# Patient Record
Sex: MALE | Race: WHITE | NOT HISPANIC OR LATINO | Employment: OTHER | URBAN - METROPOLITAN AREA
[De-identification: names, ages, dates, MRNs, and addresses within clinical notes are randomized per-mention and may not be internally consistent; named-entity substitution may affect disease eponyms.]

---

## 2022-01-18 ENCOUNTER — TRANSCRIBE ORDERS (OUTPATIENT)
Dept: ADMINISTRATIVE | Facility: HOSPITAL | Age: 67
End: 2022-01-18

## 2022-01-18 ENCOUNTER — LAB (OUTPATIENT)
Dept: LAB | Facility: HOSPITAL | Age: 67
End: 2022-01-18

## 2022-01-18 DIAGNOSIS — R97.20 ELEVATED PROSTATE SPECIFIC ANTIGEN (PSA): ICD-10-CM

## 2022-01-18 DIAGNOSIS — R97.20 ELEVATED PROSTATE SPECIFIC ANTIGEN (PSA): Primary | ICD-10-CM

## 2022-01-18 PROCEDURE — 36415 COLL VENOUS BLD VENIPUNCTURE: CPT

## 2022-01-18 PROCEDURE — 84153 ASSAY OF PSA TOTAL: CPT

## 2022-01-18 PROCEDURE — 84154 ASSAY OF PSA FREE: CPT

## 2022-01-19 LAB
PSA FREE MFR SERPL: 30 %
PSA FREE SERPL-MCNC: 1.47 NG/ML
PSA SERPL-MCNC: 4.9 NG/ML (ref 0–4)

## 2022-07-18 ENCOUNTER — LAB (OUTPATIENT)
Dept: LAB | Facility: HOSPITAL | Age: 67
End: 2022-07-18

## 2022-07-18 ENCOUNTER — TRANSCRIBE ORDERS (OUTPATIENT)
Dept: ADMINISTRATIVE | Facility: HOSPITAL | Age: 67
End: 2022-07-18

## 2022-07-18 DIAGNOSIS — R97.20 ELEVATED PROSTATE SPECIFIC ANTIGEN (PSA): ICD-10-CM

## 2022-07-18 DIAGNOSIS — R97.20 ELEVATED PROSTATE SPECIFIC ANTIGEN (PSA): Primary | ICD-10-CM

## 2022-07-18 LAB — PSA SERPL-MCNC: 3.64 NG/ML (ref 0–4)

## 2022-07-18 PROCEDURE — G0103 PSA SCREENING: HCPCS

## 2022-07-18 PROCEDURE — 36415 COLL VENOUS BLD VENIPUNCTURE: CPT

## 2023-01-16 ENCOUNTER — LAB (OUTPATIENT)
Dept: LAB | Facility: HOSPITAL | Age: 68
End: 2023-01-16
Payer: MEDICARE

## 2023-01-16 ENCOUNTER — TRANSCRIBE ORDERS (OUTPATIENT)
Dept: ADMINISTRATIVE | Facility: HOSPITAL | Age: 68
End: 2023-01-16
Payer: MEDICARE

## 2023-01-16 DIAGNOSIS — R97.20 ELEVATED PROSTATE SPECIFIC ANTIGEN (PSA): Primary | ICD-10-CM

## 2023-01-16 DIAGNOSIS — R97.20 ELEVATED PROSTATE SPECIFIC ANTIGEN (PSA): ICD-10-CM

## 2023-01-16 PROCEDURE — 84153 ASSAY OF PSA TOTAL: CPT

## 2023-01-16 PROCEDURE — 84154 ASSAY OF PSA FREE: CPT

## 2023-01-16 PROCEDURE — 36415 COLL VENOUS BLD VENIPUNCTURE: CPT

## 2023-01-17 LAB
PSA FREE MFR SERPL: 30.2 %
PSA FREE SERPL-MCNC: 1.42 NG/ML
PSA SERPL-MCNC: 4.7 NG/ML (ref 0–4)

## 2023-04-03 ENCOUNTER — TRANSCRIBE ORDERS (OUTPATIENT)
Dept: ADMINISTRATIVE | Facility: HOSPITAL | Age: 68
End: 2023-04-03
Payer: MEDICARE

## 2023-04-03 ENCOUNTER — LAB (OUTPATIENT)
Dept: LAB | Facility: HOSPITAL | Age: 68
End: 2023-04-03
Payer: MEDICARE

## 2023-04-03 DIAGNOSIS — R97.20 ELEVATED PROSTATE SPECIFIC ANTIGEN (PSA): Primary | ICD-10-CM

## 2023-04-03 DIAGNOSIS — R97.20 ELEVATED PROSTATE SPECIFIC ANTIGEN (PSA): ICD-10-CM

## 2023-04-03 LAB — PSA SERPL-MCNC: 4.88 NG/ML (ref 0–4)

## 2023-04-03 PROCEDURE — 84153 ASSAY OF PSA TOTAL: CPT

## 2023-04-03 PROCEDURE — 36415 COLL VENOUS BLD VENIPUNCTURE: CPT

## 2023-07-31 ENCOUNTER — HOSPITAL ENCOUNTER (OUTPATIENT)
Dept: CT IMAGING | Facility: HOSPITAL | Age: 68
Discharge: HOME | End: 2023-07-31
Admitting: INTERNAL MEDICINE
Payer: MEDICARE

## 2023-07-31 DIAGNOSIS — Z87.891 PERSONAL HISTORY OF NICOTINE DEPENDENCE: ICD-10-CM

## 2023-07-31 PROCEDURE — 71271 CT THORAX LUNG CANCER SCR C-: CPT

## 2023-08-18 ENCOUNTER — TRANSCRIBE ORDERS (OUTPATIENT)
Dept: ADMINISTRATIVE | Facility: HOSPITAL | Age: 68
End: 2023-08-18
Payer: MEDICARE

## 2023-08-18 DIAGNOSIS — R91.8 LUNG MASS: Primary | ICD-10-CM

## 2023-08-25 ENCOUNTER — HOSPITAL ENCOUNTER (OUTPATIENT)
Dept: CT IMAGING | Facility: HOSPITAL | Age: 68
Discharge: HOME | End: 2023-08-25
Admitting: NURSE PRACTITIONER
Payer: MEDICARE

## 2023-08-25 DIAGNOSIS — R91.8 LUNG MASS: ICD-10-CM

## 2023-08-25 LAB — CREAT BLDA-MCNC: 0.9 MG/DL (ref 0.6–1.3)

## 2023-08-25 PROCEDURE — 71260 CT THORAX DX C+: CPT

## 2023-08-25 PROCEDURE — 82565 ASSAY OF CREATININE: CPT

## 2023-08-25 PROCEDURE — 25510000001 IOPAMIDOL 61 % SOLUTION: Performed by: NURSE PRACTITIONER

## 2023-08-25 RX ADMIN — IOPAMIDOL 100 ML: 612 INJECTION, SOLUTION INTRAVENOUS at 13:09

## 2023-08-31 ENCOUNTER — PREP FOR SURGERY (OUTPATIENT)
Dept: OTHER | Facility: HOSPITAL | Age: 68
End: 2023-08-31
Payer: MEDICARE

## 2023-08-31 ENCOUNTER — OFFICE VISIT (OUTPATIENT)
Dept: OTHER | Facility: HOSPITAL | Age: 68
End: 2023-08-31
Payer: MEDICARE

## 2023-08-31 VITALS
OXYGEN SATURATION: 93 % | HEIGHT: 72 IN | BODY MASS INDEX: 22.75 KG/M2 | WEIGHT: 168 LBS | DIASTOLIC BLOOD PRESSURE: 77 MMHG | SYSTOLIC BLOOD PRESSURE: 152 MMHG | HEART RATE: 70 BPM

## 2023-08-31 DIAGNOSIS — R91.8 MASS OF LEFT LUNG: Primary | ICD-10-CM

## 2023-08-31 RX ORDER — FLUTICASONE PROPIONATE 50 MCG
2 SPRAY, SUSPENSION (ML) NASAL DAILY PRN
COMMUNITY

## 2023-08-31 RX ORDER — PRAVASTATIN SODIUM 20 MG
1 TABLET ORAL NIGHTLY
COMMUNITY
Start: 2023-08-21

## 2023-08-31 NOTE — H&P (VIEW-ONLY)
"  THORACIC SURGERY CLINIC CONSULT NOTE    REASON FOR CONSULT: Left lower lobe mass    REFERRING PROVIDER: Hosea Abernathy MD    Subjective   HISTORY OF PRESENTING ILLNESS:   Sunny Hung is a 68 y.o. male who has significant medical problems as mentioned in the medical chart.     His primary care physician recommended that he have a CT scan performed because he used to smoke from age 19 or 20 and quit several years ago. He used to smoke 1 pack of cigarettes daily.  CT chest was performed on 7/31/2023 which reported large area of soft tissue density in the medial left lower lobe extending from the left hilum inferiorly. Repeat CT chest on 8/25/2023 reported large left infrahilar medial mass measuring 8 x 4 cm in size.  He was referred to multidisciplinary clinic for further evaluation.    He denied any chest pain.  He reported coughing when he lays down or first thing in the morning and it is occasionally a productive cough; the coughing is a new development that has lasted for the last 3 or 4 months. He was placed on Xyzal and Mucinex was recommended. He was not treated with any antibiotics and is unsure if he is losing weight. He denies dyspnea with talking or sitting and is sure that he can walk a block without getting short of breath. He is still regularly active and maintains his home and property himself. He had a \"knot\" excised from his left breast in the past, but no open chest surgery. He denies any cardiac, hepatic, or kidney issues and has not had any cancer. He is currently taking cholesterol medication.    ECOG 0    Past Medical History:   Diagnosis Date    BPH (benign prostatic hyperplasia)     Bruises easily     Bursitis of right shoulder     Cough     Hyperlipidemia     Mass of lower lobe of left lung        Past Surgical History:   Procedure Laterality Date    GANGLION CYST EXCISION Bilateral     HEMORRHOIDECTOMY      OTHER SURGICAL HISTORY      SOMETHING REMOVED FROM LEFT CHEST, BENIGN ? LIPOMA " "      Family History   Problem Relation Age of Onset    Malig Hyperthermia Neg Hx        Social History     Socioeconomic History    Marital status:    Tobacco Use    Smoking status: Former     Packs/day: 0.50     Years: 40.00     Pack years: 20.00     Types: Cigarettes     Quit date:      Years since quittin.6    Smokeless tobacco: Never   Vaping Use    Vaping Use: Never used   Substance and Sexual Activity    Alcohol use: Not Currently     Comment: VERY RARELY, MAYBE 1 BEER    Drug use: Never    Sexual activity: Defer       No current facility-administered medications for this visit.  No current outpatient medications on file.    Facility-Administered Medications Ordered in Other Visits:     dexAMETHasone sodium phosphate injection, , Intravenous, PRN, Axel Coon MD, 4 mg at 23 0825    glycopyrrolate (ROBINUL) injection, , Intravenous, PRN, Axel Coon MD, 0.2 mg at 23 0825    lactated ringers infusion, 30 mL/hr, Intravenous, Continuous PRN, Rosalio Scott MD, Last Rate: 30 mL/hr at 23 0800, Restarted at 23 0801    lidocaine (XYLOCAINE) 2% injection, , Injection, PRN, Axel Coon MD, 100 mg at 23 0814    ondansetron (ZOFRAN) injection, , Intravenous, PRN, Axel Coon MD, 4 mg at 23 0844    Propofol (DIPRIVAN) injection, , Intravenous, PRN, Axel Coon MD, 200 mg at 23 0814    rocuronium (ZEMURON) injection, , Intravenous, PRN, Axel Coon MD, 40 mg at 23 0814    sugammadex (BRIDION) injection, , Intravenous, PRN, Axel Coon MD, 400 mg at 23 0853     Allergies   Allergen Reactions    Amoxicillin-Pot Clavulanate Hives             Objective    OBJECTIVE:     VITAL SIGNS:  /77   Pulse 70   Ht 182.9 cm (72\")   Wt 76.2 kg (168 lb)   SpO2 93%   BMI 22.78 kg/m²     PHYSICAL EXAM:  Constitutional:       Appearance: Normal appearance.   HENT:      Head: Normocephalic.      " Right Ear: External ear normal.      Left Ear: External ear normal.      Nose: Nose normal.      Mouth/Throat: No obvious deformity     Pharynx: Oropharynx is clear.   Eyes:      Conjunctiva/sclera: Conjunctivae normal.   Cardiovascular:      Rate and Rhythm: Normal rate.      Pulses: Normal pulses.   Pulmonary:      Effort: Pulmonary effort is normal.   Abdominal:      Palpations: Abdomen is soft.   Musculoskeletal:         General: Normal range of motion.      Cervical back: Normal range of motion.   Skin:     General: Skin is warm.   Neurological:      General: No focal deficit present.      Mental Status: He is alert and oriented to person, place, and time.     LAB RESULTS:  I have reviewed all the available laboratory results in the chart.    RESULTS REVIEW:  I have reviewed the patient's all relevant laboratory and imaging findings.     CT Chest With Contrast Diagnostic (08/25/2023 13:15)        ASSESSMENT & PLAN:  Sunny Hung is a 68 y.o. male with significant medical conditions as mentioned above presented to my clinic.    Diagnosis: Left lower lobe 4 x 8 cm mass  Staging: Undetermined    An 8 cm mass is found in the left lower lung and a PET CT scan, an MRI of the brain, and a biopsy with a lung function test are recommended to further evaluate. The risks and benefits were explained, and all future planning will depend on the results of these tests.     I discussed the patients findings and my recommendations with the patient. The patient was given adequate time to ask questions and all questions were answered to patient satisfaction. Thank you for this consult and allowing us to participate in the care of your patient.      Rosalio Scott MD  Thoracic Surgeon  [facility]        Dictated utilizing Dragon dictation    I spent 60 minutes caring for Sunny on this date of service. This time includes time spent by me in the following activities:preparing for the visit, reviewing tests,  obtaining and/or reviewing a separately obtained history, performing a medically appropriate examination and/or evaluation , counseling and educating the patient/family/caregiver, ordering medications, tests, or procedures, referring and communicating with other health care professionals , documenting information in the medical record, independently interpreting results and communicating that information with the patient/family/caregiver, and care coordination and more than half the time was spent in direct face to face evaluation and decision making.     Transcribed from ambient dictation for Rosalio Scott MD by Maritza Castaneda.  08/31/23   14:36 EDT    Patient or patient representative verbalized consent to the visit recording.  I have personally performed the services described in this document as transcribed by the above individual, and it is both accurate and complete.

## 2023-08-31 NOTE — PROGRESS NOTES
"  THORACIC SURGERY CLINIC CONSULT NOTE    REASON FOR CONSULT: Left lower lobe mass    REFERRING PROVIDER: Hosea Abernathy MD    Subjective   HISTORY OF PRESENTING ILLNESS:   Sunny Hung is a 68 y.o. male who has significant medical problems as mentioned in the medical chart.     His primary care physician recommended that he have a CT scan performed because he used to smoke from age 19 or 20 and quit several years ago. He used to smoke 1 pack of cigarettes daily.  CT chest was performed on 7/31/2023 which reported large area of soft tissue density in the medial left lower lobe extending from the left hilum inferiorly. Repeat CT chest on 8/25/2023 reported large left infrahilar medial mass measuring 8 x 4 cm in size.  He was referred to multidisciplinary clinic for further evaluation.    He denied any chest pain.  He reported coughing when he lays down or first thing in the morning and it is occasionally a productive cough; the coughing is a new development that has lasted for the last 3 or 4 months. He was placed on Xyzal and Mucinex was recommended. He was not treated with any antibiotics and is unsure if he is losing weight. He denies dyspnea with talking or sitting and is sure that he can walk a block without getting short of breath. He is still regularly active and maintains his home and property himself. He had a \"knot\" excised from his left breast in the past, but no open chest surgery. He denies any cardiac, hepatic, or kidney issues and has not had any cancer. He is currently taking cholesterol medication.    ECOG 0    Past Medical History:   Diagnosis Date    BPH (benign prostatic hyperplasia)     Bruises easily     Bursitis of right shoulder     Cough     Hyperlipidemia     Mass of lower lobe of left lung        Past Surgical History:   Procedure Laterality Date    GANGLION CYST EXCISION Bilateral     HEMORRHOIDECTOMY      OTHER SURGICAL HISTORY      SOMETHING REMOVED FROM LEFT CHEST, BENIGN ? LIPOMA " "      Family History   Problem Relation Age of Onset    Malig Hyperthermia Neg Hx        Social History     Socioeconomic History    Marital status:    Tobacco Use    Smoking status: Former     Packs/day: 0.50     Years: 40.00     Pack years: 20.00     Types: Cigarettes     Quit date:      Years since quittin.6    Smokeless tobacco: Never   Vaping Use    Vaping Use: Never used   Substance and Sexual Activity    Alcohol use: Not Currently     Comment: VERY RARELY, MAYBE 1 BEER    Drug use: Never    Sexual activity: Defer       No current facility-administered medications for this visit.  No current outpatient medications on file.    Facility-Administered Medications Ordered in Other Visits:     dexAMETHasone sodium phosphate injection, , Intravenous, PRN, Axel Coon MD, 4 mg at 23 0825    glycopyrrolate (ROBINUL) injection, , Intravenous, PRN, Axel Coon MD, 0.2 mg at 23 0825    lactated ringers infusion, 30 mL/hr, Intravenous, Continuous PRN, Rosalio Scott MD, Last Rate: 30 mL/hr at 23 0800, Restarted at 23 0801    lidocaine (XYLOCAINE) 2% injection, , Injection, PRN, Axel Coon MD, 100 mg at 23 0814    ondansetron (ZOFRAN) injection, , Intravenous, PRN, Axel Coon MD, 4 mg at 23 0844    Propofol (DIPRIVAN) injection, , Intravenous, PRN, Axel Coon MD, 200 mg at 23 0814    rocuronium (ZEMURON) injection, , Intravenous, PRN, Axel Coon MD, 40 mg at 23 0814    sugammadex (BRIDION) injection, , Intravenous, PRN, Axel Coon MD, 400 mg at 23 0853     Allergies   Allergen Reactions    Amoxicillin-Pot Clavulanate Hives             Objective    OBJECTIVE:     VITAL SIGNS:  /77   Pulse 70   Ht 182.9 cm (72\")   Wt 76.2 kg (168 lb)   SpO2 93%   BMI 22.78 kg/m²     PHYSICAL EXAM:  Constitutional:       Appearance: Normal appearance.   HENT:      Head: Normocephalic.      " Right Ear: External ear normal.      Left Ear: External ear normal.      Nose: Nose normal.      Mouth/Throat: No obvious deformity     Pharynx: Oropharynx is clear.   Eyes:      Conjunctiva/sclera: Conjunctivae normal.   Cardiovascular:      Rate and Rhythm: Normal rate.      Pulses: Normal pulses.   Pulmonary:      Effort: Pulmonary effort is normal.   Abdominal:      Palpations: Abdomen is soft.   Musculoskeletal:         General: Normal range of motion.      Cervical back: Normal range of motion.   Skin:     General: Skin is warm.   Neurological:      General: No focal deficit present.      Mental Status: He is alert and oriented to person, place, and time.     LAB RESULTS:  I have reviewed all the available laboratory results in the chart.    RESULTS REVIEW:  I have reviewed the patient's all relevant laboratory and imaging findings.     CT Chest With Contrast Diagnostic (08/25/2023 13:15)        ASSESSMENT & PLAN:  Sunny Hung is a 68 y.o. male with significant medical conditions as mentioned above presented to my clinic.    Diagnosis: Left lower lobe 4 x 8 cm mass  Staging: Undetermined    An 8 cm mass is found in the left lower lung and a PET CT scan, an MRI of the brain, and a biopsy with a lung function test are recommended to further evaluate. The risks and benefits were explained, and all future planning will depend on the results of these tests.     I discussed the patients findings and my recommendations with the patient. The patient was given adequate time to ask questions and all questions were answered to patient satisfaction. Thank you for this consult and allowing us to participate in the care of your patient.      Rosalio Scott MD  Thoracic Surgeon  [facility] [place]        Dictated utilizing Dragon dictation    I spent 60 minutes caring for Sunny on this date of service. This time includes time spent by me in the following activities:preparing for the visit, reviewing tests,  obtaining and/or reviewing a separately obtained history, performing a medically appropriate examination and/or evaluation , counseling and educating the patient/family/caregiver, ordering medications, tests, or procedures, referring and communicating with other health care professionals , documenting information in the medical record, independently interpreting results and communicating that information with the patient/family/caregiver, and care coordination and more than half the time was spent in direct face to face evaluation and decision making.     Transcribed from ambient dictation for Rosalio Scott MD by Maritza Castaneda.  08/31/23   14:36 EDT    Patient or patient representative verbalized consent to the visit recording.  I have personally performed the services described in this document as transcribed by the above individual, and it is both accurate and complete.

## 2023-09-01 PROBLEM — R91.8 MASS OF LEFT LUNG: Status: ACTIVE | Noted: 2023-09-01

## 2023-09-05 ENCOUNTER — HOSPITAL ENCOUNTER (OUTPATIENT)
Dept: PET IMAGING | Facility: HOSPITAL | Age: 68
Discharge: HOME | End: 2023-09-05
Payer: MEDICARE

## 2023-09-05 ENCOUNTER — PRE-ADMISSION TESTING (OUTPATIENT)
Dept: PREADMISSION TESTING | Facility: HOSPITAL | Age: 68
End: 2023-09-05
Payer: MEDICARE

## 2023-09-05 VITALS
DIASTOLIC BLOOD PRESSURE: 79 MMHG | HEIGHT: 70 IN | WEIGHT: 168.8 LBS | SYSTOLIC BLOOD PRESSURE: 177 MMHG | RESPIRATION RATE: 20 BRPM | TEMPERATURE: 98.3 F | OXYGEN SATURATION: 96 % | BODY MASS INDEX: 24.17 KG/M2 | HEART RATE: 61 BPM

## 2023-09-05 DIAGNOSIS — R91.8 MASS OF LEFT LUNG: ICD-10-CM

## 2023-09-05 LAB
ANION GAP SERPL CALCULATED.3IONS-SCNC: 10.4 MMOL/L (ref 5–15)
BUN SERPL-MCNC: 13 MG/DL (ref 8–23)
BUN/CREAT SERPL: 16.5 (ref 7–25)
CALCIUM SPEC-SCNC: 9.2 MG/DL (ref 8.6–10.5)
CHLORIDE SERPL-SCNC: 102 MMOL/L (ref 98–107)
CO2 SERPL-SCNC: 25.6 MMOL/L (ref 22–29)
CREAT SERPL-MCNC: 0.79 MG/DL (ref 0.76–1.27)
DEPRECATED RDW RBC AUTO: 46.7 FL (ref 37–54)
EGFRCR SERPLBLD CKD-EPI 2021: 97.4 ML/MIN/1.73
ERYTHROCYTE [DISTWIDTH] IN BLOOD BY AUTOMATED COUNT: 15.8 % (ref 12.3–15.4)
GLUCOSE SERPL-MCNC: 80 MG/DL (ref 65–99)
HCT VFR BLD AUTO: 40.2 % (ref 37.5–51)
HGB BLD-MCNC: 12.7 G/DL (ref 13–17.7)
MCH RBC QN AUTO: 25.9 PG (ref 26.6–33)
MCHC RBC AUTO-ENTMCNC: 31.6 G/DL (ref 31.5–35.7)
MCV RBC AUTO: 82 FL (ref 79–97)
PLATELET # BLD AUTO: 294 10*3/MM3 (ref 140–450)
PMV BLD AUTO: 10 FL (ref 6–12)
POTASSIUM SERPL-SCNC: 4 MMOL/L (ref 3.5–5.2)
RBC # BLD AUTO: 4.9 10*6/MM3 (ref 4.14–5.8)
SODIUM SERPL-SCNC: 138 MMOL/L (ref 136–145)
WBC NRBC COR # BLD: 7.68 10*3/MM3 (ref 3.4–10.8)

## 2023-09-05 PROCEDURE — 36415 COLL VENOUS BLD VENIPUNCTURE: CPT

## 2023-09-05 PROCEDURE — 93005 ELECTROCARDIOGRAM TRACING: CPT

## 2023-09-05 PROCEDURE — 80048 BASIC METABOLIC PNL TOTAL CA: CPT

## 2023-09-05 PROCEDURE — 85027 COMPLETE CBC AUTOMATED: CPT

## 2023-09-05 PROCEDURE — 71250 CT THORAX DX C-: CPT

## 2023-09-05 NOTE — DISCHARGE INSTRUCTIONS
Take the following medications the morning of surgery with a small sip of water:  NONE      Arrive to hospital on your day of surgery at 7:00 AM.      If you are on prescription narcotic pain medication to control your pain you may also take that medication the morning of surgery.    General Instructions:  Do not eat or drink anything after midnight the night before surgery.  Infants may have breast milk up to four hours before surgery.  Infants drinking formula may drink formula up to six hours before surgery.   Patients who avoid smoking, chewing tobacco and alcohol for 4 weeks prior to surgery have a reduced risk of post-operative complications.  Quit smoking as many days before surgery as you can.  Do not smoke, use chewing tobacco or drink alcohol the day of surgery.   If applicable bring your C-PAP/ BI-PAP machine in with you to preop day of surgery.  Bring any papers given to you in the doctor’s office.  Wear clean comfortable clothes.  Do not wear contact lenses, false eyelashes or make-up.  Bring a case for your glasses.   Bring crutches or walker if applicable.  Remove all piercings.  Leave jewelry and any other valuables at home.  Hair extensions with metal clips must be removed prior to surgery.  The Pre-Admission Testing nurse will instruct you to bring medications if unable to obtain an accurate list in Pre-Admission Testing.        If you were given a blood bank ID arm band remember to bring it with you the day of surgery.    Preventing a Surgical Site Infection:  For 2 to 3 days before surgery, avoid shaving with a razor because the razor can irritate skin and make it easier to develop an infection.    Any areas of open skin can increase the risk of a post-operative wound infection by allowing bacteria to enter and travel throughout the body.  Notify your surgeon if you have any skin wounds / rashes even if it is not near the expected surgical site.  The area will need assessed to determine if  surgery should be delayed until it is healed.  The night prior to surgery shower using a fresh bar of anti-bacterial soap (such as Dial) and clean washcloth.  Sleep in a clean bed with clean clothing.  Do not allow pets to sleep with you.  Shower on the morning of surgery using a fresh bar of anti-bacterial soap (such as Dial) and clean washcloth.  Dry with a clean towel and dress in clean clothing.  Ask your surgeon if you will be receiving antibiotics prior to surgery.  Make sure you, your family, and all healthcare providers clean their hands with soap and water or an alcohol based hand  before caring for you or your wound.    Day of surgery:  Your arrival time is approximately two hours before your scheduled surgery time.  Upon arrival, a Pre-op nurse and Anesthesiologist will review your health history, obtain vital signs, and answer questions you may have.  The only belongings needed at this time will be your home medications and if applicable your C-PAP/BI-PAP machine.  A Pre-op nurse will start an IV and you may receive medication in preparation for surgery, including something to help you relax.      Please be aware that surgery does come with discomfort.  We want to make every effort to control your discomfort so please discuss any uncontrolled symptoms with your nurse.   Your doctor will most likely have prescribed pain medications.      If you are going home after surgery you will receive individualized written care instructions before being discharged.  A responsible adult must drive you to and from the hospital on the day of your surgery and stay with you for 24 hours.  Discharge prescriptions can be filled by the hospital pharmacy during regular pharmacy hours.  If you are having surgery late in the day/evening your prescription may be e-prescribed to your pharmacy.  Please verify your pharmacy hours or chose a 24 hour pharmacy to avoid not having access to your prescription because your  pharmacy has closed for the day.    If you are staying overnight following surgery, you will be transported to your hospital room following the recovery period.  [facility] has all private rooms.    If you have any questions please call Pre-Admission Testing at [phone].  Deductibles and co-payments are collected on the day of service. Please be prepared to pay the required co-pay, deductible or deposit on the day of service as defined by your plan.    Call your surgeon immediately if you experience any of the following symptoms:  Sore Throat  Shortness of Breath or difficulty breathing  Cough  Chills  Body soreness or muscle pain  Headache  Fever  New loss of taste or smell  Do not arrive for your surgery ill.  Your procedure will need to be rescheduled to another time.  You will need to call your physician before the day of surgery to avoid any unnecessary exposure to hospital staff as well as other patients.

## 2023-09-06 ENCOUNTER — APPOINTMENT (OUTPATIENT)
Dept: GENERAL RADIOLOGY | Facility: HOSPITAL | Age: 68
End: 2023-09-06
Payer: MEDICARE

## 2023-09-06 ENCOUNTER — ANESTHESIA (OUTPATIENT)
Dept: GASTROENTEROLOGY | Facility: HOSPITAL | Age: 68
End: 2023-09-06
Payer: MEDICARE

## 2023-09-06 ENCOUNTER — ANESTHESIA EVENT (OUTPATIENT)
Dept: GASTROENTEROLOGY | Facility: HOSPITAL | Age: 68
End: 2023-09-06
Payer: MEDICARE

## 2023-09-06 ENCOUNTER — HOSPITAL ENCOUNTER (OUTPATIENT)
Facility: HOSPITAL | Age: 68
Setting detail: HOSPITAL OUTPATIENT SURGERY
Discharge: HOME | End: 2023-09-06
Attending: SURGERY | Admitting: SURGERY
Payer: MEDICARE

## 2023-09-06 VITALS
TEMPERATURE: 98 F | HEART RATE: 80 BPM | OXYGEN SATURATION: 95 % | BODY MASS INDEX: 22.44 KG/M2 | DIASTOLIC BLOOD PRESSURE: 85 MMHG | RESPIRATION RATE: 18 BRPM | HEIGHT: 72 IN | SYSTOLIC BLOOD PRESSURE: 160 MMHG | WEIGHT: 165.7 LBS

## 2023-09-06 DIAGNOSIS — R91.8 MASS OF LEFT LUNG: ICD-10-CM

## 2023-09-06 DIAGNOSIS — R91.8 MASS OF LEFT LUNG: Primary | ICD-10-CM

## 2023-09-06 LAB
QT INTERVAL: 392 MS
QTC INTERVAL: 389 MS

## 2023-09-06 PROCEDURE — 88333 PATH CONSLTJ SURG CYTO XM 1: CPT | Performed by: SURGERY

## 2023-09-06 PROCEDURE — 25010000002 DEXAMETHASONE SODIUM PHOSPHATE 20 MG/5ML SOLUTION: Performed by: ANESTHESIOLOGY

## 2023-09-06 PROCEDURE — 71045 X-RAY EXAM CHEST 1 VIEW: CPT

## 2023-09-06 PROCEDURE — 25010000002 SUGAMMADEX 200 MG/2ML SOLUTION: Performed by: ANESTHESIOLOGY

## 2023-09-06 PROCEDURE — C1726 CATH, BAL DIL, NON-VASCULAR: HCPCS | Performed by: SURGERY

## 2023-09-06 PROCEDURE — 25010000002 PROPOFOL 10 MG/ML EMULSION: Performed by: ANESTHESIOLOGY

## 2023-09-06 PROCEDURE — 94640 AIRWAY INHALATION TREATMENT: CPT

## 2023-09-06 PROCEDURE — 94799 UNLISTED PULMONARY SVC/PX: CPT

## 2023-09-06 PROCEDURE — 88342 IMHCHEM/IMCYTCHM 1ST ANTB: CPT | Performed by: SURGERY

## 2023-09-06 PROCEDURE — 88112 CYTOPATH CELL ENHANCE TECH: CPT | Performed by: SURGERY

## 2023-09-06 PROCEDURE — 94761 N-INVAS EAR/PLS OXIMETRY MLT: CPT

## 2023-09-06 PROCEDURE — 76000 FLUOROSCOPY <1 HR PHYS/QHP: CPT

## 2023-09-06 PROCEDURE — 88172 CYTP DX EVAL FNA 1ST EA SITE: CPT | Performed by: SURGERY

## 2023-09-06 PROCEDURE — 88341 IMHCHEM/IMCYTCHM EA ADD ANTB: CPT | Performed by: SURGERY

## 2023-09-06 PROCEDURE — 25010000002 ONDANSETRON PER 1 MG: Performed by: ANESTHESIOLOGY

## 2023-09-06 PROCEDURE — 88173 CYTOPATH EVAL FNA REPORT: CPT | Performed by: SURGERY

## 2023-09-06 PROCEDURE — 88305 TISSUE EXAM BY PATHOLOGIST: CPT | Performed by: SURGERY

## 2023-09-06 RX ORDER — ROCURONIUM BROMIDE 10 MG/ML
INJECTION, SOLUTION INTRAVENOUS AS NEEDED
Status: DISCONTINUED | OUTPATIENT
Start: 2023-09-06 | End: 2023-09-06 | Stop reason: SURG

## 2023-09-06 RX ORDER — ONDANSETRON 2 MG/ML
INJECTION INTRAMUSCULAR; INTRAVENOUS AS NEEDED
Status: DISCONTINUED | OUTPATIENT
Start: 2023-09-06 | End: 2023-09-06 | Stop reason: SURG

## 2023-09-06 RX ORDER — PROPOFOL 10 MG/ML
VIAL (ML) INTRAVENOUS AS NEEDED
Status: DISCONTINUED | OUTPATIENT
Start: 2023-09-06 | End: 2023-09-06 | Stop reason: SURG

## 2023-09-06 RX ORDER — SODIUM CHLORIDE, SODIUM LACTATE, POTASSIUM CHLORIDE, CALCIUM CHLORIDE 600; 310; 30; 20 MG/100ML; MG/100ML; MG/100ML; MG/100ML
30 INJECTION, SOLUTION INTRAVENOUS CONTINUOUS PRN
Status: DISCONTINUED | OUTPATIENT
Start: 2023-09-06 | End: 2023-09-06 | Stop reason: HOSPADM

## 2023-09-06 RX ORDER — GLYCOPYRROLATE 0.2 MG/ML
INJECTION INTRAMUSCULAR; INTRAVENOUS AS NEEDED
Status: DISCONTINUED | OUTPATIENT
Start: 2023-09-06 | End: 2023-09-06 | Stop reason: SURG

## 2023-09-06 RX ORDER — DEXAMETHASONE SODIUM PHOSPHATE 4 MG/ML
INJECTION, SOLUTION INTRA-ARTICULAR; INTRALESIONAL; INTRAMUSCULAR; INTRAVENOUS; SOFT TISSUE AS NEEDED
Status: DISCONTINUED | OUTPATIENT
Start: 2023-09-06 | End: 2023-09-06 | Stop reason: SURG

## 2023-09-06 RX ORDER — IPRATROPIUM BROMIDE AND ALBUTEROL SULFATE 2.5; .5 MG/3ML; MG/3ML
3 SOLUTION RESPIRATORY (INHALATION) ONCE
Status: COMPLETED | OUTPATIENT
Start: 2023-09-06 | End: 2023-09-06

## 2023-09-06 RX ORDER — LIDOCAINE HYDROCHLORIDE 20 MG/ML
INJECTION, SOLUTION INFILTRATION; PERINEURAL AS NEEDED
Status: DISCONTINUED | OUTPATIENT
Start: 2023-09-06 | End: 2023-09-06 | Stop reason: SURG

## 2023-09-06 RX ADMIN — PROPOFOL 200 MCG/KG/MIN: 10 INJECTION, EMULSION INTRAVENOUS at 08:14

## 2023-09-06 RX ADMIN — GLYCOPYRROLATE 0.2 MG: 0.2 INJECTION INTRAMUSCULAR; INTRAVENOUS at 08:25

## 2023-09-06 RX ADMIN — DEXAMETHASONE SODIUM PHOSPHATE 4 MG: 4 INJECTION, SOLUTION INTRAMUSCULAR; INTRAVENOUS at 08:25

## 2023-09-06 RX ADMIN — LIDOCAINE HYDROCHLORIDE 100 MG: 20 INJECTION, SOLUTION INFILTRATION; PERINEURAL at 08:14

## 2023-09-06 RX ADMIN — ONDANSETRON 4 MG: 2 INJECTION INTRAMUSCULAR; INTRAVENOUS at 08:44

## 2023-09-06 RX ADMIN — SUGAMMADEX 400 MG: 100 INJECTION, SOLUTION INTRAVENOUS at 08:53

## 2023-09-06 RX ADMIN — ROCURONIUM BROMIDE 40 MG: 10 INJECTION, SOLUTION INTRAVENOUS at 08:14

## 2023-09-06 RX ADMIN — IPRATROPIUM BROMIDE AND ALBUTEROL SULFATE 3 ML: .5; 3 SOLUTION RESPIRATORY (INHALATION) at 09:32

## 2023-09-06 RX ADMIN — SODIUM CHLORIDE, POTASSIUM CHLORIDE, SODIUM LACTATE AND CALCIUM CHLORIDE 30 ML/HR: 600; 310; 30; 20 INJECTION, SOLUTION INTRAVENOUS at 07:51

## 2023-09-06 RX ADMIN — PROPOFOL 200 MG: 10 INJECTION, EMULSION INTRAVENOUS at 08:14

## 2023-09-06 NOTE — DISCHARGE INSTRUCTIONS
Endoscopic ultrasound and Robotic bronchoscopy with fine needle aspiration  Samples (biopsies) of the lymph nodes are taken from inside the lungs and sent for diagnostic testing.  Final results of your biopsy take up to 5 business days to process; your endoscopic physician will contact you with your results.  EBUS and robotic bronchoscopy is recommended in the following instances:  To diagnose different types of lung disorders (such as sarcoidosis or tuberculosis)  To diagnose or 'stage' cancer   To investigate enlarged lymph  nodes in the chest  Due to effects of sedation, do not drive or operate heavy machinery for 24 hours.  Avoid heavy lifting (>10 lbs.) or strenuous activity for 48 hours.  Continue to avoid non-steroidal anti-inflammatory medications (NSAIDS) for 5-7 days after the procedure unless told otherwise by your endoscopic and primary care physicians.  Some patients have a temporary sore throat after the procedure; this is a normal finding and over-the-counter lozenges help soothe symptoms.  You may resume normal diet once you are discharged.   Avoid red-colored foods for 24 hours.   You may resume your blood thinner medications (if applicable) as directed by your endoscopic and primary care physicians.  It is normal to have a very small amount of blood-tinged sputum immediately after the procedure. This should resolve within a few hours.  Although complications are rare, there is a small risk of pain, collapsed lung, bleeding and infection. Contact your endoscopic physician if you have these signs or symptoms (Dr. Scott):  Temperature greater than 102°F  Worsening pain not relieved by medications  Go to your nearest emergency room is you experience:  Shaking, chills or a temperature over 102°F.    New, sudden difficulty breathing.    New pain when taking a deep breath.    New, sudden chest pain.  Worsening cough that produces large amounts of blood.      For the next 24 hours patient needs to be with a  responsible adult.    For 24 hours DO NOT drive, operate machinery, appliances, drink alcohol, make important decisions or sign legal documents.    Start with a light or bland diet if you are feeling sick to your stomach otherwise advance to regular diet as tolerated.    Follow recommendations on procedure report if provided by your doctor.    Call Dr Scott [phone]    Problems may include but not limited to: large amounts of bleeding, trouble breathing, repeated vomiting, severe unrelieved pain, fever or chills.

## 2023-09-06 NOTE — ANESTHESIA PREPROCEDURE EVALUATION
Anesthesia Evaluation     Patient summary reviewed and Nursing notes reviewed   NPO Solid Status: > 8 hours             Airway   Mallampati: I  TM distance: >3 FB  Neck ROM: full  No difficulty expected  Dental - normal exam     Pulmonary - normal exam   (+) a smoker Former,  Cardiovascular - normal exam  Exercise tolerance: good (4-7 METS)    (+) hyperlipidemia      Neuro/Psych- negative ROS  GI/Hepatic/Renal/Endo - negative ROS     Musculoskeletal (-) negative ROS    Abdominal    Substance History - negative use     OB/GYN negative ob/gyn ROS         Other                      Anesthesia Plan    ASA 2     general     intravenous induction     Anesthetic plan, risks, benefits, and alternatives have been provided, discussed and informed consent has been obtained with: patient.  Pre-procedure education provided    CODE STATUS:

## 2023-09-06 NOTE — OP NOTE
Operative Note     Date of procedure: 9/6/2023     Patient name: Sunny Hung  MRN: [id number]    Pre OP diagnosis:  Left lower lobe mass.  History of tobacco abuse.  Hyperlipidemia.  Chronic cough.  Benign prostatic hyperplasia.    Post OP diagnosis:  Left lower lobe non-small cell lung cancer.  History of tobacco abuse.  Hyperlipidemia.  Chronic cough.  Benign prostatic hyperplasia.    Procedure performed:   Flexible bronchoscopy.  Navigational bronchoscopy with ION robot with C-arm.  Interpretation of fluoroscopy images.  Radial endobronchial ultrasound.  Fine-needle aspiration of the left lower lobe mass.  Cold forcep biopsy of left lower lobe mass.  Left lower lobe bronchoalveolar lavage.  Endobronchial ultrasound for mediastinal staging.  Fine-needle aspiration of level 7 lymph node.  Fine-needle aspiration of level 4R lymph node.    ION Synoptic report:  Date of radiological diagnosis: 7/31/2023  Lesions biopsied: Single  Tumor size: 80 mm  Location: Left Lower Lobe  Peripheral 1/3: No  Appearance: Solid  PET avidity: Yes  Bronchial sign: Yes  Prior biopsy: No  Radial EBUS: Concentric  Tools utilized: 21-Gauge Needle, cold forcep biopsy  MANUEL result: Non-small cell lung cancer  EBUS performed: Yes    Indications:   Sunny Hung is a 68-year-old male who has significant medical problems as mentioned in the medical chart.      His primary care physician recommended that he have a CT scan performed because he used to smoke from age 19 or 20 and quit several years ago. He used to smoke 1 pack of cigarettes daily.  CT chest was performed on 7/31/2023 which reported large area of soft tissue density in the medial left lower lobe extending from the left hilum inferiorly. Repeat CT chest on 8/25/2023 reported large left infrahilar medial mass measuring 8 x 4 cm in size.  He was referred to multidisciplinary clinic for further evaluation.     He denied any chest pain.  He reported coughing when he lays down or first  "thing in the morning and it is occasionally a productive cough; the coughing is a new development that has lasted for the last 3 or 4 months. He was placed on Xyzal and Mucinex was recommended. He was not treated with any antibiotics and is unsure if he is losing weight. He denies dyspnea with talking or sitting and is sure that he can walk a block without getting short of breath. He is still regularly active and maintains his home and property himself. He had a \"knot\" excised from his left breast in the past, but no open chest surgery. He denies any cardiac, hepatic, or kidney issues and has not had any cancer.     Based on size, appearance and history of tobacco abuse, primary lung malignancy was high on my differential.  Therefore I recommended ION robotic navigational bronchoscopy to establish diagnosis and mediastinal staging.       Surgeon: Rosalio Scott MD     Assistants: No qualified assistant was available for this procedure.      Anesthesia: General endotracheal anesthesia    ASA Class: 3    Procedure Details   On 9/6/2023, the patient was brought to the operating room and placed in the supine position on the operating room table. Following an uneventful induction of general anesthesia, patient was intubated with a single endotracheal tube without incident.  Antibiotic for surgical prophylaxis was not indicated due to the nature of the procedure.  Prior to beginning the operation, a time-out was conducted with all members of surgical team present. The patient was identified as .name, the procedure and the correct site were verified.     I began by performing flexible bronchoscopy.  A flexible adult bronchoscope was advanced through the endotracheal tube.  A complete examination of the distal trachea and bilateral mainstem and lobar bronchi and all segmental bronchial orifices was performed.  The patient has normal endobronchial anatomy.  All the tracheobronchial tree had moderate mucus secretion.  There " was no blood, endoluminal lesions or other abnormal findings.  The bronchoscope was removed.    Prior to the procedure, the patient CT scan was integrated into the PlanPoint interface that generated the patient's 3D airway tree.  The target nodule was identified and its anatomical borders were mapped.  A path to the target nodule was generated through the PlanPoint interface.  After the plan was finalized, the patient image and plan guide was transferred to the Direct Access Software robotic platform.  Using the Ion's controller, the 3.5 mm robotic catheter was advanced with the Ion's vision probe and navigated to the target along the preplanned path.  The catheter was parked next to the target lesion.  The Ion vision probe was removed and radial EBUS was used to confirm the presence of the target lesion.  The radial EBUS was removed and under fluoroscopic guidance, a 21 gauge Ion’s Flexision needle was passed into the robotic catheter.  The needle was advanced into the target lesion and the location of the needle was confirmed with the C-arm. Multiple passes of the needle were made into the target lesion and the aspirate was sent for Rapid on Site Evaluation (MANUEL). The needle was removed. I then performed cold forcep biopsy through the working channel in the same location. The bronchioloalveolar lavage was performed in the segmental bronchi.  The aspirate was sent for cytology.  The robotic catheter was removed and an adult flexible bronchoscope was inserted.  There was no pooling of blood into the bronchial tree.  All tracheobronchial tree were cleared from secretions.    The flexible bronchoscope with the Olympus endobronchial ultrasound was inserted.  The level 7 lymph node was identified that measured 15 mm.  Using 21-gauge needle endobronchial Olympus needle, transbronchial FNA was performed.  Multiple passes with the needle were performed.  The specimen was passed for Rapid onsite evaluation (MANUEL) by the pathologist. The  cytology technician confirmed adequate sample.  The level 4R lymph node was identified and measured 7 mm.  Multiple fine-needle aspiration was performed.  The Olympus endobronchial ultrasound was removed and adult bronchoscope was inserted. There was no pooling of blood into the bronchial tree.  All tracheobronchial tree were cleared from secretions.    The patient was awakened from anesthesia, was extubated without incident, and was transported to the Post Anesthesia Care Unit in stable condition.    Findings:  The left lower lobe bronchus was completely obstructed by tumor.  Using 21-gauge needle, fine-needle aspiration of the left lower lobe mass was performed.  Cold forcep biopsy of left lower lobe mass was performed.  Bronchoalveolar lavage of the left lower lobe was performed.  The pathologist non-small cell lung cancer in the left lower lobe mass.  15 mm level 7 lymph node was identified and FNA was performed.  7 mm level 4R lymph node was identified and sampled.  No evidence of significant active bleeding at the end of the procedure.    Estimated Blood Loss:  Minimal           Drains: None                 Specimens:   ID Type Source Tests Collected by Time   A (Not marked as sent) : STATION 7 FNA Lymph Node Mediastinum FINE NEEDLE ASPIRATION Rosalio Scott MD 9/6/2023 0841   B (Not marked as sent) : LEFT LOWER LOBE FNA Fine Needle Aspirate Lung, Left Lower Lobe FINE NEEDLE ASPIRATION Rosalio Scott MD 9/6/2023 0842   C (Not marked as sent) : LEFT LOWER LOBE BIOPSIES Tissue Lung, Left Lower Lobe TISSUE PATHOLOGY EXAM Rosalio Scott MD 9/6/2023 0842   D (Not marked as sent) : STATION 4R FNA'S Lymph Node Mediastinum FINE NEEDLE ASPIRATION Rosalio Scott MD 9/6/2023 0844   E (Not marked as sent) : LEFT LOWER LOBE BAL Lavage Lung, Left Lower Lobe NON-GYNECOLOGIC CYTOLOGY Rosalio Scott MD 9/6/2023 0845              Implants: None           Complications: None           Disposition: PACU - hemodynamically stable.            Condition: Stable     Rosalio Scott MD   Thoracic Surgeon  [facility]

## 2023-09-06 NOTE — ANESTHESIA POSTPROCEDURE EVALUATION
Patient: Sunny Hung    Procedure Summary       Date: 09/06/23 Room / Location:  [facility]    Anesthesia Start: 0800 Anesthesia Stop: 0915    Procedure: BRONCHOSCOPY WITH BAL;   ION ROBOT AND ENDOBRONCHIAL ULTRASOUND WITH FNA'S (Chest) Diagnosis:       Mass of left lung      (Mass of left lung [R91.8])    Surgeons: Rosalio Scott MD Provider: Axel Coon MD    Anesthesia Type: general ASA Status: 2            Anesthesia Type: general    Vitals  Vitals Value Taken Time   /85 09/06/23 0954   Temp 36.7 °C (98 °F) 09/06/23 0954   Pulse 80 09/06/23 0954   Resp 18 09/06/23 0954   SpO2 95 % 09/06/23 0954           Post Anesthesia Care and Evaluation    Patient location during evaluation: bedside  Patient participation: complete - patient participated  Level of consciousness: awake  Pain management: adequate    Airway patency: patent  Anesthetic complications: No anesthetic complications  PONV Status: none  Cardiovascular status: acceptable  Respiratory status: acceptable  Hydration status: acceptable  Post Neuraxial Block status: Motor and sensory function returned to baseline

## 2023-09-06 NOTE — ANESTHESIA PROCEDURE NOTES
Airway  Urgency: elective    Date/Time: 9/6/2023 8:16 AM  Airway not difficult    General Information and Staff    Patient location during procedure: OR  Anesthesiologist: Axel Coon MD    Indications and Patient Condition  Indications for airway management: airway protection    Preoxygenated: yes  MILS maintained throughout  Mask difficulty assessment: 1 - vent by mask    Final Airway Details  Final airway type: endotracheal airway      Successful airway: ETT  Cuffed: yes   Successful intubation technique: direct laryngoscopy  Endotracheal tube insertion site: oral  Blade: Ny  Blade size: 3  ETT size (mm): 8.5  Cormack-Lehane Classification: grade I - full view of glottis  Placement verified by: chest auscultation and capnometry   Measured from: lips  ETT/EBT  to lips (cm): 25  Number of attempts at approach: 1  Assessment: lips, teeth, and gum same as pre-op and atraumatic intubation

## 2023-09-08 ENCOUNTER — HOSPITAL ENCOUNTER (OUTPATIENT)
Dept: PET IMAGING | Facility: HOSPITAL | Age: 68
Discharge: HOME | End: 2023-09-08
Payer: MEDICARE

## 2023-09-08 DIAGNOSIS — R91.8 MASS OF LEFT LUNG: ICD-10-CM

## 2023-09-08 LAB
GLUCOSE BLDC GLUCOMTR-MCNC: 90 MG/DL (ref 70–130)
LAB AP CASE REPORT: NORMAL
LAB AP DIAGNOSIS COMMENT: NORMAL
LAB AP SPECIAL STAINS: NORMAL
PATH REPORT.FINAL DX SPEC: NORMAL
PATH REPORT.GROSS SPEC: NORMAL

## 2023-09-08 PROCEDURE — 78815 PET IMAGE W/CT SKULL-THIGH: CPT

## 2023-09-08 PROCEDURE — A9552 F18 FDG: HCPCS | Performed by: SURGERY

## 2023-09-08 PROCEDURE — 0 FLUDEOXYGLUCOSE F18 SOLUTION: Performed by: SURGERY

## 2023-09-08 PROCEDURE — 82948 REAGENT STRIP/BLOOD GLUCOSE: CPT

## 2023-09-08 RX ADMIN — FLUDEOXYGLUCOSE F18 1 DOSE: 300 INJECTION INTRAVENOUS at 11:28

## 2023-09-11 ENCOUNTER — HOSPITAL ENCOUNTER (OUTPATIENT)
Dept: MRI IMAGING | Facility: HOSPITAL | Age: 68
Discharge: HOME | End: 2023-09-11
Payer: MEDICARE

## 2023-09-11 ENCOUNTER — HOSPITAL ENCOUNTER (OUTPATIENT)
Dept: PULMONOLOGY | Facility: HOSPITAL | Age: 68
Discharge: HOME | End: 2023-09-11
Payer: MEDICARE

## 2023-09-11 VITALS — RESPIRATION RATE: 20 BRPM | OXYGEN SATURATION: 98 % | HEART RATE: 68 BPM

## 2023-09-11 DIAGNOSIS — R91.8 MASS OF LEFT LUNG: ICD-10-CM

## 2023-09-11 LAB
CYTO UR: NORMAL
DX PRELIMINARY: NORMAL
LAB AP CASE REPORT: NORMAL
LAB AP DIAGNOSIS COMMENT: NORMAL
LAB AP NON-GYN SPECIMEN ADEQUACY: NORMAL
PATH REPORT.FINAL DX SPEC: NORMAL
PATH REPORT.GROSS SPEC: NORMAL

## 2023-09-11 PROCEDURE — 94726 PLETHYSMOGRAPHY LUNG VOLUMES: CPT

## 2023-09-11 PROCEDURE — A9577 INJ MULTIHANCE: HCPCS | Performed by: SURGERY

## 2023-09-11 PROCEDURE — 94060 EVALUATION OF WHEEZING: CPT

## 2023-09-11 PROCEDURE — 0 GADOBENATE DIMEGLUMINE 529 MG/ML SOLUTION: Performed by: SURGERY

## 2023-09-11 PROCEDURE — 94729 DIFFUSING CAPACITY: CPT

## 2023-09-11 PROCEDURE — 70553 MRI BRAIN STEM W/O & W/DYE: CPT

## 2023-09-11 RX ORDER — ALBUTEROL SULFATE 2.5 MG/3ML
2.5 SOLUTION RESPIRATORY (INHALATION) ONCE
Status: COMPLETED | OUTPATIENT
Start: 2023-09-11 | End: 2023-09-11

## 2023-09-11 RX ADMIN — ALBUTEROL SULFATE 2.5 MG: 2.5 SOLUTION RESPIRATORY (INHALATION) at 08:10

## 2023-09-11 RX ADMIN — GADOBENATE DIMEGLUMINE 15 ML: 529 INJECTION, SOLUTION INTRAVENOUS at 09:59

## 2023-09-13 ENCOUNTER — PATIENT OUTREACH (OUTPATIENT)
Dept: OTHER | Facility: HOSPITAL | Age: 68
End: 2023-09-13
Payer: MEDICARE

## 2023-09-13 DIAGNOSIS — R91.8 MASS OF LEFT LUNG: Primary | ICD-10-CM

## 2023-09-13 LAB
LAB AP CASE REPORT: NORMAL
LAB AP DIAGNOSIS COMMENT: NORMAL
LAB AP SPECIAL STAINS: NORMAL
PATH REPORT.ADDENDUM SPEC: NORMAL
PATH REPORT.FINAL DX SPEC: NORMAL
PATH REPORT.GROSS SPEC: NORMAL

## 2023-09-13 NOTE — PROGRESS NOTES
Called patient, s/w his wife Carmen. Introduced myself.  She stated she had not received the complete biopsy results from Dr. Scott. She is aware that Dr. Scott suspected cancer.  I will reach out to Dr. Scott and ask that he contact them to go over the pathology results.    We discussed recent and upcoming appt with Dr. Dubon. I will call back early next week. Provided her with my name and number; encouraged her to call as needed.

## 2023-09-15 ENCOUNTER — LAB (OUTPATIENT)
Dept: LAB | Facility: HOSPITAL | Age: 68
End: 2023-09-15
Payer: MEDICARE

## 2023-09-15 ENCOUNTER — CONSULT (OUTPATIENT)
Dept: ONCOLOGY | Facility: CLINIC | Age: 68
End: 2023-09-15
Payer: MEDICARE

## 2023-09-15 ENCOUNTER — PREP FOR SURGERY (OUTPATIENT)
Dept: OTHER | Facility: HOSPITAL | Age: 68
End: 2023-09-15
Payer: MEDICARE

## 2023-09-15 ENCOUNTER — TELEPHONE (OUTPATIENT)
Dept: SURGERY | Facility: CLINIC | Age: 68
End: 2023-09-15
Payer: MEDICARE

## 2023-09-15 VITALS
DIASTOLIC BLOOD PRESSURE: 82 MMHG | BODY MASS INDEX: 22.52 KG/M2 | WEIGHT: 166.3 LBS | SYSTOLIC BLOOD PRESSURE: 160 MMHG | HEART RATE: 65 BPM | HEIGHT: 72 IN | TEMPERATURE: 97.7 F | OXYGEN SATURATION: 98 %

## 2023-09-15 DIAGNOSIS — R91.8 MASS OF LEFT LUNG: Primary | ICD-10-CM

## 2023-09-15 PROBLEM — Z79.899 ENCOUNTER FOR LONG-TERM (CURRENT) USE OF OTHER MEDICATIONS: Status: ACTIVE | Noted: 2023-09-15

## 2023-09-15 LAB
BASOPHILS # BLD AUTO: 0.07 10*3/MM3 (ref 0–0.2)
BASOPHILS NFR BLD AUTO: 0.8 % (ref 0–1.5)
DEPRECATED RDW RBC AUTO: 47.8 FL (ref 37–54)
EOSINOPHIL # BLD AUTO: 0.13 10*3/MM3 (ref 0–0.4)
EOSINOPHIL NFR BLD AUTO: 1.5 % (ref 0.3–6.2)
ERYTHROCYTE [DISTWIDTH] IN BLOOD BY AUTOMATED COUNT: 15.9 % (ref 12.3–15.4)
HCT VFR BLD AUTO: 42.2 % (ref 37.5–51)
HGB BLD-MCNC: 13.2 G/DL (ref 13–17.7)
IMM GRANULOCYTES # BLD AUTO: 0.04 10*3/MM3 (ref 0–0.05)
IMM GRANULOCYTES NFR BLD AUTO: 0.5 % (ref 0–0.5)
LYMPHOCYTES # BLD AUTO: 2.04 10*3/MM3 (ref 0.7–3.1)
LYMPHOCYTES NFR BLD AUTO: 23.1 % (ref 19.6–45.3)
MCH RBC QN AUTO: 25.9 PG (ref 26.6–33)
MCHC RBC AUTO-ENTMCNC: 31.3 G/DL (ref 31.5–35.7)
MCV RBC AUTO: 82.9 FL (ref 79–97)
MONOCYTES # BLD AUTO: 0.93 10*3/MM3 (ref 0.1–0.9)
MONOCYTES NFR BLD AUTO: 10.5 % (ref 5–12)
NEUTROPHILS NFR BLD AUTO: 5.62 10*3/MM3 (ref 1.7–7)
NEUTROPHILS NFR BLD AUTO: 63.6 % (ref 42.7–76)
NRBC BLD AUTO-RTO: 0 /100 WBC (ref 0–0.2)
PLATELET # BLD AUTO: 230 10*3/MM3 (ref 140–450)
PMV BLD AUTO: 10.1 FL (ref 6–12)
RBC # BLD AUTO: 5.09 10*6/MM3 (ref 4.14–5.8)
WBC NRBC COR # BLD: 8.83 10*3/MM3 (ref 3.4–10.8)

## 2023-09-15 PROCEDURE — 36415 COLL VENOUS BLD VENIPUNCTURE: CPT

## 2023-09-15 PROCEDURE — 85025 COMPLETE CBC W/AUTO DIFF WBC: CPT

## 2023-09-15 RX ORDER — FAMOTIDINE 10 MG/ML
20 INJECTION, SOLUTION INTRAVENOUS AS NEEDED
OUTPATIENT
Start: 2023-09-20

## 2023-09-15 RX ORDER — SODIUM CHLORIDE 9 MG/ML
250 INJECTION, SOLUTION INTRAVENOUS ONCE
OUTPATIENT
Start: 2023-09-20

## 2023-09-15 RX ORDER — FAMOTIDINE 10 MG/ML
20 INJECTION, SOLUTION INTRAVENOUS ONCE
OUTPATIENT
Start: 2023-09-20

## 2023-09-15 RX ORDER — DIPHENHYDRAMINE HYDROCHLORIDE 50 MG/ML
50 INJECTION INTRAMUSCULAR; INTRAVENOUS AS NEEDED
OUTPATIENT
Start: 2023-09-20

## 2023-09-15 RX ORDER — PALONOSETRON 0.05 MG/ML
0.25 INJECTION, SOLUTION INTRAVENOUS ONCE
OUTPATIENT
Start: 2023-09-20

## 2023-09-15 NOTE — LETTER
September 15, 2023       No Recipients    Patient: Sunny Hung   YOB: 1955   Date of Visit: 9/15/2023     Dear Hosea Abernathy MD:       Thank you for referring Sunny Hung to me for evaluation. Below are the relevant portions of my assessment and plan of care.    If you have questions, please do not hesitate to call me. I look forward to following Sunny along with you.         Sincerely,        Greg Dubon MD        CC:   No Recipients    Greg Dubon MD  09/15/23 1401  Sign when Signing Visit    Subjective    REASON FOR CONSULTATION:  NSCLC  Provide an opinion on any further workup or treatment                             REQUESTING PHYSICIAN:  Gul    RECORDS OBTAINED:  Records of the patients history including those obtained from the referring provider were reviewed and summarized in detail.      History of Present Illness   This is a pleasant 68-year-old man with hyperlipidemia and previous tobacco abuse who underwent a screening CT of the chest on 7/31/2023 which showed a large mass in the left lower lobe of the lung extending to the hilum.  A diagnostic CT was performed 8/25/2023 showing a large left infrahilar medial mass in the lower lobe.  A PET scan on 9/8/2023 showed a left lower lobe spiculated pleural-based mass 1.3 x 5 cm involving the left hilum and major fissure SUV 23.2.  There were smaller nodular airspace opacities also hypermetabolic.  The mass encases left lower lobe bronchovascular bundle.  No hypermetabolic mediastinal or hilar lymph nodes noted.  There was a 7 mm nodule in the right upper lobe photopenic SUV 2.1.  There was no supraclavicular uptake or disease below the abdomen other than 2 abnormal foci in the sigmoid colon.  MRI of the brain on 9/11/2023 showed a subtle 2 mm focus inferior left cerebellum too small to characterize but likely a blood vessel.  The patient underwent bronchoscopy on 9/6/2023 with biopsies taken from the left lower lobe mass, lymph  node station 7 and lymph node station 4R.  Pathology showed malignant cells favoring poorly differentiated carcinoma/squamous differentiation from the left lower lobe and biopsies from station 7 and 4R were negative for malignant cells.    Past Medical History:   Diagnosis Date   • BPH (benign prostatic hyperplasia)    • Bruises easily    • Bursitis of right shoulder    • Cough    • Hyperlipidemia    • Mass of lower lobe of left lung         Past Surgical History:   Procedure Laterality Date   • BRONCHOSCOPY WITH ION ROBOTIC ASSIST N/A 2023    Procedure: BRONCHOSCOPY WITH BAL;   ION ROBOT AND ENDOBRONCHIAL ULTRASOUND WITH FNA'S;  Surgeon: Rosalio Scott MD;  Location: [facility];  Service: Robotics - Pulmonary;  Laterality: N/A;  PRE- LEFT LOWER LOBE MASS  POST- SAME   • GANGLION CYST EXCISION Bilateral    • HEMORRHOIDECTOMY     • OTHER SURGICAL HISTORY      SOMETHING REMOVED FROM LEFT CHEST, BENIGN ? LIPOMA        Current Outpatient Medications on File Prior to Visit   Medication Sig Dispense Refill   • fluticasone (FLONASE) 50 MCG/ACT nasal spray 2 sprays into the nostril(s) as directed by provider Daily As Needed.     • pravastatin (PRAVACHOL) 20 MG tablet Take 1 tablet by mouth Every Night.       No current facility-administered medications on file prior to visit.        ALLERGIES:    Allergies   Allergen Reactions   • Amoxicillin-Pot Clavulanate Hives        Social History     Socioeconomic History   • Marital status:      Spouse name: Carmen   Tobacco Use   • Smoking status: Former     Packs/day: 0.50     Years: 40.00     Pack years: 20.00     Types: Cigarettes     Quit date:      Years since quittin.7   • Smokeless tobacco: Never   Vaping Use   • Vaping Use: Never used   Substance and Sexual Activity   • Alcohol use: Not Currently     Comment: VERY RARELY, MAYBE 1 BEER   • Drug use: Never   • Sexual activity: Defer        Family History   Problem Relation Age of Onset   • Bone cancer  "Mother    • COPD Father    • Cancer Brother         Malignant tumor of pharynx   • Alcohol abuse Brother    • Cirrhosis Brother    • Recurrent abdominal pain Brother    • Malig Hyperthermia Neg Hx         Review of Systems   Constitutional: Negative.    HENT: Negative.     Respiratory:  Positive for cough.    Cardiovascular: Negative.    Gastrointestinal: Negative.    Genitourinary: Negative.    Musculoskeletal: Negative.    Neurological: Negative.    Hematological: Negative.    Psychiatric/Behavioral:  Negative for dysphoric mood. The patient is nervous/anxious.         Objective    Vitals:    09/15/23 1256   BP: 160/82   Pulse: 65   Temp: 97.7 °F (36.5 °C)   TempSrc: Temporal   SpO2: 98%   Weight: 75.4 kg (166 lb 4.8 oz)   Height: 182.9 cm (72.01\")   PainSc: 0-No pain         9/15/2023    12:55 PM   Current Status   ECOG score 0       Physical Exam    CONSTITUTIONAL: pleasant well-developed adult man  LYMPH: no cervical or supraclavicular lad  CV: RRR, S1S2, no murmur  RESP: cta bilat, no wheezing, no rales  GI: soft, non-tender, no splenomegaly, +bs  MUSC: no edema, normal gait  NEURO: alert and oriented x3, normal strength  PSYCH: normal mood anxious affect    RECENT LABS:  Hematology WBC   Date Value Ref Range Status   09/15/2023 8.83 3.40 - 10.80 10*3/mm3 Final     RBC   Date Value Ref Range Status   09/15/2023 5.09 4.14 - 5.80 10*6/mm3 Final     Hemoglobin   Date Value Ref Range Status   09/15/2023 13.2 13.0 - 17.7 g/dL Final     Hematocrit   Date Value Ref Range Status   09/15/2023 42.2 37.5 - 51.0 % Final     Platelets   Date Value Ref Range Status   09/15/2023 230 140 - 450 10*3/mm3 Final        Lab Results   Component Value Date    GLUCOSE 80 09/05/2023    BUN 13 09/05/2023    CREATININE 0.79 09/05/2023    EGFR 97.4 09/05/2023    BCR 16.5 09/05/2023    K 4.0 09/05/2023    CO2 25.6 09/05/2023    CALCIUM 9.2 09/05/2023     PET scan 9823  IMPRESSION:  1. Intensely hypermetabolic approximately 13 x 5 cm " pleural-based left  lower lobe lung mass involving the left hilum. Smaller nodular opacities  adjacently are hypermetabolic and likely represent metastases. A 7 mm  right upper lobe nodule is photopenic. Conservative surveillance is  recommended. There is no convincing evidence for metastatic disease at  the neck, abdomen, or pelvis.  2. There are 2 hypermetabolic foci at the sigmoid colon need further  evaluation, particularly the 1.4 cm hypermetabolic polyp at the distal  sigmoid colon. Colonoscopy is recommended.  3. Nonspecific heterogeneous prostate activity. PSA follow-up is  recommended.    Assessment & Plan  *T4N0M0 poorly differentiated carcinoma/squamous differentiation left lower lobe of the lung  Mass discovered on low-dose CT chest screening 7/31/2023  PET scan 9/8/2023 showed a 1.3 x 5 cm left lower lobe mass involving the hilum with smaller adjacent nodular opacities, max SUV 23.2, photopenic right upper lobe nodule  Bronchoscopy with biopsy from the left lower lobe 9/6/2023 positive for poorly differentiated carcinoma with squamous differentiation; P-L1 TPS 0%    *MRI brain 9/11/23-2 mm enhancement left cerebellar hemisphere likely vessel artifact but will need radiographic follow-up    *PET uptake 2 foci sigmoid colon Will eventually need colonoscopy    *Comorbidities-hyperlipidemia      Oncology plan/recommendations:  I discussed the case with Dr. Scott thoracic surgery-the patient has clinical T4 N0 M0 disease and is presently unresectable secondary to tumor location and pulmonary function test results.  I discussed with the patient perioperative chemo-immunotherapy for treatment of his malignancy using carboplatin/Taxol/nivolumab every 21 days for 3 cycles followed by repeat assessment with CT imaging and another MRI of the brain along with PFTs.  If disease is then resectable he would undergo surgical resection.  Recent study also showed benefit of adjuvant nivolumab 448 mg every 4 weeks for 6  months after surgical resection of stage III non-small cell lung cancer after neoadjuvant chemoimmunotherapy.  If disease remained unresectable after 3 cycles of neoadjuvant chemoimmunotherapy the patient would proceed with chemoradiation and adjuvant immunotherapy at that point.  I discussed with the patient risk and side effects of treatment outlined and he was agreeable to proceed.  We are hopeful to begin next week after education.  I think we can do the first 3 cycles without a port but if he ends up needing chemoradiation and maintenance immunotherapy we can have a port placed at that time.    I spent 70 minutes of time on this case today reviewing the patient's past medical records, imaging studies, lab results, pathology reports, face-to-face time with the patient discussing his diagnosis, treatment recommendations, side effects of treatment, documenting care writing orders etc.

## 2023-09-15 NOTE — TELEPHONE ENCOUNTER
9/15/23 I SCHEDULED A PORT PLACEMENT FOR WEDNESDAY 9/27 FOR THIS PATIENT PER DR. CORTEZ THIS MORNING.  THIS AFTERNOON, [facility], CHRISTINE, CALLED FOR DR. GONZALEZ AND SAID TO CANCEL THE PORT.  I CALLED SCHEDULING AND CANCELED THE PORT AND PUT IT IN THE DEPOT.  GB

## 2023-09-15 NOTE — PROGRESS NOTES
Subjective     REASON FOR CONSULTATION:  NSCLC  Provide an opinion on any further workup or treatment                             REQUESTING PHYSICIAN:  Gul    RECORDS OBTAINED:  Records of the patients history including those obtained from the referring provider were reviewed and summarized in detail.      History of Present Illness   This is a pleasant 68-year-old man with hyperlipidemia and previous tobacco abuse who underwent a screening CT of the chest on 7/31/2023 which showed a large mass in the left lower lobe of the lung extending to the hilum.  A diagnostic CT was performed 8/25/2023 showing a large left infrahilar medial mass in the lower lobe.  A PET scan on 9/8/2023 showed a left lower lobe spiculated pleural-based mass 1.3 x 5 cm involving the left hilum and major fissure SUV 23.2.  There were smaller nodular airspace opacities also hypermetabolic.  The mass encases left lower lobe bronchovascular bundle.  No hypermetabolic mediastinal or hilar lymph nodes noted.  There was a 7 mm nodule in the right upper lobe photopenic SUV 2.1.  There was no supraclavicular uptake or disease below the abdomen other than 2 abnormal foci in the sigmoid colon.  MRI of the brain on 9/11/2023 showed a subtle 2 mm focus inferior left cerebellum too small to characterize but likely a blood vessel.  The patient underwent bronchoscopy on 9/6/2023 with biopsies taken from the left lower lobe mass, lymph node station 7 and lymph node station 4R.  Pathology showed malignant cells favoring poorly differentiated carcinoma/squamous differentiation from the left lower lobe and biopsies from station 7 and 4R were negative for malignant cells.    Past Medical History:   Diagnosis Date    BPH (benign prostatic hyperplasia)     Bruises easily     Bursitis of right shoulder     Cough     Hyperlipidemia     Mass of lower lobe of left lung         Past Surgical History:   Procedure Laterality Date    BRONCHOSCOPY WITH ION ROBOTIC ASSIST  N/A 2023    Procedure: BRONCHOSCOPY WITH BAL;   ION ROBOT AND ENDOBRONCHIAL ULTRASOUND WITH FNA'S;  Surgeon: Rosalio Scott MD;  Location: [facility] ENDOSCOPY;  Service: Robotics - Pulmonary;  Laterality: N/A;  PRE- LEFT LOWER LOBE MASS  POST- SAME    GANGLION CYST EXCISION Bilateral     HEMORRHOIDECTOMY      OTHER SURGICAL HISTORY      SOMETHING REMOVED FROM LEFT CHEST, BENIGN ? LIPOMA        Current Outpatient Medications on File Prior to Visit   Medication Sig Dispense Refill    fluticasone (FLONASE) 50 MCG/ACT nasal spray 2 sprays into the nostril(s) as directed by provider Daily As Needed.      pravastatin (PRAVACHOL) 20 MG tablet Take 1 tablet by mouth Every Night.       No current facility-administered medications on file prior to visit.        ALLERGIES:    Allergies   Allergen Reactions    Amoxicillin-Pot Clavulanate Hives        Social History     Socioeconomic History    Marital status:      Spouse name: Carmen   Tobacco Use    Smoking status: Former     Packs/day: 0.50     Years: 40.00     Pack years: 20.00     Types: Cigarettes     Quit date:      Years since quittin.7    Smokeless tobacco: Never   Vaping Use    Vaping Use: Never used   Substance and Sexual Activity    Alcohol use: Not Currently     Comment: VERY RARELY, MAYBE 1 BEER    Drug use: Never    Sexual activity: Defer        Family History   Problem Relation Age of Onset    Bone cancer Mother     COPD Father     Cancer Brother         Malignant tumor of pharynx    Alcohol abuse Brother     Cirrhosis Brother     Recurrent abdominal pain Brother     Malig Hyperthermia Neg Hx         Review of Systems   Constitutional: Negative.    HENT: Negative.     Respiratory:  Positive for cough.    Cardiovascular: Negative.    Gastrointestinal: Negative.    Genitourinary: Negative.    Musculoskeletal: Negative.    Neurological: Negative.    Hematological: Negative.    Psychiatric/Behavioral:  Negative for dysphoric mood. The patient is  "nervous/anxious.         Objective     Vitals:    09/15/23 1256   BP: 160/82   Pulse: 65   Temp: 97.7 °F (36.5 °C)   TempSrc: Temporal   SpO2: 98%   Weight: 75.4 kg (166 lb 4.8 oz)   Height: 182.9 cm (72.01\")   PainSc: 0-No pain         9/15/2023    12:55 PM   Current Status   ECOG score 0       Physical Exam    CONSTITUTIONAL: pleasant well-developed adult man  LYMPH: no cervical or supraclavicular lad  CV: RRR, S1S2, no murmur  RESP: cta bilat, no wheezing, no rales  GI: soft, non-tender, no splenomegaly, +bs  MUSC: no edema, normal gait  NEURO: alert and oriented x3, normal strength  PSYCH: normal mood anxious affect    RECENT LABS:  Hematology WBC   Date Value Ref Range Status   09/15/2023 8.83 3.40 - 10.80 10*3/mm3 Final     RBC   Date Value Ref Range Status   09/15/2023 5.09 4.14 - 5.80 10*6/mm3 Final     Hemoglobin   Date Value Ref Range Status   09/15/2023 13.2 13.0 - 17.7 g/dL Final     Hematocrit   Date Value Ref Range Status   09/15/2023 42.2 37.5 - 51.0 % Final     Platelets   Date Value Ref Range Status   09/15/2023 230 140 - 450 10*3/mm3 Final        Lab Results   Component Value Date    GLUCOSE 80 09/05/2023    BUN 13 09/05/2023    CREATININE 0.79 09/05/2023    EGFR 97.4 09/05/2023    BCR 16.5 09/05/2023    K 4.0 09/05/2023    CO2 25.6 09/05/2023    CALCIUM 9.2 09/05/2023     PET scan 9823  IMPRESSION:  1. Intensely hypermetabolic approximately 13 x 5 cm pleural-based left  lower lobe lung mass involving the left hilum. Smaller nodular opacities  adjacently are hypermetabolic and likely represent metastases. A 7 mm  right upper lobe nodule is photopenic. Conservative surveillance is  recommended. There is no convincing evidence for metastatic disease at  the neck, abdomen, or pelvis.  2. There are 2 hypermetabolic foci at the sigmoid colon need further  evaluation, particularly the 1.4 cm hypermetabolic polyp at the distal  sigmoid colon. Colonoscopy is recommended.  3. Nonspecific heterogeneous " prostate activity. PSA follow-up is  recommended.    Assessment & Plan   *T4N0M0 poorly differentiated carcinoma/squamous differentiation left lower lobe of the lung  Mass discovered on low-dose CT chest screening 7/31/2023  PET scan 9/8/2023 showed a 1.3 x 5 cm left lower lobe mass involving the hilum with smaller adjacent nodular opacities, max SUV 23.2, photopenic right upper lobe nodule  Bronchoscopy with biopsy from the left lower lobe 9/6/2023 positive for poorly differentiated carcinoma with squamous differentiation; P-L1 TPS 0%    *MRI brain 9/11/23-2 mm enhancement left cerebellar hemisphere likely vessel artifact but will need radiographic follow-up    *PET uptake 2 foci sigmoid colon Will eventually need colonoscopy    *Comorbidities-hyperlipidemia      Oncology plan/recommendations:  I discussed the case with Dr. Scott thoracic surgery-the patient has clinical T4 N0 M0 disease and is presently unresectable secondary to tumor location and pulmonary function test results.  I discussed with the patient perioperative chemo-immunotherapy for treatment of his malignancy using carboplatin/Taxol/nivolumab every 21 days for 3 cycles followed by repeat assessment with CT imaging and another MRI of the brain along with PFTs.  If disease is then resectable he would undergo surgical resection.  Recent study also showed benefit of adjuvant nivolumab 448 mg every 4 weeks for 6 months after surgical resection of stage III non-small cell lung cancer after neoadjuvant chemoimmunotherapy.  If disease remained unresectable after 3 cycles of neoadjuvant chemoimmunotherapy the patient would proceed with chemoradiation and adjuvant immunotherapy at that point.  I discussed with the patient risk and side effects of treatment outlined and he was agreeable to proceed.  We are hopeful to begin next week after education.  I think we can do the first 3 cycles without a port but if he ends up needing chemoradiation and maintenance  immunotherapy we can have a port placed at that time.    I spent 70 minutes of time on this case today reviewing the patient's past medical records, imaging studies, lab results, pathology reports, face-to-face time with the patient discussing his diagnosis, treatment recommendations, side effects of treatment, documenting care writing orders etc.

## 2023-09-18 ENCOUNTER — PATIENT OUTREACH (OUTPATIENT)
Dept: OTHER | Facility: HOSPITAL | Age: 68
End: 2023-09-18
Payer: MEDICARE

## 2023-09-18 LAB
CYTO UR: NORMAL
LAB AP CASE REPORT: NORMAL
LAB AP DIAGNOSIS COMMENT: NORMAL
Lab: NORMAL
PATH REPORT.ADDENDUM SPEC: NORMAL
PATH REPORT.FINAL DX SPEC: NORMAL
PATH REPORT.GROSS SPEC: NORMAL

## 2023-09-18 NOTE — PROGRESS NOTES
Called patient. Spoke with patient and his wife. Introduced myself and explained navigational services.    The patient saw his PCP for a regular check up and was sent for LDCT scan due to smoking history and cough.  The scan revealed a soft tissue density in his LLL. He was referred to Dr. Scott and underwent LLL bronchoscopy with biopsy 9/6/23 which revealed clinical T4 N0 M0 disease.     The patient was referred to Dr. Dubon, who saw the patient in consultation 9/15/23. Dr. Dubon noted that the patient's disease is presently unresectable secondary to tumor location and pulmonary function test results. Dr. Dubon recommended perioperative chemo-immunotherapy for treatment of his malignancy using carboplatin/Taxol/nivolumab every 21 days for 3 cycles followed by repeat assessment with CT imaging and another MRI of the brain along with PFTs. If disease is then resectable he would undergo surgical resection.     The patient is not pursuing port placement at the current time. He is scheduled for chemotherapy teaching tomorrow with a tentative start date for therapy tomorrow. The patient and his wife have a good understanding of his pathology and treatment options. He and his wife were able to teach back his plan of care.     The patient is alert and oriented. He ambulates without assistive devices, denies falls and is independent with ADLs. The patient lives with his wife in [place], [place]. He will be receiving his treatment at [facility]. Their two daughters live a few blocks from them.    The patient is a former 1/2 PPD smoker from age 18 to 2016.    The patient has [employer] Medicare Replacement.    The patient does not have an ACP on file. Provided additional ACP information.    The patient is able to drive. He denies transportation, financial, resource needs or ongoing concerns at this time.    The patient has been eating well and denies weight loss.    The patient and his wife are coping with his diagnosis. His wife  "stated \"it seems like we are in a whirlwind\". The patient/wife report an adequate support system. Provided active listening and emotional support, validating and normalizing patient/wife's feelings. We discussed OSW and Behavioral oncology services.  Patient/wife expressed gratitude for my support and denied any additional needs at this time. We also discussed [employer] [employer] and [facility]. The patient declined referrals to either organization at this time.    We discussed integrative therapies and other [facility].  Mailed navigation folder with the following information today: [facility], Cancer and Restorative Exercise - CARE, [facility] exercise program, Living with a Diagnosis of Lung Cancer, Lung Cancer [place] Support Services, Cancer Resource [place], [facility], Reiki Therapy, [facility], Cancer Nutrition, Smoking Cessation and Survivorship Clinic.      I will call in a few weeks; provided my name and number and encouraged patient/wife to call if any needs arise.   "

## 2023-09-19 ENCOUNTER — TELEMEDICINE (OUTPATIENT)
Dept: ONCOLOGY | Facility: CLINIC | Age: 68
End: 2023-09-19
Payer: MEDICARE

## 2023-09-19 VITALS — BODY MASS INDEX: 22.48 KG/M2 | WEIGHT: 166 LBS | HEIGHT: 72 IN

## 2023-09-19 DIAGNOSIS — Z79.899 HIGH RISK MEDICATION USE: ICD-10-CM

## 2023-09-19 DIAGNOSIS — C34.92 NSCLC OF LEFT LUNG: Primary | ICD-10-CM

## 2023-09-19 DIAGNOSIS — R91.8 MASS OF LEFT LUNG: ICD-10-CM

## 2023-09-19 RX ORDER — MONTELUKAST SODIUM 10 MG/1
10 TABLET ORAL ONCE
Status: CANCELLED
Start: 2023-09-20 | End: 2023-09-20

## 2023-09-19 RX ORDER — DEXAMETHASONE 4 MG/1
TABLET ORAL
Qty: 4 TABLET | Refills: 0 | Status: SHIPPED | OUTPATIENT
Start: 2023-09-19

## 2023-09-19 RX ORDER — ONDANSETRON HYDROCHLORIDE 8 MG/1
8 TABLET, FILM COATED ORAL 3 TIMES DAILY PRN
Qty: 30 TABLET | Refills: 2 | Status: SHIPPED | OUTPATIENT
Start: 2023-09-19

## 2023-09-19 RX ORDER — OLANZAPINE 5 MG/1
5 TABLET ORAL NIGHTLY
Qty: 4 TABLET | Refills: 2 | Status: SHIPPED | OUTPATIENT
Start: 2023-09-19

## 2023-09-19 NOTE — PROGRESS NOTES
[facility] Hematology/Oncology Treatment Plan Summary    Name: Sunny Hung  Acct# [id number]  MD: Dr. Dubon    Diagnosis:     ICD-10-CM ICD-9-CM   1. NSCLC of left lung  C34.92 162.9   2. Mass of left lung  R91.8 786.6   3. High risk medication use  Z79.899 V58.69     Goal of treatment: disease control    Treatment Medication(s):   Carboplatin  Taxol  Nivolumab     Frequency: every 3 weeks    Number of cycles: 3     Starting on: 9/20/2023    Repeat after 3 cycles: CT Scan    Items for home use: Thermometer    Rx written for: [x] Nausea    [] Pre-Treatment   dexamethasone 4mg tabs. 2 tabs twice daily on the day prior to starting paclitaxel  and ondansetron 8 mg by mouth every 8 hours as needed for nausea  Olanzapine 5 mg nightly x 4 nights, starting the night of chemotherapy.    Notes:     Next Steps:     Completing Provider: HILDA Abarca           Date/time: 09/19/2023      Please note: You will be seen by a provider frequently with your treatment plan. This plan may change depending on many factors, if so, this will be discussed with you by your physician.  Last update 03/2022.

## 2023-09-19 NOTE — PROGRESS NOTES
TREATMENT  PREPARATION  This was an audio and video enabled telemedicine encounter.    Sunny Hung  [id number]  1955    Chief Complaint: Treatment preparation and needs assessment    History of present illness:  Sunny Hugn is a 68 y.o. year old male who is here today for treatment preparation and needs assessment.  The patient has been diagnosed with   Encounter Diagnoses   Name Primary?    NSCLC of left lung Yes    Mass of left lung     High risk medication use     and is scheduled to begin treatment with:     Oncology History:    Oncology/Hematology History    No history exists.       The current medication list and allergy list were reviewed and reconciled.     Past Medical History, Past Surgical History, Social History, Family History have been reviewed and are without significant changes except as mentioned.    Physical Exam:    There were no vitals filed for this visit.  Vitals:    09/19/23 1236   PainSc: 0-No pain        ECOG score: 0             Physical Exam  Constitutional:       General: He is not in acute distress.     Appearance: He is well-developed.   Pulmonary:      Effort: Pulmonary effort is normal. No respiratory distress.   Skin:     General: Skin is warm and dry.   Neurological:      Mental Status: He is alert and oriented to person, place, and time.     Exam limited due to telehealth visit    NEEDS ASSESSMENTS    Genetics  The patient's new diagnosis and family history have been reviewed for genetic counseling needs. The patient will not be referred..     Psychosocial and Barriers to care  The patient has completed a PHQ-9 Depression Screening and the Distress Thermometer (DT) today.  PHQ-9 results show PHQ-2 Total Score: 0 PHQ-9 Total Score: PHQ-9 Total Score: 0     The patient scored their distress today as Distress Level: 0-No distress on a scale of 0-10 with 0 being no distress and 10 being extreme distress. Problems marked by the patient as being an issue for them within the last  week include   .      Results were reviewed along with psychosocial resources offered by our cancer center.  Our Supportive Oncology team will be flagged for a score of 4 or above, and a same day call will be made for a score of 9 or 10.  A mental health referral is offered at that time. Patients who score less than 4 have been educated on our support services and can be referred to our  upon request.  The patient will not be referred to our .       Nutrition  The patient has completed the malnutrition screening today. They scored Malnutrition Screening Tool  Have you recently lost weight without trying?  If yes, how much weight have you lost?: 0--> No  Have you been eating poorly because of a decreased appetite?: 0--> No  MST score: 0   with a score of 0-1 meaning not at risk in a score of 2 or greater meaning at risk.  Patients with a score of 3 or higher will be referred to our oncology dietitian for support. Patients beginning at risk treatment regimens or who have dietary concerns will also be referred to our oncology dietitian. The patient will not be referred.    Functional Assessment  Persons who are age 70 or greater will be screened for qualification of a comprehensive geriatric assessment by our survivorship nurse practitioner.  Older adults with cancer face unique challenges. These may include an increased risk of drug reactions, financial burdens, and caregiver stress. The patient scored G8 Questionnaire  Has food intake declined over the past 3 months due to loss of appetite, digestive problems, chewing or swallowing difficulties?: No decrease in food intake  Weight loss during the last 3 months: No weight loss  Mobility: Goes out  Neuropsychological Problems: No psychological problems  Body Mass Index (BMI (weight in kg) / (height in m2)): BMI 21 to BMI < 23  Takes more than 3 medications a day: Yes, takes 1 or 2 prescription drugs per day  In comparison with other people of  "the same age, how does the patient consider his/her health status?: As good  Age: < 80  Total Score: 15 . Patients scoring 14 or lower will referred for an older adult functional assessment with the survivorship advanced practice registered nurse to ensure all needed support is provided as patients plan for their treatments. The patient will not be referred.    Intravenous Access Assessment  The patient and I discussed planned intravenous chemo/biotherapy as well as other IV treatments that are often needed throughout the course of treatment. These may include, but are not limited to blood transfusions, antibiotics, and IV hydration. Discussed that depending on selected treatment and vein assessment, patient may require venous access device (VAD) which could include but not limited to a Mediport or PICC line. Risks and benefits of VADs reviewed. The patient will be treated via PIV.    Reproductive/Sexual Activity   People should avoid becoming pregnant and should not get a partner pregnant while undergoing chemo/biotherapy.  People of childbearing age should use effective contraception during active therapy. The best recommendation for all people is to use a barrier method for a minimum of 1 week after the last infusion of chemo/biotherapy to prevent your partner being exposed to byproducts from treatment medications in bodily fluids. Effective contraception should be discussed with your oncology team to make sure it is safe to take based on your diagnosis. Possible options include oral contraceptives, barrier methods. Chemo/biotherapy can change your ability to reproduce children in the future.  There are options for fertility preservation. NOT APPLICABLE    Advanced Care Planning  Advance Care Planning   The patient and I discussed advanced care planning, \"Conversations that Matter\".   This service is offered for development of advance directives with a certified ACP facilitator.  The patient does not have an " up-to-date advanced directive. This document is not on file with our office. The patient is not interested in an appointment with one of our facilitators to create or update their advanced directives.    Have you reviewed your Advance Directive and is it valid for this stay?: Not applicable          Smoking cessation  Tobacco Use: Medium Risk    Smoking Tobacco Use: Former    Smokeless Tobacco Use: Never    Passive Exposure: Not on file       Patient and I discussed their tobacco use history. Referral will not be made for smoking cessation.      Palliative Care  When appropriate, the patient and I discussed the availability palliative care services and when appropriate Hospice care. Palliative care is not the same as Hospice care which was explained to the patient.NOT APPLICABLE.    Survivorship   When appropriate, we discussed that we will refer the patient to survivorship clinic to discuss next steps following completion of planned treatment.  Reviewed this visit will include assessment of your physical, psychological, functional, and spiritual needs as a survivor and the need at attend this visit when scheduled.    TREATMENT EDUCATION    Today I met with the patient to discuss the chemo/biotherapy regimen recommended for treatment of NSCLC of left lung    Mass of left lung  - OLANZapine (zyPREXA) 5 MG tablet  - dexAMETHasone (DECADRON) 4 MG tablet  - ondansetron (ZOFRAN) 8 MG tablet    High risk medication use  - OLANZapine (zyPREXA) 5 MG tablet  - dexAMETHasone (DECADRON) 4 MG tablet  - ondansetron (ZOFRAN) 8 MG tablet  .  The patient was given explanation of treatment premed side effects including office policy that prohibits patients to drive if sedating medications are administered, MD explanation given regarding benefits, side effects, toxicities and goals of treatment.  The patient received a Chemotherapy/Biotherapy Plan Summary including diagnosis and explanation of specific treatment plan.    SIDE  EFFECTS:  Common side effects were discussed with the patient and/or significant other.  Discussion included where applicable hair loss/discoloration, anemia/fatigue, infection/chills/fever, appetite, bleeding risk/precautions, constipation, diarrhea, mouth sores, taste alteration, loss of appetite, nausea/vomiting, peripheral neuropathy, skin/nail changes, rash, muscle aches/weakness, photosensitivity, weight gain/loss, hearing loss, dizziness, menopausal symptoms, menstrual irregularity, sterility, high blood pressure, heart damage, liver damage, lung damage, kidney damage, DVT/PE risk, fluid retention, pleural/pericardial effusion, somnolence, electrolyte/LFT imbalance, vein exercises and/or the possible need for vascular access/port placement.  The patient was advised that although uncommon, leakage of an infused medication from the vein or venous access device may lead to skin breakdown and/or other tissue damage.  The patient was advised that he/she may have pain, bleeding, and/or bruising from the insertion of a needle in their vein or venous access device (port).  The patient was further advised that, in spite of proper technique, infection with redness and irritation may rarely occur at the site where the needle was inserted.  The patient was advised that if complications occur, additional medical treatment is available.  Finally, where applicable we have reviewed rare but potential immune mediated side effects including shortness of breath, cough, chest pain (pneumonitis), abdominal pain, diarrhea (colitis), thyroiditis (hypothyroid or hyperthyroid), hepatitis and liver dysfunction, nephritis and renal dysfunction.    Discussion also included side effects specific to drugs in the treatment plan, specifically:    Treatment Plans       Name Type Plan Dates Plan Provider         Active    OP LUNG  Nivolumab 360mg /  PACLitaxel / CARBOplatin AUC=5  ONCOLOGY TREATMENT  9/19/2023 - Present Greg Dubon MD                       Questions answered and additional information discussed on topics including:  Anemia, Thrombocytopenia, Neutropenia, Nutrition and appetite changes, Constipation, Diarrhea, Nausea & vomiting, Mouth sores, Alopecia, Nervous system changes, Pain, Skin & nail changes, Organ toxicities, and Hand/Foot Cooling       Assessment and Plan:    Diagnoses and all orders for this visit:    1. NSCLC of left lung (Primary)    2. Mass of left lung  -     OLANZapine (zyPREXA) 5 MG tablet; Take 1 tablet by mouth Every Night. Take nightly x 4 starting night of chemotherapy.  Dispense: 4 tablet; Refill: 2  -     dexAMETHasone (DECADRON) 4 MG tablet; Take 2 tablets twice a day. Take day before first chemotherapy. Take with food.  Dispense: 4 tablet; Refill: 0  -     ondansetron (ZOFRAN) 8 MG tablet; Take 1 tablet by mouth 3 (Three) Times a Day As Needed for Nausea or Vomiting.  Dispense: 30 tablet; Refill: 2    3. High risk medication use  -     OLANZapine (zyPREXA) 5 MG tablet; Take 1 tablet by mouth Every Night. Take nightly x 4 starting night of chemotherapy.  Dispense: 4 tablet; Refill: 2  -     dexAMETHasone (DECADRON) 4 MG tablet; Take 2 tablets twice a day. Take day before first chemotherapy. Take with food.  Dispense: 4 tablet; Refill: 0  -     ondansetron (ZOFRAN) 8 MG tablet; Take 1 tablet by mouth 3 (Three) Times a Day As Needed for Nausea or Vomiting.  Dispense: 30 tablet; Refill: 2      No orders of the defined types were placed in this encounter.        The patient and I have reviewed their diagnosis and scheduled treatment plan. Needs assessment was completed where applicable including genetics, psychosocial needs, barriers to care, VAD evaluation, advanced care planning, survivorship, and palliative care services where indicated. Referrals have been ordered as appropriate based upon evaluation today and patient desires.   Chemo/biotherapy teaching was completed today and consent obtained. See separate  documentation for further details.  Adequate time was given to answer questions.  Patient made aware of their care team members and contact information if they have questions or problems throughout the treatment course.  Discussion held and written information provided describing frequency of office visits and ongoing monitoring throughout the treatment plan.     Reviewed with patient any prescribed medication sent to pharmacy.  Education provided regarding proper storage, safe handling, and proper disposal of unused medication.  Proper handling of body fluids and waste discussed and written information provided.  If appropriate, patient had pretreatment labs drawn today.    Learning assessment completed at initial patient encounter. See separate flowsheet. Chemo/biotherapy education comprehension assessed at today's visit.    I spent 50 minutes caring for Sunny on this date of service. This time includes time spent by me in the following activities: preparing for the visit, reviewing tests, obtaining and/or reviewing a separately obtained history, performing a medically appropriate examination and/or evaluation, counseling and educating the patient/family/caregiver, ordering medications, tests, or procedures, referring and communicating with other health care professionals, and documenting information in the medical record.     Dennise Marion, HILDA   09/19/23    Mode of Visit: Video  Location of patient: home  You have chosen to receive care through a telehealth visit.  Does the patient consent to use a video/audio connection for your medical care today? Yes  The visit included audio and video interaction. No technical issues occurred during this visit.

## 2023-09-20 ENCOUNTER — INFUSION (OUTPATIENT)
Dept: ONCOLOGY | Facility: HOSPITAL | Age: 68
End: 2023-09-20
Payer: MEDICARE

## 2023-09-20 VITALS
TEMPERATURE: 98 F | HEART RATE: 66 BPM | BODY MASS INDEX: 22.81 KG/M2 | WEIGHT: 166.6 LBS | DIASTOLIC BLOOD PRESSURE: 76 MMHG | OXYGEN SATURATION: 96 % | SYSTOLIC BLOOD PRESSURE: 146 MMHG

## 2023-09-20 DIAGNOSIS — R91.8 MASS OF LEFT LUNG: Primary | ICD-10-CM

## 2023-09-20 DIAGNOSIS — Z79.899 HIGH RISK MEDICATION USE: ICD-10-CM

## 2023-09-20 LAB
ALBUMIN SERPL-MCNC: 4.4 G/DL (ref 3.5–5.2)
ALBUMIN/GLOB SERPL: 1.4 G/DL
ALP SERPL-CCNC: 53 U/L (ref 39–117)
ALT SERPL W P-5'-P-CCNC: 13 U/L (ref 1–41)
ANION GAP SERPL CALCULATED.3IONS-SCNC: 14.4 MMOL/L (ref 5–15)
AST SERPL-CCNC: 21 U/L (ref 1–40)
BASOPHILS # BLD AUTO: 0.01 10*3/MM3 (ref 0–0.2)
BASOPHILS NFR BLD AUTO: 0.1 % (ref 0–1.5)
BILIRUB SERPL-MCNC: 0.4 MG/DL (ref 0–1.2)
BUN SERPL-MCNC: 17 MG/DL (ref 8–23)
BUN/CREAT SERPL: 22.1 (ref 7–25)
CALCIUM SPEC-SCNC: 9.2 MG/DL (ref 8.6–10.5)
CHLORIDE SERPL-SCNC: 98 MMOL/L (ref 98–107)
CO2 SERPL-SCNC: 21.6 MMOL/L (ref 22–29)
CREAT SERPL-MCNC: 0.77 MG/DL (ref 0.76–1.27)
DEPRECATED RDW RBC AUTO: 47.6 FL (ref 37–54)
EGFRCR SERPLBLD CKD-EPI 2021: 98.1 ML/MIN/1.73
EOSINOPHIL # BLD AUTO: 0 10*3/MM3 (ref 0–0.4)
EOSINOPHIL NFR BLD AUTO: 0 % (ref 0.3–6.2)
ERYTHROCYTE [DISTWIDTH] IN BLOOD BY AUTOMATED COUNT: 15.5 % (ref 12.3–15.4)
GLOBULIN UR ELPH-MCNC: 3.1 GM/DL
GLUCOSE SERPL-MCNC: 201 MG/DL (ref 65–99)
HCT VFR BLD AUTO: 40.5 % (ref 37.5–51)
HGB BLD-MCNC: 12.7 G/DL (ref 13–17.7)
IMM GRANULOCYTES # BLD AUTO: 0.01 10*3/MM3 (ref 0–0.05)
IMM GRANULOCYTES NFR BLD AUTO: 0.1 % (ref 0–0.5)
LYMPHOCYTES # BLD AUTO: 0.91 10*3/MM3 (ref 0.7–3.1)
LYMPHOCYTES NFR BLD AUTO: 8.9 % (ref 19.6–45.3)
MCH RBC QN AUTO: 26 PG (ref 26.6–33)
MCHC RBC AUTO-ENTMCNC: 31.4 G/DL (ref 31.5–35.7)
MCV RBC AUTO: 82.8 FL (ref 79–97)
MONOCYTES # BLD AUTO: 0.32 10*3/MM3 (ref 0.1–0.9)
MONOCYTES NFR BLD AUTO: 3.1 % (ref 5–12)
NEUTROPHILS NFR BLD AUTO: 87.8 % (ref 42.7–76)
NEUTROPHILS NFR BLD AUTO: 9 10*3/MM3 (ref 1.7–7)
PLATELET # BLD AUTO: 292 10*3/MM3 (ref 140–450)
PMV BLD AUTO: 9.4 FL (ref 6–12)
POTASSIUM SERPL-SCNC: 4.1 MMOL/L (ref 3.5–5.2)
PROT SERPL-MCNC: 7.5 G/DL (ref 6–8.5)
RBC # BLD AUTO: 4.89 10*6/MM3 (ref 4.14–5.8)
SODIUM SERPL-SCNC: 134 MMOL/L (ref 136–145)
T4 FREE SERPL-MCNC: 1.18 NG/DL (ref 0.93–1.7)
TSH SERPL DL<=0.05 MIU/L-ACNC: 0.7 UIU/ML (ref 0.27–4.2)
WBC NRBC COR # BLD: 10.25 10*3/MM3 (ref 3.4–10.8)

## 2023-09-20 PROCEDURE — 96417 CHEMO IV INFUS EACH ADDL SEQ: CPT

## 2023-09-20 PROCEDURE — 80053 COMPREHEN METABOLIC PANEL: CPT | Performed by: INTERNAL MEDICINE

## 2023-09-20 PROCEDURE — A9270 NON-COVERED ITEM OR SERVICE: HCPCS | Performed by: INTERNAL MEDICINE

## 2023-09-20 PROCEDURE — 96375 TX/PRO/DX INJ NEW DRUG ADDON: CPT

## 2023-09-20 PROCEDURE — 25010000002 PALONOSETRON 0.25 MG/5ML SOLUTION PREFILLED SYRINGE: Performed by: INTERNAL MEDICINE

## 2023-09-20 PROCEDURE — 25010000002 CARBOPLATIN PER 50 MG: Performed by: INTERNAL MEDICINE

## 2023-09-20 PROCEDURE — 25010000002 PACLITAXEL PER 1 MG: Performed by: INTERNAL MEDICINE

## 2023-09-20 PROCEDURE — 84443 ASSAY THYROID STIM HORMONE: CPT | Performed by: INTERNAL MEDICINE

## 2023-09-20 PROCEDURE — 63710000001 MONTELUKAST 10 MG TABLET: Performed by: INTERNAL MEDICINE

## 2023-09-20 PROCEDURE — 96413 CHEMO IV INFUSION 1 HR: CPT

## 2023-09-20 PROCEDURE — 25010000002 NIVOLUMAB 100 MG/10ML SOLUTION 10 ML VIAL: Performed by: INTERNAL MEDICINE

## 2023-09-20 PROCEDURE — 96367 TX/PROPH/DG ADDL SEQ IV INF: CPT

## 2023-09-20 PROCEDURE — 25010000002 FOSAPREPITANT PER 1 MG: Performed by: INTERNAL MEDICINE

## 2023-09-20 PROCEDURE — 84439 ASSAY OF FREE THYROXINE: CPT | Performed by: INTERNAL MEDICINE

## 2023-09-20 PROCEDURE — 96415 CHEMO IV INFUSION ADDL HR: CPT

## 2023-09-20 PROCEDURE — 25010000002 NIVOLUMAB 40 MG/4ML SOLUTION 4 ML VIAL: Performed by: INTERNAL MEDICINE

## 2023-09-20 PROCEDURE — 25010000002 DIPHENHYDRAMINE PER 50 MG: Performed by: INTERNAL MEDICINE

## 2023-09-20 PROCEDURE — 25010000002 DEXAMETHASONE SODIUM PHOSPHATE 100 MG/10ML SOLUTION: Performed by: INTERNAL MEDICINE

## 2023-09-20 PROCEDURE — 85025 COMPLETE CBC W/AUTO DIFF WBC: CPT | Performed by: INTERNAL MEDICINE

## 2023-09-20 PROCEDURE — 25010000002 NIVOLUMAB 240 MG/24ML SOLUTION 24 ML VIAL: Performed by: INTERNAL MEDICINE

## 2023-09-20 RX ORDER — FAMOTIDINE 10 MG/ML
20 INJECTION, SOLUTION INTRAVENOUS ONCE
Status: COMPLETED | OUTPATIENT
Start: 2023-09-20 | End: 2023-09-20

## 2023-09-20 RX ORDER — MONTELUKAST SODIUM 10 MG/1
10 TABLET ORAL ONCE
Status: COMPLETED | OUTPATIENT
Start: 2023-09-20 | End: 2023-09-20

## 2023-09-20 RX ORDER — SODIUM CHLORIDE 9 MG/ML
250 INJECTION, SOLUTION INTRAVENOUS ONCE
Status: COMPLETED | OUTPATIENT
Start: 2023-09-20 | End: 2023-09-20

## 2023-09-20 RX ORDER — PALONOSETRON 0.05 MG/ML
0.25 INJECTION, SOLUTION INTRAVENOUS ONCE
Status: COMPLETED | OUTPATIENT
Start: 2023-09-20 | End: 2023-09-20

## 2023-09-20 RX ADMIN — SODIUM CHLORIDE 250 ML: 9 INJECTION, SOLUTION INTRAVENOUS at 08:59

## 2023-09-20 RX ADMIN — MONTELUKAST SODIUM 10 MG: 10 TABLET, COATED ORAL at 09:42

## 2023-09-20 RX ADMIN — FAMOTIDINE 20 MG: 10 INJECTION INTRAVENOUS at 10:11

## 2023-09-20 RX ADMIN — PACLITAXEL 395 MG: 300 INJECTION, SOLUTION INTRAVENOUS at 11:18

## 2023-09-20 RX ADMIN — SODIUM CHLORIDE 360 MG: 9 INJECTION, SOLUTION INTRAVENOUS at 09:38

## 2023-09-20 RX ADMIN — DIPHENHYDRAMINE HYDROCHLORIDE 50 MG: 50 INJECTION, SOLUTION INTRAMUSCULAR; INTRAVENOUS at 10:32

## 2023-09-20 RX ADMIN — CARBOPLATIN 620 MG: 10 INJECTION, SOLUTION INTRAVENOUS at 13:59

## 2023-09-20 RX ADMIN — PALONOSETRON 0.25 MG: 0.25 INJECTION, SOLUTION INTRAVENOUS at 09:01

## 2023-09-20 RX ADMIN — FOSAPREPITANT 100 ML: 150 INJECTION, POWDER, LYOPHILIZED, FOR SOLUTION INTRAVENOUS at 09:03

## 2023-09-20 RX ADMIN — DEXAMETHASONE SODIUM PHOSPHATE 20 MG: 10 INJECTION, SOLUTION INTRAMUSCULAR; INTRAVENOUS at 10:11

## 2023-09-28 ENCOUNTER — OFFICE VISIT (OUTPATIENT)
Dept: ONCOLOGY | Facility: CLINIC | Age: 68
End: 2023-09-28
Payer: MEDICARE

## 2023-09-28 ENCOUNTER — LAB (OUTPATIENT)
Dept: LAB | Facility: HOSPITAL | Age: 68
End: 2023-09-28
Payer: MEDICARE

## 2023-09-28 VITALS
WEIGHT: 167.8 LBS | DIASTOLIC BLOOD PRESSURE: 81 MMHG | BODY MASS INDEX: 22.73 KG/M2 | TEMPERATURE: 98.9 F | RESPIRATION RATE: 16 BRPM | HEART RATE: 83 BPM | OXYGEN SATURATION: 97 % | SYSTOLIC BLOOD PRESSURE: 158 MMHG | HEIGHT: 72 IN

## 2023-09-28 DIAGNOSIS — R91.8 MASS OF LEFT LUNG: ICD-10-CM

## 2023-09-28 DIAGNOSIS — Z79.899 HIGH RISK MEDICATION USE: ICD-10-CM

## 2023-09-28 DIAGNOSIS — C34.92 NSCLC OF LEFT LUNG: ICD-10-CM

## 2023-09-28 DIAGNOSIS — Z79.899 HIGH RISK MEDICATION USE: Primary | ICD-10-CM

## 2023-09-28 LAB
ALBUMIN SERPL-MCNC: 4.1 G/DL (ref 3.5–5.2)
ALBUMIN/GLOB SERPL: 1.4 G/DL
ALP SERPL-CCNC: 46 U/L (ref 39–117)
ALT SERPL W P-5'-P-CCNC: 25 U/L (ref 1–41)
ANION GAP SERPL CALCULATED.3IONS-SCNC: 11.7 MMOL/L (ref 5–15)
AST SERPL-CCNC: 21 U/L (ref 1–40)
BASOPHILS # BLD AUTO: 0.02 10*3/MM3 (ref 0–0.2)
BASOPHILS NFR BLD AUTO: 1.2 % (ref 0–1.5)
BILIRUB SERPL-MCNC: 0.3 MG/DL (ref 0–1.2)
BUN SERPL-MCNC: 15 MG/DL (ref 8–23)
BUN/CREAT SERPL: 21.4 (ref 7–25)
CALCIUM SPEC-SCNC: 8.7 MG/DL (ref 8.6–10.5)
CHLORIDE SERPL-SCNC: 98 MMOL/L (ref 98–107)
CO2 SERPL-SCNC: 23.3 MMOL/L (ref 22–29)
CREAT SERPL-MCNC: 0.7 MG/DL (ref 0.76–1.27)
DEPRECATED RDW RBC AUTO: 47.7 FL (ref 37–54)
EGFRCR SERPLBLD CKD-EPI 2021: 101 ML/MIN/1.73
EOSINOPHIL # BLD AUTO: 0.09 10*3/MM3 (ref 0–0.4)
EOSINOPHIL NFR BLD AUTO: 5.3 % (ref 0.3–6.2)
ERYTHROCYTE [DISTWIDTH] IN BLOOD BY AUTOMATED COUNT: 15.6 % (ref 12.3–15.4)
GLOBULIN UR ELPH-MCNC: 3 GM/DL
GLUCOSE SERPL-MCNC: 106 MG/DL (ref 65–99)
HCT VFR BLD AUTO: 36.9 % (ref 37.5–51)
HGB BLD-MCNC: 11.6 G/DL (ref 13–17.7)
IMM GRANULOCYTES # BLD AUTO: 0 10*3/MM3 (ref 0–0.05)
IMM GRANULOCYTES NFR BLD AUTO: 0 % (ref 0–0.5)
LYMPHOCYTES # BLD AUTO: 0.92 10*3/MM3 (ref 0.7–3.1)
LYMPHOCYTES NFR BLD AUTO: 53.8 % (ref 19.6–45.3)
MCH RBC QN AUTO: 26.3 PG (ref 26.6–33)
MCHC RBC AUTO-ENTMCNC: 31.4 G/DL (ref 31.5–35.7)
MCV RBC AUTO: 83.7 FL (ref 79–97)
MONOCYTES # BLD AUTO: 0.22 10*3/MM3 (ref 0.1–0.9)
MONOCYTES NFR BLD AUTO: 12.9 % (ref 5–12)
NEUTROPHILS NFR BLD AUTO: 0.46 10*3/MM3 (ref 1.7–7)
NEUTROPHILS NFR BLD AUTO: 26.8 % (ref 42.7–76)
NRBC BLD AUTO-RTO: 0 /100 WBC (ref 0–0.2)
PLATELET # BLD AUTO: 165 10*3/MM3 (ref 140–450)
PMV BLD AUTO: 9.6 FL (ref 6–12)
POTASSIUM SERPL-SCNC: 4.1 MMOL/L (ref 3.5–5.2)
PROT SERPL-MCNC: 7.1 G/DL (ref 6–8.5)
RBC # BLD AUTO: 4.41 10*6/MM3 (ref 4.14–5.8)
SODIUM SERPL-SCNC: 133 MMOL/L (ref 136–145)
T4 FREE SERPL-MCNC: 1.05 NG/DL (ref 0.93–1.7)
TSH SERPL DL<=0.05 MIU/L-ACNC: 2.51 UIU/ML (ref 0.27–4.2)
WBC NRBC COR # BLD: 1.71 10*3/MM3 (ref 3.4–10.8)

## 2023-09-28 PROCEDURE — 36415 COLL VENOUS BLD VENIPUNCTURE: CPT

## 2023-09-28 PROCEDURE — 85025 COMPLETE CBC W/AUTO DIFF WBC: CPT | Performed by: INTERNAL MEDICINE

## 2023-09-28 PROCEDURE — 84443 ASSAY THYROID STIM HORMONE: CPT | Performed by: INTERNAL MEDICINE

## 2023-09-28 PROCEDURE — 80053 COMPREHEN METABOLIC PANEL: CPT | Performed by: INTERNAL MEDICINE

## 2023-09-28 PROCEDURE — 84439 ASSAY OF FREE THYROXINE: CPT | Performed by: INTERNAL MEDICINE

## 2023-09-28 RX ORDER — LEVOFLOXACIN 500 MG/1
500 TABLET, FILM COATED ORAL DAILY
Qty: 5 TABLET | Refills: 0 | Status: SHIPPED | OUTPATIENT
Start: 2023-09-28

## 2023-09-28 NOTE — PROGRESS NOTES
Subjective     REASON FOR CONSULTATION:  NSCLC  Provide an opinion on any further workup or treatment                             REQUESTING PHYSICIAN:  Sonia    RECORDS OBTAINED:  Records of the patients history including those obtained from the referring provider were reviewed and summarized in detail.      History of Present Illness   This is a pleasant 68-year-old man with hyperlipidemia and previous tobacco abuse who underwent a screening CT of the chest on 7/31/2023 which showed a large mass in the left lower lobe of the lung extending to the hilum.  A diagnostic CT was performed 8/25/2023 showing a large left infrahilar medial mass in the lower lobe.  A PET scan on 9/8/2023 showed a left lower lobe spiculated pleural-based mass 1.3 x 5 cm involving the left hilum and major fissure SUV 23.2.  There were smaller nodular airspace opacities also hypermetabolic.  The mass encases left lower lobe bronchovascular bundle.  No hypermetabolic mediastinal or hilar lymph nodes noted.  There was a 7 mm nodule in the right upper lobe photopenic SUV 2.1.  There was no supraclavicular uptake or disease below the abdomen other than 2 abnormal foci in the sigmoid colon.  MRI of the brain on 9/11/2023 showed a subtle 2 mm focus inferior left cerebellum too small to characterize but likely a blood vessel.  The patient underwent bronchoscopy on 9/6/2023 with biopsies taken from the left lower lobe mass, lymph node station 7 and lymph node station 4R.  Pathology showed malignant cells favoring poorly differentiated carcinoma/squamous differentiation from the left lower lobe and biopsies from station 7 and 4R were negative for malignant cells.    He went on to initiate carboplatin, Taxol, and nivolumab on 9/20/2023.  He returns today for toxicity check.  He has tolerated treatment well thus far.  He did have aches and pains and felt chilled on Saturday and part of Sunday.  They checked his temperature multiple times he was  afebrile.  He did take some Tylenol for the aches with some improvement.  He denies nausea though does feel somewhat bloated at times.  He is eating smaller portions more frequently.  He denies any bowel difficulties.  He does have minimal neuropathy in his fingertips bilaterally.    Past Medical History:   Diagnosis Date    BPH (benign prostatic hyperplasia)     Bruises easily     Bursitis of right shoulder     Cough     Hyperlipidemia     Mass of lower lobe of left lung         Past Surgical History:   Procedure Laterality Date    BRONCHOSCOPY WITH ION ROBOTIC ASSIST N/A 9/6/2023    Procedure: BRONCHOSCOPY WITH BAL;   ION ROBOT AND ENDOBRONCHIAL ULTRASOUND WITH FNA'S;  Surgeon: Rosalio Scott MD;  Location: [facility] ENDOSCOPY;  Service: Robotics - Pulmonary;  Laterality: N/A;  PRE- LEFT LOWER LOBE MASS  POST- SAME    GANGLION CYST EXCISION Bilateral     HEMORRHOIDECTOMY      OTHER SURGICAL HISTORY      SOMETHING REMOVED FROM LEFT CHEST, BENIGN ? LIPOMA        Current Outpatient Medications on File Prior to Visit   Medication Sig Dispense Refill    fluticasone (FLONASE) 50 MCG/ACT nasal spray 2 sprays into the nostril(s) as directed by provider Daily As Needed.      OLANZapine (zyPREXA) 5 MG tablet Take 1 tablet by mouth Every Night. Take nightly x 4 starting night of chemotherapy. 4 tablet 2    ondansetron (ZOFRAN) 8 MG tablet Take 1 tablet by mouth 3 (Three) Times a Day As Needed for Nausea or Vomiting. 30 tablet 2    pravastatin (PRAVACHOL) 20 MG tablet Take 1 tablet by mouth Every Night.      [DISCONTINUED] dexAMETHasone (DECADRON) 4 MG tablet Take 2 tablets twice a day. Take day before first chemotherapy. Take with food. 4 tablet 0     No current facility-administered medications on file prior to visit.        ALLERGIES:    Allergies   Allergen Reactions    Amoxicillin-Pot Clavulanate Hives        Social History     Socioeconomic History    Marital status:      Spouse name: Carmen   Tobacco Use    Smoking  "status: Former     Packs/day: 0.50     Years: 40.00     Pack years: 20.00     Types: Cigarettes     Quit date: 2016     Years since quittin.7    Smokeless tobacco: Never   Vaping Use    Vaping Use: Never used   Substance and Sexual Activity    Alcohol use: Not Currently     Comment: VERY RARELY, MAYBE 1 BEER    Drug use: Never    Sexual activity: Defer        Family History   Problem Relation Age of Onset    Bone cancer Mother     COPD Father     Brain cancer Sister     Cancer Brother         Malignant tumor of pharynx    Alcohol abuse Brother     Cirrhosis Brother     Recurrent abdominal pain Brother     Lung cancer Brother     Malig Hyperthermia Neg Hx         Review of Systems   Constitutional: Negative.    HENT: Negative.     Respiratory:  Positive for cough.    Cardiovascular: Negative.    Gastrointestinal: Negative.    Genitourinary: Negative.    Musculoskeletal: Negative.    Neurological: Negative.    Hematological: Negative.    Psychiatric/Behavioral:  Negative for dysphoric mood. The patient is nervous/anxious.         Objective     Vitals:    23 0920   BP: 158/81   Pulse: 83   Resp: 16   Temp: 98.9 °F (37.2 °C)   TempSrc: Infrared   SpO2: 97%   Weight: 76.1 kg (167 lb 12.8 oz)   Height: 182.9 cm (72.01\")   PainSc: 0-No pain         2023     9:21 AM   Current Status   ECOG score 0       Physical Exam    CONSTITUTIONAL: pleasant well-developed adult man  LYMPH: no cervical or supraclavicular lad  CV: RRR, S1S2, no murmur  RESP: cta bilat, no wheezing, no rales  GI: soft, non-tender, no splenomegaly, +bs  MUSC: no edema, normal gait  NEURO: alert and oriented x3, normal strength  PSYCH: normal mood anxious affect  I have reexamined the patient and the results are consistent with the previously documented exam. Coni Gonzales, APRN 2023        RECENT LABS:  Hematology WBC   Date Value Ref Range Status   2023 1.71 (C) 3.40 - 10.80 10*3/mm3 Final     RBC   Date Value Ref Range Status "   09/28/2023 4.41 4.14 - 5.80 10*6/mm3 Final     Hemoglobin   Date Value Ref Range Status   09/28/2023 11.6 (L) 13.0 - 17.7 g/dL Final     Hematocrit   Date Value Ref Range Status   09/28/2023 36.9 (L) 37.5 - 51.0 % Final     Platelets   Date Value Ref Range Status   09/28/2023 165 140 - 450 10*3/mm3 Final        Lab Results   Component Value Date    GLUCOSE 106 (H) 09/28/2023    BUN 15 09/28/2023    CREATININE 0.70 (L) 09/28/2023    EGFR 101.0 09/28/2023    BCR 21.4 09/28/2023    K 4.1 09/28/2023    CO2 23.3 09/28/2023    CALCIUM 8.7 09/28/2023    ALBUMIN 4.1 09/28/2023    BILITOT 0.3 09/28/2023    AST 21 09/28/2023    ALT 25 09/28/2023     PET scan 9823  IMPRESSION:  1. Intensely hypermetabolic approximately 13 x 5 cm pleural-based left  lower lobe lung mass involving the left hilum. Smaller nodular opacities  adjacently are hypermetabolic and likely represent metastases. A 7 mm  right upper lobe nodule is photopenic. Conservative surveillance is  recommended. There is no convincing evidence for metastatic disease at  the neck, abdomen, or pelvis.  2. There are 2 hypermetabolic foci at the sigmoid colon need further  evaluation, particularly the 1.4 cm hypermetabolic polyp at the distal  sigmoid colon. Colonoscopy is recommended.  3. Nonspecific heterogeneous prostate activity. PSA follow-up is  recommended.    Assessment & Plan   *T4N0M0 poorly differentiated carcinoma/squamous differentiation left lower lobe of the lung  Mass discovered on low-dose CT chest screening 7/31/2023  PET scan 9/8/2023 showed a 1.3 x 5 cm left lower lobe mass involving the hilum with smaller adjacent nodular opacities, max SUV 23.2, photopenic right upper lobe nodule  Bronchoscopy with biopsy from the left lower lobe 9/6/2023 positive for poorly differentiated carcinoma with squamous differentiation; P-L1 TPS 0%  Disease is presently unresectable secondary to tumor location and pulmonary function test results  Plans made to initiate  carboplatin, Taxol, nivolumab every 21 days x 3 cycles followed by repeat assessment with CT imaging and another MRI of the brain along with PFTs.  Patient did initiate this on 9/20/2023.  If the disease is then resectable following this plan, he would undergo surgical resection.  Recent study showed benefit of adjuvant nivolumab every 4 weeks for 6 months following surgical resection of stage III non-small cell lung cancer after neoadjuvant chemoimmunotherapy.  If disease remained unresectable after 3 cycles of neoadjuvant chemotherapy, the patient would proceed with chemoradiation and adjuvant immunotherapy at that point.  We have proceeded with initiation without a port.  If patient ends up needing chemoradiation and maintenance immunotherapy, we will initiate a port at that time.  Patient returns to the office on 9/28/2023 for 1 week toxicity check status post initiation of carboplatin, Taxol, nivolumab.  He tolerated treatment fairly well.  He does have minimal neuropathy in his fingertips bilaterally.  He reports Saturday and Sunday following treatment he felt achy and chilled though denies fevers at the time.  They did give him Tylenol and he had some improvement in his symptoms.  Thereafter this resolved.  He had no nausea or bowel difficulties.  He does feel full more quickly and slightly bloated and is eating smaller portions.  His weight is stable.  he is neutropenic today with an ANC of 0.46 with some monocytosis at 12.9%.  We will proceed with Levaquin prophylaxis.  He will return in 1 week for CBC with nurse review and in 2 weeks for review by Dr. Dubon and cycle 2 carboplatin, Taxol, nivolumab.  We reviewed today to have his olanzapine filled for cycle 2.  We reviewed neutropenic precautions today.    *MRI brain 9/11/23-2 mm enhancement left cerebellar hemisphere likely vessel artifact but will need radiographic follow-up    *PET uptake 2 foci sigmoid colon Will eventually need  colonoscopy    *Comorbidities-hyperlipidemia      Oncology plan/recommendations:  Levaquin 500 mg daily for neutropenic prophylaxis   Call the office for any signs or symptoms of fevers or infection.    Return to the office in 1 week for CBC with RN review   May continue ondansetron every 8 hours as needed for nausea  We will utilize olanzapine 5 mg on nights 1, 2, 3, 4 of each cycle of chemotherapy to help prevent nausea, review to get this refilled for cycle 2.  Return to the office in 2 weeks for review by Dr. Dubon and cycle 2 neoadjuvant carboplatin, Taxol, nivolumab.  Plan a total of 3 cycles of neoadjuvant chemoimmunotherapy followed by restaging with CT scans, MRI of the brain, and PFTs.  Patient continues on high risk medication requiring frequent monitoring.

## 2023-10-02 ENCOUNTER — PATIENT OUTREACH (OUTPATIENT)
Dept: OTHER | Facility: HOSPITAL | Age: 68
End: 2023-10-02
Payer: MEDICARE

## 2023-10-02 NOTE — PROGRESS NOTES
Reviewed chart    Called patient, s/w wife. He has had 1 treatment so far and felt bad a few days later (felt achy and chilled, no fever). The patient has labs 10/5 and is scheduled for cycle 2 on 10/11.    We discussed how they are coping with the patient's diagnosis. His wife states they are doing better.  We again discussed integrative therapies and other [facility]. Patient expressed gratitude for my support and denied any additional needs at this time.     We discussed their [facility] and the [facility] cancer policy. She will ask Dr. Dubon's office for assistance in filling out paper work needed for the policy.  Offered to transfer her to medical records if she needs to request any reports today; she declined.    I will call in several weeks; encouraged patient/wife to call as needed.

## 2023-10-05 ENCOUNTER — CLINICAL SUPPORT (OUTPATIENT)
Dept: ONCOLOGY | Facility: HOSPITAL | Age: 68
End: 2023-10-05
Payer: MEDICARE

## 2023-10-05 ENCOUNTER — LAB (OUTPATIENT)
Dept: LAB | Facility: HOSPITAL | Age: 68
End: 2023-10-05
Payer: MEDICARE

## 2023-10-05 DIAGNOSIS — C34.92 NSCLC OF LEFT LUNG: ICD-10-CM

## 2023-10-05 DIAGNOSIS — Z79.899 HIGH RISK MEDICATION USE: ICD-10-CM

## 2023-10-05 LAB
BASOPHILS # BLD AUTO: 0.06 10*3/MM3 (ref 0–0.2)
BASOPHILS NFR BLD AUTO: 1.2 % (ref 0–1.5)
DEPRECATED RDW RBC AUTO: 47.1 FL (ref 37–54)
EOSINOPHIL # BLD AUTO: 0.21 10*3/MM3 (ref 0–0.4)
EOSINOPHIL NFR BLD AUTO: 4.2 % (ref 0.3–6.2)
ERYTHROCYTE [DISTWIDTH] IN BLOOD BY AUTOMATED COUNT: 15.7 % (ref 12.3–15.4)
HCT VFR BLD AUTO: 36.8 % (ref 37.5–51)
HGB BLD-MCNC: 11.6 G/DL (ref 13–17.7)
IMM GRANULOCYTES # BLD AUTO: 0.07 10*3/MM3 (ref 0–0.05)
IMM GRANULOCYTES NFR BLD AUTO: 1.4 % (ref 0–0.5)
LYMPHOCYTES # BLD AUTO: 1.85 10*3/MM3 (ref 0.7–3.1)
LYMPHOCYTES NFR BLD AUTO: 37.2 % (ref 19.6–45.3)
MCH RBC QN AUTO: 26.2 PG (ref 26.6–33)
MCHC RBC AUTO-ENTMCNC: 31.5 G/DL (ref 31.5–35.7)
MCV RBC AUTO: 83.3 FL (ref 79–97)
MONOCYTES # BLD AUTO: 1.1 10*3/MM3 (ref 0.1–0.9)
MONOCYTES NFR BLD AUTO: 22.1 % (ref 5–12)
NEUTROPHILS NFR BLD AUTO: 1.68 10*3/MM3 (ref 1.7–7)
NEUTROPHILS NFR BLD AUTO: 33.9 % (ref 42.7–76)
NRBC BLD AUTO-RTO: 0 /100 WBC (ref 0–0.2)
PLATELET # BLD AUTO: 280 10*3/MM3 (ref 140–450)
PMV BLD AUTO: 9.1 FL (ref 6–12)
RBC # BLD AUTO: 4.42 10*6/MM3 (ref 4.14–5.8)
WBC NRBC COR # BLD: 4.97 10*3/MM3 (ref 3.4–10.8)

## 2023-10-05 PROCEDURE — G0463 HOSPITAL OUTPT CLINIC VISIT: HCPCS

## 2023-10-05 PROCEDURE — 85025 COMPLETE CBC W/AUTO DIFF WBC: CPT | Performed by: NURSE PRACTITIONER

## 2023-10-05 NOTE — PROGRESS NOTES
Subjective     REASON FOR CONSULTATION:  NSCLC  Provide an opinion on any further workup or treatment                             REQUESTING PHYSICIAN:  Gul    RECORDS OBTAINED:  Records of the patients history including those obtained from the referring provider were reviewed and summarized in detail.      History of Present Illness   The patient initiated neoadjuvant treatments on 9/20/2023 with carboplatin/Taxol and nivolumab.  He tolerated the first treatment well other than achiness and fatigue.  He had no uncontrolled nausea vomiting diarrhea skin rash worsening shortness of breath cough fevers chills etc.    Oncology history:  This is a pleasant 68-year-old man with hyperlipidemia and previous tobacco abuse who underwent a screening CT of the chest on 7/31/2023 which showed a large mass in the left lower lobe of the lung extending to the hilum.  A diagnostic CT was performed 8/25/2023 showing a large left infrahilar medial mass in the lower lobe.  A PET scan on 9/8/2023 showed a left lower lobe spiculated pleural-based mass 1.3 x 5 cm involving the left hilum and major fissure SUV 23.2.  There were smaller nodular airspace opacities also hypermetabolic.  The mass encases left lower lobe bronchovascular bundle.  No hypermetabolic mediastinal or hilar lymph nodes noted.  There was a 7 mm nodule in the right upper lobe photopenic SUV 2.1.  There was no supraclavicular uptake or disease below the abdomen other than 2 abnormal foci in the sigmoid colon.  MRI of the brain on 9/11/2023 showed a subtle 2 mm focus inferior left cerebellum too small to characterize but likely a blood vessel.  The patient underwent bronchoscopy on 9/6/2023 with biopsies taken from the left lower lobe mass, lymph node station 7 and lymph node station 4R.  Pathology showed malignant cells favoring poorly differentiated carcinoma/squamous differentiation from the left lower lobe and biopsies from station 7 and 4R were negative for  malignant cells.        Past Medical History:   Diagnosis Date    BPH (benign prostatic hyperplasia)     Bruises easily     Bursitis of right shoulder     Cough     Hyperlipidemia     Mass of lower lobe of left lung         Past Surgical History:   Procedure Laterality Date    BRONCHOSCOPY WITH ION ROBOTIC ASSIST N/A 2023    Procedure: BRONCHOSCOPY WITH BAL;   ION ROBOT AND ENDOBRONCHIAL ULTRASOUND WITH FNA'S;  Surgeon: Rosalio Scott MD;  Location: [facility] ENDOSCOPY;  Service: Robotics - Pulmonary;  Laterality: N/A;  PRE- LEFT LOWER LOBE MASS  POST- SAME    GANGLION CYST EXCISION Bilateral     HEMORRHOIDECTOMY      OTHER SURGICAL HISTORY      SOMETHING REMOVED FROM LEFT CHEST, BENIGN ? LIPOMA        Current Outpatient Medications on File Prior to Visit   Medication Sig Dispense Refill    fluticasone (FLONASE) 50 MCG/ACT nasal spray 2 sprays into the nostril(s) as directed by provider Daily As Needed.      OLANZapine (zyPREXA) 5 MG tablet Take 1 tablet by mouth Every Night. Take nightly x 4 starting night of chemotherapy. 4 tablet 2    ondansetron (ZOFRAN) 8 MG tablet Take 1 tablet by mouth 3 (Three) Times a Day As Needed for Nausea or Vomiting. 30 tablet 2    pravastatin (PRAVACHOL) 20 MG tablet Take 1 tablet by mouth Every Night.      levoFLOXacin (Levaquin) 500 MG tablet Take 1 tablet by mouth Daily. 5 tablet 0     No current facility-administered medications on file prior to visit.        ALLERGIES:    Allergies   Allergen Reactions    Amoxicillin-Pot Clavulanate Hives        Social History     Socioeconomic History    Marital status:      Spouse name: Carmen   Tobacco Use    Smoking status: Former     Packs/day: 0.50     Years: 40.00     Additional pack years: 0.00     Total pack years: 20.00     Types: Cigarettes     Quit date:      Years since quittin.7    Smokeless tobacco: Never   Vaping Use    Vaping Use: Never used   Substance and Sexual Activity    Alcohol use: Not Currently     Comment:  "VERY RARELY, MAYBE 1 BEER    Drug use: Never    Sexual activity: Defer        Family History   Problem Relation Age of Onset    Bone cancer Mother     COPD Father     Brain cancer Sister     Cancer Brother         Malignant tumor of pharynx    Alcohol abuse Brother     Cirrhosis Brother     Recurrent abdominal pain Brother     Lung cancer Brother     Malig Hyperthermia Neg Hx         Review of Systems   Constitutional:  Positive for fatigue.   HENT: Negative.     Respiratory:  Positive for cough.    Cardiovascular: Negative.    Gastrointestinal: Negative.    Genitourinary: Negative.    Musculoskeletal: Negative.    Neurological: Negative.    Hematological: Negative.    Psychiatric/Behavioral:  Negative for dysphoric mood. The patient is nervous/anxious.           Objective     Vitals:    10/11/23 0752   BP: 155/70   Pulse: 83   Resp: 16   Temp: 98.2 øF (36.8 øC)   TempSrc: Infrared   SpO2: 95%   Weight: 76.8 kg (169 lb 6.4 oz)   Height: 182.9 cm (72.01\")   PainSc: 0-No pain         10/11/2023     7:53 AM   Current Status   ECOG score 0       Physical Exam    CONSTITUTIONAL: pleasant well-developed adult man  LYMPH: no cervical or supraclavicular lad  CV: RRR, S1S2, no murmur  RESP: cta bilat, no wheezing, no rales  GI: soft, non-tender, no splenomegaly, +bs  MUSC: no edema, normal gait  NEURO: alert and oriented x3, normal strength  PSYCH: normal mood anxious affect  Exam is unchanged-10/11/2023  RECENT LABS:  Hematology WBC   Date Value Ref Range Status   10/11/2023 12.90 (H) 3.40 - 10.80 10*3/mm3 Final     RBC   Date Value Ref Range Status   10/11/2023 4.75 4.14 - 5.80 10*6/mm3 Final     Hemoglobin   Date Value Ref Range Status   10/11/2023 12.6 (L) 13.0 - 17.7 g/dL Final     Hematocrit   Date Value Ref Range Status   10/11/2023 39.8 37.5 - 51.0 % Final     Platelets   Date Value Ref Range Status   10/11/2023 470 (H) 140 - 450 10*3/mm3 Final        Lab Results   Component Value Date    GLUCOSE 280 (H) 10/11/2023 "    BUN 16 10/11/2023    CREATININE 0.63 (L) 10/11/2023    EGFR 104.3 10/11/2023    BCR 25.4 (H) 10/11/2023    K 3.9 10/11/2023    CO2 21.1 (L) 10/11/2023    CALCIUM 9.2 10/11/2023    ALBUMIN 4.1 10/11/2023    BILITOT 0.2 10/11/2023    AST 14 10/11/2023    ALT 18 10/11/2023     PET scan 9823  IMPRESSION:  1. Intensely hypermetabolic approximately 13 x 5 cm pleural-based left  lower lobe lung mass involving the left hilum. Smaller nodular opacities  adjacently are hypermetabolic and likely represent metastases. A 7 mm  right upper lobe nodule is photopenic. Conservative surveillance is  recommended. There is no convincing evidence for metastatic disease at  the neck, abdomen, or pelvis.  2. There are 2 hypermetabolic foci at the sigmoid colon need further  evaluation, particularly the 1.4 cm hypermetabolic polyp at the distal  sigmoid colon. Colonoscopy is recommended.  3. Nonspecific heterogeneous prostate activity. PSA follow-up is  recommended.    Assessment & Plan   *T4N0M0 poorly differentiated carcinoma/squamous differentiation left lower lobe of the lung  Mass discovered on low-dose CT chest screening 7/31/2023  PET scan 9/8/2023 showed a 1.3 x 5 cm left lower lobe mass involving the hilum with smaller adjacent nodular opacities, max SUV 23.2, photopenic right upper lobe nodule  Bronchoscopy with biopsy from the left lower lobe 9/6/2023 positive for poorly differentiated carcinoma with squamous differentiation; P-L1 TPS 0%  Initiated neoadjuvant treatment with carboplatin/Taxol/nivolumab 9/20/2023    *MRI brain 9/11/23-2 mm enhancement left cerebellar hemisphere likely vessel artifact but will need radiographic follow-up    *PET uptake 2 foci sigmoid colon; he will eventually need colonoscopy    *Comorbidities-hyperlipidemia      Oncology plan/recommendations:  Proceed with cycle 2 of carboplatin/Taxol/nivolumab-creatinine clearance will be capped at 120 mil/ minute  Return to clinic 3 weeks for cycle 3 of  neoadjuvant therapy  After cycle 3 plan to repeat CT chest, MRI brain PFTs and assess for resectability   If disease remained unresectable after 3 cycles of neoadjuvant chemoimmunotherapy the patient would proceed with chemoradiation and adjuvant immunotherapy at that point.

## 2023-10-05 NOTE — NURSING NOTE
WBC 4.97  ANC 1.68 today.  Pt denies any signs of infection or fevers.  His only complaint is fatigue. Pt to return next week for md visit and next treatment.

## 2023-10-09 ENCOUNTER — LAB (OUTPATIENT)
Dept: LAB | Facility: HOSPITAL | Age: 68
End: 2023-10-09
Payer: MEDICARE

## 2023-10-09 ENCOUNTER — TRANSCRIBE ORDERS (OUTPATIENT)
Dept: ADMINISTRATIVE | Facility: HOSPITAL | Age: 68
End: 2023-10-09
Payer: MEDICARE

## 2023-10-09 DIAGNOSIS — R97.20 ELEVATED PROSTATE SPECIFIC ANTIGEN (PSA): Primary | ICD-10-CM

## 2023-10-09 DIAGNOSIS — R97.20 ELEVATED PROSTATE SPECIFIC ANTIGEN (PSA): ICD-10-CM

## 2023-10-09 PROCEDURE — 36415 COLL VENOUS BLD VENIPUNCTURE: CPT

## 2023-10-09 PROCEDURE — 84154 ASSAY OF PSA FREE: CPT

## 2023-10-09 PROCEDURE — 84153 ASSAY OF PSA TOTAL: CPT

## 2023-10-10 LAB
PSA FREE MFR SERPL: 23.6 %
PSA FREE SERPL-MCNC: 1.51 NG/ML
PSA SERPL-MCNC: 6.4 NG/ML (ref 0–4)

## 2023-10-11 ENCOUNTER — INFUSION (OUTPATIENT)
Dept: ONCOLOGY | Facility: HOSPITAL | Age: 68
End: 2023-10-11
Payer: MEDICARE

## 2023-10-11 ENCOUNTER — OFFICE VISIT (OUTPATIENT)
Dept: ONCOLOGY | Facility: CLINIC | Age: 68
End: 2023-10-11
Payer: MEDICARE

## 2023-10-11 ENCOUNTER — APPOINTMENT (OUTPATIENT)
Dept: ONCOLOGY | Facility: HOSPITAL | Age: 68
End: 2023-10-11
Payer: MEDICARE

## 2023-10-11 VITALS
SYSTOLIC BLOOD PRESSURE: 155 MMHG | TEMPERATURE: 98.2 F | RESPIRATION RATE: 16 BRPM | OXYGEN SATURATION: 95 % | DIASTOLIC BLOOD PRESSURE: 70 MMHG | HEIGHT: 72 IN | HEART RATE: 83 BPM | BODY MASS INDEX: 22.94 KG/M2 | WEIGHT: 169.4 LBS

## 2023-10-11 VITALS — HEART RATE: 65 BPM | SYSTOLIC BLOOD PRESSURE: 133 MMHG | DIASTOLIC BLOOD PRESSURE: 73 MMHG

## 2023-10-11 DIAGNOSIS — R91.8 MASS OF LEFT LUNG: ICD-10-CM

## 2023-10-11 DIAGNOSIS — Z79.899 HIGH RISK MEDICATION USE: ICD-10-CM

## 2023-10-11 DIAGNOSIS — Z79.899 HIGH RISK MEDICATION USE: Primary | ICD-10-CM

## 2023-10-11 DIAGNOSIS — R91.8 MASS OF LEFT LUNG: Primary | ICD-10-CM

## 2023-10-11 LAB
ALBUMIN SERPL-MCNC: 4.1 G/DL (ref 3.5–5.2)
ALBUMIN/GLOB SERPL: 1.1 G/DL
ALP SERPL-CCNC: 56 U/L (ref 39–117)
ALT SERPL W P-5'-P-CCNC: 18 U/L (ref 1–41)
ANION GAP SERPL CALCULATED.3IONS-SCNC: 12.9 MMOL/L (ref 5–15)
AST SERPL-CCNC: 14 U/L (ref 1–40)
BASOPHILS # BLD AUTO: 0.02 10*3/MM3 (ref 0–0.2)
BASOPHILS NFR BLD AUTO: 0.2 % (ref 0–1.5)
BILIRUB SERPL-MCNC: 0.2 MG/DL (ref 0–1.2)
BUN SERPL-MCNC: 16 MG/DL (ref 8–23)
BUN/CREAT SERPL: 25.4 (ref 7–25)
CALCIUM SPEC-SCNC: 9.2 MG/DL (ref 8.6–10.5)
CHLORIDE SERPL-SCNC: 102 MMOL/L (ref 98–107)
CO2 SERPL-SCNC: 21.1 MMOL/L (ref 22–29)
CREAT SERPL-MCNC: 0.63 MG/DL (ref 0.76–1.27)
DEPRECATED RDW RBC AUTO: 49 FL (ref 37–54)
EGFRCR SERPLBLD CKD-EPI 2021: 104.3 ML/MIN/1.73
EOSINOPHIL # BLD AUTO: 0 10*3/MM3 (ref 0–0.4)
EOSINOPHIL NFR BLD AUTO: 0 % (ref 0.3–6.2)
ERYTHROCYTE [DISTWIDTH] IN BLOOD BY AUTOMATED COUNT: 16.2 % (ref 12.3–15.4)
GLOBULIN UR ELPH-MCNC: 3.6 GM/DL
GLUCOSE SERPL-MCNC: 280 MG/DL (ref 65–99)
HCT VFR BLD AUTO: 39.8 % (ref 37.5–51)
HGB BLD-MCNC: 12.6 G/DL (ref 13–17.7)
IMM GRANULOCYTES # BLD AUTO: 0.08 10*3/MM3 (ref 0–0.05)
IMM GRANULOCYTES NFR BLD AUTO: 0.6 % (ref 0–0.5)
LYMPHOCYTES # BLD AUTO: 1.09 10*3/MM3 (ref 0.7–3.1)
LYMPHOCYTES NFR BLD AUTO: 8.4 % (ref 19.6–45.3)
MCH RBC QN AUTO: 26.5 PG (ref 26.6–33)
MCHC RBC AUTO-ENTMCNC: 31.7 G/DL (ref 31.5–35.7)
MCV RBC AUTO: 83.8 FL (ref 79–97)
MONOCYTES # BLD AUTO: 0.26 10*3/MM3 (ref 0.1–0.9)
MONOCYTES NFR BLD AUTO: 2 % (ref 5–12)
NEUTROPHILS NFR BLD AUTO: 11.45 10*3/MM3 (ref 1.7–7)
NEUTROPHILS NFR BLD AUTO: 88.8 % (ref 42.7–76)
NRBC BLD AUTO-RTO: 0.2 /100 WBC (ref 0–0.2)
PLATELET # BLD AUTO: 470 10*3/MM3 (ref 140–450)
PMV BLD AUTO: 9.1 FL (ref 6–12)
POTASSIUM SERPL-SCNC: 3.9 MMOL/L (ref 3.5–5.2)
PROT SERPL-MCNC: 7.7 G/DL (ref 6–8.5)
RBC # BLD AUTO: 4.75 10*6/MM3 (ref 4.14–5.8)
SODIUM SERPL-SCNC: 136 MMOL/L (ref 136–145)
T4 FREE SERPL-MCNC: 1.01 NG/DL (ref 0.93–1.7)
TSH SERPL DL<=0.05 MIU/L-ACNC: 0.77 UIU/ML (ref 0.27–4.2)
WBC NRBC COR # BLD: 12.9 10*3/MM3 (ref 3.4–10.8)

## 2023-10-11 PROCEDURE — 96375 TX/PRO/DX INJ NEW DRUG ADDON: CPT

## 2023-10-11 PROCEDURE — 84443 ASSAY THYROID STIM HORMONE: CPT | Performed by: INTERNAL MEDICINE

## 2023-10-11 PROCEDURE — 96367 TX/PROPH/DG ADDL SEQ IV INF: CPT

## 2023-10-11 PROCEDURE — 25010000002 NIVOLUMAB 240 MG/24ML SOLUTION 24 ML VIAL: Performed by: INTERNAL MEDICINE

## 2023-10-11 PROCEDURE — 25010000002 FOSAPREPITANT PER 1 MG: Performed by: INTERNAL MEDICINE

## 2023-10-11 PROCEDURE — 25010000002 DEXAMETHASONE PER 1 MG: Performed by: INTERNAL MEDICINE

## 2023-10-11 PROCEDURE — 80053 COMPREHEN METABOLIC PANEL: CPT | Performed by: INTERNAL MEDICINE

## 2023-10-11 PROCEDURE — 96417 CHEMO IV INFUS EACH ADDL SEQ: CPT

## 2023-10-11 PROCEDURE — 25810000003 SODIUM CHLORIDE 0.9 % SOLUTION: Performed by: INTERNAL MEDICINE

## 2023-10-11 PROCEDURE — 25810000003 SODIUM CHLORIDE 0.9 % SOLUTION 500 ML FLEX CONT: Performed by: INTERNAL MEDICINE

## 2023-10-11 PROCEDURE — 25010000002 PACLITAXEL PER 1 MG: Performed by: INTERNAL MEDICINE

## 2023-10-11 PROCEDURE — A9270 NON-COVERED ITEM OR SERVICE: HCPCS | Performed by: INTERNAL MEDICINE

## 2023-10-11 PROCEDURE — 25010000002 CARBOPLATIN PER 50 MG: Performed by: INTERNAL MEDICINE

## 2023-10-11 PROCEDURE — 63710000001 MONTELUKAST 10 MG TABLET: Performed by: INTERNAL MEDICINE

## 2023-10-11 PROCEDURE — 25010000002 PALONOSETRON 0.25 MG/5ML SOLUTION PREFILLED SYRINGE: Performed by: INTERNAL MEDICINE

## 2023-10-11 PROCEDURE — 25010000002 DIPHENHYDRAMINE PER 50 MG: Performed by: INTERNAL MEDICINE

## 2023-10-11 PROCEDURE — 84439 ASSAY OF FREE THYROXINE: CPT | Performed by: INTERNAL MEDICINE

## 2023-10-11 PROCEDURE — 96415 CHEMO IV INFUSION ADDL HR: CPT

## 2023-10-11 PROCEDURE — 96413 CHEMO IV INFUSION 1 HR: CPT

## 2023-10-11 PROCEDURE — 25010000002 NIVOLUMAB 100 MG/10ML SOLUTION 10 ML VIAL: Performed by: INTERNAL MEDICINE

## 2023-10-11 PROCEDURE — 25810000003 SODIUM CHLORIDE 0.9 % SOLUTION 250 ML FLEX CONT: Performed by: INTERNAL MEDICINE

## 2023-10-11 PROCEDURE — 85025 COMPLETE CBC W/AUTO DIFF WBC: CPT | Performed by: INTERNAL MEDICINE

## 2023-10-11 RX ORDER — PALONOSETRON 0.05 MG/ML
0.25 INJECTION, SOLUTION INTRAVENOUS ONCE
Status: CANCELLED | OUTPATIENT
Start: 2023-10-11

## 2023-10-11 RX ORDER — FAMOTIDINE 10 MG/ML
20 INJECTION, SOLUTION INTRAVENOUS AS NEEDED
Status: CANCELLED | OUTPATIENT
Start: 2023-10-11

## 2023-10-11 RX ORDER — MONTELUKAST SODIUM 10 MG/1
10 TABLET ORAL ONCE
Status: CANCELLED
Start: 2023-10-11 | End: 2023-10-11

## 2023-10-11 RX ORDER — PALONOSETRON 0.05 MG/ML
0.25 INJECTION, SOLUTION INTRAVENOUS ONCE
Status: COMPLETED | OUTPATIENT
Start: 2023-10-11 | End: 2023-10-11

## 2023-10-11 RX ORDER — MONTELUKAST SODIUM 10 MG/1
10 TABLET ORAL ONCE
Status: COMPLETED | OUTPATIENT
Start: 2023-10-11 | End: 2023-10-11

## 2023-10-11 RX ORDER — SODIUM CHLORIDE 9 MG/ML
250 INJECTION, SOLUTION INTRAVENOUS ONCE
Status: COMPLETED | OUTPATIENT
Start: 2023-10-11 | End: 2023-10-11

## 2023-10-11 RX ORDER — FAMOTIDINE 10 MG/ML
20 INJECTION, SOLUTION INTRAVENOUS ONCE
Status: CANCELLED | OUTPATIENT
Start: 2023-10-11

## 2023-10-11 RX ORDER — DIPHENHYDRAMINE HYDROCHLORIDE 50 MG/ML
50 INJECTION INTRAMUSCULAR; INTRAVENOUS AS NEEDED
Status: CANCELLED | OUTPATIENT
Start: 2023-10-11

## 2023-10-11 RX ORDER — SODIUM CHLORIDE 9 MG/ML
250 INJECTION, SOLUTION INTRAVENOUS ONCE
Status: CANCELLED | OUTPATIENT
Start: 2023-10-11

## 2023-10-11 RX ORDER — FAMOTIDINE 10 MG/ML
20 INJECTION, SOLUTION INTRAVENOUS ONCE
Status: COMPLETED | OUTPATIENT
Start: 2023-10-11 | End: 2023-10-11

## 2023-10-11 RX ADMIN — FOSAPREPITANT 100 ML: 150 INJECTION, POWDER, LYOPHILIZED, FOR SOLUTION INTRAVENOUS at 08:56

## 2023-10-11 RX ADMIN — FAMOTIDINE 20 MG: 10 INJECTION INTRAVENOUS at 10:05

## 2023-10-11 RX ADMIN — CARBOPLATIN 730 MG: 10 INJECTION, SOLUTION INTRAVENOUS at 14:16

## 2023-10-11 RX ADMIN — DIPHENHYDRAMINE HYDROCHLORIDE 50 MG: 50 INJECTION, SOLUTION INTRAMUSCULAR; INTRAVENOUS at 10:05

## 2023-10-11 RX ADMIN — MONTELUKAST SODIUM 10 MG: 10 TABLET, COATED ORAL at 08:53

## 2023-10-11 RX ADMIN — PALONOSETRON 0.25 MG: 0.25 INJECTION, SOLUTION INTRAVENOUS at 08:54

## 2023-10-11 RX ADMIN — DEXAMETHASONE SODIUM PHOSPHATE 20 MG: 4 INJECTION, SOLUTION INTRAMUSCULAR; INTRAVENOUS at 10:28

## 2023-10-11 RX ADMIN — SODIUM CHLORIDE 360 MG: 9 INJECTION, SOLUTION INTRAVENOUS at 09:31

## 2023-10-11 RX ADMIN — SODIUM CHLORIDE 250 ML: 9 INJECTION, SOLUTION INTRAVENOUS at 08:51

## 2023-10-11 RX ADMIN — PACLITAXEL 395 MG: 6 INJECTION, SOLUTION INTRAVENOUS at 11:02

## 2023-10-16 ENCOUNTER — TELEPHONE (OUTPATIENT)
Dept: ONCOLOGY | Facility: CLINIC | Age: 68
End: 2023-10-16
Payer: MEDICARE

## 2023-10-16 NOTE — TELEPHONE ENCOUNTER
Called the patients wife and she states that he is having some hair loss that was expected but that he also had red area around where his ballcap was. They had not tried lotion yet and stated this was not bothering him and there were no blisters. Patients wife stated that they would try lotion and call us if this did not help. Wife v/u.

## 2023-10-16 NOTE — TELEPHONE ENCOUNTER
Provider: Jagdish   Caller: Carmen  Relationship to Patient: wife  Call Back Phone Number: [phone]  Reason for Call: Pt is losing some hair and has some redness on his head.Is there a medicated cream that can be used to help.

## 2023-10-26 NOTE — PROGRESS NOTES
Subjective     REASON FOR CONSULTATION:  NSCLC  Provide an opinion on any further workup or treatment                             REQUESTING PHYSICIAN:  Gul    RECORDS OBTAINED:  Records of the patients history including those obtained from the referring provider were reviewed and summarized in detail.      History of Present Illness   The patient initiated neoadjuvant treatments on 9/20/2023 with carboplatin/Taxol and nivolumab and has completed 2 cycles.  The patient has had excellent tolerance thus far to neoadjuvant therapy.  Only main side effect has been grade 1 peripheral neuropathy affecting the fingertips.  He denies nausea vomiting diarrhea shortness of breath above baseline worsening cough skin rash etc.    Oncology history:  This is a pleasant 68-year-old man with hyperlipidemia and previous tobacco abuse who underwent a screening CT of the chest on 7/31/2023 which showed a large mass in the left lower lobe of the lung extending to the hilum.  A diagnostic CT was performed 8/25/2023 showing a large left infrahilar medial mass in the lower lobe.  A PET scan on 9/8/2023 showed a left lower lobe spiculated pleural-based mass 1.3 x 5 cm involving the left hilum and major fissure SUV 23.2.  There were smaller nodular airspace opacities also hypermetabolic.  The mass encases left lower lobe bronchovascular bundle.  No hypermetabolic mediastinal or hilar lymph nodes noted.  There was a 7 mm nodule in the right upper lobe photopenic SUV 2.1.  There was no supraclavicular uptake or disease below the abdomen other than 2 abnormal foci in the sigmoid colon.  MRI of the brain on 9/11/2023 showed a subtle 2 mm focus inferior left cerebellum too small to characterize but likely a blood vessel.  The patient underwent bronchoscopy on 9/6/2023 with biopsies taken from the left lower lobe mass, lymph node station 7 and lymph node station 4R.  Pathology showed malignant cells favoring poorly differentiated  carcinoma/squamous differentiation from the left lower lobe and biopsies from station 7 and 4R were negative for malignant cells.    The patient completed 3 cycles of neoadjuvant therapy with carboplatin/Taxol/nivolumab 2023.    Past Medical History:   Diagnosis Date    BPH (benign prostatic hyperplasia)     Bruises easily     Bursitis of right shoulder     Cough     Hyperlipidemia     Mass of lower lobe of left lung         Past Surgical History:   Procedure Laterality Date    BRONCHOSCOPY WITH ION ROBOTIC ASSIST N/A 2023    Procedure: BRONCHOSCOPY WITH BAL;   ION ROBOT AND ENDOBRONCHIAL ULTRASOUND WITH FNA'S;  Surgeon: Rosalio Scott MD;  Location: [facility] ENDOSCOPY;  Service: Robotics - Pulmonary;  Laterality: N/A;  PRE- LEFT LOWER LOBE MASS  POST- SAME    GANGLION CYST EXCISION Bilateral     HEMORRHOIDECTOMY      OTHER SURGICAL HISTORY      SOMETHING REMOVED FROM LEFT CHEST, BENIGN ? LIPOMA        Current Outpatient Medications on File Prior to Visit   Medication Sig Dispense Refill    fluticasone (FLONASE) 50 MCG/ACT nasal spray 2 sprays into the nostril(s) as directed by provider Daily As Needed.      OLANZapine (zyPREXA) 5 MG tablet Take 1 tablet by mouth Every Night. Take nightly x 4 starting night of chemotherapy. 4 tablet 2    ondansetron (ZOFRAN) 8 MG tablet Take 1 tablet by mouth 3 (Three) Times a Day As Needed for Nausea or Vomiting. 30 tablet 2    pravastatin (PRAVACHOL) 20 MG tablet Take 1 tablet by mouth Every Night.       No current facility-administered medications on file prior to visit.        ALLERGIES:    Allergies   Allergen Reactions    Amoxicillin-Pot Clavulanate Hives        Social History     Socioeconomic History    Marital status:      Spouse name: Carmen   Tobacco Use    Smoking status: Former     Packs/day: 0.50     Years: 40.00     Additional pack years: 0.00     Total pack years: 20.00     Types: Cigarettes     Quit date:      Years since quittin.8    Smokeless  "tobacco: Never   Vaping Use    Vaping Use: Never used   Substance and Sexual Activity    Alcohol use: Not Currently     Comment: VERY RARELY, MAYBE 1 BEER    Drug use: Never    Sexual activity: Defer        Family History   Problem Relation Age of Onset    Bone cancer Mother     COPD Father     Brain cancer Sister     Cancer Brother         Malignant tumor of pharynx    Alcohol abuse Brother     Cirrhosis Brother     Recurrent abdominal pain Brother     Lung cancer Brother     Malig Hyperthermia Neg Hx         Review of Systems   Constitutional:  Positive for fatigue.   HENT: Negative.     Respiratory:  Positive for cough.    Cardiovascular: Negative.    Gastrointestinal: Negative.    Genitourinary: Negative.    Musculoskeletal: Negative.    Neurological:  Positive for numbness.   Hematological: Negative.    Psychiatric/Behavioral:  Negative for dysphoric mood. The patient is nervous/anxious.           Objective     Vitals:    11/01/23 0751   BP: 130/83   Pulse: 83   Resp: 16   Temp: 98.6 °F (37 °C)   TempSrc: Infrared   SpO2: 97%   Weight: 77.7 kg (171 lb 6.4 oz)   Height: 182.9 cm (72.01\")   PainSc: 0-No pain           11/1/2023     7:54 AM   Current Status   ECOG score 0       Physical Exam    CONSTITUTIONAL: pleasant well-developed adult man  LYMPH: no cervical or supraclavicular lad  CV: RRR, S1S2, no murmur  RESP: cta bilat, no wheezing, no rales  GI: soft, non-tender, no splenomegaly, +bs  MUSC: no edema, normal gait  NEURO: alert and oriented x3, normal strength  PSYCH: normal mood anxious affect  Exam is unchanged-11/1/2023  RECENT LABS:  Hematology WBC   Date Value Ref Range Status   11/01/2023 10.67 3.40 - 10.80 10*3/mm3 Final     RBC   Date Value Ref Range Status   11/01/2023 4.33 4.14 - 5.80 10*6/mm3 Final     Hemoglobin   Date Value Ref Range Status   11/01/2023 11.4 (L) 13.0 - 17.7 g/dL Final     Hematocrit   Date Value Ref Range Status   11/01/2023 36.6 (L) 37.5 - 51.0 % Final     Platelets   Date " Value Ref Range Status   11/01/2023 409 140 - 450 10*3/mm3 Final        Lab Results   Component Value Date    GLUCOSE 93 11/01/2023    BUN 16 11/01/2023    CREATININE 0.64 (L) 11/01/2023    EGFR 103.8 11/01/2023    BCR 25.0 11/01/2023    K 4.2 11/01/2023    CO2 23.3 11/01/2023    CALCIUM 9.3 11/01/2023    ALBUMIN 4.0 11/01/2023    BILITOT 0.2 11/01/2023    AST 18 11/01/2023    ALT 18 11/01/2023     PET scan 9823  IMPRESSION:  1. Intensely hypermetabolic approximately 13 x 5 cm pleural-based left  lower lobe lung mass involving the left hilum. Smaller nodular opacities  adjacently are hypermetabolic and likely represent metastases. A 7 mm  right upper lobe nodule is photopenic. Conservative surveillance is  recommended. There is no convincing evidence for metastatic disease at  the neck, abdomen, or pelvis.  2. There are 2 hypermetabolic foci at the sigmoid colon need further  evaluation, particularly the 1.4 cm hypermetabolic polyp at the distal  sigmoid colon. Colonoscopy is recommended.  3. Nonspecific heterogeneous prostate activity. PSA follow-up is  recommended.    Assessment & Plan   *T4N0M0 poorly differentiated carcinoma/squamous differentiation left lower lobe of the lung  Mass discovered on low-dose CT chest screening 7/31/2023  PET scan 9/8/2023 showed a 1.3 x 5 cm left lower lobe mass involving the hilum with smaller adjacent nodular opacities, max SUV 23.2, photopenic right upper lobe nodule  Bronchoscopy with biopsy from the left lower lobe 9/6/2023 positive for poorly differentiated carcinoma with squamous differentiation; P-L1 TPS 0%  Initiated neoadjuvant treatment with carboplatin/Taxol/nivolumab 9/20/2023    *MRI brain 9/11/23-2 mm enhancement left cerebellar hemisphere likely vessel artifact but will need radiographic follow-up    *PET uptake 2 foci sigmoid colon; he will eventually need colonoscopy    *Comorbidities-hyperlipidemia      Oncology plan/recommendations:  Proceed with cycle 3 of  carboplatin/Taxol/nivolumab-creatinine clearance will be capped at 125 mil/ minute  After cycle 3 plan to repeat CT chest, MRI brain PFTs and assess for resectability-ordered today   If disease remained unresectable after 3 cycles of neoadjuvant chemoimmunotherapy the patient would proceed with chemoradiation and adjuvant immunotherapy at that point.   3.  We will present case at our multidisciplinary conference after repeat imaging/PFT assessment

## 2023-10-30 ENCOUNTER — PATIENT OUTREACH (OUTPATIENT)
Dept: OTHER | Facility: HOSPITAL | Age: 68
End: 2023-10-30
Payer: MEDICARE

## 2023-10-30 NOTE — PROGRESS NOTES
"Reviewed chart. Initiated neoadjuvant treatment with carboplatin/Taxol/nivolumab 9/20/2023. After cycle 3 plan to repeat CT chest, MRI brain PFTs and assess for resectability     Called patient.  S/w his wife.  He continued to have similar side effects to cycle 2 of chemotherapy (fever, fatigue, generally not feeling well). The patient has his last treatment on 11/1.  His daughter will be attending this appt.     The patient's appetite is \"pretty good\". He is drinking Ensure. He lost 1 lb this past week.     We discussed his [employer] insurance (PPO).  They requested continuity of care agreement for Jagdish Krishnan; his wife states [employer] approved that.    We again discussed integrative therapies and other services [facility]. Patient expressed gratitude for my support and denied any additional needs at this time. I will call in 1-2 months; encouraged patient to call as needed.   "

## 2023-11-01 ENCOUNTER — INFUSION (OUTPATIENT)
Dept: ONCOLOGY | Facility: HOSPITAL | Age: 68
End: 2023-11-01
Payer: MEDICARE

## 2023-11-01 ENCOUNTER — OFFICE VISIT (OUTPATIENT)
Dept: ONCOLOGY | Facility: CLINIC | Age: 68
End: 2023-11-01
Payer: MEDICARE

## 2023-11-01 VITALS
TEMPERATURE: 98.6 F | HEART RATE: 83 BPM | RESPIRATION RATE: 16 BRPM | DIASTOLIC BLOOD PRESSURE: 83 MMHG | WEIGHT: 171.4 LBS | SYSTOLIC BLOOD PRESSURE: 130 MMHG | BODY MASS INDEX: 23.22 KG/M2 | HEIGHT: 72 IN | OXYGEN SATURATION: 97 %

## 2023-11-01 VITALS — DIASTOLIC BLOOD PRESSURE: 69 MMHG | SYSTOLIC BLOOD PRESSURE: 130 MMHG | HEART RATE: 61 BPM

## 2023-11-01 DIAGNOSIS — Z79.899 HIGH RISK MEDICATION USE: ICD-10-CM

## 2023-11-01 DIAGNOSIS — C34.92 PRIMARY SQUAMOUS CELL CARCINOMA OF LUNG, LEFT: Primary | ICD-10-CM

## 2023-11-01 DIAGNOSIS — R91.8 MASS OF LEFT LUNG: ICD-10-CM

## 2023-11-01 LAB
ALBUMIN SERPL-MCNC: 4 G/DL (ref 3.5–5.2)
ALBUMIN/GLOB SERPL: 1.1 G/DL
ALP SERPL-CCNC: 63 U/L (ref 39–117)
ALT SERPL W P-5'-P-CCNC: 18 U/L (ref 1–41)
ANION GAP SERPL CALCULATED.3IONS-SCNC: 11.7 MMOL/L (ref 5–15)
AST SERPL-CCNC: 18 U/L (ref 1–40)
BASOPHILS # BLD AUTO: 0.05 10*3/MM3 (ref 0–0.2)
BASOPHILS NFR BLD AUTO: 0.5 % (ref 0–1.5)
BILIRUB SERPL-MCNC: 0.2 MG/DL (ref 0–1.2)
BUN SERPL-MCNC: 16 MG/DL (ref 8–23)
BUN/CREAT SERPL: 25 (ref 7–25)
CALCIUM SPEC-SCNC: 9.3 MG/DL (ref 8.6–10.5)
CHLORIDE SERPL-SCNC: 102 MMOL/L (ref 98–107)
CO2 SERPL-SCNC: 23.3 MMOL/L (ref 22–29)
CREAT SERPL-MCNC: 0.64 MG/DL (ref 0.76–1.27)
DEPRECATED RDW RBC AUTO: 51 FL (ref 37–54)
EGFRCR SERPLBLD CKD-EPI 2021: 103.8 ML/MIN/1.73
EOSINOPHIL # BLD AUTO: 0.15 10*3/MM3 (ref 0–0.4)
EOSINOPHIL NFR BLD AUTO: 1.4 % (ref 0.3–6.2)
ERYTHROCYTE [DISTWIDTH] IN BLOOD BY AUTOMATED COUNT: 16.8 % (ref 12.3–15.4)
GLOBULIN UR ELPH-MCNC: 3.8 GM/DL
GLUCOSE SERPL-MCNC: 93 MG/DL (ref 65–99)
HCT VFR BLD AUTO: 36.6 % (ref 37.5–51)
HGB BLD-MCNC: 11.4 G/DL (ref 13–17.7)
IMM GRANULOCYTES # BLD AUTO: 0.04 10*3/MM3 (ref 0–0.05)
IMM GRANULOCYTES NFR BLD AUTO: 0.4 % (ref 0–0.5)
LYMPHOCYTES # BLD AUTO: 2.27 10*3/MM3 (ref 0.7–3.1)
LYMPHOCYTES NFR BLD AUTO: 21.3 % (ref 19.6–45.3)
MCH RBC QN AUTO: 26.3 PG (ref 26.6–33)
MCHC RBC AUTO-ENTMCNC: 31.1 G/DL (ref 31.5–35.7)
MCV RBC AUTO: 84.5 FL (ref 79–97)
MONOCYTES # BLD AUTO: 1.08 10*3/MM3 (ref 0.1–0.9)
MONOCYTES NFR BLD AUTO: 10.1 % (ref 5–12)
NEUTROPHILS NFR BLD AUTO: 66.3 % (ref 42.7–76)
NEUTROPHILS NFR BLD AUTO: 7.08 10*3/MM3 (ref 1.7–7)
NRBC BLD AUTO-RTO: 0 /100 WBC (ref 0–0.2)
PLATELET # BLD AUTO: 409 10*3/MM3 (ref 140–450)
PMV BLD AUTO: 9.4 FL (ref 6–12)
POTASSIUM SERPL-SCNC: 4.2 MMOL/L (ref 3.5–5.2)
PROT SERPL-MCNC: 7.8 G/DL (ref 6–8.5)
RBC # BLD AUTO: 4.33 10*6/MM3 (ref 4.14–5.8)
SODIUM SERPL-SCNC: 137 MMOL/L (ref 136–145)
WBC NRBC COR # BLD: 10.67 10*3/MM3 (ref 3.4–10.8)

## 2023-11-01 PROCEDURE — 25810000003 SODIUM CHLORIDE 0.9 % SOLUTION 500 ML FLEX CONT: Performed by: INTERNAL MEDICINE

## 2023-11-01 PROCEDURE — 80053 COMPREHEN METABOLIC PANEL: CPT | Performed by: INTERNAL MEDICINE

## 2023-11-01 PROCEDURE — 96375 TX/PRO/DX INJ NEW DRUG ADDON: CPT

## 2023-11-01 PROCEDURE — 25010000002 PALONOSETRON 0.25 MG/5ML SOLUTION PREFILLED SYRINGE: Performed by: INTERNAL MEDICINE

## 2023-11-01 PROCEDURE — 25010000002 DIPHENHYDRAMINE PER 50 MG: Performed by: INTERNAL MEDICINE

## 2023-11-01 PROCEDURE — 96367 TX/PROPH/DG ADDL SEQ IV INF: CPT

## 2023-11-01 PROCEDURE — 25010000002 CARBOPLATIN PER 50 MG: Performed by: INTERNAL MEDICINE

## 2023-11-01 PROCEDURE — A9270 NON-COVERED ITEM OR SERVICE: HCPCS | Performed by: INTERNAL MEDICINE

## 2023-11-01 PROCEDURE — 25010000002 NIVOLUMAB 120 MG/12ML SOLUTION 12 ML VIAL: Performed by: INTERNAL MEDICINE

## 2023-11-01 PROCEDURE — 1126F AMNT PAIN NOTED NONE PRSNT: CPT | Performed by: INTERNAL MEDICINE

## 2023-11-01 PROCEDURE — 96415 CHEMO IV INFUSION ADDL HR: CPT

## 2023-11-01 PROCEDURE — 25010000002 PACLITAXEL PER 1 MG: Performed by: INTERNAL MEDICINE

## 2023-11-01 PROCEDURE — 25810000003 SODIUM CHLORIDE 0.9 % SOLUTION: Performed by: INTERNAL MEDICINE

## 2023-11-01 PROCEDURE — 99214 OFFICE O/P EST MOD 30 MIN: CPT | Performed by: INTERNAL MEDICINE

## 2023-11-01 PROCEDURE — 85025 COMPLETE CBC W/AUTO DIFF WBC: CPT | Performed by: INTERNAL MEDICINE

## 2023-11-01 PROCEDURE — 25010000002 NIVOLUMAB 240 MG/24ML SOLUTION 24 ML VIAL: Performed by: INTERNAL MEDICINE

## 2023-11-01 PROCEDURE — 63710000001 MONTELUKAST 10 MG TABLET: Performed by: INTERNAL MEDICINE

## 2023-11-01 PROCEDURE — 25810000003 SODIUM CHLORIDE 0.9 % SOLUTION 250 ML FLEX CONT: Performed by: INTERNAL MEDICINE

## 2023-11-01 PROCEDURE — 25010000002 DEXAMETHASONE SODIUM PHOSPHATE 100 MG/10ML SOLUTION: Performed by: INTERNAL MEDICINE

## 2023-11-01 PROCEDURE — 25010000002 FOSAPREPITANT PER 1 MG: Performed by: INTERNAL MEDICINE

## 2023-11-01 PROCEDURE — 96413 CHEMO IV INFUSION 1 HR: CPT

## 2023-11-01 PROCEDURE — 96417 CHEMO IV INFUS EACH ADDL SEQ: CPT

## 2023-11-01 RX ORDER — PALONOSETRON 0.05 MG/ML
0.25 INJECTION, SOLUTION INTRAVENOUS ONCE
Status: COMPLETED | OUTPATIENT
Start: 2023-11-01 | End: 2023-11-01

## 2023-11-01 RX ORDER — FAMOTIDINE 10 MG/ML
20 INJECTION, SOLUTION INTRAVENOUS ONCE
Status: COMPLETED | OUTPATIENT
Start: 2023-11-01 | End: 2023-11-01

## 2023-11-01 RX ORDER — SODIUM CHLORIDE 9 MG/ML
250 INJECTION, SOLUTION INTRAVENOUS ONCE
Status: CANCELLED | OUTPATIENT
Start: 2023-11-01

## 2023-11-01 RX ORDER — PALONOSETRON 0.05 MG/ML
0.25 INJECTION, SOLUTION INTRAVENOUS ONCE
Status: CANCELLED | OUTPATIENT
Start: 2023-11-01

## 2023-11-01 RX ORDER — MONTELUKAST SODIUM 10 MG/1
10 TABLET ORAL ONCE
Status: COMPLETED | OUTPATIENT
Start: 2023-11-01 | End: 2023-11-01

## 2023-11-01 RX ORDER — MONTELUKAST SODIUM 10 MG/1
10 TABLET ORAL ONCE
Status: CANCELLED
Start: 2023-11-01 | End: 2023-11-01

## 2023-11-01 RX ORDER — DIPHENHYDRAMINE HYDROCHLORIDE 50 MG/ML
50 INJECTION INTRAMUSCULAR; INTRAVENOUS AS NEEDED
Status: CANCELLED | OUTPATIENT
Start: 2023-11-01

## 2023-11-01 RX ORDER — FAMOTIDINE 10 MG/ML
20 INJECTION, SOLUTION INTRAVENOUS ONCE
Status: CANCELLED | OUTPATIENT
Start: 2023-11-01

## 2023-11-01 RX ORDER — SODIUM CHLORIDE 9 MG/ML
250 INJECTION, SOLUTION INTRAVENOUS ONCE
Status: COMPLETED | OUTPATIENT
Start: 2023-11-01 | End: 2023-11-01

## 2023-11-01 RX ORDER — FAMOTIDINE 10 MG/ML
20 INJECTION, SOLUTION INTRAVENOUS AS NEEDED
Status: CANCELLED | OUTPATIENT
Start: 2023-11-01

## 2023-11-01 RX ADMIN — DEXAMETHASONE SODIUM PHOSPHATE 20 MG: 10 INJECTION, SOLUTION INTRAMUSCULAR; INTRAVENOUS at 10:25

## 2023-11-01 RX ADMIN — SODIUM CHLORIDE 100 ML: 9 INJECTION, SOLUTION INTRAVENOUS at 08:35

## 2023-11-01 RX ADMIN — PACLITAXEL 395 MG: 6 INJECTION, SOLUTION INTRAVENOUS at 11:04

## 2023-11-01 RX ADMIN — SODIUM CHLORIDE 250 ML: 9 INJECTION, SOLUTION INTRAVENOUS at 08:25

## 2023-11-01 RX ADMIN — PALONOSETRON 0.25 MG: 0.25 INJECTION, SOLUTION INTRAVENOUS at 08:33

## 2023-11-01 RX ADMIN — FAMOTIDINE 20 MG: 10 INJECTION, SOLUTION INTRAVENOUS at 09:59

## 2023-11-01 RX ADMIN — DIPHENHYDRAMINE HYDROCHLORIDE 50 MG: 50 INJECTION, SOLUTION INTRAMUSCULAR; INTRAVENOUS at 10:00

## 2023-11-01 RX ADMIN — MONTELUKAST 10 MG: 10 TABLET, FILM COATED ORAL at 08:33

## 2023-11-01 RX ADMIN — CARBOPLATIN 740 MG: 10 INJECTION, SOLUTION INTRAVENOUS at 14:10

## 2023-11-01 RX ADMIN — SODIUM CHLORIDE 360 MG: 9 INJECTION, SOLUTION INTRAVENOUS at 09:24

## 2023-11-27 ENCOUNTER — HOSPITAL ENCOUNTER (OUTPATIENT)
Dept: MRI IMAGING | Facility: HOSPITAL | Age: 68
Discharge: HOME | End: 2023-11-27
Admitting: INTERNAL MEDICINE
Payer: MEDICARE

## 2023-11-27 DIAGNOSIS — C34.92 PRIMARY SQUAMOUS CELL CARCINOMA OF LUNG, LEFT: ICD-10-CM

## 2023-11-27 PROCEDURE — A9577 INJ MULTIHANCE: HCPCS | Performed by: INTERNAL MEDICINE

## 2023-11-27 PROCEDURE — 70553 MRI BRAIN STEM W/O & W/DYE: CPT

## 2023-11-27 PROCEDURE — 0 GADOBENATE DIMEGLUMINE 529 MG/ML SOLUTION: Performed by: INTERNAL MEDICINE

## 2023-11-27 RX ADMIN — GADOBENATE DIMEGLUMINE 15 ML: 529 INJECTION, SOLUTION INTRAVENOUS at 10:15

## 2023-11-28 ENCOUNTER — HOSPITAL ENCOUNTER (OUTPATIENT)
Dept: CT IMAGING | Facility: HOSPITAL | Age: 68
Discharge: HOME | End: 2023-11-28
Payer: MEDICARE

## 2023-11-28 ENCOUNTER — HOSPITAL ENCOUNTER (OUTPATIENT)
Dept: RESPIRATORY THERAPY | Facility: HOSPITAL | Age: 68
Discharge: HOME | End: 2023-11-28
Payer: MEDICARE

## 2023-11-28 DIAGNOSIS — C34.92 PRIMARY SQUAMOUS CELL CARCINOMA OF LUNG, LEFT: ICD-10-CM

## 2023-11-28 LAB
BDY SITE: ABNORMAL
CALCULATED HEMOGLOBIN, EPOC: ABNORMAL
CREAT BLDA-MCNC: 0.6 MG/DL (ref 0.6–1.3)
HGB BLDA-MCNC: 11.1 G/DL (ref 12–18)
Lab: ABNORMAL

## 2023-11-28 PROCEDURE — 71260 CT THORAX DX C+: CPT

## 2023-11-28 PROCEDURE — 82820 HEMOGLOBIN-OXYGEN AFFINITY: CPT | Performed by: INTERNAL MEDICINE

## 2023-11-28 PROCEDURE — 25510000001 IOPAMIDOL 61 % SOLUTION: Performed by: INTERNAL MEDICINE

## 2023-11-28 PROCEDURE — 94010 BREATHING CAPACITY TEST: CPT

## 2023-11-28 PROCEDURE — 94726 PLETHYSMOGRAPHY LUNG VOLUMES: CPT

## 2023-11-28 PROCEDURE — 94729 DIFFUSING CAPACITY: CPT

## 2023-11-28 PROCEDURE — 82565 ASSAY OF CREATININE: CPT

## 2023-11-28 RX ADMIN — IOPAMIDOL 75 ML: 612 INJECTION, SOLUTION INTRAVENOUS at 09:23

## 2023-11-30 ENCOUNTER — HOSPITAL ENCOUNTER (OUTPATIENT)
Dept: CT IMAGING | Facility: HOSPITAL | Age: 68
Discharge: HOME | End: 2023-11-30
Admitting: NURSE PRACTITIONER
Payer: MEDICARE

## 2023-11-30 ENCOUNTER — OFFICE VISIT (OUTPATIENT)
Dept: ONCOLOGY | Facility: CLINIC | Age: 68
End: 2023-11-30
Payer: MEDICARE

## 2023-11-30 ENCOUNTER — TELEPHONE (OUTPATIENT)
Dept: ONCOLOGY | Facility: CLINIC | Age: 68
End: 2023-11-30
Payer: MEDICARE

## 2023-11-30 ENCOUNTER — INFUSION (OUTPATIENT)
Dept: ONCOLOGY | Facility: HOSPITAL | Age: 68
End: 2023-11-30
Payer: MEDICARE

## 2023-11-30 ENCOUNTER — APPOINTMENT (OUTPATIENT)
Dept: ONCOLOGY | Facility: HOSPITAL | Age: 68
End: 2023-11-30
Payer: MEDICARE

## 2023-11-30 VITALS
TEMPERATURE: 98.1 F | OXYGEN SATURATION: 95 % | HEART RATE: 82 BPM | DIASTOLIC BLOOD PRESSURE: 83 MMHG | WEIGHT: 164.6 LBS | SYSTOLIC BLOOD PRESSURE: 135 MMHG | BODY MASS INDEX: 22.32 KG/M2

## 2023-11-30 DIAGNOSIS — Z79.899 HIGH RISK MEDICATION USE: ICD-10-CM

## 2023-11-30 DIAGNOSIS — C34.92 NSCLC OF LEFT LUNG: ICD-10-CM

## 2023-11-30 DIAGNOSIS — Z51.11 ENCOUNTER FOR ANTINEOPLASTIC CHEMOTHERAPY: ICD-10-CM

## 2023-11-30 DIAGNOSIS — C34.92 NSCLC OF LEFT LUNG: Primary | ICD-10-CM

## 2023-11-30 DIAGNOSIS — C34.92 PRIMARY SQUAMOUS CELL CARCINOMA OF LUNG, LEFT: Primary | ICD-10-CM

## 2023-11-30 DIAGNOSIS — R91.8 MASS OF LEFT LUNG: Primary | ICD-10-CM

## 2023-11-30 DIAGNOSIS — R19.7 DIARRHEA, UNSPECIFIED TYPE: ICD-10-CM

## 2023-11-30 DIAGNOSIS — C34.92 PRIMARY SQUAMOUS CELL CARCINOMA OF LUNG, LEFT: ICD-10-CM

## 2023-11-30 DIAGNOSIS — R94.31 ABNORMAL ELECTROCARDIOGRAM (ECG) (EKG): ICD-10-CM

## 2023-11-30 LAB
ALBUMIN SERPL-MCNC: 4.1 G/DL (ref 3.5–5.2)
ALBUMIN/GLOB SERPL: 1.2 G/DL
ALP SERPL-CCNC: 57 U/L (ref 39–117)
ALT SERPL W P-5'-P-CCNC: 12 U/L (ref 1–41)
ANION GAP SERPL CALCULATED.3IONS-SCNC: 12.7 MMOL/L (ref 5–15)
AST SERPL-CCNC: 16 U/L (ref 1–40)
BASOPHILS # BLD AUTO: 0.03 10*3/MM3 (ref 0–0.2)
BASOPHILS NFR BLD AUTO: 0.3 % (ref 0–1.5)
BILIRUB SERPL-MCNC: 0.4 MG/DL (ref 0–1.2)
BUN SERPL-MCNC: 12 MG/DL (ref 8–23)
BUN/CREAT SERPL: 17.9 (ref 7–25)
CALCIUM SPEC-SCNC: 9.6 MG/DL (ref 8.6–10.5)
CHLORIDE SERPL-SCNC: 98 MMOL/L (ref 98–107)
CO2 SERPL-SCNC: 23.3 MMOL/L (ref 22–29)
CREAT SERPL-MCNC: 0.67 MG/DL (ref 0.76–1.27)
DEPRECATED RDW RBC AUTO: 56.5 FL (ref 37–54)
EGFRCR SERPLBLD CKD-EPI 2021: 102.3 ML/MIN/1.73
EOSINOPHIL # BLD AUTO: 0.13 10*3/MM3 (ref 0–0.4)
EOSINOPHIL NFR BLD AUTO: 1.3 % (ref 0.3–6.2)
ERYTHROCYTE [DISTWIDTH] IN BLOOD BY AUTOMATED COUNT: 17.6 % (ref 12.3–15.4)
GLOBULIN UR ELPH-MCNC: 3.4 GM/DL
GLUCOSE SERPL-MCNC: 147 MG/DL (ref 65–99)
HCT VFR BLD AUTO: 36.5 % (ref 37.5–51)
HGB BLD-MCNC: 11.1 G/DL (ref 13–17.7)
IMM GRANULOCYTES # BLD AUTO: 0 10*3/MM3 (ref 0–0.05)
IMM GRANULOCYTES NFR BLD AUTO: 0 % (ref 0–0.5)
LYMPHOCYTES # BLD AUTO: 1.2 10*3/MM3 (ref 0.7–3.1)
LYMPHOCYTES NFR BLD AUTO: 11.6 % (ref 19.6–45.3)
MAGNESIUM SERPL-MCNC: 1.9 MG/DL (ref 1.6–2.4)
MCH RBC QN AUTO: 26.4 PG (ref 26.6–33)
MCHC RBC AUTO-ENTMCNC: 30.4 G/DL (ref 31.5–35.7)
MCV RBC AUTO: 86.7 FL (ref 79–97)
MONOCYTES # BLD AUTO: 0.91 10*3/MM3 (ref 0.1–0.9)
MONOCYTES NFR BLD AUTO: 8.8 % (ref 5–12)
NEUTROPHILS NFR BLD AUTO: 78 % (ref 42.7–76)
NEUTROPHILS NFR BLD AUTO: 8.04 10*3/MM3 (ref 1.7–7)
PLATELET # BLD AUTO: 388 10*3/MM3 (ref 140–450)
PMV BLD AUTO: 9.1 FL (ref 6–12)
POTASSIUM SERPL-SCNC: 3.5 MMOL/L (ref 3.5–5.2)
PROT SERPL-MCNC: 7.5 G/DL (ref 6–8.5)
RBC # BLD AUTO: 4.21 10*6/MM3 (ref 4.14–5.8)
SODIUM SERPL-SCNC: 134 MMOL/L (ref 136–145)
TSH SERPL DL<=0.05 MIU/L-ACNC: 2.92 UIU/ML (ref 0.27–4.2)
WBC NRBC COR # BLD AUTO: 10.31 10*3/MM3 (ref 3.4–10.8)

## 2023-11-30 PROCEDURE — 85025 COMPLETE CBC W/AUTO DIFF WBC: CPT | Performed by: INTERNAL MEDICINE

## 2023-11-30 PROCEDURE — 25810000003 SODIUM CHLORIDE 0.9 % SOLUTION: Performed by: NURSE PRACTITIONER

## 2023-11-30 PROCEDURE — 25510000001 IOPAMIDOL PER 1 ML: Performed by: NURSE PRACTITIONER

## 2023-11-30 PROCEDURE — 96360 HYDRATION IV INFUSION INIT: CPT

## 2023-11-30 PROCEDURE — 74177 CT ABD & PELVIS W/CONTRAST: CPT

## 2023-11-30 PROCEDURE — 84443 ASSAY THYROID STIM HORMONE: CPT | Performed by: INTERNAL MEDICINE

## 2023-11-30 PROCEDURE — 0 DIATRIZOATE MEGLUMINE & SODIUM PER 1 ML: Performed by: NURSE PRACTITIONER

## 2023-11-30 PROCEDURE — 96361 HYDRATE IV INFUSION ADD-ON: CPT

## 2023-11-30 PROCEDURE — 83735 ASSAY OF MAGNESIUM: CPT | Performed by: INTERNAL MEDICINE

## 2023-11-30 PROCEDURE — 80053 COMPREHEN METABOLIC PANEL: CPT | Performed by: INTERNAL MEDICINE

## 2023-11-30 RX ORDER — SODIUM CHLORIDE 9 MG/ML
1000 INJECTION, SOLUTION INTRAVENOUS ONCE
OUTPATIENT
Start: 2023-11-30

## 2023-11-30 RX ORDER — SODIUM CHLORIDE 9 MG/ML
500 INJECTION, SOLUTION INTRAVENOUS ONCE
Status: COMPLETED | OUTPATIENT
Start: 2023-11-30 | End: 2023-11-30

## 2023-11-30 RX ADMIN — IOPAMIDOL 100 ML: 755 INJECTION, SOLUTION INTRAVENOUS at 14:52

## 2023-11-30 RX ADMIN — SODIUM CHLORIDE 500 ML: 9 INJECTION, SOLUTION INTRAVENOUS at 11:44

## 2023-11-30 RX ADMIN — SODIUM CHLORIDE 500 ML: 9 INJECTION, SOLUTION INTRAVENOUS at 10:29

## 2023-11-30 RX ADMIN — DIATRIZOATE MEGLUMINE AND DIATRIZOATE SODIUM 30 ML: 660; 100 LIQUID ORAL; RECTAL at 13:26

## 2023-11-30 NOTE — PROGRESS NOTES
Subjective     REASON FOR CONSULTATION:  NSCLC  Provide an opinion on any further workup or treatment                             REQUESTING PHYSICIAN:  Gul    RECORDS OBTAINED:  Records of the patients history including those obtained from the referring provider were reviewed and summarized in detail.      History of Present Illness   The patient initiated neoadjuvant treatments on 9/20/2023 with carboplatin/Taxol and nivolumab and has completed 3 cycles.  Patient returns today for triage visit due to new onset diarrhea as of this past Sunday.  He reports he has been having at least 6-8 episodes daily.  He has been trying to eat light such as toast so that he does not aggravate it further.  He has had no fevers, chills, nausea, vomiting.  He has occasional lower abdominal cramping that is overall mild.  He denies blood or mucus in his stool.  He reports his stools are mainly watery and more odorous than he would expect.    Oncology history:  This is a pleasant 68-year-old man with hyperlipidemia and previous tobacco abuse who underwent a screening CT of the chest on 7/31/2023 which showed a large mass in the left lower lobe of the lung extending to the hilum.  A diagnostic CT was performed 8/25/2023 showing a large left infrahilar medial mass in the lower lobe.  A PET scan on 9/8/2023 showed a left lower lobe spiculated pleural-based mass 1.3 x 5 cm involving the left hilum and major fissure SUV 23.2.  There were smaller nodular airspace opacities also hypermetabolic.  The mass encases left lower lobe bronchovascular bundle.  No hypermetabolic mediastinal or hilar lymph nodes noted.  There was a 7 mm nodule in the right upper lobe photopenic SUV 2.1.  There was no supraclavicular uptake or disease below the abdomen other than 2 abnormal foci in the sigmoid colon.  MRI of the brain on 9/11/2023 showed a subtle 2 mm focus inferior left cerebellum too small to characterize but likely a blood vessel.  The patient  underwent bronchoscopy on 9/6/2023 with biopsies taken from the left lower lobe mass, lymph node station 7 and lymph node station 4R.  Pathology showed malignant cells favoring poorly differentiated carcinoma/squamous differentiation from the left lower lobe and biopsies from station 7 and 4R were negative for malignant cells.    The patient completed 3 cycles of neoadjuvant therapy with carboplatin/Taxol/nivolumab 11/1/2023.    Past Medical History:   Diagnosis Date    BPH (benign prostatic hyperplasia)     Bruises easily     Bursitis of right shoulder     Cough     Hyperlipidemia     Mass of lower lobe of left lung         Past Surgical History:   Procedure Laterality Date    BRONCHOSCOPY WITH ION ROBOTIC ASSIST N/A 9/6/2023    Procedure: BRONCHOSCOPY WITH BAL;   ION ROBOT AND ENDOBRONCHIAL ULTRASOUND WITH FNA'S;  Surgeon: Rosalio Scott MD;  Location: [facility] ENDOSCOPY;  Service: Robotics - Pulmonary;  Laterality: N/A;  PRE- LEFT LOWER LOBE MASS  POST- SAME    GANGLION CYST EXCISION Bilateral     HEMORRHOIDECTOMY      OTHER SURGICAL HISTORY      SOMETHING REMOVED FROM LEFT CHEST, BENIGN ? LIPOMA        Current Outpatient Medications on File Prior to Visit   Medication Sig Dispense Refill    fluticasone (FLONASE) 50 MCG/ACT nasal spray 2 sprays into the nostril(s) as directed by provider Daily As Needed.      ondansetron (ZOFRAN) 8 MG tablet Take 1 tablet by mouth 3 (Three) Times a Day As Needed for Nausea or Vomiting. 30 tablet 2    pravastatin (PRAVACHOL) 20 MG tablet Take 1 tablet by mouth Every Night.      [DISCONTINUED] OLANZapine (zyPREXA) 5 MG tablet Take 1 tablet by mouth Every Night. Take nightly x 4 starting night of chemotherapy. 4 tablet 2     No current facility-administered medications on file prior to visit.        ALLERGIES:    Allergies   Allergen Reactions    Amoxicillin-Pot Clavulanate Hives        Social History     Socioeconomic History    Marital status:      Spouse name: Carmen    Tobacco Use    Smoking status: Former     Packs/day: 0.50     Years: 40.00     Additional pack years: 0.00     Total pack years: 20.00     Types: Cigarettes     Quit date:      Years since quittin.9    Smokeless tobacco: Never   Vaping Use    Vaping Use: Never used   Substance and Sexual Activity    Alcohol use: Not Currently     Comment: VERY RARELY, MAYBE 1 BEER    Drug use: Never    Sexual activity: Defer        Family History   Problem Relation Age of Onset    Bone cancer Mother     COPD Father     Brain cancer Sister     Cancer Brother         Malignant tumor of pharynx    Alcohol abuse Brother     Cirrhosis Brother     Recurrent abdominal pain Brother     Lung cancer Brother     Malig Hyperthermia Neg Hx         Review of Systems   Constitutional:  Positive for fatigue.   HENT: Negative.     Respiratory:  Positive for cough.    Cardiovascular: Negative.    Gastrointestinal: Negative.    Genitourinary: Negative.    Musculoskeletal: Negative.    Neurological:  Positive for numbness.   Hematological: Negative.    Psychiatric/Behavioral:  Negative for dysphoric mood. The patient is nervous/anxious.           Objective     Vitals:    23 0949   BP: 135/83   Pulse: 82   Temp: 98.1 °F (36.7 °C)   TempSrc: Infrared   SpO2: 95%   Weight: 74.7 kg (164 lb 9.6 oz)   PainSc: 0-No pain           2023    10:21 AM   Current Status   ECOG score 0       Physical Exam    CONSTITUTIONAL: pleasant well-developed adult man  LYMPH: no cervical or supraclavicular lad  CV: RRR, S1S2, no murmur  RESP: cta bilat, no wheezing, no rales  GI: soft, non-tender, no splenomegaly, +bs  MUSC: no edema, normal gait  NEURO: alert and oriented x3, normal strength  PSYCH: normal mood anxious affect  Exam is unchanged-2023  RECENT LABS:  Hematology WBC   Date Value Ref Range Status   2023 10.31 3.40 - 10.80 10*3/mm3 Final     RBC   Date Value Ref Range Status   2023 4.21 4.14 - 5.80 10*6/mm3 Final      Hemoglobin   Date Value Ref Range Status   11/30/2023 11.1 (L) 13.0 - 17.7 g/dL Final     Hematocrit   Date Value Ref Range Status   11/30/2023 36.5 (L) 37.5 - 51.0 % Final     Platelets   Date Value Ref Range Status   11/30/2023 388 140 - 450 10*3/mm3 Final        Lab Results   Component Value Date    GLUCOSE 147 (H) 11/30/2023    BUN 12 11/30/2023    CREATININE 0.67 (L) 11/30/2023    EGFR 102.3 11/30/2023    BCR 17.9 11/30/2023    K 3.5 11/30/2023    CO2 23.3 11/30/2023    CALCIUM 9.6 11/30/2023    ALBUMIN 4.1 11/30/2023    BILITOT 0.4 11/30/2023    AST 16 11/30/2023    ALT 12 11/30/2023     PET scan 9823  IMPRESSION:  1. Intensely hypermetabolic approximately 13 x 5 cm pleural-based left  lower lobe lung mass involving the left hilum. Smaller nodular opacities  adjacently are hypermetabolic and likely represent metastases. A 7 mm  right upper lobe nodule is photopenic. Conservative surveillance is  recommended. There is no convincing evidence for metastatic disease at  the neck, abdomen, or pelvis.  2. There are 2 hypermetabolic foci at the sigmoid colon need further  evaluation, particularly the 1.4 cm hypermetabolic polyp at the distal  sigmoid colon. Colonoscopy is recommended.  3. Nonspecific heterogeneous prostate activity. PSA follow-up is  recommended.    Assessment & Plan   *T4N0M0 poorly differentiated carcinoma/squamous differentiation left lower lobe of the lung  Mass discovered on low-dose CT chest screening 7/31/2023  PET scan 9/8/2023 showed a 1.3 x 5 cm left lower lobe mass involving the hilum with smaller adjacent nodular opacities, max SUV 23.2, photopenic right upper lobe nodule  Bronchoscopy with biopsy from the left lower lobe 9/6/2023 positive for poorly differentiated carcinoma with squamous differentiation; P-L1 TPS 0%  Initiated neoadjuvant treatment with carboplatin/Taxol/nivolumab 9/20/2023  Scans reviewed by Dr. Dubon which we discussed today.  Patient has had significant  improvement overall and we will refer him back to Dr. Scott for evaluation surgical options.  CT scan showed the previously 7 x 4.3 cm left lower lobe pleural-based lung mass now measures 4.1 x 3.3.  There is been significant decrease in the adjacent interstitial thickening and tiny interstitial lung nodules.  Patient denies any increased shortness of breath recently.  There is some patchy nodular airspace consolidations.  We will continue to monitor.  There is resolution of the 7 mm right upper lobe pulmonary nodule and decrease of the narrowing of the left lower lobe bronchus.  Mediastinal nodes are stable.  Patient was happy to hear this information.    *Diarrhea  Seen today with diarrhea for the past 4 to 5 days with 6-8 episodes daily and not responsive to Imodium.  He is taken up to 6 Imodium tablets in 24 hours.  He has been drinking Gatorade and water.  We discussed trying to avoid greasy or spicy foods and maintaining good hydration like he has been doing.  I did give him 1 L of normal saline in the office while we awaited some lab work today.  Patient was sent for a CT abdomen pelvis that was overall unremarkable with regards to diarrhea without any concerns reported in the colon.  It does show some gallbladder sludge though no blockage noted.  Patient not having any nausea.  Will monitor.  Otherwise scan was stable from previous imaging  Patient did not have another stool while he was in the office so if he does have further diarrhea, we sent him with supplies for a stool sample which she will bring back to the hospital for evaluation of C. difficile and GI panel.  If this is negative and he continues with diarrhea then we will plan to proceed with steroid treatment.  We discussed not utilizing Imodium currently until we have a diagnosis.  Patient does understand this could be related to his previous immunotherapy.  We will continue to await his symptoms.  They are to call for any new changes.  I will have  him follow-up next week with MD/NP with repeat CBC and CMP.        *MRI brain 9/11/23-2 mm enhancement left cerebellar hemisphere likely vessel artifact but will need radiographic follow-up repeat MRI of the brain 11/27/2023 stable with no concerns of metastatic disease.    *PET uptake 2 foci sigmoid colon; he will eventually need colonoscopy    *Comorbidities-hyperlipidemia      Oncology plan/recommendations:  CT abdomen pelvis performed today  1 Liter normal saline in the office today  Referral back to Dr. Scott for evaluation of surgical options  Follow-up with MD/NP in 1 week with CBC and CMP  If diarrhea returns, patient will bring stool sample back to the hospital lab for evaluation of C. difficile and GI PCR which is ordered already.  Once these results we will treat with steroids if these are negative for immune-mediated colitis.    Case discussed with Dr. Dubon today. I spent 60 minutes caring for Sunny on this date of service. This time includes time spent by me in the following activities: preparing for the visit, reviewing tests, obtaining and/or reviewing a separately obtained history, performing a medically appropriate examination and/or evaluation, counseling and educating the patient/family/caregiver, ordering medications, tests, or procedures, referring and communicating with other health care professionals, documenting information in the medical record, independently interpreting results and communicating that information with the patient/family/caregiver, and care coordination.

## 2023-11-30 NOTE — TELEPHONE ENCOUNTER
Provider: Jagdish  Caller: Rochelle  Relationship to Patient: daughter  Call Back Phone Number: [phone]  Reason for Call: patient is having sever diarrhea since Sunday

## 2023-11-30 NOTE — TELEPHONE ENCOUNTER
Patients daughter is calling in and she states the patient has been having diarrhea since Sunday night and he has been having 3-4 episodes a day, but this morning he has already gone 3x. I know you see him tomorrow, but I wanted your thoughts. The patient has taken Imodium and this is still how much he is going. I let her know I would discuss this with Dr. Dubon to see about coming into [facility] to see an NP and have labs and get fluids. Dr. Dubon was in agreeance to this and also asked if the NP could call him. I talked with Meenakshi at [facility] and she requested that the patient come now and the daughter was fine with this and stated she could be there in 15ish minutes. Daughter v/u.

## 2023-12-01 ENCOUNTER — DOCUMENTATION (OUTPATIENT)
Dept: ONCOLOGY | Facility: CLINIC | Age: 68
End: 2023-12-01
Payer: MEDICARE

## 2023-12-01 ENCOUNTER — LAB (OUTPATIENT)
Dept: LAB | Facility: HOSPITAL | Age: 68
End: 2023-12-01
Payer: MEDICARE

## 2023-12-01 ENCOUNTER — TELEPHONE (OUTPATIENT)
Dept: ONCOLOGY | Facility: CLINIC | Age: 68
End: 2023-12-01
Payer: MEDICARE

## 2023-12-01 ENCOUNTER — TELEPHONE (OUTPATIENT)
Dept: SURGERY | Facility: CLINIC | Age: 68
End: 2023-12-01
Payer: MEDICARE

## 2023-12-01 ENCOUNTER — TELEPHONE (OUTPATIENT)
Dept: ONCOLOGY | Facility: CLINIC | Age: 68
End: 2023-12-01

## 2023-12-01 DIAGNOSIS — Z79.899 HIGH RISK MEDICATION USE: ICD-10-CM

## 2023-12-01 DIAGNOSIS — R19.7 DIARRHEA, UNSPECIFIED TYPE: ICD-10-CM

## 2023-12-01 DIAGNOSIS — Z51.11 ENCOUNTER FOR ANTINEOPLASTIC CHEMOTHERAPY: ICD-10-CM

## 2023-12-01 DIAGNOSIS — C34.92 NSCLC OF LEFT LUNG: ICD-10-CM

## 2023-12-01 LAB
ADV 40+41 DNA STL QL NAA+NON-PROBE: NOT DETECTED
ASTRO TYP 1-8 RNA STL QL NAA+NON-PROBE: NOT DETECTED
C CAYETANENSIS DNA STL QL NAA+NON-PROBE: NOT DETECTED
C COLI+JEJ+UPSA DNA STL QL NAA+NON-PROBE: NOT DETECTED
C DIFF TOX GENS STL QL NAA+PROBE: NEGATIVE
CRYPTOSP DNA STL QL NAA+NON-PROBE: NOT DETECTED
E HISTOLYT DNA STL QL NAA+NON-PROBE: NOT DETECTED
EAEC PAA PLAS AGGR+AATA ST NAA+NON-PRB: NOT DETECTED
EC STX1+STX2 GENES STL QL NAA+NON-PROBE: NOT DETECTED
EPEC EAE GENE STL QL NAA+NON-PROBE: NOT DETECTED
ETEC LTA+ST1A+ST1B TOX ST NAA+NON-PROBE: NOT DETECTED
G LAMBLIA DNA STL QL NAA+NON-PROBE: NOT DETECTED
NOROVIRUS GI+II RNA STL QL NAA+NON-PROBE: NOT DETECTED
P SHIGELLOIDES DNA STL QL NAA+NON-PROBE: NOT DETECTED
RVA RNA STL QL NAA+NON-PROBE: NOT DETECTED
S ENT+BONG DNA STL QL NAA+NON-PROBE: NOT DETECTED
SAPO I+II+IV+V RNA STL QL NAA+NON-PROBE: NOT DETECTED
SHIGELLA SP+EIEC IPAH ST NAA+NON-PROBE: NOT DETECTED
V CHOL+PARA+VUL DNA STL QL NAA+NON-PROBE: NOT DETECTED
V CHOLERAE DNA STL QL NAA+NON-PROBE: NOT DETECTED
Y ENTEROCOL DNA STL QL NAA+NON-PROBE: NOT DETECTED

## 2023-12-01 PROCEDURE — 87507 IADNA-DNA/RNA PROBE TQ 12-25: CPT

## 2023-12-01 PROCEDURE — 87493 C DIFF AMPLIFIED PROBE: CPT

## 2023-12-01 RX ORDER — OMEPRAZOLE 20 MG/1
20 CAPSULE, DELAYED RELEASE ORAL DAILY
Qty: 30 CAPSULE | Refills: 5 | Status: CANCELLED | OUTPATIENT
Start: 2023-12-01

## 2023-12-01 RX ORDER — PREDNISONE 10 MG/1
70 TABLET ORAL
Qty: 100 TABLET | Refills: 0 | Status: SHIPPED | OUTPATIENT
Start: 2023-12-01

## 2023-12-01 RX ORDER — OMEPRAZOLE 20 MG/1
20 CAPSULE, DELAYED RELEASE ORAL DAILY
Qty: 30 CAPSULE | Refills: 0 | Status: SHIPPED | OUTPATIENT
Start: 2023-12-01

## 2023-12-01 RX ORDER — PREDNISONE 20 MG/1
20 TABLET ORAL DAILY
Status: CANCELLED
Start: 2023-12-01

## 2023-12-01 NOTE — TELEPHONE ENCOUNTER
Pt confirmed appt for 12/7/2023 @ 9 am and pet and echo tesf for 12/5/2023    DB 1:58 pm  12/1/2023

## 2023-12-01 NOTE — TELEPHONE ENCOUNTER
Called the wife to let her know the stool specimen was not back. I talked with Priscilla in lab and she stated it should be same day. Patients wife v/u.

## 2023-12-01 NOTE — TELEPHONE ENCOUNTER
Provider: Dr. Dubon  Caller: Carmen Hung  Relationship to Patient: Spouse  Call Back Phone Number: [phone]  Reason for Call: Pt and spouse wondering is stool sample alb is back

## 2023-12-01 NOTE — TELEPHONE ENCOUNTER
Called the patients wife back and explained that the stool specimen had to be sent to [facility] and they could not give me an ETA. I called Coni and updated her and she stated she would watch out for it and she would call them with results. I called the wife to see how many times he has gone and she stated he went three times from midnight to 6am and he has not had any during the day. I explained that we would still want him to avoid imodium and to push oral hydration and we would call him with results. Patients wife v/u.

## 2023-12-02 NOTE — TELEPHONE ENCOUNTER
Reviewed stool studies, resulted as negative.  Discussed with Dr Dubon and will proceed with prednisone for immune mediated colitis.  Patient with up to 8 diarrhea stools mainly at night.  No stool output during the day.  Will send in prednisone 1mg/kg at 70mg daily.  Also sending in omeprazole for GI prophylaxis. Discussed with patient wife and she v/u.  They will call for any worsening symptoms. He will return in 1 week for follow up.     HILDA Lamb

## 2023-12-04 ENCOUNTER — PATIENT OUTREACH (OUTPATIENT)
Dept: OTHER | Facility: HOSPITAL | Age: 68
End: 2023-12-04
Payer: MEDICARE

## 2023-12-04 DIAGNOSIS — C34.92 PRIMARY SQUAMOUS CELL CARCINOMA OF LUNG, LEFT: Primary | ICD-10-CM

## 2023-12-04 NOTE — PROGRESS NOTES
Reviewed chart    Called patient, s/w his wife Carmen. He developed diarrhea related to his treatment. The patient was given steroids and reports mild improvement in his diarrhea. He is drinking Gatorade and water as well as Ensure. The patient has npt been seen by nutrition; will place referral    The patient is scheduled for PET scan tomorrow.  We discussed the prep and dietary restrictions for the PET scan. His wife verbalized understanding.  The patient is scheduled for a f/u with Dr. Scott on 12/7 to discuss possible surgery.    The patient/wife denies any questions/concerns or ongoing resource needs. I will call in several weeks; encouraged patient to call as needed.

## 2023-12-05 ENCOUNTER — HOSPITAL ENCOUNTER (OUTPATIENT)
Dept: PET IMAGING | Facility: HOSPITAL | Age: 68
Discharge: HOME | End: 2023-12-05
Payer: MEDICARE

## 2023-12-05 ENCOUNTER — HOSPITAL ENCOUNTER (OUTPATIENT)
Dept: CARDIOLOGY | Facility: HOSPITAL | Age: 68
Discharge: HOME | End: 2023-12-05
Payer: MEDICARE

## 2023-12-05 VITALS
WEIGHT: 164 LBS | HEIGHT: 72 IN | HEART RATE: 65 BPM | BODY MASS INDEX: 22.21 KG/M2 | DIASTOLIC BLOOD PRESSURE: 84 MMHG | SYSTOLIC BLOOD PRESSURE: 162 MMHG

## 2023-12-05 DIAGNOSIS — R94.31 ABNORMAL ELECTROCARDIOGRAM (ECG) (EKG): ICD-10-CM

## 2023-12-05 DIAGNOSIS — R91.8 MASS OF LEFT LUNG: ICD-10-CM

## 2023-12-05 LAB
BH CV ECHO MEAS - ACS: 2.37 CM
BH CV ECHO MEAS - AO MAX PG: 8.3 MMHG
BH CV ECHO MEAS - AO MEAN PG: 4 MMHG
BH CV ECHO MEAS - AO ROOT DIAM: 3.9 CM
BH CV ECHO MEAS - AO V2 MAX: 144.5 CM/SEC
BH CV ECHO MEAS - AO V2 VTI: 30 CM
BH CV ECHO MEAS - AVA(I,D): 2.8 CM2
BH CV ECHO MEAS - EDV(CUBED): 95.3 ML
BH CV ECHO MEAS - EDV(MOD-SP2): 96 ML
BH CV ECHO MEAS - EDV(MOD-SP4): 101 ML
BH CV ECHO MEAS - EF(MOD-BP): 56.2 %
BH CV ECHO MEAS - EF(MOD-SP2): 52.1 %
BH CV ECHO MEAS - EF(MOD-SP4): 56.4 %
BH CV ECHO MEAS - ESV(CUBED): 33.2 ML
BH CV ECHO MEAS - ESV(MOD-SP2): 46 ML
BH CV ECHO MEAS - ESV(MOD-SP4): 44 ML
BH CV ECHO MEAS - FS: 29.6 %
BH CV ECHO MEAS - IVS/LVPW: 1.08 CM
BH CV ECHO MEAS - IVSD: 0.73 CM
BH CV ECHO MEAS - LAT PEAK E' VEL: 12.8 CM/SEC
BH CV ECHO MEAS - LV DIASTOLIC VOL/BSA (35-75): 51.6 CM2
BH CV ECHO MEAS - LV MASS(C)D: 98.7 GRAMS
BH CV ECHO MEAS - LV MAX PG: 5.2 MMHG
BH CV ECHO MEAS - LV MEAN PG: 2.37 MMHG
BH CV ECHO MEAS - LV SYSTOLIC VOL/BSA (12-30): 22.5 CM2
BH CV ECHO MEAS - LV V1 MAX: 114.5 CM/SEC
BH CV ECHO MEAS - LV V1 VTI: 26.2 CM
BH CV ECHO MEAS - LVIDD: 4.6 CM
BH CV ECHO MEAS - LVIDS: 3.2 CM
BH CV ECHO MEAS - LVOT AREA: 3.2 CM2
BH CV ECHO MEAS - LVOT DIAM: 2.03 CM
BH CV ECHO MEAS - LVPWD: 0.68 CM
BH CV ECHO MEAS - MED PEAK E' VEL: 9.9 CM/SEC
BH CV ECHO MEAS - MV A DUR: 0.13 SEC
BH CV ECHO MEAS - MV A MAX VEL: 73.7 CM/SEC
BH CV ECHO MEAS - MV DEC SLOPE: 368.7 CM/SEC2
BH CV ECHO MEAS - MV DEC TIME: 0.33 SEC
BH CV ECHO MEAS - MV E MAX VEL: 58.3 CM/SEC
BH CV ECHO MEAS - MV E/A: 0.79
BH CV ECHO MEAS - MV MAX PG: 3.7 MMHG
BH CV ECHO MEAS - MV MEAN PG: 1.28 MMHG
BH CV ECHO MEAS - MV P1/2T: 73.3 MSEC
BH CV ECHO MEAS - MV V2 VTI: 35.7 CM
BH CV ECHO MEAS - MVA(P1/2T): 3 CM2
BH CV ECHO MEAS - MVA(VTI): 2.37 CM2
BH CV ECHO MEAS - PA ACC TIME: 0.11 SEC
BH CV ECHO MEAS - PA V2 MAX: 97.7 CM/SEC
BH CV ECHO MEAS - PULM A REVS DUR: 0.12 SEC
BH CV ECHO MEAS - PULM A REVS VEL: 26.6 CM/SEC
BH CV ECHO MEAS - PULM DIAS VEL: 37.4 CM/SEC
BH CV ECHO MEAS - PULM S/D: 1.49
BH CV ECHO MEAS - PULM SYS VEL: 55.6 CM/SEC
BH CV ECHO MEAS - RAP SYSTOLE: 3 MMHG
BH CV ECHO MEAS - RV MAX PG: 2.3 MMHG
BH CV ECHO MEAS - RV V1 MAX: 75.8 CM/SEC
BH CV ECHO MEAS - RV V1 VTI: 16.9 CM
BH CV ECHO MEAS - RVSP: 34 MMHG
BH CV ECHO MEAS - SI(MOD-SP2): 25.5 ML/M2
BH CV ECHO MEAS - SI(MOD-SP4): 29.1 ML/M2
BH CV ECHO MEAS - SV(LVOT): 84.8 ML
BH CV ECHO MEAS - SV(MOD-SP2): 50 ML
BH CV ECHO MEAS - SV(MOD-SP4): 57 ML
BH CV ECHO MEAS - TAPSE (>1.6): 1.58 CM
BH CV ECHO MEAS - TR MAX PG: 31.5 MMHG
BH CV ECHO MEAS - TR MAX VEL: 280.5 CM/SEC
BH CV ECHO MEASUREMENTS AVERAGE E/E' RATIO: 5.14
BH CV XLRA - RV BASE: 3.3 CM
BH CV XLRA - RV LENGTH: 6.6 CM
BH CV XLRA - RV MID: 2.38 CM
BH CV XLRA - TDI S': 10.1 CM/SEC
GLUCOSE BLDC GLUCOMTR-MCNC: 85 MG/DL (ref 70–130)
LEFT ATRIUM VOLUME INDEX: 12.3 ML/M2
SINUS: 3.7 CM
STJ: 3.5 CM

## 2023-12-05 PROCEDURE — 0 FLUDEOXYGLUCOSE F18 SOLUTION: Performed by: SURGERY

## 2023-12-05 PROCEDURE — 25510000001 PERFLUTREN (DEFINITY) 8.476 MG IN SODIUM CHLORIDE (PF) 0.9 % 10 ML INJECTION: Performed by: SURGERY

## 2023-12-05 PROCEDURE — 78815 PET IMAGE W/CT SKULL-THIGH: CPT

## 2023-12-05 PROCEDURE — 93306 TTE W/DOPPLER COMPLETE: CPT | Performed by: INTERNAL MEDICINE

## 2023-12-05 PROCEDURE — 93306 TTE W/DOPPLER COMPLETE: CPT

## 2023-12-05 PROCEDURE — 82948 REAGENT STRIP/BLOOD GLUCOSE: CPT

## 2023-12-05 PROCEDURE — A9552 F18 FDG: HCPCS | Performed by: SURGERY

## 2023-12-05 RX ADMIN — SODIUM CHLORIDE 2 ML: 9 INJECTION INTRAMUSCULAR; INTRAVENOUS; SUBCUTANEOUS at 09:30

## 2023-12-05 RX ADMIN — FLUDEOXYGLUCOSE F 18 1 DOSE: 200 INJECTION, SOLUTION INTRAVENOUS at 06:44

## 2023-12-06 ENCOUNTER — TELEMEDICINE - AUDIO (OUTPATIENT)
Dept: OTHER | Facility: HOSPITAL | Age: 68
End: 2023-12-06
Payer: MEDICARE

## 2023-12-06 NOTE — H&P (VIEW-ONLY)
THORACIC SURGERY CLINIC CONSULT NOTE    REASON FOR CONSULT: Left lower lobe poorly differentiated carcinoma/squamous differentiation     REFERRING PROVIDER: Hosea Abernathy MD     Subjective   HISTORY OF PRESENTING ILLNESS:   Sunny Hung is a 68 y.o. male who has significant medical problems as mentioned in the medical chart.     He underwent a screening CT of the chest on 7/31/2023 which showed a large mass in the left lower lobe of the lung extending to the hilum.  A diagnostic CT was performed 8/25/2023 showing a large left infrahilar medial mass in the lower lobe.  A PET scan on 9/8/2023 showed a left lower lobe spiculated pleural-based mass 1.3 x 5 cm involving the left hilum and major fissure SUV 23.2.  There were smaller nodular airspace opacities also hypermetabolic.  The mass encases left lower lobe bronchovascular bundle.  No hypermetabolic mediastinal or hilar lymph nodes noted.  There was a 7 mm nodule in the right upper lobe photopenic SUV 2.1.  There was no supraclavicular uptake or disease below the abdomen other than 2 abnormal foci in the sigmoid colon.  MRI of the brain on 9/11/2023 showed a subtle 2 mm focus inferior left cerebellum too small to characterize but likely a blood vessel.  The patient underwent bronchoscopy on 9/6/2023 with biopsies taken from the left lower lobe mass, lymph node station 7 and lymph node station 4R.  Pathology showed malignant cells favoring poorly differentiated carcinoma/squamous differentiation from the left lower lobe and biopsies from station 7 and 4R were negative for malignant cells.     The patient completed 3 cycles of neoadjuvant therapy with carboplatin/Taxol/nivolumab 11/1/2023.  He tolerated treatment well without much complications.  His case was discussed in multidisciplinary tumor board conference and the consensus recommendation was to proceed with surgical resection.  Restaging PET/CT was done on 12/5/2023 which reported significant decrease in  size of the left lower lobe mass with decreased SUV of 3.6, previously noted 23.2.  There was irregular large airspace consolidation at the posterior aspect of the left lower lobe with a maximal SUV of 8.7 suspected to represent radiation pneumonitis with possible superimposed infection.  There was no evidence of hypermetabolic mediastinal or hilar lymphadenopathy or distant metastases.  He also had transthoracic echo on 12/5/2023 which reported LVEF of 60%.     ECOG 0    He came to clinic to discuss surgical resection.  He denies any difficulty breathing or pain. He coughs intermittently. He denies any angina. He had diarrhea last week and lost about 7 pounds. The diarrhea has resolved. He denies any history of myocardial infarction or stroke. He can walk a block without dyspnea. He does not smoke. He is not on oxygen at home.    Past Medical History:   Diagnosis Date    BPH (benign prostatic hyperplasia)     Bruises easily     Bursitis of right shoulder     Cough     Hyperlipidemia     Mass of lower lobe of left lung        Past Surgical History:   Procedure Laterality Date    BRONCHOSCOPY WITH ION ROBOTIC ASSIST N/A 9/6/2023    Procedure: BRONCHOSCOPY WITH BAL;   ION ROBOT AND ENDOBRONCHIAL ULTRASOUND WITH FNA'S;  Surgeon: Rosalio Scott MD;  Location: [facility] ENDOSCOPY;  Service: Robotics - Pulmonary;  Laterality: N/A;  PRE- LEFT LOWER LOBE MASS  POST- SAME    GANGLION CYST EXCISION Bilateral     HEMORRHOIDECTOMY      OTHER SURGICAL HISTORY      SOMETHING REMOVED FROM LEFT CHEST, BENIGN ? LIPOMA       Family History   Problem Relation Age of Onset    Bone cancer Mother     COPD Father     Brain cancer Sister     Cancer Brother         Malignant tumor of pharynx    Alcohol abuse Brother     Cirrhosis Brother     Recurrent abdominal pain Brother     Lung cancer Brother     Malig Hyperthermia Neg Hx        Social History     Socioeconomic History    Marital status:      Spouse name: Carmen   Tobacco Use     "Smoking status: Former     Packs/day: 0.50     Years: 40.00     Additional pack years: 0.00     Total pack years: 20.00     Types: Cigarettes     Quit date: 2016     Years since quittin.9    Smokeless tobacco: Never   Vaping Use    Vaping Use: Never used   Substance and Sexual Activity    Alcohol use: Not Currently     Comment: VERY RARELY, MAYBE 1 BEER    Drug use: Never    Sexual activity: Defer         Current Outpatient Medications:     fluticasone (FLONASE) 50 MCG/ACT nasal spray, 2 sprays into the nostril(s) as directed by provider Daily As Needed., Disp: , Rfl:     omeprazole (priLOSEC) 20 MG capsule, Take 1 capsule by mouth Daily., Disp: 30 capsule, Rfl: 0    ondansetron (ZOFRAN) 8 MG tablet, Take 1 tablet by mouth 3 (Three) Times a Day As Needed for Nausea or Vomiting., Disp: 30 tablet, Rfl: 2    pravastatin (PRAVACHOL) 20 MG tablet, Take 1 tablet by mouth Every Night., Disp: , Rfl:     predniSONE (DELTASONE) 10 MG tablet, Take 7 tablets by mouth Daily With Breakfast., Disp: 100 tablet, Rfl: 0     Allergies   Allergen Reactions    Amoxicillin-Pot Clavulanate Hives             Objective    OBJECTIVE:     VITAL SIGNS:  /80 (BP Location: Left arm, Patient Position: Standing, Cuff Size: Adult)   Pulse 62   Ht 182.9 cm (72.01\")   Wt 74.4 kg (164 lb)   SpO2 95%   BMI 22.24 kg/m²     PHYSICAL EXAM:  Constitutional:       Appearance: Normal appearance.   HENT:      Head: Normocephalic.      Right Ear: External ear normal.      Left Ear: External ear normal.      Nose: Nose normal.      Mouth/Throat: No obvious deformity     Pharynx: Oropharynx is clear.   Eyes:      Conjunctiva/sclera: Conjunctivae normal.   Cardiovascular:      Rate and Rhythm: Normal rate.      Pulses: Normal pulses.   Pulmonary:      Effort: Pulmonary effort is normal.   Abdominal:      Palpations: Abdomen is soft.   Musculoskeletal:         General: Normal range of motion.      Cervical back: Normal range of motion.   Skin:     " General: Skin is warm.   Neurological:      General: No focal deficit present.      Mental Status: He is alert and oriented to person, place, and time.     LAB RESULTS:  I have reviewed all the available laboratory results in the chart.    RESULTS REVIEW:  I have reviewed the patient's all relevant laboratory and imaging findings.     CT Chest With Contrast Diagnostic (08/25/2023 13:15)        ASSESSMENT & PLAN:  Sunny Hung is a 68 y.o. male with significant medical conditions as mentioned above presented to my clinic.    Diagnosis: Left lower lobe poorly differentiated non-small cell lung cancer  Staging: Stage II-B ([id number])    He has stage II-B non-small cell lung cancer who underwent neoadjuvant chemoimmunotherapy based on Checkmate 816 trial.  There is significant treatment response based on the PET CT scan.  In Checkmate 816 trial, there were 25% of the patient who had complete pathological response after neoadjuvant chemoimmunotherapy.  The standard of care for stage II-B resectable lung cancer is neoadjuvant chemoimmunotherapy followed by lobectomy.  Therefore, I recommended robot-assisted thoracoscopic left lower lobectomy, mediastinal lymph node dissection.  His lung function has been borderline which does not correlate to his excellent functional status.  He is independent in his activities of daily living and is not dependent on supplemental oxygen.  We performed a 6-minute walk test in the clinic which he completed without any respiratory distress.  His oxygen saturation remained above 95% throughout the test.  He is motivated and determined to pursue surgical cure and I support his wishes.    His last dose of chemotherapy was on 11/1/2023 and I recommend surgical resection within 6 to 8 weeks after the last systemic treatment dose.    I discussed the patients findings and my recommendations with the patient. The patient was given adequate time to ask questions and all questions were answered to  patient satisfaction.     Rosalio Scott MD  Thoracic Surgeon  [facility] [place]        Dictated utilizing Dragon dictation    I spent 50 minutes caring for Sunny on this date of service. This time includes time spent by me in the following activities:preparing for the visit, reviewing tests, obtaining and/or reviewing a separately obtained history, performing a medically appropriate examination and/or evaluation , counseling and educating the patient/family/caregiver, ordering medications, tests, or procedures, referring and communicating with other health care professionals , documenting information in the medical record, independently interpreting results and communicating that information with the patient/family/caregiver, and care coordination and more than half the time was spent in direct face to face evaluation and decision making.       Transcribed from ambient dictation for Rosalio Scott MD by Adrienne Lance.   12/07/23   12:23 EST    Patient or patient representative verbalized consent to the visit recording.  I have personally performed the services described in this document as transcribed by the above individual, and it is both accurate and complete.

## 2023-12-06 NOTE — PROGRESS NOTES
THORACIC SURGERY CLINIC CONSULT NOTE    REASON FOR CONSULT: Left lower lobe poorly differentiated carcinoma/squamous differentiation     REFERRING PROVIDER: Hosea Abernathy MD     Subjective   HISTORY OF PRESENTING ILLNESS:   Sunny Hung is a 68 y.o. male who has significant medical problems as mentioned in the medical chart.     He underwent a screening CT of the chest on 7/31/2023 which showed a large mass in the left lower lobe of the lung extending to the hilum.  A diagnostic CT was performed 8/25/2023 showing a large left infrahilar medial mass in the lower lobe.  A PET scan on 9/8/2023 showed a left lower lobe spiculated pleural-based mass 1.3 x 5 cm involving the left hilum and major fissure SUV 23.2.  There were smaller nodular airspace opacities also hypermetabolic.  The mass encases left lower lobe bronchovascular bundle.  No hypermetabolic mediastinal or hilar lymph nodes noted.  There was a 7 mm nodule in the right upper lobe photopenic SUV 2.1.  There was no supraclavicular uptake or disease below the abdomen other than 2 abnormal foci in the sigmoid colon.  MRI of the brain on 9/11/2023 showed a subtle 2 mm focus inferior left cerebellum too small to characterize but likely a blood vessel.  The patient underwent bronchoscopy on 9/6/2023 with biopsies taken from the left lower lobe mass, lymph node station 7 and lymph node station 4R.  Pathology showed malignant cells favoring poorly differentiated carcinoma/squamous differentiation from the left lower lobe and biopsies from station 7 and 4R were negative for malignant cells.     The patient completed 3 cycles of neoadjuvant therapy with carboplatin/Taxol/nivolumab 11/1/2023.  He tolerated treatment well without much complications.  His case was discussed in multidisciplinary tumor board conference and the consensus recommendation was to proceed with surgical resection.  Restaging PET/CT was done on 12/5/2023 which reported significant decrease in  size of the left lower lobe mass with decreased SUV of 3.6, previously noted 23.2.  There was irregular large airspace consolidation at the posterior aspect of the left lower lobe with a maximal SUV of 8.7 suspected to represent radiation pneumonitis with possible superimposed infection.  There was no evidence of hypermetabolic mediastinal or hilar lymphadenopathy or distant metastases.  He also had transthoracic echo on 12/5/2023 which reported LVEF of 60%.     ECOG 0    He came to clinic to discuss surgical resection.  He denies any difficulty breathing or pain. He coughs intermittently. He denies any angina. He had diarrhea last week and lost about 7 pounds. The diarrhea has resolved. He denies any history of myocardial infarction or stroke. He can walk a block without dyspnea. He does not smoke. He is not on oxygen at home.    Past Medical History:   Diagnosis Date    BPH (benign prostatic hyperplasia)     Bruises easily     Bursitis of right shoulder     Cough     Hyperlipidemia     Mass of lower lobe of left lung        Past Surgical History:   Procedure Laterality Date    BRONCHOSCOPY WITH ION ROBOTIC ASSIST N/A 9/6/2023    Procedure: BRONCHOSCOPY WITH BAL;   ION ROBOT AND ENDOBRONCHIAL ULTRASOUND WITH FNA'S;  Surgeon: Rosalio Scott MD;  Location: [facility] ENDOSCOPY;  Service: Robotics - Pulmonary;  Laterality: N/A;  PRE- LEFT LOWER LOBE MASS  POST- SAME    GANGLION CYST EXCISION Bilateral     HEMORRHOIDECTOMY      OTHER SURGICAL HISTORY      SOMETHING REMOVED FROM LEFT CHEST, BENIGN ? LIPOMA       Family History   Problem Relation Age of Onset    Bone cancer Mother     COPD Father     Brain cancer Sister     Cancer Brother         Malignant tumor of pharynx    Alcohol abuse Brother     Cirrhosis Brother     Recurrent abdominal pain Brother     Lung cancer Brother     Malig Hyperthermia Neg Hx        Social History     Socioeconomic History    Marital status:      Spouse name: Carmen   Tobacco Use     "Smoking status: Former     Packs/day: 0.50     Years: 40.00     Additional pack years: 0.00     Total pack years: 20.00     Types: Cigarettes     Quit date: 2016     Years since quittin.9    Smokeless tobacco: Never   Vaping Use    Vaping Use: Never used   Substance and Sexual Activity    Alcohol use: Not Currently     Comment: VERY RARELY, MAYBE 1 BEER    Drug use: Never    Sexual activity: Defer         Current Outpatient Medications:     fluticasone (FLONASE) 50 MCG/ACT nasal spray, 2 sprays into the nostril(s) as directed by provider Daily As Needed., Disp: , Rfl:     omeprazole (priLOSEC) 20 MG capsule, Take 1 capsule by mouth Daily., Disp: 30 capsule, Rfl: 0    ondansetron (ZOFRAN) 8 MG tablet, Take 1 tablet by mouth 3 (Three) Times a Day As Needed for Nausea or Vomiting., Disp: 30 tablet, Rfl: 2    pravastatin (PRAVACHOL) 20 MG tablet, Take 1 tablet by mouth Every Night., Disp: , Rfl:     predniSONE (DELTASONE) 10 MG tablet, Take 7 tablets by mouth Daily With Breakfast., Disp: 100 tablet, Rfl: 0     Allergies   Allergen Reactions    Amoxicillin-Pot Clavulanate Hives             Objective    OBJECTIVE:     VITAL SIGNS:  /80 (BP Location: Left arm, Patient Position: Standing, Cuff Size: Adult)   Pulse 62   Ht 182.9 cm (72.01\")   Wt 74.4 kg (164 lb)   SpO2 95%   BMI 22.24 kg/m²     PHYSICAL EXAM:  Constitutional:       Appearance: Normal appearance.   HENT:      Head: Normocephalic.      Right Ear: External ear normal.      Left Ear: External ear normal.      Nose: Nose normal.      Mouth/Throat: No obvious deformity     Pharynx: Oropharynx is clear.   Eyes:      Conjunctiva/sclera: Conjunctivae normal.   Cardiovascular:      Rate and Rhythm: Normal rate.      Pulses: Normal pulses.   Pulmonary:      Effort: Pulmonary effort is normal.   Abdominal:      Palpations: Abdomen is soft.   Musculoskeletal:         General: Normal range of motion.      Cervical back: Normal range of motion.   Skin:     " General: Skin is warm.   Neurological:      General: No focal deficit present.      Mental Status: He is alert and oriented to person, place, and time.     LAB RESULTS:  I have reviewed all the available laboratory results in the chart.    RESULTS REVIEW:  I have reviewed the patient's all relevant laboratory and imaging findings.     CT Chest With Contrast Diagnostic (08/25/2023 13:15)        ASSESSMENT & PLAN:  Sunny Hung is a 68 y.o. male with significant medical conditions as mentioned above presented to my clinic.    Diagnosis: Left lower lobe poorly differentiated non-small cell lung cancer  Staging: Stage II-B ([id number])    He has stage II-B non-small cell lung cancer who underwent neoadjuvant chemoimmunotherapy based on Checkmate 816 trial.  There is significant treatment response based on the PET CT scan.  In Checkmate 816 trial, there were 25% of the patient who had complete pathological response after neoadjuvant chemoimmunotherapy.  The standard of care for stage II-B resectable lung cancer is neoadjuvant chemoimmunotherapy followed by lobectomy.  Therefore, I recommended robot-assisted thoracoscopic left lower lobectomy, mediastinal lymph node dissection.  His lung function has been borderline which does not correlate to his excellent functional status.  He is independent in his activities of daily living and is not dependent on supplemental oxygen.  We performed a 6-minute walk test in the clinic which he completed without any respiratory distress.  His oxygen saturation remained above 95% throughout the test.  He is motivated and determined to pursue surgical cure and I support his wishes.    His last dose of chemotherapy was on 11/1/2023 and I recommend surgical resection within 6 to 8 weeks after the last systemic treatment dose.    I discussed the patients findings and my recommendations with the patient. The patient was given adequate time to ask questions and all questions were answered to  patient satisfaction.     Rosalio Scott MD  Thoracic Surgeon  [facility]        Dictated utilizing Dragon dictation    I spent 50 minutes caring for Sunny on this date of service. This time includes time spent by me in the following activities:preparing for the visit, reviewing tests, obtaining and/or reviewing a separately obtained history, performing a medically appropriate examination and/or evaluation , counseling and educating the patient/family/caregiver, ordering medications, tests, or procedures, referring and communicating with other health care professionals , documenting information in the medical record, independently interpreting results and communicating that information with the patient/family/caregiver, and care coordination and more than half the time was spent in direct face to face evaluation and decision making.       Transcribed from ambient dictation for Rosalio Scott MD by Adrienne Lance.   12/07/23   12:23 EST    Patient or patient representative verbalized consent to the visit recording.  I have personally performed the services described in this document as transcribed by the above individual, and it is both accurate and complete.

## 2023-12-06 NOTE — PROGRESS NOTES
OUTPATIENT ONCOLOGY NUTRITION ASSESSMENT    Patient Name: Sunny Hung  YOB: 1955  MRN: [id number]  Assessment Date: 12/6/2023    COMMENTS: Spoke with pt wife via phone, pt wife reports pt diarrhea has improved since starting the steroids, has not had a loose BM since Sunday. Pt spouse also reports he has done well in regards to nutrition and eating throughout treatment, has completed chemotherapy. She does report he will occasionally get bloated, but this does not significantly impact his PO intake. Pt continues to drink 1 ensure/day, is following up with surgeon tomorrow about surgical options. Encouraged pt spouse to reach out if they have any questions or concerns regarding nutrition.         Reason for Assessment New assessment, Nursing referral     Diagnosis/Problem   NSCLC L lung   Treatment Plan Chemotherapy; carboplatin, taxol, nivolumab    Frequency Completed    Goal of cancer treatment Disease control   Comments:      Encounter Information        Nutrition/Diet History:  Pt has a good appetite, eating well through treatment, diarrhea improving    Oral Nutrition Supplements: Ensure daily    Factors/Symptoms Affecting Intake: Diarrhea   Comments:      Medical/Surgical History Past Medical History:   Diagnosis Date    BPH (benign prostatic hyperplasia)     Bruises easily     Bursitis of right shoulder     Cough     Hyperlipidemia     Mass of lower lobe of left lung      Past Surgical History:   Procedure Laterality Date    BRONCHOSCOPY WITH ION ROBOTIC ASSIST N/A 9/6/2023    Procedure: BRONCHOSCOPY WITH BAL;   ION ROBOT AND ENDOBRONCHIAL ULTRASOUND WITH FNA'S;  Surgeon: Rosalio Scott MD;  Location: [facility] ENDOSCOPY;  Service: Robotics - Pulmonary;  Laterality: N/A;  PRE- LEFT LOWER LOBE MASS  POST- SAME    GANGLION CYST EXCISION Bilateral     HEMORRHOIDECTOMY      OTHER SURGICAL HISTORY      SOMETHING REMOVED FROM LEFT CHEST, BENIGN ? LIPOMA        Anthropometrics        Current Height Ht  "Readings from Last 1 Encounters:   12/05/23 182.9 cm (72\")      Current Weight Wt Readings from Last 1 Encounters:   12/05/23 74.4 kg (164 lb)      BMI  22.2   Ideal Body Weight (IBW) 178#   Usual Body Weight (UBW) ~170#   Weight Change/Trend Stable, Loss   Weight History Wt Readings from Last 30 Encounters:   12/05/23 0930 74.4 kg (164 lb)   11/30/23 0949 74.7 kg (164 lb 9.6 oz)   11/24/23 1119 77.6 kg (171 lb)   11/01/23 0751 77.7 kg (171 lb 6.4 oz)   10/11/23 0752 76.8 kg (169 lb 6.4 oz)   09/28/23 0920 76.1 kg (167 lb 12.8 oz)   09/20/23 0816 75.6 kg (166 lb 9.6 oz)   09/19/23 1236 75.3 kg (166 lb)   09/15/23 1256 75.4 kg (166 lb 4.8 oz)   09/06/23 0738 75.2 kg (165 lb 11.2 oz)   09/05/23 0747 76.6 kg (168 lb 12.8 oz)   08/31/23 1256 76.2 kg (168 lb)          Medications           Current medications: fluticasone, omeprazole, ondansetron, pravastatin, and predniSONE     Tests/Procedures        Tests/Procedures Chemotherapy     Labs       Pertinent Labs    Results from last 7 days   Lab Units 11/30/23  0944   SODIUM mmol/L 134*   POTASSIUM mmol/L 3.5   CHLORIDE mmol/L 98   CO2 mmol/L 23.3   BUN mg/dL 12   CREATININE mg/dL 0.67*   CALCIUM mg/dL 9.6   BILIRUBIN mg/dL 0.4   ALK PHOS U/L 57   ALT (SGPT) U/L 12   AST (SGOT) U/L 16   GLUCOSE mg/dL 147*     Results from last 7 days   Lab Units 11/30/23  0944   MAGNESIUM mg/dL 1.9   HEMOGLOBIN g/dL 11.1*   HEMATOCRIT % 36.5*   WBC 10*3/mm3 10.31   ALBUMIN g/dL 4.1     Results from last 7 days   Lab Units 11/30/23  0944   PLATELETS 10*3/mm3 388     No results found for: \"COVID19\"  No results found for: \"HGBA1C\"       Estimated/Assessed Needs        Energy Requirements    Height for Calculation   72\"    Weight for Calculation 74.4 kg   Method for Estimation  25 kcal/kg   EST Needs (kcal/day) 1860 kcal/day       Protein Requirements    Weight for Calculation 74.4 kg   EST Protein Needs (g/kg) 1.2 gm/kg   EST Daily Needs (g/day) 90 g/day       Fluid Requirements     Method " for Estimation 1 mL/kcal    Estimated Needs (mL/day) 1900 mL/day           PES STATEMENT / NUTRITION DIAGNOSIS      Nutrition Dx Problem Problem:    NutritionDiagnosisProblem: Nutrition Appropriate for Condition at this Time and Altered GI Function    Etiology:  Medical diagnosis: Lung cancer  Factors affecting nutrition: Reported GI Symptoms    Signs/Symptoms:  Unintended Weight Change and Report/Observation    Comment:      NUTRITION INTERVENTION / PLAN OF CARE      Intervention Goal(s) Maintain nutrition status, Meet estimated needs, Tolerate PO , Maintain intake, Maintain weight, and No significant weight loss         RD Intervention/Action Encouraged intake, Follow Tx progress         Recommendations:       PO Diet Continue current       Supplements Continue current       Snacks       Other          Monitor/Evaluation PO intake, Supplement intake, Weight, Symptoms   Education Will provide eduction as needed   --    RD to follow     Electronically signed by:  Claudia Kelly RD  12/06/23 13:33 EST

## 2023-12-07 ENCOUNTER — OFFICE VISIT (OUTPATIENT)
Dept: SURGERY | Facility: CLINIC | Age: 68
End: 2023-12-07
Payer: MEDICARE

## 2023-12-07 ENCOUNTER — LAB (OUTPATIENT)
Dept: LAB | Facility: HOSPITAL | Age: 68
End: 2023-12-07
Payer: MEDICARE

## 2023-12-07 ENCOUNTER — PREP FOR SURGERY (OUTPATIENT)
Dept: OTHER | Facility: HOSPITAL | Age: 68
End: 2023-12-07
Payer: MEDICARE

## 2023-12-07 ENCOUNTER — OFFICE VISIT (OUTPATIENT)
Dept: ONCOLOGY | Facility: CLINIC | Age: 68
End: 2023-12-07
Payer: MEDICARE

## 2023-12-07 VITALS
WEIGHT: 164 LBS | HEIGHT: 72 IN | OXYGEN SATURATION: 95 % | HEART RATE: 62 BPM | BODY MASS INDEX: 22.21 KG/M2 | DIASTOLIC BLOOD PRESSURE: 80 MMHG | SYSTOLIC BLOOD PRESSURE: 132 MMHG

## 2023-12-07 VITALS
RESPIRATION RATE: 16 BRPM | SYSTOLIC BLOOD PRESSURE: 157 MMHG | HEIGHT: 72 IN | HEART RATE: 72 BPM | BODY MASS INDEX: 23.08 KG/M2 | OXYGEN SATURATION: 97 % | DIASTOLIC BLOOD PRESSURE: 62 MMHG | WEIGHT: 170.4 LBS | TEMPERATURE: 98.2 F

## 2023-12-07 DIAGNOSIS — C34.92 PRIMARY SQUAMOUS CELL CARCINOMA OF LUNG, LEFT: ICD-10-CM

## 2023-12-07 DIAGNOSIS — C34.92 PRIMARY SQUAMOUS CELL CARCINOMA OF LUNG, LEFT: Primary | ICD-10-CM

## 2023-12-07 DIAGNOSIS — R91.8 MASS OF LEFT LUNG: Primary | ICD-10-CM

## 2023-12-07 DIAGNOSIS — R94.5 ABNORMAL RESULTS OF LIVER FUNCTION STUDIES: ICD-10-CM

## 2023-12-07 DIAGNOSIS — Z79.899 HIGH RISK MEDICATION USE: ICD-10-CM

## 2023-12-07 PROBLEM — C34.32: Status: ACTIVE | Noted: 2023-12-07

## 2023-12-07 LAB
ALBUMIN SERPL-MCNC: 3.9 G/DL (ref 3.5–5.2)
ALBUMIN/GLOB SERPL: 1.3 G/DL
ALP SERPL-CCNC: 49 U/L (ref 39–117)
ALT SERPL W P-5'-P-CCNC: 20 U/L (ref 1–41)
ANION GAP SERPL CALCULATED.3IONS-SCNC: 15.1 MMOL/L (ref 5–15)
AST SERPL-CCNC: 14 U/L (ref 1–40)
BASOPHILS # BLD AUTO: 0.01 10*3/MM3 (ref 0–0.2)
BASOPHILS NFR BLD AUTO: 0.1 % (ref 0–1.5)
BILIRUB SERPL-MCNC: 0.2 MG/DL (ref 0–1.2)
BUN SERPL-MCNC: 15 MG/DL (ref 8–23)
BUN/CREAT SERPL: 20.3 (ref 7–25)
CALCIUM SPEC-SCNC: 9 MG/DL (ref 8.6–10.5)
CHLORIDE SERPL-SCNC: 99 MMOL/L (ref 98–107)
CO2 SERPL-SCNC: 21.9 MMOL/L (ref 22–29)
CREAT SERPL-MCNC: 0.74 MG/DL (ref 0.76–1.27)
DEPRECATED RDW RBC AUTO: 55.2 FL (ref 37–54)
EGFRCR SERPLBLD CKD-EPI 2021: 99.3 ML/MIN/1.73
EOSINOPHIL # BLD AUTO: 0 10*3/MM3 (ref 0–0.4)
EOSINOPHIL NFR BLD AUTO: 0 % (ref 0.3–6.2)
ERYTHROCYTE [DISTWIDTH] IN BLOOD BY AUTOMATED COUNT: 17.8 % (ref 12.3–15.4)
GLOBULIN UR ELPH-MCNC: 3.1 GM/DL
GLUCOSE SERPL-MCNC: 249 MG/DL (ref 65–99)
HCT VFR BLD AUTO: 35.4 % (ref 37.5–51)
HGB BLD-MCNC: 10.9 G/DL (ref 13–17.7)
IMM GRANULOCYTES # BLD AUTO: 0.09 10*3/MM3 (ref 0–0.05)
IMM GRANULOCYTES NFR BLD AUTO: 0.9 % (ref 0–0.5)
LYMPHOCYTES # BLD AUTO: 0.81 10*3/MM3 (ref 0.7–3.1)
LYMPHOCYTES NFR BLD AUTO: 8 % (ref 19.6–45.3)
MCH RBC QN AUTO: 26.6 PG (ref 26.6–33)
MCHC RBC AUTO-ENTMCNC: 30.8 G/DL (ref 31.5–35.7)
MCV RBC AUTO: 86.3 FL (ref 79–97)
MONOCYTES # BLD AUTO: 0.11 10*3/MM3 (ref 0.1–0.9)
MONOCYTES NFR BLD AUTO: 1.1 % (ref 5–12)
NEUTROPHILS NFR BLD AUTO: 89.9 % (ref 42.7–76)
NEUTROPHILS NFR BLD AUTO: 9.15 10*3/MM3 (ref 1.7–7)
NRBC BLD AUTO-RTO: 0.4 /100 WBC (ref 0–0.2)
PLATELET # BLD AUTO: 384 10*3/MM3 (ref 140–450)
PMV BLD AUTO: 9.2 FL (ref 6–12)
POTASSIUM SERPL-SCNC: 3.7 MMOL/L (ref 3.5–5.2)
PROT SERPL-MCNC: 7 G/DL (ref 6–8.5)
RBC # BLD AUTO: 4.1 10*6/MM3 (ref 4.14–5.8)
SODIUM SERPL-SCNC: 136 MMOL/L (ref 136–145)
WBC NRBC COR # BLD AUTO: 10.17 10*3/MM3 (ref 3.4–10.8)

## 2023-12-07 PROCEDURE — 85025 COMPLETE CBC W/AUTO DIFF WBC: CPT | Performed by: INTERNAL MEDICINE

## 2023-12-07 PROCEDURE — 36415 COLL VENOUS BLD VENIPUNCTURE: CPT

## 2023-12-07 PROCEDURE — 80053 COMPREHEN METABOLIC PANEL: CPT | Performed by: INTERNAL MEDICINE

## 2023-12-07 RX ORDER — SODIUM CHLORIDE 0.9 % (FLUSH) 0.9 %
10 SYRINGE (ML) INJECTION EVERY 12 HOURS SCHEDULED
OUTPATIENT
Start: 2023-12-07

## 2023-12-07 RX ORDER — CEFAZOLIN SODIUM 2 G/100ML
2000 INJECTION, SOLUTION INTRAVENOUS ONCE
OUTPATIENT
Start: 2023-12-07 | End: 2023-12-07

## 2023-12-07 RX ORDER — SODIUM CHLORIDE 9 MG/ML
40 INJECTION, SOLUTION INTRAVENOUS AS NEEDED
OUTPATIENT
Start: 2023-12-07

## 2023-12-07 RX ORDER — SODIUM CHLORIDE 0.9 % (FLUSH) 0.9 %
10 SYRINGE (ML) INJECTION AS NEEDED
OUTPATIENT
Start: 2023-12-07

## 2023-12-07 NOTE — LETTER
December 7, 2023     Patient: Sunny Hung   YOB: 1955   Date of Visit: 12/7/2023       To Whom It May Concern:    Sunny Hung is seen in our office for   Encounter Diagnoses   Name Primary?   • Primary squamous cell carcinoma of lung, left Yes   • High risk medication use        He received immunotherapy with nivolumab to activate cells in his body to attack the cancer.           Sincerely,        HILDA Lamb    CC: No Recipients

## 2023-12-07 NOTE — PROGRESS NOTES
Subjective     REASON FOR CONSULTATION:  NSCLC  Provide an opinion on any further workup or treatment                             REQUESTING PHYSICIAN:  Sonia    RECORDS OBTAINED:  Records of the patients history including those obtained from the referring provider were reviewed and summarized in detail.      History of Present Illness   The patient returns to the office today for follow-up on his frequent diarrhea that was felt to be associated to immunotherapy.  Patient has been taking 70 mg of prednisone daily.  He has had an increased appetite with this.  He is may be slightly more irritable.  He denies insomnia or jitteriness.  He reports the diarrhea improved starting on Sunday.  He has had 1 normal bowel movement since that time but no further diarrhea.  He is taking omeprazole daily.  Patient did see Dr. Diop earlier this week and has plans for left lobectomy on 12/13/2023.    Oncology history:  This is a pleasant 68-year-old man with hyperlipidemia and previous tobacco abuse who underwent a screening CT of the chest on 7/31/2023 which showed a large mass in the left lower lobe of the lung extending to the hilum.  A diagnostic CT was performed 8/25/2023 showing a large left infrahilar medial mass in the lower lobe.  A PET scan on 9/8/2023 showed a left lower lobe spiculated pleural-based mass 1.3 x 5 cm involving the left hilum and major fissure SUV 23.2.  There were smaller nodular airspace opacities also hypermetabolic.  The mass encases left lower lobe bronchovascular bundle.  No hypermetabolic mediastinal or hilar lymph nodes noted.  There was a 7 mm nodule in the right upper lobe photopenic SUV 2.1.  There was no supraclavicular uptake or disease below the abdomen other than 2 abnormal foci in the sigmoid colon.  MRI of the brain on 9/11/2023 showed a subtle 2 mm focus inferior left cerebellum too small to characterize but likely a blood vessel.  The patient underwent bronchoscopy on 9/6/2023 with  biopsies taken from the left lower lobe mass, lymph node station 7 and lymph node station 4R.  Pathology showed malignant cells favoring poorly differentiated carcinoma/squamous differentiation from the left lower lobe and biopsies from station 7 and 4R were negative for malignant cells.    The patient completed 3 cycles of neoadjuvant therapy with carboplatin/Taxol/nivolumab 11/1/2023.    Past Medical History:   Diagnosis Date    BPH (benign prostatic hyperplasia)     Bruises easily     Bursitis of right shoulder     Cough     Hyperlipidemia     Mass of lower lobe of left lung         Past Surgical History:   Procedure Laterality Date    BRONCHOSCOPY WITH ION ROBOTIC ASSIST N/A 9/6/2023    Procedure: BRONCHOSCOPY WITH BAL;   ION ROBOT AND ENDOBRONCHIAL ULTRASOUND WITH FNA'S;  Surgeon: Rosalio Scott MD;  Location: [facility] ENDOSCOPY;  Service: Robotics - Pulmonary;  Laterality: N/A;  PRE- LEFT LOWER LOBE MASS  POST- SAME    GANGLION CYST EXCISION Bilateral     HEMORRHOIDECTOMY      OTHER SURGICAL HISTORY      SOMETHING REMOVED FROM LEFT CHEST, BENIGN ? LIPOMA        Current Outpatient Medications on File Prior to Visit   Medication Sig Dispense Refill    fluticasone (FLONASE) 50 MCG/ACT nasal spray 2 sprays into the nostril(s) as directed by provider Daily As Needed.      omeprazole (priLOSEC) 20 MG capsule Take 1 capsule by mouth Daily. 30 capsule 0    ondansetron (ZOFRAN) 8 MG tablet Take 1 tablet by mouth 3 (Three) Times a Day As Needed for Nausea or Vomiting. 30 tablet 2    pravastatin (PRAVACHOL) 20 MG tablet Take 1 tablet by mouth Every Night.      predniSONE (DELTASONE) 10 MG tablet Take 7 tablets by mouth Daily With Breakfast. 100 tablet 0     No current facility-administered medications on file prior to visit.        ALLERGIES:    Allergies   Allergen Reactions    Amoxicillin-Pot Clavulanate Hives        Social History     Socioeconomic History    Marital status:      Spouse name: Carmen   Tobacco Use  "   Smoking status: Former     Packs/day: 0.50     Years: 40.00     Additional pack years: 0.00     Total pack years: 20.00     Types: Cigarettes     Quit date: 2016     Years since quittin.9    Smokeless tobacco: Never   Vaping Use    Vaping Use: Never used   Substance and Sexual Activity    Alcohol use: Not Currently     Comment: VERY RARELY, MAYBE 1 BEER    Drug use: Never    Sexual activity: Defer        Family History   Problem Relation Age of Onset    Bone cancer Mother     COPD Father     Brain cancer Sister     Cancer Brother         Malignant tumor of pharynx    Alcohol abuse Brother     Cirrhosis Brother     Recurrent abdominal pain Brother     Lung cancer Brother     Malig Hyperthermia Neg Hx         Review of Systems   Constitutional:  Positive for fatigue.   HENT: Negative.     Respiratory:  Positive for cough.    Cardiovascular: Negative.    Gastrointestinal: Negative.    Genitourinary: Negative.    Musculoskeletal: Negative.    Neurological:  Positive for numbness.   Hematological: Negative.    Psychiatric/Behavioral:  Negative for dysphoric mood. The patient is nervous/anxious.           Objective     Vitals:    23 1305   BP: 157/62   Pulse: 72   Resp: 16   Temp: 98.2 °F (36.8 °C)   TempSrc: Infrared   SpO2: 97%   Weight: 77.3 kg (170 lb 6.4 oz)   Height: 182.9 cm (72.01\")   PainSc: 0-No pain           2023     1:10 PM   Current Status   ECOG score 0       Physical Exam    CONSTITUTIONAL: pleasant well-developed adult man  LYMPH: no cervical or supraclavicular lad  CV: RRR, S1S2, no murmur  RESP: cta bilat, no wheezing, no rales  GI: soft, non-tender, no splenomegaly, +bs  MUSC: no edema, normal gait  NEURO: alert and oriented x3, normal strength  PSYCH: normal mood anxious affect  Exam is unchanged- 2023      RECENT LABS:  Hematology WBC   Date Value Ref Range Status   2023 10.17 3.40 - 10.80 10*3/mm3 Final     RBC   Date Value Ref Range Status   2023 4.10 (L) 4.14 - " 5.80 10*6/mm3 Final     Hemoglobin   Date Value Ref Range Status   12/07/2023 10.9 (L) 13.0 - 17.7 g/dL Final     Hematocrit   Date Value Ref Range Status   12/07/2023 35.4 (L) 37.5 - 51.0 % Final     Platelets   Date Value Ref Range Status   12/07/2023 384 140 - 450 10*3/mm3 Final        Lab Results   Component Value Date    GLUCOSE 147 (H) 11/30/2023    BUN 12 11/30/2023    CREATININE 0.67 (L) 11/30/2023    EGFR 102.3 11/30/2023    BCR 17.9 11/30/2023    K 3.5 11/30/2023    CO2 23.3 11/30/2023    CALCIUM 9.6 11/30/2023    ALBUMIN 4.1 11/30/2023    BILITOT 0.4 11/30/2023    AST 16 11/30/2023    ALT 12 11/30/2023     PET scan 9823  IMPRESSION:  1. Intensely hypermetabolic approximately 13 x 5 cm pleural-based left  lower lobe lung mass involving the left hilum. Smaller nodular opacities  adjacently are hypermetabolic and likely represent metastases. A 7 mm  right upper lobe nodule is photopenic. Conservative surveillance is  recommended. There is no convincing evidence for metastatic disease at  the neck, abdomen, or pelvis.  2. There are 2 hypermetabolic foci at the sigmoid colon need further  evaluation, particularly the 1.4 cm hypermetabolic polyp at the distal  sigmoid colon. Colonoscopy is recommended.  3. Nonspecific heterogeneous prostate activity. PSA follow-up is  recommended.    Assessment & Plan   *T4N0M0 poorly differentiated carcinoma/squamous differentiation left lower lobe of the lung  Mass discovered on low-dose CT chest screening 7/31/2023  PET scan 9/8/2023 showed a 1.3 x 5 cm left lower lobe mass involving the hilum with smaller adjacent nodular opacities, max SUV 23.2, photopenic right upper lobe nodule  Bronchoscopy with biopsy from the left lower lobe 9/6/2023 positive for poorly differentiated carcinoma with squamous differentiation; P-L1 TPS 0%  Initiated neoadjuvant treatment with carboplatin/Taxol/nivolumab 9/20/2023  Scans reviewed by Dr. Dubon which we discussed today.  Patient has had  significant improvement overall and we will refer him back to Dr. Scott for evaluation surgical options.  CT scan showed the previously 7 x 4.3 cm left lower lobe pleural-based lung mass now measures 4.1 x 3.3.  There is been significant decrease in the adjacent interstitial thickening and tiny interstitial lung nodules.  Patient denies any increased shortness of breath recently.  There is some patchy nodular airspace consolidations.  We will continue to monitor.  There is resolution of the 7 mm right upper lobe pulmonary nodule and decrease of the narrowing of the left lower lobe bronchus.  Mediastinal nodes are stable.  Patient was happy to hear this information.  The lower lobe lung mass has a maximal SUV of 3.6, previously 23.2.  12/7/2023 patient was seen by Dr. Scott earlier this week with plans for left lower lobectomy 12/13/23    *Diarrhea  Seen today with diarrhea for the past 4 to 5 days with 6-8 episodes daily and not responsive to Imodium.  He is taken up to 6 Imodium tablets in 24 hours.  He has been drinking Gatorade and water.  We discussed trying to avoid greasy or spicy foods and maintaining good hydration like he has been doing.  I did give him 1 L of normal saline in the office while we awaited some lab work today.  Patient was sent for a CT abdomen pelvis that was overall unremarkable with regards to diarrhea without any concerns reported in the colon.  It does show some gallbladder sludge though no blockage noted.  Patient not having any nausea.  Will monitor.  Return stool studies that evening to the hospital with C. difficile and GI PCR panel negative.  Prednisone 70 mg daily initiated.  12/7/23 patient returns today reporting diarrhea has subsided on Sunday.  Since that time has had 1 normal stool.  He is continue the prednisone.  He is tolerating this reasonably well.  Will reduce to 60 mg daily.  He was instructed to continue the omeprazole until he discontinues steroids.  He will wean the  steroids by 10 mg/week.        *MRI brain 9/11/23-2 mm enhancement left cerebellar hemisphere likely vessel artifact but will need radiographic follow-up repeat MRI of the brain 11/27/2023 stable with no concerns of metastatic disease.    *PET uptake 2 foci sigmoid colon; he will eventually need colonoscopy    *Comorbidities-hyperlipidemia      Oncology plan/recommendations:  Reduce prednisone to 60 mg daily  Continue omeprazole 20 mg daily  Patient will reduce prednisone by 10 mg/week until discontinued.  He is currently on 60 mg daily on 12/15/2023 he will reduce prednisone to 50 mg daily, on 12/22/2023 he will reduce prednisone to 40 mg daily, on 12/29/2023 he will reduce to 30 mg daily and so 1.  Patient instructed to notify the office for any recurrent diarrhea.  Follow-up in 2 and half weeks with Dr. Dubon with [facility] CMP.

## 2023-12-11 ENCOUNTER — TELEPHONE (OUTPATIENT)
Dept: ONCOLOGY | Facility: CLINIC | Age: 68
End: 2023-12-11
Payer: MEDICARE

## 2023-12-11 ENCOUNTER — PRE-ADMISSION TESTING (OUTPATIENT)
Dept: PREADMISSION TESTING | Facility: HOSPITAL | Age: 68
DRG: 163 | End: 2023-12-11
Payer: MEDICARE

## 2023-12-11 VITALS
RESPIRATION RATE: 18 BRPM | DIASTOLIC BLOOD PRESSURE: 79 MMHG | HEART RATE: 63 BPM | BODY MASS INDEX: 24.05 KG/M2 | HEIGHT: 70 IN | SYSTOLIC BLOOD PRESSURE: 161 MMHG | TEMPERATURE: 97.7 F | WEIGHT: 168 LBS | OXYGEN SATURATION: 98 %

## 2023-12-11 DIAGNOSIS — R91.8 MASS OF LEFT LUNG: ICD-10-CM

## 2023-12-11 DIAGNOSIS — R94.5 ABNORMAL RESULTS OF LIVER FUNCTION STUDIES: ICD-10-CM

## 2023-12-11 LAB
ABO GROUP BLD: NORMAL
ALBUMIN SERPL-MCNC: 4.1 G/DL (ref 3.5–5.2)
ALBUMIN/GLOB SERPL: 1.6 G/DL
ALP SERPL-CCNC: 46 U/L (ref 39–117)
ALT SERPL W P-5'-P-CCNC: 21 U/L (ref 1–41)
ANION GAP SERPL CALCULATED.3IONS-SCNC: 12 MMOL/L (ref 5–15)
AST SERPL-CCNC: 16 U/L (ref 1–40)
BILIRUB SERPL-MCNC: <0.2 MG/DL (ref 0–1.2)
BLD GP AB SCN SERPL QL: NEGATIVE
BUN SERPL-MCNC: 17 MG/DL (ref 8–23)
BUN/CREAT SERPL: 25.8 (ref 7–25)
CALCIUM SPEC-SCNC: 7.1 MG/DL (ref 8.6–10.5)
CHLORIDE SERPL-SCNC: 103 MMOL/L (ref 98–107)
CO2 SERPL-SCNC: 26 MMOL/L (ref 22–29)
CREAT SERPL-MCNC: 0.66 MG/DL (ref 0.76–1.27)
DEPRECATED RDW RBC AUTO: 54.1 FL (ref 37–54)
EGFRCR SERPLBLD CKD-EPI 2021: 102.8 ML/MIN/1.73
ERYTHROCYTE [DISTWIDTH] IN BLOOD BY AUTOMATED COUNT: 17.6 % (ref 12.3–15.4)
GLOBULIN UR ELPH-MCNC: 2.6 GM/DL
GLUCOSE SERPL-MCNC: 125 MG/DL (ref 65–99)
HCT VFR BLD AUTO: 36.9 % (ref 37.5–51)
HGB BLD-MCNC: 11.6 G/DL (ref 13–17.7)
INR PPP: 1.11 (ref 0.9–1.1)
MCH RBC QN AUTO: 26.7 PG (ref 26.6–33)
MCHC RBC AUTO-ENTMCNC: 31.4 G/DL (ref 31.5–35.7)
MCV RBC AUTO: 85 FL (ref 79–97)
PLATELET # BLD AUTO: 416 10*3/MM3 (ref 140–450)
PMV BLD AUTO: 8.9 FL (ref 6–12)
POTASSIUM SERPL-SCNC: 4.8 MMOL/L (ref 3.5–5.2)
PROT SERPL-MCNC: 6.7 G/DL (ref 6–8.5)
PROTHROMBIN TIME: 14.5 SECONDS (ref 11.7–14.2)
RBC # BLD AUTO: 4.34 10*6/MM3 (ref 4.14–5.8)
RH BLD: POSITIVE
SODIUM SERPL-SCNC: 141 MMOL/L (ref 136–145)
T&S EXPIRATION DATE: NORMAL
WBC NRBC COR # BLD AUTO: 10.57 10*3/MM3 (ref 3.4–10.8)

## 2023-12-11 PROCEDURE — 85027 COMPLETE CBC AUTOMATED: CPT

## 2023-12-11 PROCEDURE — 86901 BLOOD TYPING SEROLOGIC RH(D): CPT

## 2023-12-11 PROCEDURE — 36415 COLL VENOUS BLD VENIPUNCTURE: CPT

## 2023-12-11 PROCEDURE — 86900 BLOOD TYPING SEROLOGIC ABO: CPT

## 2023-12-11 PROCEDURE — 86850 RBC ANTIBODY SCREEN: CPT

## 2023-12-11 PROCEDURE — 85610 PROTHROMBIN TIME: CPT

## 2023-12-11 PROCEDURE — 80053 COMPREHEN METABOLIC PANEL: CPT

## 2023-12-11 NOTE — TELEPHONE ENCOUNTER
----- Message from HILDA Lamb sent at 12/8/2023  5:20 PM EST -----  Regarding: marc dubon someone in the last week dec  Patient will follow-up with Dr. Dubon sometime in the last week of December with CBC and CMP.  Thank you.

## 2023-12-11 NOTE — DISCHARGE INSTRUCTIONS
Take the following medications the morning of surgery: OMEPRAZOLE AND PREDNISONE      If you are on prescription narcotic pain medication to control your pain you may also take that medication the morning of surgery.    General Instructions:  Do not eat solid food after midnight the night before surgery.  You may drink clear liquids day of surgery but must stop at least one hour before your hospital arrival time.  It is beneficial for you to have a clear drink that contains carbohydrates the day of surgery.  We suggest a 12 to 20 ounce bottle of Gatorade or Powerade for non-diabetic patients or a 12 to 20 ounce bottle of G2 or Powerade Zero for diabetic patients. (Pediatric patients, are not advised to drink a 12 to 20 ounce carbohydrate drink)    Clear liquids are liquids you can see through.  Nothing red in color.     Plain water                               Sports drinks  Sodas                                   Gelatin (Jell-O)  Fruit juices without pulp such as white grape juice and apple juice  Popsicles that contain no fruit or yogurt  Tea or coffee (no cream or milk added)  Gatorade / Powerade  G2 / Powerade Zero    Infants may have breast milk up to four hours before surgery.  Infants drinking formula may drink formula up to six hours before surgery.   Patients who avoid smoking, chewing tobacco and alcohol for 4 weeks prior to surgery have a reduced risk of post-operative complications.  Quit smoking as many days before surgery as you can.  Do not smoke, use chewing tobacco or drink alcohol the day of surgery.   If applicable bring your C-PAP/ BI-PAP machine in with you to preop day of surgery.  Bring any papers given to you in the doctor’s office.  Wear clean comfortable clothes.  Do not wear contact lenses, false eyelashes or make-up.  Bring a case for your glasses.   Bring crutches or walker if applicable.  Remove all piercings.  Leave jewelry and any other valuables at home.  Hair extensions with metal  clips must be removed prior to surgery.  The Pre-Admission Testing nurse will instruct you to bring medications if unable to obtain an accurate list in Pre-Admission Testing.        If you were given a blood bank ID arm band remember to bring it with you the day of surgery.    Preventing a Surgical Site Infection:  For 2 to 3 days before surgery, avoid shaving with a razor because the razor can irritate skin and make it easier to develop an infection.    Any areas of open skin can increase the risk of a post-operative wound infection by allowing bacteria to enter and travel throughout the body.  Notify your surgeon if you have any skin wounds / rashes even if it is not near the expected surgical site.  The area will need assessed to determine if surgery should be delayed until it is healed.  The night prior to surgery shower using a fresh bar of anti-bacterial soap (such as Dial) and clean washcloth.  Sleep in a clean bed with clean clothing.  Do not allow pets to sleep with you.  Shower on the morning of surgery using a fresh bar of anti-bacterial soap (such as Dial) and clean washcloth.  Dry with a clean towel and dress in clean clothing.  Ask your surgeon if you will be receiving antibiotics prior to surgery.  Make sure you, your family, and all healthcare providers clean their hands with soap and water or an alcohol based hand  before caring for you or your wound.  CHLORHEXIDINE CLOTH INSTRUCTIONS  The morning of surgery follow these instructions using the Chlorhexidine cloths you've been given.  These steps reduce bacteria on the body.  Do not use the cloths near your eyes, ears mouth, genitalia or on open wounds.  Throw the cloths away after use but do not try to flush them down a toilet.      Open and remove one cloth at a time from the package.    Leave the cloth unfolded and begin the bathing.  Massage the skin with the cloths using gentle pressure to remove bacteria.  Do not scrub harshly.    Follow the steps below with one 2% CHG cloth per area (6 total cloths).  One cloth for neck, shoulders and chest.  One cloth for both arms, hands, fingers and underarms (do underarms last).  One cloth for the abdomen followed by groin.  One cloth for right leg and foot including between the toes.  One cloth for left leg and foot including between the toes.  The last cloth is to be used for the back of the neck, back and buttocks.    Allow the CHG to air dry 3 minutes on the skin which will give it time to work and decrease the chance of irritation.  The skin may feel sticky until it is dry.  Do not rinse with water or any other liquid or you will lose the beneficial effects of the CHG.  If mild skin irritation occurs, do rinse the skin to remove the CHG.  Report this to the nurse at time of admission.  Do not apply lotions, creams, ointments, deodorants or perfumes after using the clothes. Dress in clean clothes before coming to the hospital.  Day of surgery:  Your arrival time is approximately two hours before your scheduled surgery time.  Upon arrival, a Pre-op nurse and Anesthesiologist will review your health history, obtain vital signs, and answer questions you may have.  The only belongings needed at this time will be a list of your home medications and if applicable your C-PAP/BI-PAP machine.  A Pre-op nurse will start an IV and you may receive medication in preparation for surgery, including something to help you relax.     Please be aware that surgery does come with discomfort.  We want to make every effort to control your discomfort so please discuss any uncontrolled symptoms with your nurse.   Your doctor will most likely have prescribed pain medications.      If you are going home after surgery you will receive individualized written care instructions before being discharged.  A responsible adult must drive you to and from the hospital on the day of your surgery and stay with you for 24 hours.   Discharge prescriptions can be filled by the hospital pharmacy during regular pharmacy hours.  If you are having surgery late in the day/evening your prescription may be e-prescribed to your pharmacy.  Please verify your pharmacy hours or chose a 24 hour pharmacy to avoid not having access to your prescription because your pharmacy has closed for the day.    If you are staying overnight following surgery, you will be transported to your hospital room following the recovery period.  [facility] has all private rooms.    If you have any questions please call Pre-Admission Testing at [phone].  Deductibles and co-payments are collected on the day of service. Please be prepared to pay the required co-pay, deductible or deposit on the day of service as defined by your plan.    Call your surgeon immediately if you experience any of the following symptoms:  Sore Throat  Shortness of Breath or difficulty breathing  Cough  Chills  Body soreness or muscle pain  Headache  Fever  New loss of taste or smell  Do not arrive for your surgery ill.  Your procedure will need to be rescheduled to another time.  You will need to call your physician before the day of surgery to avoid any unnecessary exposure to hospital staff as well as other patients.

## 2023-12-12 ENCOUNTER — ANESTHESIA EVENT (OUTPATIENT)
Dept: PERIOP | Facility: HOSPITAL | Age: 68
End: 2023-12-12
Payer: MEDICARE

## 2023-12-13 ENCOUNTER — ANESTHESIA (OUTPATIENT)
Dept: PERIOP | Facility: HOSPITAL | Age: 68
End: 2023-12-13
Payer: MEDICARE

## 2023-12-13 ENCOUNTER — HOSPITAL ENCOUNTER (INPATIENT)
Facility: HOSPITAL | Age: 68
LOS: 11 days | Discharge: HOME | DRG: 163 | End: 2023-12-24
Attending: SURGERY | Admitting: SURGERY
Payer: MEDICARE

## 2023-12-13 ENCOUNTER — APPOINTMENT (OUTPATIENT)
Dept: GENERAL RADIOLOGY | Facility: HOSPITAL | Age: 68
DRG: 163 | End: 2023-12-13
Payer: MEDICARE

## 2023-12-13 DIAGNOSIS — C34.32 CANCER OF BRONCHUS OF LEFT LOWER LOBE: Primary | ICD-10-CM

## 2023-12-13 DIAGNOSIS — R91.8 MASS OF LEFT LUNG: ICD-10-CM

## 2023-12-13 LAB
ANION GAP SERPL CALCULATED.3IONS-SCNC: 8.9 MMOL/L (ref 5–15)
BUN SERPL-MCNC: 15 MG/DL (ref 8–23)
BUN/CREAT SERPL: 22.1 (ref 7–25)
CALCIUM SPEC-SCNC: 8.2 MG/DL (ref 8.6–10.5)
CHLORIDE SERPL-SCNC: 103 MMOL/L (ref 98–107)
CO2 SERPL-SCNC: 24.1 MMOL/L (ref 22–29)
CREAT SERPL-MCNC: 0.68 MG/DL (ref 0.76–1.27)
DEPRECATED RDW RBC AUTO: 54.2 FL (ref 37–54)
EGFRCR SERPLBLD CKD-EPI 2021: 101.9 ML/MIN/1.73
ERYTHROCYTE [DISTWIDTH] IN BLOOD BY AUTOMATED COUNT: 17.6 % (ref 12.3–15.4)
GLUCOSE SERPL-MCNC: 191 MG/DL (ref 65–99)
HCT VFR BLD AUTO: 26.7 % (ref 37.5–51)
HGB BLD-MCNC: 8.5 G/DL (ref 13–17.7)
MAGNESIUM SERPL-MCNC: 2.3 MG/DL (ref 1.6–2.4)
MCH RBC QN AUTO: 27.2 PG (ref 26.6–33)
MCHC RBC AUTO-ENTMCNC: 31.8 G/DL (ref 31.5–35.7)
MCV RBC AUTO: 85.6 FL (ref 79–97)
PLATELET # BLD AUTO: 243 10*3/MM3 (ref 140–450)
PMV BLD AUTO: 8.5 FL (ref 6–12)
POTASSIUM SERPL-SCNC: 4.5 MMOL/L (ref 3.5–5.2)
RBC # BLD AUTO: 3.12 10*6/MM3 (ref 4.14–5.8)
SODIUM SERPL-SCNC: 136 MMOL/L (ref 136–145)
WBC NRBC COR # BLD AUTO: 23.99 10*3/MM3 (ref 3.4–10.8)

## 2023-12-13 PROCEDURE — 25010000002 BUPIVACAINE (PF) 0.25 % SOLUTION: Performed by: STUDENT IN AN ORGANIZED HEALTH CARE EDUCATION/TRAINING PROGRAM

## 2023-12-13 PROCEDURE — 85027 COMPLETE CBC AUTOMATED: CPT | Performed by: SURGERY

## 2023-12-13 PROCEDURE — 07B74ZX EXCISION OF THORAX LYMPHATIC, PERCUTANEOUS ENDOSCOPIC APPROACH, DIAGNOSTIC: ICD-10-PCS | Performed by: SURGERY

## 2023-12-13 PROCEDURE — 25010000002 INDOCYANINE GREEN 25 MG RECONSTITUTED SOLUTION: Performed by: NURSE ANESTHETIST, CERTIFIED REGISTERED

## 2023-12-13 PROCEDURE — 25810000003 LACTATED RINGERS PER 1000 ML: Performed by: NURSE ANESTHETIST, CERTIFIED REGISTERED

## 2023-12-13 PROCEDURE — C9290 INJ, BUPIVACAINE LIPOSOME: HCPCS | Performed by: STUDENT IN AN ORGANIZED HEALTH CARE EDUCATION/TRAINING PROGRAM

## 2023-12-13 PROCEDURE — 25010000002 ONDANSETRON PER 1 MG: Performed by: NURSE ANESTHETIST, CERTIFIED REGISTERED

## 2023-12-13 PROCEDURE — P9041 ALBUMIN (HUMAN),5%, 50ML: HCPCS | Performed by: NURSE ANESTHETIST, CERTIFIED REGISTERED

## 2023-12-13 PROCEDURE — 88342 IMHCHEM/IMCYTCHM 1ST ANTB: CPT | Performed by: SURGERY

## 2023-12-13 PROCEDURE — 25010000002 PHENYLEPHRINE 10 MG/ML SOLUTION 5 ML VIAL: Performed by: NURSE ANESTHETIST, CERTIFIED REGISTERED

## 2023-12-13 PROCEDURE — 88305 TISSUE EXAM BY PATHOLOGIST: CPT | Performed by: SURGERY

## 2023-12-13 PROCEDURE — 25010000002 MAGNESIUM SULFATE PER 500 MG OF MAGNESIUM: Performed by: NURSE ANESTHETIST, CERTIFIED REGISTERED

## 2023-12-13 PROCEDURE — C1729 CATH, DRAINAGE: HCPCS | Performed by: SURGERY

## 2023-12-13 PROCEDURE — 25010000002 BUPIVACAINE (PF) 0.25 % SOLUTION: Performed by: SURGERY

## 2023-12-13 PROCEDURE — 25010000002 ONDANSETRON PER 1 MG: Performed by: SURGERY

## 2023-12-13 PROCEDURE — 0 BUPIVACAINE LIPOSOME 1.3 % SUSPENSION: Performed by: STUDENT IN AN ORGANIZED HEALTH CARE EDUCATION/TRAINING PROGRAM

## 2023-12-13 PROCEDURE — 71045 X-RAY EXAM CHEST 1 VIEW: CPT

## 2023-12-13 PROCEDURE — 25810000003 LACTATED RINGERS PER 1000 ML: Performed by: SURGERY

## 2023-12-13 PROCEDURE — 0BJ08ZZ INSPECTION OF TRACHEOBRONCHIAL TREE, VIA NATURAL OR ARTIFICIAL OPENING ENDOSCOPIC: ICD-10-PCS | Performed by: SURGERY

## 2023-12-13 PROCEDURE — 88341 IMHCHEM/IMCYTCHM EA ADD ANTB: CPT

## 2023-12-13 PROCEDURE — 25010000002 HYDROMORPHONE PER 4 MG: Performed by: SURGERY

## 2023-12-13 PROCEDURE — 85014 HEMATOCRIT: CPT

## 2023-12-13 PROCEDURE — 25010000002 DEXAMETHASONE SODIUM PHOSPHATE 20 MG/5ML SOLUTION: Performed by: NURSE ANESTHETIST, CERTIFIED REGISTERED

## 2023-12-13 PROCEDURE — 0BTJ4ZZ RESECTION OF LEFT LOWER LUNG LOBE, PERCUTANEOUS ENDOSCOPIC APPROACH: ICD-10-PCS | Performed by: SURGERY

## 2023-12-13 PROCEDURE — 88341 IMHCHEM/IMCYTCHM EA ADD ANTB: CPT | Performed by: SURGERY

## 2023-12-13 PROCEDURE — 25810000003 SODIUM CHLORIDE 0.9 % SOLUTION 250 ML FLEX CONT: Performed by: NURSE ANESTHETIST, CERTIFIED REGISTERED

## 2023-12-13 PROCEDURE — 82803 BLOOD GASES ANY COMBINATION: CPT

## 2023-12-13 PROCEDURE — 85018 HEMOGLOBIN: CPT

## 2023-12-13 PROCEDURE — 25810000003 LACTATED RINGERS PER 1000 ML: Performed by: STUDENT IN AN ORGANIZED HEALTH CARE EDUCATION/TRAINING PROGRAM

## 2023-12-13 PROCEDURE — 25010000002 NEOSTIGMINE 5 MG/10ML SOLUTION: Performed by: NURSE ANESTHETIST, CERTIFIED REGISTERED

## 2023-12-13 PROCEDURE — 25010000002 LIDOCAINE PER 10 MG: Performed by: NURSE ANESTHETIST, CERTIFIED REGISTERED

## 2023-12-13 PROCEDURE — 25010000002 HYDROMORPHONE 1 MG/ML SOLUTION: Performed by: NURSE ANESTHETIST, CERTIFIED REGISTERED

## 2023-12-13 PROCEDURE — 25010000002 KETOROLAC TROMETHAMINE PER 15 MG: Performed by: SURGERY

## 2023-12-13 PROCEDURE — 82947 ASSAY GLUCOSE BLOOD QUANT: CPT

## 2023-12-13 PROCEDURE — 83735 ASSAY OF MAGNESIUM: CPT | Performed by: SURGERY

## 2023-12-13 PROCEDURE — 3E0T3BZ INTRODUCTION OF ANESTHETIC AGENT INTO PERIPHERAL NERVES AND PLEXI, PERCUTANEOUS APPROACH: ICD-10-PCS | Performed by: SURGERY

## 2023-12-13 PROCEDURE — 25810000003 SODIUM CHLORIDE PER 500 ML: Performed by: SURGERY

## 2023-12-13 PROCEDURE — 8E0W4CZ ROBOTIC ASSISTED PROCEDURE OF TRUNK REGION, PERCUTANEOUS ENDOSCOPIC APPROACH: ICD-10-PCS | Performed by: SURGERY

## 2023-12-13 PROCEDURE — 25010000002 FENTANYL CITRATE (PF) 50 MCG/ML SOLUTION: Performed by: NURSE ANESTHETIST, CERTIFIED REGISTERED

## 2023-12-13 PROCEDURE — 25010000002 PROPOFOL 10 MG/ML EMULSION: Performed by: NURSE ANESTHETIST, CERTIFIED REGISTERED

## 2023-12-13 PROCEDURE — 88309 TISSUE EXAM BY PATHOLOGIST: CPT | Performed by: SURGERY

## 2023-12-13 PROCEDURE — 25010000002 CEFAZOLIN IN DEXTROSE 2-4 GM/100ML-% SOLUTION: Performed by: NURSE ANESTHETIST, CERTIFIED REGISTERED

## 2023-12-13 PROCEDURE — 25010000002 FENTANYL CITRATE (PF) 50 MCG/ML SOLUTION: Performed by: STUDENT IN AN ORGANIZED HEALTH CARE EDUCATION/TRAINING PROGRAM

## 2023-12-13 PROCEDURE — 25010000002 MIDAZOLAM PER 1 MG: Performed by: STUDENT IN AN ORGANIZED HEALTH CARE EDUCATION/TRAINING PROGRAM

## 2023-12-13 PROCEDURE — 0BBP4ZZ EXCISION OF LEFT PLEURA, PERCUTANEOUS ENDOSCOPIC APPROACH: ICD-10-PCS | Performed by: SURGERY

## 2023-12-13 PROCEDURE — 25010000002 CEFAZOLIN IN DEXTROSE 2-4 GM/100ML-% SOLUTION: Performed by: SURGERY

## 2023-12-13 PROCEDURE — 25010000002 ALBUMIN HUMAN 5% PER 50 ML: Performed by: NURSE ANESTHETIST, CERTIFIED REGISTERED

## 2023-12-13 PROCEDURE — 80048 BASIC METABOLIC PNL TOTAL CA: CPT | Performed by: SURGERY

## 2023-12-13 PROCEDURE — C2615 SEALANT, PULMONARY, LIQUID: HCPCS | Performed by: SURGERY

## 2023-12-13 DEVICE — ABSORBABLE HEMOSTAT (OXIDIZED REGENERATED CELLULOSE)
Type: IMPLANTABLE DEVICE | Site: CHEST | Status: FUNCTIONAL
Brand: SURGICEL

## 2023-12-13 DEVICE — SUREFORM 45 RELOAD BLUE
Type: IMPLANTABLE DEVICE | Site: CHEST | Status: FUNCTIONAL
Brand: SUREFORM

## 2023-12-13 DEVICE — SUREFORM 45 RELOAD GREEN
Type: IMPLANTABLE DEVICE | Site: CHEST | Status: FUNCTIONAL
Brand: SUREFORM

## 2023-12-13 DEVICE — PROGEL, PLEURAL AIR LEAK SEALANT, 4 ML KIT
Type: IMPLANTABLE DEVICE | Site: CHEST | Status: FUNCTIONAL
Brand: PROGEL

## 2023-12-13 DEVICE — SUREFORM 45 RELOAD BLACK
Type: IMPLANTABLE DEVICE | Site: CHEST | Status: FUNCTIONAL
Brand: SUREFORM

## 2023-12-13 DEVICE — SUREFORM 45 RELOAD WHITE
Type: IMPLANTABLE DEVICE | Site: CHEST | Status: FUNCTIONAL
Brand: SUREFORM

## 2023-12-13 DEVICE — ARISTA AH ABSORBABLE HEMOSTATIC PARTICLES
Type: IMPLANTABLE DEVICE | Site: CHEST | Status: FUNCTIONAL
Brand: ARISTA™ AH

## 2023-12-13 RX ORDER — KETAMINE HCL IN NACL, ISO-OSM 100MG/10ML
10 SYRINGE (ML) INJECTION
Status: DISCONTINUED | OUTPATIENT
Start: 2023-12-13 | End: 2023-12-13 | Stop reason: HOSPADM

## 2023-12-13 RX ORDER — DEXAMETHASONE SODIUM PHOSPHATE 4 MG/ML
INJECTION, SOLUTION INTRA-ARTICULAR; INTRALESIONAL; INTRAMUSCULAR; INTRAVENOUS; SOFT TISSUE AS NEEDED
Status: DISCONTINUED | OUTPATIENT
Start: 2023-12-13 | End: 2023-12-13 | Stop reason: SURG

## 2023-12-13 RX ORDER — ALBUMIN, HUMAN INJ 5% 5 %
SOLUTION INTRAVENOUS CONTINUOUS PRN
Status: DISCONTINUED | OUTPATIENT
Start: 2023-12-13 | End: 2023-12-13 | Stop reason: SURG

## 2023-12-13 RX ORDER — PREDNISONE 50 MG/1
50 TABLET ORAL
Status: DISCONTINUED | OUTPATIENT
Start: 2023-12-14 | End: 2023-12-17

## 2023-12-13 RX ORDER — NALOXONE HCL 0.4 MG/ML
0.4 VIAL (ML) INJECTION
Status: DISCONTINUED | OUTPATIENT
Start: 2023-12-13 | End: 2023-12-13 | Stop reason: HOSPADM

## 2023-12-13 RX ORDER — ONDANSETRON 4 MG/1
4 TABLET, FILM COATED ORAL EVERY 6 HOURS PRN
Status: DISCONTINUED | OUTPATIENT
Start: 2023-12-13 | End: 2023-12-24 | Stop reason: HOSPADM

## 2023-12-13 RX ORDER — ACETAMINOPHEN 500 MG
1000 TABLET ORAL 3 TIMES DAILY
Status: DISCONTINUED | OUTPATIENT
Start: 2023-12-13 | End: 2023-12-24 | Stop reason: HOSPADM

## 2023-12-13 RX ORDER — HYDROMORPHONE HYDROCHLORIDE 1 MG/ML
0.5 INJECTION, SOLUTION INTRAMUSCULAR; INTRAVENOUS; SUBCUTANEOUS
Status: DISCONTINUED | OUTPATIENT
Start: 2023-12-13 | End: 2023-12-24 | Stop reason: HOSPADM

## 2023-12-13 RX ORDER — DIPHENHYDRAMINE HCL 25 MG
25 CAPSULE ORAL EVERY 4 HOURS PRN
Status: DISCONTINUED | OUTPATIENT
Start: 2023-12-13 | End: 2023-12-13 | Stop reason: HOSPADM

## 2023-12-13 RX ORDER — DOCUSATE SODIUM 100 MG/1
100 CAPSULE, LIQUID FILLED ORAL 2 TIMES DAILY
Status: DISCONTINUED | OUTPATIENT
Start: 2023-12-13 | End: 2023-12-24 | Stop reason: HOSPADM

## 2023-12-13 RX ORDER — ONDANSETRON 2 MG/ML
4 INJECTION INTRAMUSCULAR; INTRAVENOUS ONCE AS NEEDED
Status: DISCONTINUED | OUTPATIENT
Start: 2023-12-13 | End: 2023-12-13 | Stop reason: HOSPADM

## 2023-12-13 RX ORDER — HYDROMORPHONE HYDROCHLORIDE 1 MG/ML
0.25 INJECTION, SOLUTION INTRAMUSCULAR; INTRAVENOUS; SUBCUTANEOUS
Status: DISCONTINUED | OUTPATIENT
Start: 2023-12-13 | End: 2023-12-13 | Stop reason: HOSPADM

## 2023-12-13 RX ORDER — INDOCYANINE GREEN AND WATER 25 MG
KIT INJECTION AS NEEDED
Status: DISCONTINUED | OUTPATIENT
Start: 2023-12-13 | End: 2023-12-13 | Stop reason: SURG

## 2023-12-13 RX ORDER — ACETYLCYSTEINE 200 MG/ML
3 SOLUTION ORAL; RESPIRATORY (INHALATION)
Status: DISCONTINUED | OUTPATIENT
Start: 2023-12-13 | End: 2023-12-15

## 2023-12-13 RX ORDER — NITROGLYCERIN 0.4 MG/1
0.4 TABLET SUBLINGUAL
Status: DISCONTINUED | OUTPATIENT
Start: 2023-12-13 | End: 2023-12-24 | Stop reason: HOSPADM

## 2023-12-13 RX ORDER — ONDANSETRON 2 MG/ML
INJECTION INTRAMUSCULAR; INTRAVENOUS AS NEEDED
Status: DISCONTINUED | OUTPATIENT
Start: 2023-12-13 | End: 2023-12-13 | Stop reason: SURG

## 2023-12-13 RX ORDER — PROPOFOL 10 MG/ML
VIAL (ML) INTRAVENOUS AS NEEDED
Status: DISCONTINUED | OUTPATIENT
Start: 2023-12-13 | End: 2023-12-13 | Stop reason: SURG

## 2023-12-13 RX ORDER — ONDANSETRON 2 MG/ML
4 INJECTION INTRAMUSCULAR; INTRAVENOUS EVERY 6 HOURS PRN
Status: DISCONTINUED | OUTPATIENT
Start: 2023-12-13 | End: 2023-12-24 | Stop reason: HOSPADM

## 2023-12-13 RX ORDER — SODIUM CHLORIDE 0.9 % (FLUSH) 0.9 %
3-10 SYRINGE (ML) INJECTION AS NEEDED
Status: DISCONTINUED | OUTPATIENT
Start: 2023-12-13 | End: 2023-12-13 | Stop reason: HOSPADM

## 2023-12-13 RX ORDER — LABETALOL HYDROCHLORIDE 5 MG/ML
5 INJECTION, SOLUTION INTRAVENOUS
Status: DISCONTINUED | OUTPATIENT
Start: 2023-12-13 | End: 2023-12-13 | Stop reason: HOSPADM

## 2023-12-13 RX ORDER — KETOROLAC TROMETHAMINE 15 MG/ML
15 INJECTION, SOLUTION INTRAMUSCULAR; INTRAVENOUS EVERY 8 HOURS
Status: DISPENSED | OUTPATIENT
Start: 2023-12-13 | End: 2023-12-16

## 2023-12-13 RX ORDER — DIPHENHYDRAMINE HYDROCHLORIDE 50 MG/ML
25 INJECTION INTRAMUSCULAR; INTRAVENOUS EVERY 4 HOURS PRN
Status: DISCONTINUED | OUTPATIENT
Start: 2023-12-13 | End: 2023-12-13 | Stop reason: HOSPADM

## 2023-12-13 RX ORDER — FENTANYL CITRATE 50 UG/ML
INJECTION, SOLUTION INTRAMUSCULAR; INTRAVENOUS
Status: COMPLETED | OUTPATIENT
Start: 2023-12-13 | End: 2023-12-13

## 2023-12-13 RX ORDER — BUPIVACAINE HYDROCHLORIDE 2.5 MG/ML
INJECTION, SOLUTION EPIDURAL; INFILTRATION; INTRACAUDAL
Status: COMPLETED | OUTPATIENT
Start: 2023-12-13 | End: 2023-12-13

## 2023-12-13 RX ORDER — CEFAZOLIN SODIUM 2 G/100ML
2000 INJECTION, SOLUTION INTRAVENOUS ONCE
Status: COMPLETED | OUTPATIENT
Start: 2023-12-13 | End: 2023-12-13

## 2023-12-13 RX ORDER — DROPERIDOL 2.5 MG/ML
0.62 INJECTION, SOLUTION INTRAMUSCULAR; INTRAVENOUS
Status: DISCONTINUED | OUTPATIENT
Start: 2023-12-13 | End: 2023-12-13 | Stop reason: HOSPADM

## 2023-12-13 RX ORDER — FENTANYL CITRATE 50 UG/ML
50 INJECTION, SOLUTION INTRAMUSCULAR; INTRAVENOUS ONCE AS NEEDED
Status: DISCONTINUED | OUTPATIENT
Start: 2023-12-13 | End: 2023-12-13 | Stop reason: HOSPADM

## 2023-12-13 RX ORDER — LIDOCAINE HYDROCHLORIDE 10 MG/ML
0.5 INJECTION, SOLUTION INFILTRATION; PERINEURAL ONCE AS NEEDED
Status: DISCONTINUED | OUTPATIENT
Start: 2023-12-13 | End: 2023-12-13 | Stop reason: HOSPADM

## 2023-12-13 RX ORDER — KETAMINE HCL IN NACL, ISO-OSM 100MG/10ML
SYRINGE (ML) INJECTION AS NEEDED
Status: DISCONTINUED | OUTPATIENT
Start: 2023-12-13 | End: 2023-12-13 | Stop reason: SURG

## 2023-12-13 RX ORDER — SODIUM CHLORIDE 9 MG/ML
40 INJECTION, SOLUTION INTRAVENOUS AS NEEDED
Status: DISCONTINUED | OUTPATIENT
Start: 2023-12-13 | End: 2023-12-13 | Stop reason: HOSPADM

## 2023-12-13 RX ORDER — SODIUM CHLORIDE, SODIUM LACTATE, POTASSIUM CHLORIDE, CALCIUM CHLORIDE 600; 310; 30; 20 MG/100ML; MG/100ML; MG/100ML; MG/100ML
40 INJECTION, SOLUTION INTRAVENOUS CONTINUOUS
Status: DISCONTINUED | OUTPATIENT
Start: 2023-12-13 | End: 2023-12-14

## 2023-12-13 RX ORDER — FAMOTIDINE 10 MG/ML
20 INJECTION, SOLUTION INTRAVENOUS ONCE
Status: COMPLETED | OUTPATIENT
Start: 2023-12-13 | End: 2023-12-13

## 2023-12-13 RX ORDER — NALOXONE HCL 0.4 MG/ML
0.4 VIAL (ML) INJECTION AS NEEDED
Status: DISCONTINUED | OUTPATIENT
Start: 2023-12-13 | End: 2023-12-13 | Stop reason: HOSPADM

## 2023-12-13 RX ORDER — METOCLOPRAMIDE HYDROCHLORIDE 5 MG/ML
10 INJECTION INTRAMUSCULAR; INTRAVENOUS ONCE AS NEEDED
Status: DISCONTINUED | OUTPATIENT
Start: 2023-12-13 | End: 2023-12-13 | Stop reason: HOSPADM

## 2023-12-13 RX ORDER — FENTANYL CITRATE 50 UG/ML
INJECTION, SOLUTION INTRAMUSCULAR; INTRAVENOUS AS NEEDED
Status: DISCONTINUED | OUTPATIENT
Start: 2023-12-13 | End: 2023-12-13 | Stop reason: SURG

## 2023-12-13 RX ORDER — BUPIVACAINE HYDROCHLORIDE 2.5 MG/ML
INJECTION, SOLUTION EPIDURAL; INFILTRATION; INTRACAUDAL AS NEEDED
Status: DISCONTINUED | OUTPATIENT
Start: 2023-12-13 | End: 2023-12-13 | Stop reason: HOSPADM

## 2023-12-13 RX ORDER — SODIUM CHLORIDE 0.9 % (FLUSH) 0.9 %
3 SYRINGE (ML) INJECTION EVERY 12 HOURS SCHEDULED
Status: DISCONTINUED | OUTPATIENT
Start: 2023-12-13 | End: 2023-12-13 | Stop reason: HOSPADM

## 2023-12-13 RX ORDER — OXYCODONE HYDROCHLORIDE 5 MG/1
5 TABLET ORAL ONCE AS NEEDED
Status: DISCONTINUED | OUTPATIENT
Start: 2023-12-13 | End: 2023-12-13 | Stop reason: HOSPADM

## 2023-12-13 RX ORDER — CEFAZOLIN SODIUM 2 G/100ML
INJECTION, SOLUTION INTRAVENOUS AS NEEDED
Status: DISCONTINUED | OUTPATIENT
Start: 2023-12-13 | End: 2023-12-13 | Stop reason: SURG

## 2023-12-13 RX ORDER — LIDOCAINE HYDROCHLORIDE 20 MG/ML
INJECTION, SOLUTION INFILTRATION; PERINEURAL AS NEEDED
Status: DISCONTINUED | OUTPATIENT
Start: 2023-12-13 | End: 2023-12-13 | Stop reason: SURG

## 2023-12-13 RX ORDER — GLYCOPYRROLATE 0.2 MG/ML
INJECTION INTRAMUSCULAR; INTRAVENOUS AS NEEDED
Status: DISCONTINUED | OUTPATIENT
Start: 2023-12-13 | End: 2023-12-13 | Stop reason: SURG

## 2023-12-13 RX ORDER — SODIUM CHLORIDE 9 MG/ML
INJECTION, SOLUTION INTRAVENOUS AS NEEDED
Status: DISCONTINUED | OUTPATIENT
Start: 2023-12-13 | End: 2023-12-13 | Stop reason: HOSPADM

## 2023-12-13 RX ORDER — SODIUM CHLORIDE, SODIUM LACTATE, POTASSIUM CHLORIDE, CALCIUM CHLORIDE 600; 310; 30; 20 MG/100ML; MG/100ML; MG/100ML; MG/100ML
9 INJECTION, SOLUTION INTRAVENOUS CONTINUOUS
Status: DISCONTINUED | OUTPATIENT
Start: 2023-12-13 | End: 2023-12-13

## 2023-12-13 RX ORDER — LIDOCAINE HYDROCHLORIDE ANHYDROUS AND DEXTROSE MONOHYDRATE 5; 400 G/100ML; MG/100ML
INJECTION, SOLUTION INTRAVENOUS CONTINUOUS PRN
Status: DISCONTINUED | OUTPATIENT
Start: 2023-12-13 | End: 2023-12-13 | Stop reason: SURG

## 2023-12-13 RX ORDER — IPRATROPIUM BROMIDE AND ALBUTEROL SULFATE 2.5; .5 MG/3ML; MG/3ML
3 SOLUTION RESPIRATORY (INHALATION) ONCE AS NEEDED
Status: DISCONTINUED | OUTPATIENT
Start: 2023-12-13 | End: 2023-12-13 | Stop reason: HOSPADM

## 2023-12-13 RX ORDER — PANTOPRAZOLE SODIUM 40 MG/1
40 TABLET, DELAYED RELEASE ORAL
Status: DISCONTINUED | OUTPATIENT
Start: 2023-12-14 | End: 2023-12-24 | Stop reason: HOSPADM

## 2023-12-13 RX ORDER — GABAPENTIN 300 MG/1
300 CAPSULE ORAL EVERY 8 HOURS SCHEDULED
Status: DISCONTINUED | OUTPATIENT
Start: 2023-12-13 | End: 2023-12-24 | Stop reason: HOSPADM

## 2023-12-13 RX ORDER — ALBUTEROL SULFATE 2.5 MG/3ML
2.5 SOLUTION RESPIRATORY (INHALATION)
Status: DISCONTINUED | OUTPATIENT
Start: 2023-12-13 | End: 2023-12-15

## 2023-12-13 RX ORDER — HYDRALAZINE HYDROCHLORIDE 20 MG/ML
5 INJECTION INTRAMUSCULAR; INTRAVENOUS
Status: DISCONTINUED | OUTPATIENT
Start: 2023-12-13 | End: 2023-12-13 | Stop reason: HOSPADM

## 2023-12-13 RX ORDER — SODIUM CHLORIDE 0.9 % (FLUSH) 0.9 %
10 SYRINGE (ML) INJECTION AS NEEDED
Status: DISCONTINUED | OUTPATIENT
Start: 2023-12-13 | End: 2023-12-13 | Stop reason: HOSPADM

## 2023-12-13 RX ORDER — SODIUM CHLORIDE 0.9 % (FLUSH) 0.9 %
10 SYRINGE (ML) INJECTION EVERY 12 HOURS SCHEDULED
Status: DISCONTINUED | OUTPATIENT
Start: 2023-12-13 | End: 2023-12-13 | Stop reason: HOSPADM

## 2023-12-13 RX ORDER — MAGNESIUM SULFATE HEPTAHYDRATE 500 MG/ML
INJECTION, SOLUTION INTRAMUSCULAR; INTRAVENOUS AS NEEDED
Status: DISCONTINUED | OUTPATIENT
Start: 2023-12-13 | End: 2023-12-13 | Stop reason: SURG

## 2023-12-13 RX ORDER — MIDAZOLAM HYDROCHLORIDE 1 MG/ML
0.5 INJECTION INTRAMUSCULAR; INTRAVENOUS
Status: DISCONTINUED | OUTPATIENT
Start: 2023-12-13 | End: 2023-12-13 | Stop reason: HOSPADM

## 2023-12-13 RX ORDER — SODIUM CHLORIDE, SODIUM LACTATE, POTASSIUM CHLORIDE, CALCIUM CHLORIDE 600; 310; 30; 20 MG/100ML; MG/100ML; MG/100ML; MG/100ML
INJECTION, SOLUTION INTRAVENOUS CONTINUOUS PRN
Status: DISCONTINUED | OUTPATIENT
Start: 2023-12-13 | End: 2023-12-13 | Stop reason: SURG

## 2023-12-13 RX ORDER — NEOSTIGMINE METHYLSULFATE 0.5 MG/ML
INJECTION, SOLUTION INTRAVENOUS AS NEEDED
Status: DISCONTINUED | OUTPATIENT
Start: 2023-12-13 | End: 2023-12-13 | Stop reason: SURG

## 2023-12-13 RX ORDER — ACETAMINOPHEN 500 MG
1000 TABLET ORAL ONCE
Status: COMPLETED | OUTPATIENT
Start: 2023-12-13 | End: 2023-12-13

## 2023-12-13 RX ORDER — FLUMAZENIL 0.1 MG/ML
0.2 INJECTION INTRAVENOUS AS NEEDED
Status: DISCONTINUED | OUTPATIENT
Start: 2023-12-13 | End: 2023-12-13 | Stop reason: HOSPADM

## 2023-12-13 RX ORDER — PHENYLEPHRINE HCL IN 0.9% NACL 0.5 MG/5ML
.5-3 SYRINGE (ML) INTRAVENOUS
Status: DISCONTINUED | OUTPATIENT
Start: 2023-12-13 | End: 2023-12-17

## 2023-12-13 RX ORDER — OXYCODONE HYDROCHLORIDE 5 MG/1
5 TABLET ORAL EVERY 4 HOURS PRN
Status: DISCONTINUED | OUTPATIENT
Start: 2023-12-13 | End: 2023-12-24 | Stop reason: HOSPADM

## 2023-12-13 RX ORDER — ENOXAPARIN SODIUM 100 MG/ML
40 INJECTION SUBCUTANEOUS DAILY
Status: DISCONTINUED | OUTPATIENT
Start: 2023-12-14 | End: 2023-12-24 | Stop reason: HOSPADM

## 2023-12-13 RX ORDER — EPHEDRINE SULFATE 50 MG/ML
INJECTION INTRAVENOUS AS NEEDED
Status: DISCONTINUED | OUTPATIENT
Start: 2023-12-13 | End: 2023-12-13 | Stop reason: SURG

## 2023-12-13 RX ORDER — POLYETHYLENE GLYCOL 3350 17 G/17G
17 POWDER, FOR SOLUTION ORAL DAILY
Status: DISCONTINUED | OUTPATIENT
Start: 2023-12-13 | End: 2023-12-24 | Stop reason: HOSPADM

## 2023-12-13 RX ORDER — LIDOCAINE HYDROCHLORIDE 40 MG/ML
SOLUTION TOPICAL AS NEEDED
Status: DISCONTINUED | OUTPATIENT
Start: 2023-12-13 | End: 2023-12-13 | Stop reason: SURG

## 2023-12-13 RX ORDER — ROCURONIUM BROMIDE 10 MG/ML
INJECTION, SOLUTION INTRAVENOUS AS NEEDED
Status: DISCONTINUED | OUTPATIENT
Start: 2023-12-13 | End: 2023-12-13 | Stop reason: SURG

## 2023-12-13 RX ADMIN — DOCUSATE SODIUM 100 MG: 100 CAPSULE, LIQUID FILLED ORAL at 20:15

## 2023-12-13 RX ADMIN — LIDOCAINE HYDROCHLORIDE 2 MG/MIN: 4 INJECTION, SOLUTION INTRAVENOUS at 10:00

## 2023-12-13 RX ADMIN — SODIUM CHLORIDE, POTASSIUM CHLORIDE, SODIUM LACTATE AND CALCIUM CHLORIDE: 600; 310; 30; 20 INJECTION, SOLUTION INTRAVENOUS at 07:45

## 2023-12-13 RX ADMIN — LIDOCAINE HYDROCHLORIDE 100 MG: 20 INJECTION, SOLUTION INFILTRATION; PERINEURAL at 09:24

## 2023-12-13 RX ADMIN — GLYCOPYRROLATE 0.2 MG: 0.2 INJECTION INTRAMUSCULAR; INTRAVENOUS at 13:35

## 2023-12-13 RX ADMIN — EPHEDRINE SULFATE 10 MG: 50 INJECTION INTRAVENOUS at 10:52

## 2023-12-13 RX ADMIN — CEFAZOLIN SODIUM 2 G: 2 INJECTION, SOLUTION INTRAVENOUS at 13:06

## 2023-12-13 RX ADMIN — NEOSTIGMINE METHYLSULFATE 3 MG: 0.5 INJECTION INTRAVENOUS at 14:59

## 2023-12-13 RX ADMIN — ONDANSETRON 4 MG: 2 INJECTION INTRAMUSCULAR; INTRAVENOUS at 20:13

## 2023-12-13 RX ADMIN — LIDOCAINE HYDROCHLORIDE 1 EACH: 40 SOLUTION TOPICAL at 09:25

## 2023-12-13 RX ADMIN — FENTANYL CITRATE 50 MCG: 50 INJECTION, SOLUTION INTRAMUSCULAR; INTRAVENOUS at 09:47

## 2023-12-13 RX ADMIN — ROCURONIUM BROMIDE 20 MG: 10 INJECTION, SOLUTION INTRAVENOUS at 11:00

## 2023-12-13 RX ADMIN — PHENYLEPHRINE HYDROCHLORIDE 0.5 MCG/KG/MIN: 10 INJECTION, SOLUTION INTRAVENOUS at 10:07

## 2023-12-13 RX ADMIN — GLYCOPYRROLATE 0.4 MG: 0.2 INJECTION INTRAMUSCULAR; INTRAVENOUS at 14:59

## 2023-12-13 RX ADMIN — Medication 10 MG: at 11:40

## 2023-12-13 RX ADMIN — ACETAMINOPHEN 1000 MG: 500 TABLET ORAL at 07:51

## 2023-12-13 RX ADMIN — BUPIVACAINE 10 ML: 13.3 INJECTION, SUSPENSION, LIPOSOMAL INFILTRATION at 08:10

## 2023-12-13 RX ADMIN — FENTANYL CITRATE 50 MCG: 50 INJECTION, SOLUTION INTRAMUSCULAR; INTRAVENOUS at 08:04

## 2023-12-13 RX ADMIN — EPHEDRINE SULFATE 10 MG: 50 INJECTION INTRAVENOUS at 10:07

## 2023-12-13 RX ADMIN — KETOROLAC TROMETHAMINE 15 MG: 15 INJECTION, SOLUTION INTRAMUSCULAR; INTRAVENOUS at 20:13

## 2023-12-13 RX ADMIN — ACETAMINOPHEN 1000 MG: 500 TABLET ORAL at 20:14

## 2023-12-13 RX ADMIN — DEXAMETHASONE SODIUM PHOSPHATE 6 MG: 4 INJECTION, SOLUTION INTRAMUSCULAR; INTRAVENOUS at 09:40

## 2023-12-13 RX ADMIN — OXYCODONE HYDROCHLORIDE 5 MG: 5 TABLET ORAL at 20:14

## 2023-12-13 RX ADMIN — ONDANSETRON 4 MG: 2 INJECTION INTRAMUSCULAR; INTRAVENOUS at 14:29

## 2023-12-13 RX ADMIN — CEFAZOLIN SODIUM 2000 MG: 2 INJECTION, SOLUTION INTRAVENOUS at 09:09

## 2023-12-13 RX ADMIN — Medication 30 MG: at 09:40

## 2023-12-13 RX ADMIN — PROPOFOL 50 MG: 10 INJECTION, EMULSION INTRAVENOUS at 09:29

## 2023-12-13 RX ADMIN — HYDROMORPHONE HYDROCHLORIDE 0.5 MG: 1 INJECTION, SOLUTION INTRAMUSCULAR; INTRAVENOUS; SUBCUTANEOUS at 23:20

## 2023-12-13 RX ADMIN — HYDROMORPHONE HYDROCHLORIDE 0.5 MG: 1 INJECTION, SOLUTION INTRAMUSCULAR; INTRAVENOUS; SUBCUTANEOUS at 15:14

## 2023-12-13 RX ADMIN — PROPOFOL 150 MG: 10 INJECTION, EMULSION INTRAVENOUS at 09:24

## 2023-12-13 RX ADMIN — SODIUM CHLORIDE, POTASSIUM CHLORIDE, SODIUM LACTATE AND CALCIUM CHLORIDE 40 ML/HR: 600; 310; 30; 20 INJECTION, SOLUTION INTRAVENOUS at 19:00

## 2023-12-13 RX ADMIN — ALBUMIN (HUMAN): 12.5 INJECTION, SOLUTION INTRAVENOUS at 11:47

## 2023-12-13 RX ADMIN — ROCURONIUM BROMIDE 50 MG: 10 INJECTION, SOLUTION INTRAVENOUS at 09:24

## 2023-12-13 RX ADMIN — MAGNESIUM SULFATE HEPTAHYDRATE 1 G: 500 INJECTION, SOLUTION INTRAMUSCULAR; INTRAVENOUS at 09:50

## 2023-12-13 RX ADMIN — Medication 10 MG: at 10:40

## 2023-12-13 RX ADMIN — PROPOFOL 30 MG: 10 INJECTION, EMULSION INTRAVENOUS at 10:55

## 2023-12-13 RX ADMIN — MIDAZOLAM HYDROCHLORIDE 1 MG: 1 INJECTION, SOLUTION INTRAMUSCULAR; INTRAVENOUS at 08:03

## 2023-12-13 RX ADMIN — SODIUM CHLORIDE, POTASSIUM CHLORIDE, SODIUM LACTATE AND CALCIUM CHLORIDE: 600; 310; 30; 20 INJECTION, SOLUTION INTRAVENOUS at 12:39

## 2023-12-13 RX ADMIN — PROPOFOL 100 MG: 10 INJECTION, EMULSION INTRAVENOUS at 09:47

## 2023-12-13 RX ADMIN — INDOCYANINE GREEN 12.5 MG: KIT INTRAVENOUS at 14:12

## 2023-12-13 RX ADMIN — GABAPENTIN 300 MG: 300 CAPSULE ORAL at 20:16

## 2023-12-13 RX ADMIN — MAGNESIUM SULFATE HEPTAHYDRATE 1 G: 500 INJECTION, SOLUTION INTRAMUSCULAR; INTRAVENOUS at 09:40

## 2023-12-13 RX ADMIN — INDOCYANINE GREEN 12.5 MG: KIT INTRAVENOUS at 14:25

## 2023-12-13 RX ADMIN — FAMOTIDINE 20 MG: 10 INJECTION INTRAVENOUS at 07:50

## 2023-12-13 RX ADMIN — SODIUM CHLORIDE, POTASSIUM CHLORIDE, SODIUM LACTATE AND CALCIUM CHLORIDE 9 ML/HR: 600; 310; 30; 20 INJECTION, SOLUTION INTRAVENOUS at 07:51

## 2023-12-13 RX ADMIN — EPHEDRINE SULFATE 10 MG: 50 INJECTION INTRAVENOUS at 11:44

## 2023-12-13 RX ADMIN — BUPIVACAINE HYDROCHLORIDE 20 ML: 2.5 INJECTION, SOLUTION EPIDURAL; INFILTRATION; INTRACAUDAL; PERINEURAL at 08:10

## 2023-12-13 RX ADMIN — ALBUMIN (HUMAN): 12.5 INJECTION, SOLUTION INTRAVENOUS at 13:40

## 2023-12-13 NOTE — ANESTHESIA PROCEDURE NOTES
Airway  Urgency: elective    Date/Time: 12/13/2023 9:25 AM  Airway not difficult    General Information and Staff    Patient location during procedure: OR  Anesthesiologist: Lisandra Dewey MD  CRNA/CAA: Rk Dougherty CRNA    Indications and Patient Condition  Indications for airway management: airway protection    Preoxygenated: yes  MILS maintained throughout  Mask difficulty assessment: 2 - vent by mask + OA or adjuvant +/- NMBA    Final Airway Details  Final airway type: endotracheal airway      Successful airway: EBT - double lumen left  Cuffed: yes   Successful intubation technique: direct laryngoscopy  Endotracheal tube insertion site: oral  Blade: Ny  Blade size: 4  EBT DL size (fr): 39  Cormack-Lehane Classification: grade I - full view of glottis  Placement verified by: chest auscultation, bronchoscopy and capnometry   Measured from: teeth  ETT/EBT  to teeth (cm): 29  Number of attempts at approach: 1  Assessment: lips, teeth, and gum same as pre-op and atraumatic intubation    Additional Comments  Bronchoscopy verification of TEVIN placement

## 2023-12-13 NOTE — BRIEF OP NOTE
THORACOSCOPY WITH LOBECTOMY WITH DAVINCI ROBOT  Progress Note    Sunny Hung  12/13/2023    Pre-op Diagnosis:   Mass of left lung [R91.8]       Post-Op Diagnosis Codes:     * Mass of left lung [R91.8]    Procedure/CPT® Codes:  BRONCHOSCOPY, THORACOSCOPY WITH LYSIS OF ADHESIONS (120 MINUTES), LEFT LOWER LOBECTOMY WITH EN BLOC PARTIAL PARIETAL PLEURECTOMY USING DAVINCI ROBOT, MEDIASTINAL LYMPH NODE DISSECTION, INTERCOSTAL NERVE BLOCK      Surgeon(s):  Rosalio Scott MD    Anesthesia: General with Block    Staff:   Circulator: An Conrad RN; Abi Ragsdale RN  Scrub Person: Jagdeep Mcgowan; Nikole Mancuso  Assistant: Ernie Harrington CSA  Assistant: Ernie Harrington CSA      Estimated Blood Loss: 700 mL    Urine Voided: 600 mL    Specimens:                      Drains:   Chest Tube 1 Left Midaxillary (Active)   Function -20 cm H2O 12/13/23 1735   Air Leak/Fluctuation air leak present 12/13/23 1735   Patency Intervention Tip/tilt 12/13/23 1735   Drainage Description Sanguineous;Bright red 12/13/23 1735   Dressing Type Gauze 12/13/23 1735   Dressing Status Clean;Dry;Intact 12/13/23 1735   Site Assessment Clean 12/13/23 1735   Surrounding Skin Intact 12/13/23 1735   Securement tubing anchored to body distal to insertion site with anchoring device 12/13/23 1735   Left Subcutaneous Emphysema chest wall;posterior 12/13/23 1735   Safety suction checked;all connections secured 12/13/23 1735   Output (mL) 15 mL 12/13/23 1623       Chest Tube 2 Left Midaxillary (Active)   Function -20 cm H2O 12/13/23 1735   Air Leak/Fluctuation air leak present 12/13/23 1735   Patency Intervention Tip/tilt 12/13/23 1635   Drainage Description Sanguineous;Bright red 12/13/23 1735   Dressing Type Gauze 12/13/23 1735   Dressing Status Clean;Dry;Intact 12/13/23 1735   Site Assessment Clean;Dry;Intact 12/13/23 1735   Surrounding Skin Intact 12/13/23 1735   Securement tubing anchored to body distal to insertion site with anchoring  device 12/13/23 1735   Left Subcutaneous Emphysema chest wall;posterior 12/13/23 1735   Safety all connections secured 12/13/23 1735   Output (mL) 35 mL 12/13/23 1623       Urethral Catheter Silicone 16 Fr. (Active)   Daily Indications Hourly Output in Critical Unstable Patient requiring Frequent Intervention (hemodynamics, titration or life supportive therapy) 12/13/23 1735   Site Assessment Clean 12/13/23 1735   Collection Container Standard drainage bag 12/13/23 1735   Securement Method Securing device 12/13/23 1735   Output (mL) 200 mL 12/13/23 1635       Complications: None    Assistant: Ernie Harrington CSA  was responsible for performing the following activities: Retraction, Suction, Irrigation, Suturing, Closing, Placing Dressing, and Held/Positioned Camera and their skilled assistance was necessary for the success of this case.    Rosalio Scott MD     Date: 12/13/2023  Time: 17:56 EST

## 2023-12-13 NOTE — ANESTHESIA PROCEDURE NOTES
Peripheral Block      Patient reassessed immediately prior to procedure    Patient location during procedure: pre-op  Start time: 12/13/2023 8:10 AM  Stop time: 12/13/2023 8:15 AM  Reason for block: at surgeon's request and post-op pain management  Performed by  Anesthesiologist: Lester Rehman MD  Preanesthetic Checklist  Completed: patient identified, IV checked, site marked, risks and benefits discussed, surgical consent, monitors and equipment checked, pre-op evaluation and timeout performed  Prep:  Pt Position: sitting  Sterile barriers:cap, gloves, mask and washed/disinfected hands  Prep: ChloraPrep  Patient monitoring: blood pressure monitoring, continuous pulse oximetry and EKG  Procedure    Sedation: yes  Performed under: local infiltration  Guidance:ultrasound guided    ULTRASOUND INTERPRETATION.  Using ultrasound guidance a 22 G (22G needle for placement of 3cc 2%lido to site prior to start of procedure.) gauge needle was placed in close proximity to the nerve, at which point, under ultrasound guidance anesthetic was injected in the area of the nerve and spread of the anesthesia was seen on ultrasound in close proximity thereto.  There were no abnormalities seen on ultrasound; a digital image was taken; and the patient tolerated the procedure with no complications. Images:still images obtained    Laterality:left  Block Type:erector spinae block  Injection Technique:single-shot  Needle Type:echogenic  Needle Gauge:22 G      Medications Used: bupivacaine PF (MARCAINE) 0.25 % injection - Injection   20 mL - 12/13/2023 8:10:00 AM  bupivacaine liposome (EXPAREL) 1.3 % injection - Infiltration   10 mL - 12/13/2023 8:10:00 AM      Post Assessment  Injection Assessment: negative aspiration for heme, no paresthesia on injection and incremental injection  Patient Tolerance:comfortable throughout block  Complications:no  Additional Notes  Ultrasound interpretation: Under ultrasound guidance anatomy was  evaluated, needle placement was viewed and nerve structures verified,   medication disbursement was visualized for this block.

## 2023-12-13 NOTE — PLAN OF CARE
Problem: Adult Inpatient Plan of Care  Goal: Plan of Care Review  Outcome: Ongoing, Progressing  Flowsheets (Taken 12/13/2023 1753)  Progress: no change  Plan of Care Reviewed With: patient  Goal: Patient-Specific Goal (Individualized)  Outcome: Ongoing, Progressing  Goal: Absence of Hospital-Acquired Illness or Injury  Outcome: Ongoing, Progressing  Intervention: Identify and Manage Fall Risk  Recent Flowsheet Documentation  Taken 12/13/2023 1735 by Madelyn Pascual RN  Safety Promotion/Fall Prevention:   safety round/check completed   nonskid shoes/slippers when out of bed   fall prevention program maintained   assistive device/personal items within reach  Intervention: Prevent Skin Injury  Recent Flowsheet Documentation  Taken 12/13/2023 1735 by Madelyn Pascual RN  Body Position: position changed independently  Intervention: Prevent and Manage VTE (Venous Thromboembolism) Risk  Recent Flowsheet Documentation  Taken 12/13/2023 1735 by Madelyn Pascual RN  Activity Management: activity encouraged  VTE Prevention/Management:   bilateral   sequential compression devices on  Goal: Optimal Comfort and Wellbeing  Outcome: Ongoing, Progressing  Intervention: Monitor Pain and Promote Comfort  Recent Flowsheet Documentation  Taken 12/13/2023 1735 by Madelyn Pascual RN  Pain Management Interventions:   pain management plan reviewed with patient/caregiver   pillow support provided   position adjusted  Intervention: Provide Person-Centered Care  Recent Flowsheet Documentation  Taken 12/13/2023 1735 by Madelyn Pascual RN  Trust Relationship/Rapport:   care explained   questions answered  Goal: Readiness for Transition of Care  Outcome: Ongoing, Progressing  Intervention: Mutually Develop Transition Plan  Recent Flowsheet Documentation  Taken 12/13/2023 1751 by Madelyn Pascual RN  Equipment Currently Used at Home: none  Taken 12/13/2023 1750 by Madelyn Pascual RN  Transportation Anticipated: family or  friend will provide  Patient/Family Anticipated Services at Transition: none  Patient/Family Anticipates Transition to: home with family   Goal Outcome Evaluation:  Plan of Care Reviewed With: patient        Progress: no change

## 2023-12-13 NOTE — ANESTHESIA PREPROCEDURE EVALUATION
Anesthesia Evaluation     Patient summary reviewed and Nursing notes reviewed   no history of anesthetic complications:                Airway   Mallampati: II  TM distance: >3 FB  Neck ROM: full  Dental    (+) poor dentition        Pulmonary    (+) a smoker Current, Abstained day of surgery, lung cancer,  Cardiovascular     ECG reviewed    (+) hyperlipidemia    ROS comment: ·  Left ventricular systolic function is normal. Left ventricular ejection fraction appears to be 56 - 60%.  ·  Left ventricular diastolic function was normal.  ·  Estimated right ventricular systolic pressure from tricuspid regurgitation is normal (<35 mmHg).      Neuro/Psych- negative ROS  GI/Hepatic/Renal/Endo - negative ROS     Musculoskeletal (-) negative ROS    Abdominal    Substance History      OB/GYN          Other   arthritis,   history of cancer                      Anesthesia Plan    ASA 3     general, Kassandra and regional     (I have reviewed the patient's history with the patient and the chart, including all pertinent laboratory results and imaging. I have explained the risks of anesthesia including but not limited to dental damage, corneal abrasion, nerve injury, MI, stroke, and death. Questions asked and answered. Anesthetic plan discussed with patient and team as indicated. Patient expressed understanding of the above.    FIONA block)  intravenous induction     Anesthetic plan, risks, benefits, and alternatives have been provided, discussed and informed consent has been obtained with: patient.        CODE STATUS:

## 2023-12-13 NOTE — ANESTHESIA PROCEDURE NOTES
Arterial Line      Patient reassessed immediately prior to procedure    Patient location during procedure: pre-op  Start time: 12/13/2023 8:15 AM  Stop Time:12/13/2023 8:18 AM       Line placed for hemodynamic monitoring and ABGs/Labs/ISTAT.  Performed By   Anesthesiologist: Lester Rehman MD   Preanesthetic Checklist  Completed: patient identified, IV checked, site marked, risks and benefits discussed, surgical consent, monitors and equipment checked, pre-op evaluation and timeout performed  Arterial Line Prep    Sterile Tech: gloves, mask, cap and sterile barriers  Prep: ChloraPrep  Patient monitoring: blood pressure monitoring, continuous pulse oximetry and EKG  Arterial Line Procedure   Laterality:right  Location:  radial artery  Catheter size: 20 G   Guidance: ultrasound guided  PROCEDURE NOTE/ULTRASOUND INTERPRETATION.  Using ultrasound guidance the potential vascular sites for insertion of the catheter were visualized to determine the patency of the vessel to be used for vascular access.  After selecting the appropriate site for insertion, the needle was visualized under ultrasound being inserted into the radial artery, followed by ultrasound confirmation of wire and catheter placement. There were no abnormalities seen on ultrasound; an image was taken; and the patient tolerated the procedure with no complications.   Number of attempts: 1  Successful placement: yes   Post Assessment   Dressing Type: occlusive dressing applied, secured with tape and wrist guard applied.   Complications no  Circ/Move/Sens Assessment: unchanged.   Patient Tolerance: patient tolerated the procedure well with no apparent complications

## 2023-12-13 NOTE — ANESTHESIA POSTPROCEDURE EVALUATION
"Patient: Sunny Hung    Procedure Summary       Date: 12/13/23 Room / Location: [facility] OR [address] [place] [address] MAIN OR    Anesthesia Start: 0915 Anesthesia Stop: 1542    Procedure: BRONCHOSCOPY, THORACOSCOPY WITH LYSIS OF ADHESIONS (120 MINUTES), LEFT LOWER LOBECTOMY WITH EN BLOC PARTIAL PARIETAL PLEURECTOMY USING DAVINCI ROBOT, MEDIASTINAL LYMPH NODE DISSECTION, INTERCOSTAL NERVE BLOCK (Left: Chest) Diagnosis:       Mass of left lung      (Mass of left lung [R91.8])    Surgeons: Rosalio Scott MD Provider: Lisandra Dewey MD    Anesthesia Type: general, Kassandra, regional ASA Status: 3            Anesthesia Type: general, Kassandra, regional    Vitals  Vitals Value Taken Time   /73 12/13/23 1615   Temp 36.4 °C (97.5 °F) 12/13/23 1625   Pulse 65 12/13/23 1627   Resp     SpO2 100 % 12/13/23 1627   Vitals shown include unfiled device data.        Post Anesthesia Care and Evaluation    Patient location during evaluation: bedside  Patient participation: complete - patient participated  Level of consciousness: awake  Pain management: adequate    Airway patency: patent  Anesthetic complications: No anesthetic complications    Cardiovascular status: acceptable  Respiratory status: acceptable  Hydration status: acceptable    Comments: /73   Pulse 61   Temp 36.4 °C (97.5 °F) (Oral)   Resp 15   Ht 180.3 cm (71\")   SpO2 100%   BMI 23.43 kg/m²     "

## 2023-12-14 ENCOUNTER — APPOINTMENT (OUTPATIENT)
Dept: GENERAL RADIOLOGY | Facility: HOSPITAL | Age: 68
DRG: 163 | End: 2023-12-14
Payer: MEDICARE

## 2023-12-14 LAB
ANION GAP SERPL CALCULATED.3IONS-SCNC: 9 MMOL/L (ref 5–15)
BASE EXCESS BLDA CALC-SCNC: 1 MMOL/L (ref -5–5)
BASOPHILS # BLD AUTO: 0.01 10*3/MM3 (ref 0–0.2)
BASOPHILS NFR BLD AUTO: 0.1 % (ref 0–1.5)
BUN SERPL-MCNC: 20 MG/DL (ref 8–23)
BUN/CREAT SERPL: 26 (ref 7–25)
CA-I BLDA-SCNC: ABNORMAL MMOL/L
CALCIUM SPEC-SCNC: 8.3 MG/DL (ref 8.6–10.5)
CHLORIDE SERPL-SCNC: 101 MMOL/L (ref 98–107)
CO2 BLDA-SCNC: 28 MMOL/L (ref 24–29)
CO2 SERPL-SCNC: 26 MMOL/L (ref 22–29)
CREAT SERPL-MCNC: 0.77 MG/DL (ref 0.76–1.27)
DEPRECATED RDW RBC AUTO: 52.8 FL (ref 37–54)
EGFRCR SERPLBLD CKD-EPI 2021: 98.1 ML/MIN/1.73
EOSINOPHIL # BLD AUTO: 0.01 10*3/MM3 (ref 0–0.4)
EOSINOPHIL NFR BLD AUTO: 0.1 % (ref 0.3–6.2)
ERYTHROCYTE [DISTWIDTH] IN BLOOD BY AUTOMATED COUNT: 17.3 % (ref 12.3–15.4)
GLUCOSE BLDC GLUCOMTR-MCNC: 175 MG/DL (ref 70–130)
GLUCOSE SERPL-MCNC: 122 MG/DL (ref 65–99)
HCO3 BLDA-SCNC: 26.7 MMOL/L (ref 22–26)
HCT VFR BLD AUTO: 26.2 % (ref 37.5–51)
HCT VFR BLDA CALC: 31 % (ref 38–51)
HGB BLD-MCNC: 8.4 G/DL (ref 13–17.7)
HGB BLDA-MCNC: 10.5 G/DL (ref 12–17)
IMM GRANULOCYTES # BLD AUTO: 0.04 10*3/MM3 (ref 0–0.05)
IMM GRANULOCYTES NFR BLD AUTO: 0.3 % (ref 0–0.5)
LYMPHOCYTES # BLD AUTO: 1.62 10*3/MM3 (ref 0.7–3.1)
LYMPHOCYTES NFR BLD AUTO: 13.3 % (ref 19.6–45.3)
MCH RBC QN AUTO: 26.8 PG (ref 26.6–33)
MCHC RBC AUTO-ENTMCNC: 32.1 G/DL (ref 31.5–35.7)
MCV RBC AUTO: 83.4 FL (ref 79–97)
MONOCYTES # BLD AUTO: 0.9 10*3/MM3 (ref 0.1–0.9)
MONOCYTES NFR BLD AUTO: 7.4 % (ref 5–12)
NEUTROPHILS NFR BLD AUTO: 78.8 % (ref 42.7–76)
NEUTROPHILS NFR BLD AUTO: 9.59 10*3/MM3 (ref 1.7–7)
NRBC BLD AUTO-RTO: 0.1 /100 WBC (ref 0–0.2)
PCO2 BLDA: 49.8 MM HG (ref 35–45)
PH BLDA: 7.37 PH UNITS (ref 7.35–7.6)
PLATELET # BLD AUTO: 224 10*3/MM3 (ref 140–450)
PMV BLD AUTO: 8.6 FL (ref 6–12)
PO2 BLDA: 379 MMHG (ref 80–105)
POTASSIUM BLDA-SCNC: 3.9 MMOL/L (ref 3.5–4.9)
POTASSIUM SERPL-SCNC: 5 MMOL/L (ref 3.5–5.2)
RBC # BLD AUTO: 3.14 10*6/MM3 (ref 4.14–5.8)
SAO2 % BLDA: 100 % (ref 95–98)
SODIUM SERPL-SCNC: 136 MMOL/L (ref 136–145)
WBC NRBC COR # BLD AUTO: 12.17 10*3/MM3 (ref 3.4–10.8)

## 2023-12-14 PROCEDURE — 25010000002 KETOROLAC TROMETHAMINE PER 15 MG: Performed by: SURGERY

## 2023-12-14 PROCEDURE — 94761 N-INVAS EAR/PLS OXIMETRY MLT: CPT

## 2023-12-14 PROCEDURE — 25010000002 ENOXAPARIN PER 10 MG: Performed by: SURGERY

## 2023-12-14 PROCEDURE — 99024 POSTOP FOLLOW-UP VISIT: CPT | Performed by: NURSE PRACTITIONER

## 2023-12-14 PROCEDURE — 25810000003 SODIUM CHLORIDE 0.9 % SOLUTION: Performed by: NURSE PRACTITIONER

## 2023-12-14 PROCEDURE — 94799 UNLISTED PULMONARY SVC/PX: CPT

## 2023-12-14 PROCEDURE — 94760 N-INVAS EAR/PLS OXIMETRY 1: CPT

## 2023-12-14 PROCEDURE — 71045 X-RAY EXAM CHEST 1 VIEW: CPT

## 2023-12-14 PROCEDURE — 63710000001 PREDNISONE PER 5 MG: Performed by: SURGERY

## 2023-12-14 PROCEDURE — 94664 DEMO&/EVAL PT USE INHALER: CPT

## 2023-12-14 PROCEDURE — 94640 AIRWAY INHALATION TREATMENT: CPT

## 2023-12-14 PROCEDURE — 85025 COMPLETE CBC W/AUTO DIFF WBC: CPT | Performed by: SURGERY

## 2023-12-14 PROCEDURE — 97165 OT EVAL LOW COMPLEX 30 MIN: CPT

## 2023-12-14 PROCEDURE — 25810000003 LACTATED RINGERS PER 1000 ML: Performed by: SURGERY

## 2023-12-14 PROCEDURE — 80048 BASIC METABOLIC PNL TOTAL CA: CPT | Performed by: SURGERY

## 2023-12-14 RX ADMIN — ALBUTEROL SULFATE 2.5 MG: 2.5 SOLUTION RESPIRATORY (INHALATION) at 07:53

## 2023-12-14 RX ADMIN — GABAPENTIN 300 MG: 300 CAPSULE ORAL at 20:10

## 2023-12-14 RX ADMIN — SODIUM CHLORIDE 500 ML: 9 INJECTION, SOLUTION INTRAVENOUS at 14:41

## 2023-12-14 RX ADMIN — SODIUM CHLORIDE, POTASSIUM CHLORIDE, SODIUM LACTATE AND CALCIUM CHLORIDE 40 ML/HR: 600; 310; 30; 20 INJECTION, SOLUTION INTRAVENOUS at 00:26

## 2023-12-14 RX ADMIN — ACETYLCYSTEINE 3 ML: 200 SOLUTION ORAL; RESPIRATORY (INHALATION) at 07:54

## 2023-12-14 RX ADMIN — ALBUTEROL SULFATE 2.5 MG: 2.5 SOLUTION RESPIRATORY (INHALATION) at 15:57

## 2023-12-14 RX ADMIN — DOCUSATE SODIUM 100 MG: 100 CAPSULE, LIQUID FILLED ORAL at 20:10

## 2023-12-14 RX ADMIN — PREDNISONE 50 MG: 50 TABLET ORAL at 08:32

## 2023-12-14 RX ADMIN — OXYCODONE HYDROCHLORIDE 5 MG: 5 TABLET ORAL at 13:35

## 2023-12-14 RX ADMIN — ACETAMINOPHEN 1000 MG: 500 TABLET ORAL at 20:10

## 2023-12-14 RX ADMIN — GABAPENTIN 300 MG: 300 CAPSULE ORAL at 05:22

## 2023-12-14 RX ADMIN — ACETAMINOPHEN 1000 MG: 500 TABLET ORAL at 08:32

## 2023-12-14 RX ADMIN — POLYETHYLENE GLYCOL 3350 17 G: 17 POWDER, FOR SOLUTION ORAL at 08:31

## 2023-12-14 RX ADMIN — KETOROLAC TROMETHAMINE 15 MG: 15 INJECTION, SOLUTION INTRAMUSCULAR; INTRAVENOUS at 03:23

## 2023-12-14 RX ADMIN — GABAPENTIN 300 MG: 300 CAPSULE ORAL at 13:36

## 2023-12-14 RX ADMIN — METOPROLOL TARTRATE 12.5 MG: 25 TABLET, FILM COATED ORAL at 12:05

## 2023-12-14 RX ADMIN — OXYCODONE HYDROCHLORIDE 5 MG: 5 TABLET ORAL at 08:36

## 2023-12-14 RX ADMIN — OXYCODONE HYDROCHLORIDE 5 MG: 5 TABLET ORAL at 00:26

## 2023-12-14 RX ADMIN — ENOXAPARIN SODIUM 40 MG: 100 INJECTION SUBCUTANEOUS at 08:31

## 2023-12-14 RX ADMIN — ACETYLCYSTEINE 3 ML: 200 SOLUTION ORAL; RESPIRATORY (INHALATION) at 16:01

## 2023-12-14 RX ADMIN — KETOROLAC TROMETHAMINE 15 MG: 15 INJECTION, SOLUTION INTRAMUSCULAR; INTRAVENOUS at 12:05

## 2023-12-14 RX ADMIN — PANTOPRAZOLE SODIUM 40 MG: 40 TABLET, DELAYED RELEASE ORAL at 05:22

## 2023-12-14 RX ADMIN — KETOROLAC TROMETHAMINE 15 MG: 15 INJECTION, SOLUTION INTRAMUSCULAR; INTRAVENOUS at 20:11

## 2023-12-14 RX ADMIN — ACETAMINOPHEN 1000 MG: 500 TABLET ORAL at 16:57

## 2023-12-14 RX ADMIN — DOCUSATE SODIUM 100 MG: 100 CAPSULE, LIQUID FILLED ORAL at 08:31

## 2023-12-14 NOTE — THERAPY EVALUATION
Patient Name: Sunny Hung  : 1955    MRN: [id number]                              Today's Date: 2023       Admit Date: 2023    Visit Dx:     ICD-10-CM ICD-9-CM   1. Mass of left lung  R91.8 786.6     Patient Active Problem List   Diagnosis    Primary squamous cell carcinoma of lung, left    High risk medication use    Mass of left lung    Cancer of bronchus of left lower lobe     Past Medical History:   Diagnosis Date    Arthritis     BPH (benign prostatic hyperplasia)     Bruises easily     Bursitis of right shoulder     Cough     Hyperlipidemia     Mass of lower lobe of left lung      Past Surgical History:   Procedure Laterality Date    BRONCHOSCOPY WITH ION ROBOTIC ASSIST N/A 2023    Procedure: BRONCHOSCOPY WITH BAL;   ION ROBOT AND ENDOBRONCHIAL ULTRASOUND WITH FNA'S;  Surgeon: Rosalio Scott MD;  Location: [facility] ENDOSCOPY;  Service: Robotics - Pulmonary;  Laterality: N/A;  PRE- LEFT LOWER LOBE MASS  POST- SAME    GANGLION CYST EXCISION Bilateral     HEMORRHOIDECTOMY      LOBECTOMY Left 2023    Procedure: BRONCHOSCOPY, THORACOSCOPY WITH LYSIS OF ADHESIONS (120 MINUTES), LEFT LOWER LOBECTOMY WITH EN BLOC PARTIAL PARIETAL PLEURECTOMY USING DAVINCI ROBOT, MEDIASTINAL LYMPH NODE DISSECTION, INTERCOSTAL NERVE BLOCK;  Surgeon: Rosalio Scott MD;  Location: [facility] OR;  Service: Robotics - DaVinci;  Laterality: Left;    OTHER SURGICAL HISTORY      SOMETHING REMOVED FROM LEFT CHEST, BENIGN ? LIPOMA      General Information       Row Name 23 1417          OT Time and Intention    Document Type evaluation  -     Mode of Treatment individual therapy;occupational therapy  -       Row Name 23 1417          General Information    Patient Profile Reviewed yes  -     Prior Level of Function independent:  -     Existing Precautions/Restrictions oxygen therapy device and L/min  -       Row Name 23 1417          Living Environment    People in Home spouse  -        Row Name 12/14/23 1417          Cognition    Orientation Status (Cognition) oriented x 4  -[facility] Name 12/14/23 1417          Safety Issues, Functional Mobility    Impairments Affecting Function (Mobility) endurance/activity tolerance  -               User Key  (r) = Recorded By, (t) = Taken By, (c) = Cosigned By      Initials Name Provider Type     Nikki Smith OT Occupational Therapist                     Mobility/ADL's       Row Name 12/14/23 1418          Bed Mobility    Bed Mobility bed mobility (all) activities  -     All Activities, Wirt (Bed Mobility) modified independence  -[facility] Name 12/14/23 1418          Transfers    Transfers bed-chair transfer;sit-stand transfer  -[facility] Name 12/14/23 1418          Bed-Chair Transfer    Bed-Chair Wirt (Transfers) modified independence  -[facility] Name 12/14/23 1418          Sit-Stand Transfer    Sit-Stand Wirt (Transfers) modified independence  -[facility] Name 12/14/23 1418          Functional Mobility    Patient was able to Ambulate yes  -               User Key  (r) = Recorded By, (t) = Taken By, (c) = Cosigned By      Initials Name Provider Type     Nikki Smith, YADI Occupational Therapist                   Obj/Interventions       [facility] Name 12/14/23 1418          Sensory Assessment (Somatosensory)    Sensory Assessment (Somatosensory) sensation intact  -[facility] Name 12/14/23 1418          Vision Assessment/Intervention    Visual Impairment/Limitations WFL  -[facility] Name 12/14/23 1418          Range of Motion Comprehensive    General Range of Motion bilateral upper extremity ROM WNL  -[facility] Name 12/14/23 1418          Strength Comprehensive (MMT)    General Manual Muscle Testing (MMT) Assessment no strength deficits identified  -[facility] Name 12/14/23 1418          Balance    Balance Assessment sitting static balance;sitting dynamic balance;sit to stand dynamic balance  -     Static  Sitting Balance independent  -     Dynamic Sitting Balance independent  -     Position, Sitting Balance unsupported;sitting edge of bed  -     Sit to Stand Dynamic Balance modified independence  -     Balance Interventions sitting;standing;occupation based/functional task  -               User Key  (r) = Recorded By, (t) = Taken By, (c) = Cosigned By      Initials Name Provider Type     Nikki Smith, YADI Occupational Therapist                   Goals/Plan       Row Name 12/14/23 1422          Problem Specific Goal 1 (OT)    Problem Specific Goal 1 (OT) Patient will perform HEP with mod I  -     Time Frame (Problem Specific Goal 1, OT) short term goal (STG);1 day  -     Progress/Outcome (Problem Specific Goal 1, OT) goal met  -               User Key  (r) = Recorded By, (t) = Taken By, (c) = Cosigned By      Initials Name Provider Type     Nikki Smith, YADI Occupational Therapist                   Clinical Impression       Row Name 12/14/23 1414          Pain Assessment    Pretreatment Pain Rating 0/10 - no pain  -     Posttreatment Pain Rating 0/10 - no pain  -       Row Name 12/14/23 1419          Plan of Care Review    Plan of Care Reviewed With patient;family  -     Outcome Evaluation Patient is a 68 year old male admitted to [facility] with L lung mass. Patient is currently on supplemental O2 via nc, and reports he was previously independent at home with ADLs, IADLs, and household mobility without use of AD and did not require supplemental O2 at home. Patient reports some minimal fatigue today, but able to sit up all morning, able to perform bed mobility and ADL transfers with mod I. Patient is performing all self care with supervision at hospital and no OT needs identified at this time. Recommend return home at [facility], and OT to sign off.  -       Row Name 12/14/23 1410          Therapy Assessment/Plan (OT)    Criteria for Skilled Therapeutic Interventions Met (OT) no;no problems identified  which require skilled intervention  -     Therapy Frequency (OT) evaluation only  -       Row Name 12/14/23 1419          Therapy Plan Review/Discharge Plan (OT)    Anticipated Discharge Disposition (OT) home  -       Row Name 12/14/23 1419          Positioning and Restraints    Pre-Treatment Position in bed  -     Post Treatment Position bed  -     In Bed encouraged to call for assist;call light within reach;with family/caregiver;with nsg  -               User Key  (r) = Recorded By, (t) = Taken By, (c) = Cosigned By      Initials Name Provider Type     Nikki Smith, OT Occupational Therapist                   Outcome Measures       Row Name 12/14/23 1423          How much help from another is currently needed...    Putting on and taking off regular lower body clothing? 4  -LH     Bathing (including washing, rinsing, and drying) 3  -LH     Toileting (which includes using toilet bed pan or urinal) 4  -LH     Putting on and taking off regular upper body clothing 4  -LH     Taking care of personal grooming (such as brushing teeth) 4  -LH     Eating meals 4  -     AM-PAC 6 Clicks Score (OT) 23  -       Row Name 12/14/23 0832          How much help from another person do you currently need...    Turning from your back to your side while in flat bed without using bedrails? 3  -SN     Moving from lying on back to sitting on the side of a flat bed without bedrails? 3  -SN     Moving to and from a bed to a chair (including a wheelchair)? 3  -SN     Standing up from a chair using your arms (e.g., wheelchair, bedside chair)? 3  -SN     Climbing 3-5 steps with a railing? 3  -SN     To walk in hospital room? 3  -SN     AM-PAC 6 Clicks Score (PT) 18  -SN     Highest Level of Mobility Goal 6 --> Walk 10 steps or more  -       Row Name 12/14/23 1423          Functional Assessment    Outcome Measure Options AM-PAC 6 Clicks Daily Activity (OT)  -               User Key  (r) = Recorded By, (t) = Taken By, (c) =  Cosigned By      Initials Name Provider Type    Madelyn Humphrey, RN Registered Nurse     Nikki Smith OT Occupational Therapist                    Occupational Therapy Education       Title: PT OT SLP Therapies (Done)       Topic: Occupational Therapy (Done)       Point: ADL training (Done)       Description:   Instruct learner(s) on proper safety adaptation and remediation techniques during self care or transfers.   Instruct in proper use of assistive devices.                  Learning Progress Summary             Patient Acceptance, E,TB, VU by  at 12/14/2023 1423    Comment: Instructed in role of OT, discharge planning, exercise program, patient verbalizes understanding and agreeable to POC   Family Acceptance, E,TB, VU by  at 12/14/2023 1423    Comment: Instructed in role of OT, discharge planning, exercise program, patient verbalizes understanding and agreeable to POC                         Point: Home exercise program (Done)       Description:   Instruct learner(s) on appropriate technique for monitoring, assisting and/or progressing therapeutic exercises/activities.                  Learning Progress Summary             Patient Acceptance, E,TB, VU by  at 12/14/2023 1423    Comment: Instructed in role of OT, discharge planning, exercise program, patient verbalizes understanding and agreeable to POC   Family Acceptance, E,TB, VU by  at 12/14/2023 1423    Comment: Instructed in role of OT, discharge planning, exercise program, patient verbalizes understanding and agreeable to POC                         Point: Precautions (Done)       Description:   Instruct learner(s) on prescribed precautions during self-care and functional transfers.                  Learning Progress Summary             Patient Acceptance, E,TB, VU by  at 12/14/2023 1423    Comment: Instructed in role of OT, discharge planning, exercise program, patient verbalizes understanding and agreeable to POC   Family Acceptance,  E,TB, VU by  at 12/14/2023 1423    Comment: Instructed in role of OT, discharge planning, exercise program, patient verbalizes understanding and agreeable to POC                         Point: Body mechanics (Done)       Description:   Instruct learner(s) on proper positioning and spine alignment during self-care, functional mobility activities and/or exercises.                  Learning Progress Summary             Patient Acceptance, E,TB, VU by  at 12/14/2023 1423    Comment: Instructed in role of OT, discharge planning, exercise program, patient verbalizes understanding and agreeable to POC   Family Acceptance, E,TB, VU by  at 12/14/2023 1423    Comment: Instructed in role of OT, discharge planning, exercise program, patient verbalizes understanding and agreeable to POC                                         User Key       Initials Effective Dates Name Provider Type Discipline     08/31/23 -  Nikki Smith, OT Occupational Therapist OT                  OT Recommendation and Plan  Therapy Frequency (OT): evaluation only  Plan of Care Review  Plan of Care Reviewed With: patient, family  Outcome Evaluation: Patient is a 68 year old male admitted to [facility] with L lung mass. Patient is currently on supplemental O2 via nc, and reports he was previously independent at home with ADLs, IADLs, and household mobility without use of AD and did not require supplemental O2 at home. Patient reports some minimal fatigue today, but able to sit up all morning, able to perform bed mobility and ADL transfers with mod I. Patient is performing all self care with supervision at hospital and no OT needs identified at this time. Recommend return home at [facility], and OT to sign off.     Time Calculation:   Evaluation Complexity (OT)  Review Occupational Profile/Medical/Therapy History Complexity: brief/low complexity  Assessment, Occupational Performance/Identification of Deficit Complexity: 1-3 performance deficits  Clinical Decision  Making Complexity (OT): problem focused assessment/low complexity  Overall Complexity of Evaluation (OT): low complexity     Time Calculation- OT       Row Name 12/14/23 1424             Time Calculation- OT    OT Start Time 1338  -      OT Stop Time 1348  -      OT Time Calculation (min) 10 min  -      OT Non-Billable Time (min) 10 min  -      OT Received On 12/14/23  -         Untimed Charges    OT Eval/Re-eval Minutes 10  -LH         Total Minutes    Untimed Charges Total Minutes 10  -LH       Total Minutes 10  -LH                User Key  (r) = Recorded By, (t) = Taken By, (c) = Cosigned By      Initials Name Provider Type     Nikki Smith, OT Occupational Therapist                  Therapy Charges for Today       Code Description Service Date Service Provider Modifiers Qty    [id number] HC OT EVAL LOW COMPLEXITY 3 12/14/2023 Nikki Smith OT GO 1                 Nikki Smith OT  12/14/2023

## 2023-12-14 NOTE — PROGRESS NOTES
"  POST-OPERATIVE NOTE     Chief Complaint: left lower lobe poorly differentiated carcinoma/squamous differentiation, post-operative care  S/P: bronchoscopy, da [place] robot assisted left lower lobectomy with partial parietal pleurectomy, mediastinal lymph node dissecton, intercostal nerve block  POD # 1    Subjective:  Symptoms:  Stable.  He reports chest pain.    Diet:  Adequate intake.  No nausea or vomiting.    Activity level: Returning to normal.    Pain:  He complains of pain that is mild.  Pain is well controlled.        Objective:  General Appearance:  Comfortable and in no acute distress.    Vital signs: (most recent): Blood pressure 107/57, pulse 80, temperature 98.1 °F (36.7 °C), temperature source Oral, resp. rate 16, height 180.3 cm (71\"), SpO2 96%.  Vital signs are normal.  No fever.    Output: Minimal urine output and no stool output.    Lungs:  Normal effort and normal respiratory rate.  There are decreased breath sounds.    Heart: Normal rate.  Regular rhythm.    Chest: Chest wall tenderness present.    Abdomen: Abdomen is soft.  Bowel sounds are normal.     Neurological: Patient is alert and oriented to person, place and time.    Skin:  Warm and dry.                Chest tube:   Site: Left, Clean, Dry, Intact, and Securement device intact  Suction: waterseal  Air Leak: positive  24 Hour Total: 55/585 mL    Results Review:     I reviewed the patient's new clinical results.  I reviewed the patient's new imaging results and agree with the interpretation.  I reviewed the patient's other test results and agree with the interpretation  Discussed with patient, family at bedside, RN and Dr. Scott.    Assessment & Plan     Sunny Hung is a pleasant 68 year-old gentleman who underwent a daVinci robot assisted left lower lobectomy with Dr. Scott.   Chest x-ray this morning shows very trace apical pleural separation. There is significant air leak on my assessment. Continue to waterseal. Repeat CXR in " AM.  Minimal urine output - give 500cc bolus. Repeat BMP in AM.  Continue to encourage good pulmonary hygiene, incentive spirometry and frequent ambulation.    HILDA Medeiros  [facility]  12/14/23  14:53 EST    Patient was seen and assessed while wearing personal protective equipment including facemask, protective eyewear and gloves.  Hand hygiene performed prior to entering the room and upon exiting with doffing of gloves.

## 2023-12-14 NOTE — PLAN OF CARE
Goal Outcome Evaluation:        Problem: Adult Inpatient Plan of Care  Goal: Plan of Care Review  12/13/2023 2206 by Reina Christianson RN  Outcome: Ongoing, Progressing  Flowsheets  Taken 12/13/2023 1753 by Madelyn Pascual RN  Progress: no change  Plan of Care Reviewed With: patient  Taken 12/13/2023 1645 by Massiel Hays RN  Outcome Evaluation: VSS. No pain meds needed. CXR and labs drawn.  12/13/2023 1946 by Reina Christianson RN  Outcome: Ongoing, Not Progressing  Flowsheets  Taken 12/13/2023 1753 by Madelyn Pascual RN  Progress: no change  Plan of Care Reviewed With: patient  Taken 12/13/2023 1645 by Massiel Hays RN  Outcome Evaluation: VSS. No pain meds needed. CXR and labs drawn.  Goal: Patient-Specific Goal (Individualized)  12/13/2023 2206 by Reina Christianson RN  Outcome: Ongoing, Progressing  12/13/2023 1946 by Reina Christianson RN  Outcome: Ongoing, Not Progressing  Goal: Absence of Hospital-Acquired Illness or Injury  12/13/2023 2206 by Reina Christianson RN  Outcome: Ongoing, Progressing  12/13/2023 1946 by Reina Christianson RN  Outcome: Ongoing, Not Progressing  Goal: Optimal Comfort and Wellbeing  12/13/2023 2206 by Reina Christianson RN  Outcome: Ongoing, Progressing  12/13/2023 1946 by Reina Christianson RN  Outcome: Ongoing, Not Progressing  Goal: Readiness for Transition of Care  12/13/2023 2206 by Reina Christianson RN  Outcome: Ongoing, Progressing  12/13/2023 1946 by Reina Christianson RN  Outcome: Ongoing, Not Progressing

## 2023-12-14 NOTE — PLAN OF CARE
Goal Outcome Evaluation:  Plan of Care Reviewed With: patient, family           Outcome Evaluation: Patient is a 68 year old male admitted to [facility] with L lung mass. Patient is currently on supplemental O2 via nc, and reports he was previously independent at home with ADLs, IADLs, and household mobility without use of AD and did not require supplemental O2 at home. Patient reports some minimal fatigue today, but able to sit up all morning, able to perform bed mobility and ADL transfers with mod I. Patient is performing all self care with supervision at hospital and no OT needs identified at this time. Recommend return home at [facility], and OT to sign off.      Anticipated Discharge Disposition (OT): home

## 2023-12-14 NOTE — PLAN OF CARE
Problem: Adult Inpatient Plan of Care  Goal: Plan of Care Review  Outcome: Ongoing, Progressing  Flowsheets (Taken 12/14/2023 9119)  Progress: improving  Plan of Care Reviewed With:   patient   spouse  Outcome Evaluation: VSS, pain controlled, ambulated well with staff, chest tube to water-seal, air leak present   Goal Outcome Evaluation:  Plan of Care Reviewed With: patient, spouse        Progress: improving  Outcome Evaluation: VSS, pain controlled, ambulated well with staff, chest tube to water-seal, air leak present

## 2023-12-14 NOTE — PLAN OF CARE
Goal Outcome Evaluation:     Problem: Adult Inpatient Plan of Care  Goal: Plan of Care Review  Outcome: Ongoing, Not Progressing  Flowsheets  Taken 12/13/2023 1753 by Madelyn Pascual, RN  Progress: no change  Plan of Care Reviewed With: patient  Taken 12/13/2023 1645 by Massiel Hays, RN  Outcome Evaluation: VSS. No pain meds needed. CXR and labs drawn.  Goal: Patient-Specific Goal (Individualized)  Outcome: Ongoing, Not Progressing  Goal: Absence of Hospital-Acquired Illness or Injury  Outcome: Ongoing, Not Progressing  Goal: Optimal Comfort and Wellbeing  Outcome: Ongoing, Not Progressing  Goal: Readiness for Transition of Care  Outcome: Ongoing, Not Progressing

## 2023-12-15 ENCOUNTER — APPOINTMENT (OUTPATIENT)
Dept: GENERAL RADIOLOGY | Facility: HOSPITAL | Age: 68
DRG: 163 | End: 2023-12-15
Payer: MEDICARE

## 2023-12-15 LAB
ANION GAP SERPL CALCULATED.3IONS-SCNC: 8.3 MMOL/L (ref 5–15)
BUN SERPL-MCNC: 24 MG/DL (ref 8–23)
BUN/CREAT SERPL: 25.8 (ref 7–25)
CALCIUM SPEC-SCNC: 8.7 MG/DL (ref 8.6–10.5)
CHLORIDE SERPL-SCNC: 101 MMOL/L (ref 98–107)
CO2 SERPL-SCNC: 26.7 MMOL/L (ref 22–29)
CREAT SERPL-MCNC: 0.93 MG/DL (ref 0.76–1.27)
EGFRCR SERPLBLD CKD-EPI 2021: 90 ML/MIN/1.73
GLUCOSE SERPL-MCNC: 201 MG/DL (ref 65–99)
MAGNESIUM SERPL-MCNC: 2.5 MG/DL (ref 1.6–2.4)
POTASSIUM SERPL-SCNC: 4.5 MMOL/L (ref 3.5–5.2)
QT INTERVAL: 355 MS
QTC INTERVAL: 394 MS
SODIUM SERPL-SCNC: 136 MMOL/L (ref 136–145)

## 2023-12-15 PROCEDURE — 32656 THORACOSCOPY W/PLEURECTOMY: CPT | Performed by: SPECIALIST/TECHNOLOGIST, OTHER

## 2023-12-15 PROCEDURE — 32674 THORACOSCOPY LYMPH NODE EXC: CPT | Performed by: SPECIALIST/TECHNOLOGIST, OTHER

## 2023-12-15 PROCEDURE — 94799 UNLISTED PULMONARY SVC/PX: CPT

## 2023-12-15 PROCEDURE — 99222 1ST HOSP IP/OBS MODERATE 55: CPT | Performed by: STUDENT IN AN ORGANIZED HEALTH CARE EDUCATION/TRAINING PROGRAM

## 2023-12-15 PROCEDURE — 94761 N-INVAS EAR/PLS OXIMETRY MLT: CPT

## 2023-12-15 PROCEDURE — 32663 THORACOSCOPY W/LOBECTOMY: CPT | Performed by: SPECIALIST/TECHNOLOGIST, OTHER

## 2023-12-15 PROCEDURE — 32663 THORACOSCOPY W/LOBECTOMY: CPT | Performed by: SURGERY

## 2023-12-15 PROCEDURE — 83735 ASSAY OF MAGNESIUM: CPT | Performed by: NURSE PRACTITIONER

## 2023-12-15 PROCEDURE — 32674 THORACOSCOPY LYMPH NODE EXC: CPT | Performed by: SURGERY

## 2023-12-15 PROCEDURE — 80048 BASIC METABOLIC PNL TOTAL CA: CPT | Performed by: NURSE PRACTITIONER

## 2023-12-15 PROCEDURE — 32656 THORACOSCOPY W/PLEURECTOMY: CPT | Performed by: SURGERY

## 2023-12-15 PROCEDURE — 99024 POSTOP FOLLOW-UP VISIT: CPT | Performed by: NURSE PRACTITIONER

## 2023-12-15 PROCEDURE — 93010 ELECTROCARDIOGRAM REPORT: CPT | Performed by: INTERNAL MEDICINE

## 2023-12-15 PROCEDURE — 97161 PT EVAL LOW COMPLEX 20 MIN: CPT

## 2023-12-15 PROCEDURE — 63710000001 PREDNISONE PER 5 MG: Performed by: SURGERY

## 2023-12-15 PROCEDURE — 25010000002 KETOROLAC TROMETHAMINE PER 15 MG: Performed by: SURGERY

## 2023-12-15 PROCEDURE — 93005 ELECTROCARDIOGRAM TRACING: CPT | Performed by: SURGERY

## 2023-12-15 PROCEDURE — 71045 X-RAY EXAM CHEST 1 VIEW: CPT

## 2023-12-15 PROCEDURE — 97530 THERAPEUTIC ACTIVITIES: CPT

## 2023-12-15 PROCEDURE — 25010000002 ENOXAPARIN PER 10 MG: Performed by: SURGERY

## 2023-12-15 PROCEDURE — 94760 N-INVAS EAR/PLS OXIMETRY 1: CPT

## 2023-12-15 PROCEDURE — 94664 DEMO&/EVAL PT USE INHALER: CPT

## 2023-12-15 RX ORDER — SODIUM CHLORIDE FOR INHALATION 7 %
4 VIAL, NEBULIZER (ML) INHALATION
Status: DISCONTINUED | OUTPATIENT
Start: 2023-12-15 | End: 2023-12-24 | Stop reason: HOSPADM

## 2023-12-15 RX ORDER — ACETYLCYSTEINE 200 MG/ML
3 SOLUTION ORAL; RESPIRATORY (INHALATION)
Status: DISCONTINUED | OUTPATIENT
Start: 2023-12-15 | End: 2023-12-24 | Stop reason: HOSPADM

## 2023-12-15 RX ORDER — IPRATROPIUM BROMIDE AND ALBUTEROL SULFATE 2.5; .5 MG/3ML; MG/3ML
3 SOLUTION RESPIRATORY (INHALATION)
Status: DISCONTINUED | OUTPATIENT
Start: 2023-12-15 | End: 2023-12-24 | Stop reason: HOSPADM

## 2023-12-15 RX ADMIN — ACETYLCYSTEINE 3 ML: 200 SOLUTION ORAL; RESPIRATORY (INHALATION) at 07:49

## 2023-12-15 RX ADMIN — KETOROLAC TROMETHAMINE 15 MG: 15 INJECTION, SOLUTION INTRAMUSCULAR; INTRAVENOUS at 12:34

## 2023-12-15 RX ADMIN — ENOXAPARIN SODIUM 40 MG: 100 INJECTION SUBCUTANEOUS at 08:21

## 2023-12-15 RX ADMIN — GABAPENTIN 300 MG: 300 CAPSULE ORAL at 20:56

## 2023-12-15 RX ADMIN — GABAPENTIN 300 MG: 300 CAPSULE ORAL at 05:56

## 2023-12-15 RX ADMIN — ACETAMINOPHEN 1000 MG: 500 TABLET ORAL at 20:55

## 2023-12-15 RX ADMIN — PANTOPRAZOLE SODIUM 40 MG: 40 TABLET, DELAYED RELEASE ORAL at 05:57

## 2023-12-15 RX ADMIN — ACETAMINOPHEN 1000 MG: 500 TABLET ORAL at 08:21

## 2023-12-15 RX ADMIN — KETOROLAC TROMETHAMINE 15 MG: 15 INJECTION, SOLUTION INTRAMUSCULAR; INTRAVENOUS at 20:55

## 2023-12-15 RX ADMIN — POLYETHYLENE GLYCOL 3350 17 G: 17 POWDER, FOR SOLUTION ORAL at 08:21

## 2023-12-15 RX ADMIN — KETOROLAC TROMETHAMINE 15 MG: 15 INJECTION, SOLUTION INTRAMUSCULAR; INTRAVENOUS at 02:43

## 2023-12-15 RX ADMIN — ACETAMINOPHEN 1000 MG: 500 TABLET ORAL at 16:12

## 2023-12-15 RX ADMIN — ACETYLCYSTEINE 3 ML: 200 SOLUTION ORAL; RESPIRATORY (INHALATION) at 15:08

## 2023-12-15 RX ADMIN — ALBUTEROL SULFATE 2.5 MG: 2.5 SOLUTION RESPIRATORY (INHALATION) at 15:05

## 2023-12-15 RX ADMIN — METOPROLOL TARTRATE 12.5 MG: 25 TABLET, FILM COATED ORAL at 08:21

## 2023-12-15 RX ADMIN — DOCUSATE SODIUM 100 MG: 100 CAPSULE, LIQUID FILLED ORAL at 08:21

## 2023-12-15 RX ADMIN — PREDNISONE 50 MG: 50 TABLET ORAL at 08:21

## 2023-12-15 RX ADMIN — GABAPENTIN 300 MG: 300 CAPSULE ORAL at 15:03

## 2023-12-15 RX ADMIN — ALBUTEROL SULFATE 2.5 MG: 2.5 SOLUTION RESPIRATORY (INHALATION) at 07:46

## 2023-12-15 RX ADMIN — METOPROLOL TARTRATE 25 MG: 25 TABLET, FILM COATED ORAL at 20:55

## 2023-12-15 RX ADMIN — DOCUSATE SODIUM 100 MG: 100 CAPSULE, LIQUID FILLED ORAL at 20:55

## 2023-12-15 RX ADMIN — IPRATROPIUM BROMIDE AND ALBUTEROL SULFATE 3 ML: 2.5; .5 SOLUTION RESPIRATORY (INHALATION) at 21:35

## 2023-12-15 NOTE — OP NOTE
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    History of tobacco abuse.  Hyperlipidemia.  Chronic cough.  Benign prostatic hyperplasia.    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    History of tobacco abuse.  Hyperlipidemia.  Chronic cough.  Benign prostatic hyperplasia.    Procedure performed:   Flexible Bronchoscopy.   Robot assisted Thoracoscopic Left Lower Lobectomy with en bloc resection of parietal pleura.  Extensive lysis of adhesion over 120 minutes.  Partial parietal pleurectomy.  Systematic Mediastinal Lymph node dissection.   Intercostal Nerve Block.     Synoptic portion:  Intent: curative  Surgical procedure performed:  Resection of at least one lobe or bilobectomy - Lobectomy WITH mediastinal lymph node dissection  Mediastinal lymph node stations resected:  5 Subaortic, 7 Subcarinal, and 9 Pulmonary ligament  Hilar lymph node stations resected:  10 Hilar, 11 Interlobar, and 12 Lobar    Indications:   Sunny Hung is a 68-year-old male who has significant medical problems as mentioned in the medical chart.      He underwent a screening CT of the chest on 7/31/2023 which showed a large mass in the left lower lobe of the lung extending to the hilum.  A diagnostic CT was performed 8/25/2023 showing a large left infrahilar medial mass in the lower lobe.  A PET scan on 9/8/2023 showed a left lower lobe spiculated pleural-based mass 1.3 x 5 cm involving the left hilum and major fissure SUV 23.2.  There were smaller nodular airspace opacities also hypermetabolic.  The mass encases left lower lobe bronchovascular bundle.  No  hypermetabolic mediastinal or hilar lymph nodes noted.  There was a 7 mm nodule in the right upper lobe photopenic SUV 2.1.  There was no supraclavicular uptake or disease below the abdomen other than 2 abnormal foci in the sigmoid colon.  MRI of the brain on 9/11/2023 showed a subtle 2 mm focus inferior left cerebellum too small to characterize but likely a blood vessel.  The patient underwent bronchoscopy on 9/6/2023 with biopsies taken from the left lower lobe mass, lymph node station 7 and lymph node station 4R.  Pathology showed malignant cells favoring poorly differentiated carcinoma/squamous differentiation from the left lower lobe and biopsies from station 7 and 4R were negative for malignant cells.     The patient completed 3 cycles of neoadjuvant therapy with carboplatin/Taxol/nivolumab 11/1/2023.  He tolerated treatment well without much complications.  His case was discussed in multidisciplinary tumor board conference and the consensus recommendation was to proceed with surgical resection.  Restaging PET/CT was done on 12/5/2023 which reported significant decrease in size of the left lower lobe mass with decreased SUV of 3.6, previously noted 23.2.  There was irregular large airspace consolidation at the posterior aspect of the left lower lobe with a maximal SUV of 8.7 suspected to represent pneumonitis with possible superimposed infection.  There was no evidence of hypermetabolic mediastinal or hilar lymphadenopathy or distant metastases.  He also had transthoracic echo on 12/5/2023 which reported LVEF of 60%.     Prior to surgical resection, he denied any difficulty breathing or pain. He coughed intermittently. He denied angina, history of myocardial infarction or stroke. He could walk a block without dyspnea. He did not smoke. He was not on supplemental oxygen at home.     As per my assessment, he had stage II-B non-small cell lung cancer who underwent neoadjuvant chemoimmunotherapy based on Checkmate  816 trial.  There was significant treatment response based on the PET CT scan.  In Checkmate 816 trial, there were 25% of the patient who had complete pathological response after neoadjuvant chemoimmunotherapy.  The standard of care for stage II-B resectable lung cancer is neoadjuvant chemoimmunotherapy followed by lobectomy.  Therefore, I recommended robot-assisted thoracoscopic left lower lobectomy, mediastinal lymph node dissection.  His lung function has been borderline which does not correlate to his excellent functional status.  He was independent in his activities of daily living and was not dependent on supplemental oxygen.  We performed a 6-minute walk test in the clinic which he completed without any respiratory distress.  His oxygen saturation remained above 95% throughout the test.  He was motivated and determined to pursue surgical cure and I supported his wishes.  He agreed to the proposed procedure and signed the consent form.    Findings:  No evidence of pleural implants or effusion.   Entire lung was adherent to the chest wall.  The pulmonary parenchyma was extremely friable.  The left lower lobe mass was adherent to the chest wall invading the parietal pleura but did not extend to the extrathoracic space.  Friable planes and coagulopathy leading to 700 cc blood loss.  Complete major fissure.  There was significant air leak at the end of procedure.    Surgeon: Rosalio Scott MD     Assistants: Ernie Harrington CSA was responsible for performing the following activities: Retraction, Suction, Irrigation, Suturing, Closing, Placing Dressing, and Held/Positioned Camera and their skilled assistance was necessary for the success of this case.    Anesthesia: General endotracheal - Double lumen tube administered by Anesthesiologist: Lisandra Dewey MD  CRNA: Rk Dougherty CRNA    ASA Class: 3    Procedure Details   On 12/15/2023, the patient was brought to the operating room and placed supine on the  operating table.  Following an uneventful induction of general anesthesia, she was intubated with a double-lumen endotracheal tube without incident.  Appropriate placement was confirmed with the pediatric bronchoscope.  A radial arterial line and additional peripheral IVs were placed by the Anesthesia team. Prater catheter was inserted.  Pneumatic compression devices placed to the lower extremities.  Patient was repositioned in the right lateral decubitus position.  The table was jackknifed. All bony prominences were well padded.  The placement of endotracheal tube was confirmed again after positioning with the pediatric bronchoscope. The patient received vancomycin for intravenous antibiotic prophylaxis..  The left chest was prepped and draped in usual sterile fashion.  Prior to beginning the operation, a time-out was conducted with all members of the surgical team present.      Following left lung isolation, I began by making an 8 mm transverse incision in the seventh intercostal space in the left mid chest.  The pleural space was entered and was made with a lot of resistance from adherent lung to the chest wall.  A trocar was inserted after careful dissection. After confirming safe pleural entry, carbon dioxide insufflation to 8 mm Hg was utilized.  Using the scope, blunt dissection was performed to create space for additional port placement. After infiltrating the pleura and skin with local anesthetic, I placed an 8 mm port 4 cm anterior to the spinous process, approximately in the sixth and seventh intercostal space.  A 12 mm port was placed midway between the arm 2 and arm 4 ports. The 12 mm port was placed anteriorly in the confluence of the diaphragm at the sixth intercostal space.  A 12 mm assistant Air Seal® trocar was placed at the confluence of the diaphragm triangulated between the arm 1 and arm 2 ports.  The robot was docked in a standard fashion.  All instruments were inserted under direct vision. The  chest was examined.  There was no pleural effusion or obvious pleural disease.      The first 2 hours of the procedure was spent doing extensive lysis of adhesion.  Using blunt and sharp dissection, the entire lung surface was freed from the chest wall.  The left lower lobe was densely adherent to the posterior lateral chest wall and required extrathoracic dissection.  I stayed in the extrathoracic plane and dissected parietal pleura that was adherent to the left lower lobe.  Then I started taking down the inferior pulmonary ligament all the way inferior pulmonary vein using bipolar cautery.  A station 9 lymph node was removed and sent to Pathology as a permanent specimen.  I then took down the pleura posterior to the hilum between the inferior pulmonary vein and pulmonary artery.  A station 7 lymph node was removed and sent to Pathology as a permanent specimen.  A station 10 L lymph node was removed off the main pulmonary artery.  The patient had a complete fissure posteriorly.  I continued the dissection up and over the apex of the hilum and removed 11 L lymph nodes off the left upper lobe pulmonary arterial branches, taking care to avoid injury to the phrenic nerve for and the recurrent laryngeal nerve.  A station 5 lymph node was removed and sent to Pathology as a permanent specimen.  I continued to work along the anterior hilum taking down the pleura from the superior pulmonary vein to the inferior pulmonary vein.  The veins were identified as separate structures.      I turned my attention to identifying the pulmonary arterial anatomy.  I dissected within the fissure on the ongoing pulmonary artery.  A station 12 L lymph node was removed.  I performed a bottom up approach by taking the inferior pulmonary vein first using 45 mm staple load.  Then the bronchus was circumferentially dissected.  The left lower lobe bronchus was clamped and a clamp test was performed ensuring the left upper lobe was adequately  ventilated.  The bronchus was transected using 45 mm green load stapler.  The basilar artery above the superior segmental artery was circumferentially dissected and stapled using 45 mm vascular load stapler.  Intercostal nerve block from 4th-10th ribs was performed using 0.25 % Marcaine. The left lower lobectomy specimen was removed via Endo Catch™ bag, advanced through the assistant port site. The robot was undocked and I irrigated the chest cavity with 0.9% normal saline and suction till it was clear. I sprayed progel hemostatic agent over the raw surface of the lung to minimize air leak.  I sprayed Carson hemostatic agent over the dissection bed.  Hemostasis was assured. The specimen was inspected the back table.  Hemostasis was achieved. A 24 Fr chest tube was introduced through the anterior port and directed posterior to the hilum and towards the apex of the pleural space. The remaining lung was inflated under direct visualization. The chest tube was secured to the skin using # 2 Ethibond suture. The port sites were closed in layers.  The fascia of the 12 mm port sites was closed using 0-Vicryl suture.  The subcutaneous tissues were closed using 2-0 Vicryl suture.  Skin incisions were closed using 4-0 Monocryl subcuticular sutures and skin glue were applied.      The sponge, needle, and instrument counts were correct at the end of the operation.  The patient was awakened from anesthesia, was extubated without incident, and was transported to the Post Anesthesia Care Unit in stable condition.    Estimated Blood Loss:   700 cc           Drains: 24 Fr chest tube on - 20 suction                 Specimens:   ID Type Source Tests Collected by Time   A : 9 L FOR PERMANENT Tissue Lymph Node TISSUE PATHOLOGY EXAM Rosalio Scott MD 12/13/2023 1142   B : 10 L # 1 FOR PERMANENT Tissue Lymph Node TISSUE PATHOLOGY EXAM Rosalio Scott MD 12/13/2023 1143   C : 10 L # 2 FOR PERMANENT Tissue Lymph Node TISSUE PATHOLOGY EXAM Sonia  MD Rosalio 12/13/2023 1226   D : 11L # 1 FOR PERMANENT Tissue Lymph Node TISSUE PATHOLOGY EXAM Rosalio Scott MD 12/13/2023 1241   E : LEVEL 7  FOR PERMANENT Tissue Lymph Node TISSUE PATHOLOGY EXAM Rosalio Scott MD 12/13/2023 1243   F : 11L # 2 FOR PERMANENT Tissue Lymph Node TISSUE PATHOLOGY EXAM Rosalio Scott MD 12/13/2023 1253   G : LEVEL 5 FOR PERMANENT Tissue Lymph Node TISSUE PATHOLOGY EXAM Rosalio Scott MD 12/13/2023 1339   H : BRONCHI MARGIN, INK ON PROXIMAL MARGIN FOR PERMANENT Tissue Lung, L TISSUE PATHOLOGY EXAM Rosalio Scott MD 12/13/2023 1429   I : 10 L # 3 FOR PERMANENT Tissue Lymph Node TISSUE PATHOLOGY EXAM Rosalio Scott MD 12/13/2023 1445   J : LEFT LOWER LOBE FOR PERMANENT Tissue Lung, Left Lower Lobe TISSUE PATHOLOGY EXAM Rosalio Scott MD 12/13/2023 1452              Implants:   Implant Name Type Inv. Item Serial No.  Lot No. LRB No. Used Action   HEMOST ABS SURGICEL 2X14 - [id number] Implant HEMOST ABS SURGICEL 2X14  [employer]  DIV OF J AND J . Left 1 Implanted   RELOAD STPLR SUREFORM 45 DAVINCI/X/XI 6ROW 3.5 QUYEN 1P/U - [id number] Implant RELOAD STPLR SUREFORM 45 DAVINCI/X/XI 6ROW 3.5 QUYEN 1P/U  INTUITIVE SURGICAL [id number] Left 8 Implanted   RELOAD STPLR SUREFORM 45 DAVINCI/X/XI 6ROW 2.5 WHT 1P/U - [id number] Implant RELOAD STPLR SUREFORM 45 DAVINCI/X/XI 6ROW 2.5 WHT 1P/U  INTUITIVE SURGICAL [id number] Left 1 Implanted   SEAL HEMO SURG NAYE/AH ABS/PWDR 3GM - [id number] Implant SEAL HEMO SURG NAYE/AH ABS/PWDR 3GM  [employer]rement ([employer]) [id number] Left 1 Implanted   RELOAD STPLR SUREFORM 45 DAVINCI/X/XI 6ROW 3.5 QUYEN 1P/U - [id number] Implant RELOAD STPLR SUREFORM 45 DAVINCI/X/XI 6ROW 3.5 QUYEN 1P/U  INTUITIVE SURGICAL [id number] Left 3 Implanted   RELOAD STPLR SUREFORM 45 DAVINCI/X/XI 6ROW 2.5 WHT 1P/U - [id number] Implant RELOAD STPLR SUREFORM 45 DAVINCI/X/XI 6ROW 2.5 WHT 1P/U  INTUITIVE SURGICAL [id number] Left 1 Implanted   RELOAD STPLR SUREFORM 45 DAVINCI/X/XI 6ROW 4.3 GRN 1P/U  - [id number] Implant RELOAD STPLR SUREFORM 45 DAVINCI/X/XI 6ROW 4.3 GRN 1P/U  INTUITIVE SURGICAL [id number] Left 2 Implanted   RELOAD STPLR SUREFORM 45 DAVINCI/X/XI 6ROW 4.6 BLK 1P/U - [id number] Implant RELOAD STPLR SUREFORM 45 DAVINCI/X/XI 6ROW 4.6 BLK 1P/U  INTUITIVE SURGICAL [id number] Left 1 Implanted   HEMOST ABS SURGICEL ORIG 2X14IN STRL - [id number] Implant HEMOST ABS SURGICEL ORIG 2X14IN STRL  ETH[employer]ON  DIV OF J AND J [id number] Left 1 Implanted   SEALANT PLURAL AIRLEAK PROGEL W/APPL 4ML - [id number] Implant SEALANT PLURAL AIRLEAK PROGEL W/APPL 4ML  NEOMEND [id number] Left 1 Implanted   HEMOST ABS SURGICEL ORIG 2X14IN STRL - [id number] Implant HEMOST ABS SURGICEL ORIG 2X14IN STRL  ETH[employer]ON  DIV OF J AND J [id number] Left 1 Implanted              Complications: None           Disposition: PACU - hemodynamically stable.           Condition: Stable     Rosalio Scott MD   Thoracic Surgeon  [facility] [place]

## 2023-12-15 NOTE — CONSULTS
[facility]        Patient Name: Sunny Hung  Age/Sex: 68 y.o. male  : 1955  MRN: [id number]    Date of Admission: 2023  Date of Encounter Visit: 12/15/23  Encounter Provider: Giovany Delacruz MD  Referring Provider: Rosalio Scott MD  Place of Service: [facility] CARDIOLOGY  Patient Care Team:  Hosea Abernathy MD as PCP - General (Family Medicine)  Rosalio Scott MD as Referring Physician (Thoracic Surgery)  Greg Dubon MD as Consulting Physician (Hematology and Oncology)  Madelyn Trejo, RN as Nurse Navigator    Subjective:     Chief Complaint: S/p robotic assisted thorascopic left lower lobectomy    Reason for consult: SVT    History of Present Illness:  Sunny Hung is a 68 y.o. male who has a past medical history significant for hyperlipidemia, BPH, and a mass of his left lower lobe.  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    He was being considered for this wedge resection and had an echocardiogram performed a few weeks ago that was normal.  He otherwise denies any previous cardiac history.  He tolerated procedure well, however today, has had a few episodes of SVT.  He did not particularly notice his heart racing but he states that he got agitated when some water was spilled on his hand this morning and felt some palpitations after that.  He does have 2 chest tubes in place in his left lung, 1 of which abutting the lateral wall and likely coursing behind the left atrium.  He otherwise denies any cardiac complaints.  The SVT episodes are short and self-limited.  Due to the SVT, cardiology was consulted for further recommendations.    He had a CT of the chest on 23 showed a large mass in the left lower lobe extending to the hilum.       Prior Cardiac Testing:    ECHO 23    Left ventricular systolic function is  normal. Left ventricular ejection fraction appears to be 56 - 60%.    Left ventricular diastolic function was normal.    Estimated right ventricular systolic pressure from tricuspid regurgitation is normal (<35 mmHg).         Past Medical History:  Past Medical History:   Diagnosis Date    Arthritis     BPH (benign prostatic hyperplasia)     Bruises easily     Bursitis of right shoulder     Cough     Hyperlipidemia     Mass of lower lobe of left lung        Past Surgical History:   Procedure Laterality Date    BRONCHOSCOPY WITH ION ROBOTIC ASSIST N/A 9/6/2023    Procedure: BRONCHOSCOPY WITH BAL;   ION ROBOT AND ENDOBRONCHIAL ULTRASOUND WITH FNA'S;  Surgeon: Rosalio Scott MD;  Location: [facility] ENDOSCOPY;  Service: Robotics - Pulmonary;  Laterality: N/A;  PRE- LEFT LOWER LOBE MASS  POST- SAME    GANGLION CYST EXCISION Bilateral     HEMORRHOIDECTOMY      LOBECTOMY Left 12/13/2023    Procedure: BRONCHOSCOPY, THORACOSCOPY WITH LYSIS OF ADHESIONS (120 MINUTES), LEFT LOWER LOBECTOMY WITH EN BLOC PARTIAL PARIETAL PLEURECTOMY USING DAVINCI ROBOT, MEDIASTINAL LYMPH NODE DISSECTION, INTERCOSTAL NERVE BLOCK;  Surgeon: Rosalio Scott MD;  Location: [facility] MAIN OR;  Service: Robotics - DaVinci;  Laterality: Left;    OTHER SURGICAL HISTORY      SOMETHING REMOVED FROM LEFT CHEST, BENIGN ? LIPOMA       Home Medications:   Medications Prior to Admission   Medication Sig Dispense Refill Last Dose    pravastatin (PRAVACHOL) 20 MG tablet Take 1 tablet by mouth Every Night.   12/12/2023 at 1700    predniSONE (DELTASONE) 10 MG tablet Take 7 tablets by mouth Daily With Breakfast. (Patient taking differently: Take 6 tablets by mouth Daily With Breakfast.) 100 tablet 0 Past Week    fluticasone (FLONASE) 50 MCG/ACT nasal spray 2 sprays into the nostril(s) as directed by provider Daily As Needed.   More than a month    omeprazole (priLOSEC) 20 MG capsule Take 1 capsule by mouth Daily. 30 capsule 0 More than a month    ondansetron (ZOFRAN) 8 MG  tablet Take 1 tablet by mouth 3 (Three) Times a Day As Needed for Nausea or Vomiting. 30 tablet 2        Allergies:  Allergies   Allergen Reactions    Amoxicillin-Pot Clavulanate Hives       Past Social History:  Social History     Socioeconomic History    Marital status:      Spouse name: Carmen   Tobacco Use    Smoking status: Former     Packs/day: 0.50     Years: 40.00     Additional pack years: 0.00     Total pack years: 20.00     Types: Cigarettes     Quit date:      Years since quittin.9    Smokeless tobacco: Never   Vaping Use    Vaping Use: Never used   Substance and Sexual Activity    Alcohol use: Not Currently     Comment: VERY RARELY, MAYBE 1 BEER    Drug use: Never    Sexual activity: Defer       Past Family History:  Family History   Problem Relation Age of Onset    Bone cancer Mother     COPD Father     Brain cancer Sister     Cancer Brother         Malignant tumor of pharynx    Alcohol abuse Brother     Cirrhosis Brother     Recurrent abdominal pain Brother     Lung cancer Brother     Malig Hyperthermia Neg Hx        REVIEW OF SYSTEMS:   14 point ROS was performed and is negative except as outlined in HPI          Objective:   Temp:  [97.4 °F (36.3 °C)-98.1 °F (36.7 °C)] 97.7 °F (36.5 °C)  Heart Rate:  [63-91] 89  Resp:  [12-20] 14  BP: (103-124)/(61-68) 124/63     Intake/Output Summary (Last 24 hours) at 12/15/2023 1526  Last data filed at 12/15/2023 0900  Gross per 24 hour   Intake 667 ml   Output 567 ml   Net 100 ml     Body mass index is 23.43 kg/m².  There were no vitals filed for this visit.  Weight change:     General Appearance:    Alert, cooperative, in no acute distress, resting in bed   Head:    Normocephalic, without obvious abnormality, atraumatic   Eyes:            Lids and lashes normal, conjunctivae and sclerae normal, no   icterus, no pallor, corneas clear, PERRLA   Ears:    Ears appear intact with no abnormalities noted   Throat:   No oral lesions, no thrush, oral  mucosa moist   Neck:   No adenopathy, supple, trachea midline, no thyromegaly, no   carotid bruit, no JVD   Back:     No kyphosis present, no scoliosis present, no skin lesions, erythema or scars, no tenderness to percussion or palpation, range of motion normal   Lungs:   Coarse breath sounds on left, air leak noted,respirations regular, even and unlabored    Heart:    Regular rhythm and normal rate, normal S1 and S2, no murmur, no gallop, no rub, no click.  No friction rubs evident   Chest Wall:    No abnormalities observed   Abdomen:     Normal bowel sounds, no masses, no organomegaly, soft, nontender, nondistended, no guarding, no rebound  tenderness   Extremities:   Moves all extremities well, no edema, no cyanosis, no redness   Pulses:   Pulses palpable and equal bilaterally. Normal radial, carotid, femoral, dorsalis pedis and posterior tibial pulses bilaterally. Normal abdominal aorta   Skin:  Psychiatric:   No bleeding, bruising or rash    Alert and oriented x 3, normal mood and affect     Lab Review:   Results from last 7 days   Lab Units 12/15/23  0458 12/14/23  0524 12/13/23  1558 12/11/23  1623   SODIUM mmol/L 136 136 136 141   POTASSIUM mmol/L 4.5 5.0 4.5 4.8   CHLORIDE mmol/L 101 101 103 103   CO2 mmol/L 26.7 26.0 24.1 26.0   BUN mg/dL 24* 20 15 17   CREATININE mg/dL 0.93 0.77 0.68* 0.66*   GLUCOSE mg/dL 201* 122* 191* 125*   CALCIUM mg/dL 8.7 8.3* 8.2* 7.1*   AST (SGOT) U/L  --   --   --  16   ALT (SGPT) U/L  --   --   --  21         Results from last 7 days   Lab Units 12/14/23  0524 12/13/23  1558 12/13/23  1241 12/11/23  1623   WBC 10*3/mm3 12.17* 23.99*  --  10.57   HEMOGLOBIN g/dL 8.4* 8.5*  --  11.6*   HEMOGLOBIN, POC g/dL  --   --  10.5*  --    HEMATOCRIT % 26.2* 26.7*  --  36.9*   HEMATOCRIT POC %  --   --  31*  --    PLATELETS 10*3/mm3 224 243  --  416     Results from last 7 days   Lab Units 12/11/23  1623   INR  1.11*     Results from last 7 days   Lab Units 12/15/23  1538 12/13/23  3680  "  MAGNESIUM mg/dL 2.5* 2.3           Invalid input(s): \"LDLCALC\"            Echo EF Estimated  No results found for: \"ECHOEFEST\"    EK/15/23    9/5/23        I personally viewed and interpreted the patient's EKGHis EKG read says there is an ST elevation pattern that seems consistent with pericarditis, however this is unchanged from previous EKG dated 2023.    Imaging:  Imaging Results (Most Recent)       Procedure Component Value Units Date/Time    XR Chest 1 View [[id number]] Collected: 12/15/23 0656     Updated: 12/15/23 0701    Narrative:      XR CHEST 1 VW-     Clinical: Postop lung surgery     COMPARISON 2023     FINDINGS: Heart size within normal limits. The right lung is clear.  There are 2 left-sided chest tubes in position. Subcutaneous emphysema  left chest wall more pronounced compared to the previous examination.  Left rib deformities again noted. Left lung infiltrate similar to the  previous examination. Left apical pneumothorax resolved. No pleural  effusion or pulmonary edema seen. The remainder is unremarkable.     This report was finalized on 12/15/2023 6:58 AM by Dr. Donta Feliz M.D on Workstation: [id number]       XR Chest 1 View [[id number]] Collected: 23     Updated: 23    Narrative:      XR CHEST 1 VW-     Clinical: Post open heart surgery     COMPARISON examination 2023     FINDINGS: There are 2 left-sided chest tubes in position as before.  Subcutaneous emphysema left chest wall again demonstrated. Vague area of  infiltrate left midlung zone. Trace left apical pneumothorax as before.  The right lung is clear. The cardiomediastinal silhouette is stable.  Heart size within normal limits. The remainder is unremarkable.     This report was finalized on 2023 6:28 AM by Dr. Donta Feliz M.D on Workstation: [id number]       XR Chest 1 View [[id number]] Collected: 23 1638     Updated: 23 164    Narrative:      XR CHEST 1 VW-   "   HISTORY: Postop lung surgery.     COMPARISON: Chest radiograph 9/6/2023     FINDINGS:    2 views of the chest were obtained. There are 2 new left apical chest  tubes. The cardiac silhouette is upper normal in size. Mediastinal and  hilar contours are not significantly changed. Mild hazy airspace  opacities throughout the left lung are slightly progressed. There is a  new trace left apical pneumothorax. There is subcutaneous emphysema in  the left chest and neck. There is degenerative disc disease.     This report was finalized on 12/13/2023 4:41 PM by Dr. Reina Krishnan M.D  on Workstation: [id number]                   Assessment:     Active Hospital Problems    Diagnosis  POA    **Mass of left lung [R91.8]  Unknown    Cancer of bronchus of left lower lobe [C34.32]  Yes      Resolved Hospital Problems   No resolved problems to display.          Plan:     SVT: Intermittent episodes, short-lived, there does appear to be P wave morphologies consistent with atrial tachycardia.  Unclear if this has been an ongoing issue, or perhaps just related to his chest tube insertions.  Either way, he is asymptomatic and his episodes are nonsustained.  Reasonable to increase his metoprolol to 25 twice daily  No need for repeat echo, previous was normal  No need for anticoagulation, short nonsustained episodes and this appears regular and consistent with A. Tach  If SVT episodes persist, would consider obtaining PA and lateral chest x-ray or CT chest to ensure that the chest tube sites or not abutting the cardiac structures  ECG suggesting pericarditis pattern.  Clinically, no evidence of pericarditis.  No rub noted on exam, no clinical history to suggest positional chest pain.  Also the EKG is unchanged from a prior EKG in September  Poorly differentiated carcinoma/squamous differentiation: Per thoracic surgery  Persistent air leak with subcutaneous air.  Per thoracic surgery    Thank you for allowing me to participate in the  care of Sunny NGUYEN Abhilash.  No other acute cardiac needs, it is possible that he may intermittently have these short episodes as long as the chest tubes are in place, may need repeat imaging if it persist per above.  Otherwise just continue metoprolol for now.  Cardiology will see as needed.  Thank you for the consult.  Will arrange for follow-up in the next 4 weeks or so in clinic to ensure no other signs of arrhythmia.    Giovany Delacruz MD  [facility]  12/15/23  12:17 EST      Part of this note may be an electronic transcription/translation of spoken language to printed text using the Dragon Dictation System.

## 2023-12-15 NOTE — PLAN OF CARE
Goal Outcome Evaluation:     Problem: Adult Inpatient Plan of Care  Goal: Plan of Care Review  Outcome: Ongoing, Progressing  Flowsheets (Taken 12/14/2023 6809 by Madelyn Pascual, RN)  Progress: improving  Plan of Care Reviewed With:   patient   spouse  Outcome Evaluation: VSS, pain controlled, ambulated well with staff, chest tube to water-seal, air leak present  Goal: Patient-Specific Goal (Individualized)  Outcome: Ongoing, Progressing  Goal: Absence of Hospital-Acquired Illness or Injury  Outcome: Ongoing, Progressing  Intervention: Identify and Manage Fall Risk  Recent Flowsheet Documentation  Taken 12/14/2023 2000 by Reina Christianson, RN  Safety Promotion/Fall Prevention: safety round/check completed  Intervention: Prevent Skin Injury  Recent Flowsheet Documentation  Taken 12/14/2023 2000 by Reina Christianson RN  Body Position: position changed independently  Intervention: Prevent Infection  Recent Flowsheet Documentation  Taken 12/14/2023 2000 by Reina Christianson, RN  Infection Prevention:   single patient room provided   rest/sleep promoted   hand hygiene promoted  Goal: Optimal Comfort and Wellbeing  Outcome: Ongoing, Progressing  Goal: Readiness for Transition of Care  Outcome: Ongoing, Progressing     Problem: Bleeding (Lung Surgery)  Goal: Absence of Bleeding  Outcome: Ongoing, Progressing     Problem: Bowel Motility Impaired (Lung Surgery)  Goal: Effective Bowel Elimination  Outcome: Ongoing, Progressing     Problem: Fluid and Electrolyte Imbalance (Lung Surgery)  Goal: Fluid and Electrolyte Balance  Outcome: Ongoing, Progressing     Problem: Hemodynamic Instability (Lung Surgery)  Goal: Effective Tissue Perfusion  Outcome: Ongoing, Progressing     Problem: Fluid and Electrolyte Imbalance (Lung Surgery)  Goal: Fluid and Electrolyte Balance  Outcome: Ongoing, Progressing     Problem: Hemodynamic Instability (Lung Surgery)  Goal: Effective Tissue Perfusion  Outcome: Ongoing, Progressing     Problem: Infection (Lung  Surgery)  Goal: Absence of Infection Signs and Symptoms  Outcome: Ongoing, Progressing  Intervention: Prevent or Manage Infection  Recent Flowsheet Documentation  Taken 12/14/2023 2000 by Reina Christianson RN  Infection Prevention:   single patient room provided   rest/sleep promoted   hand hygiene promoted     Problem: Ongoing Anesthesia Effects (Lung Surgery)  Goal: Anesthesia/Sedation Recovery  Outcome: Ongoing, Progressing  Intervention: Optimize Anesthesia Recovery  Recent Flowsheet Documentation  Taken 12/14/2023 2000 by Reina Christianson RN  Safety Promotion/Fall Prevention: safety round/check completed     Problem: Pain (Lung Surgery)  Goal: Acceptable Pain Control  Outcome: Ongoing, Progressing     Problem: Postoperative Nausea and Vomiting (Lung Surgery)  Goal: Nausea and Vomiting Relief  Outcome: Ongoing, Progressing     Problem: Postoperative Urinary Retention (Lung Surgery)  Goal: Effective Urinary Elimination  Outcome: Ongoing, Progressing     Problem: Respiratory Compromise (Lung Surgery)  Goal: Effective Oxygenation and Ventilation  Outcome: Ongoing, Progressing  Intervention: Optimize Oxygenation and Ventilation  Recent Flowsheet Documentation  Taken 12/14/2023 2144 by Reina Christianson RN  Administration (IS): self-administered  Taken 12/14/2023 2000 by Reina Christianson RN  Patient Tolerance (IS): no adverse signs/symptoms present  Administration (IS): self-administered  Head of Bed (HOB) Positioning: HOB elevated     Problem: Fall Injury Risk  Goal: Absence of Fall and Fall-Related Injury  Outcome: Ongoing, Progressing  Intervention: Identify and Manage Contributors  Recent Flowsheet Documentation  Taken 12/14/2023 2000 by Reina Christianson RN  Medication Review/Management: medications reviewed  Intervention: Promote Injury-Free Environment  Recent Flowsheet Documentation  Taken 12/14/2023 2000 by Reina Christianson RN  Safety Promotion/Fall Prevention: safety round/check completed

## 2023-12-15 NOTE — PROGRESS NOTES
"  POST-OPERATIVE NOTE     Chief Complaint: left lower lobe poorly differentiated carcinoma/squamous differentiation, post-operative care  S/P: bronchoscopy, da [place] robot assisted left lower lobectomy with partial parietal pleurectomy, mediastinal lymph node dissecton, intercostal nerve block  POD # 2    Subjective:  Symptoms:  Stable.  He reports chest pain.  No weakness.    Diet:  Adequate intake.  No nausea or vomiting.    Activity level: Returning to normal.    Pain:  He complains of pain that is mild.  Pain is well controlled.    Up to chair. On 1-2L    Objective:  General Appearance:  Comfortable and in no acute distress.    Vital signs: (most recent): Blood pressure 124/63, pulse 89, temperature 97.7 °F (36.5 °C), temperature source Oral, resp. rate 16, height 180.3 cm (71\"), SpO2 96%.  Vital signs are normal.  No fever.    Output: Minimal urine output and no stool output.    Lungs:  Normal effort and normal respiratory rate.  There are decreased breath sounds.    Heart: Normal rate.  Regular rhythm.    Chest: Chest wall tenderness present.    Abdomen: Abdomen is soft.  Bowel sounds are normal.     Neurological: Patient is alert and oriented to person, place and time.    Skin:  Warm and dry.                Chest tube:   Site: Left, Clean, Dry, Intact, and Securement device intact  Suction: waterseal  Air Leak: positive  24 Hour Total: 290/52 mL    Results Review:     I reviewed the patient's new clinical results.  I reviewed the patient's new imaging results and agree with the interpretation.  I reviewed the patient's other test results and agree with the interpretation  Discussed with patient, family at bedside, RN and Dr. Scott.    Assessment & Plan     Mr. Hung is a pleasant 68 year-old gentleman who is s/p POD 2 a daVinci robot assisted left lower lobectomy with Dr. Scott.   Persistent airleak to chest tubes. Slightly worse subq air on CXR today. Leave chest tube to -10cm and re-check a CXR in the " morning.  He had some intermittent ectopy overnight. Potassium 4.5 and Mag 2.5 today. We will ask cards to see him. Appreciate their assistance.   Increase mobilization and pulmonary toilet.      Katerina Garcia DNP, APRN  [facility]  12/15/23  10:54 EST    Patient was seen and assessed while wearing personal protective equipment including facemask, protective eyewear and gloves.  Hand hygiene performed prior to entering the room and upon exiting with doffing of gloves.

## 2023-12-15 NOTE — PLAN OF CARE
Goal Outcome Evaluation:  Plan of Care Reviewed With: patient, family           Outcome Evaluation: Pt is a 69 yo male with lung mass, s/p thoracoscopy with lysis of adhesions and L lower lobectomy. Pt lives with spouse, independent at baseline without any DME use. Today, he completes all mobility with supervision and ambulates 3 laps around nurses station using platform walker for chest tubes/suction. Pace WFL, no balance deficits observed, pt does not report any SOA or fatigue throughout session. He is mobilizing well and should continue mobilizing 3x/day with nsg staff. No further acute PT needs, PT signing off. Anticipate return home when medically ready.      Anticipated Discharge Disposition (PT): home

## 2023-12-15 NOTE — THERAPY DISCHARGE NOTE
Patient Name: Sunny Hung  : 1955    MRN: [id number]                              Today's Date: 12/15/2023       Admit Date: 2023    Visit Dx:     ICD-10-CM ICD-9-CM   1. Mass of left lung  R91.8 786.6     Patient Active Problem List   Diagnosis    Primary squamous cell carcinoma of lung, left    High risk medication use    Mass of left lung    Cancer of bronchus of left lower lobe     Past Medical History:   Diagnosis Date    Arthritis     BPH (benign prostatic hyperplasia)     Bruises easily     Bursitis of right shoulder     Cough     Hyperlipidemia     Mass of lower lobe of left lung      Past Surgical History:   Procedure Laterality Date    BRONCHOSCOPY WITH ION ROBOTIC ASSIST N/A 2023    Procedure: BRONCHOSCOPY WITH BAL;   ION ROBOT AND ENDOBRONCHIAL ULTRASOUND WITH FNA'S;  Surgeon: Rosalio Scott MD;  Location: [facility] ENDOSCOPY;  Service: Robotics - Pulmonary;  Laterality: N/A;  PRE- LEFT LOWER LOBE MASS  POST- SAME    GANGLION CYST EXCISION Bilateral     HEMORRHOIDECTOMY      LOBECTOMY Left 2023    Procedure: BRONCHOSCOPY, THORACOSCOPY WITH LYSIS OF ADHESIONS (120 MINUTES), LEFT LOWER LOBECTOMY WITH EN BLOC PARTIAL PARIETAL PLEURECTOMY USING DAVINCI ROBOT, MEDIASTINAL LYMPH NODE DISSECTION, INTERCOSTAL NERVE BLOCK;  Surgeon: Rosalio Scott MD;  Location: [facility] OR;  Service: Robotics - DaVinci;  Laterality: Left;    OTHER SURGICAL HISTORY      SOMETHING REMOVED FROM LEFT CHEST, BENIGN ? LIPOMA      General Information       Row Name 12/15/23 1114          Physical Therapy Time and Intention    Document Type evaluation  -CW     Mode of Treatment individual therapy;physical therapy  -CW       Row Name 12/15/23 1110          General Information    Patient Profile Reviewed yes  -CW     Prior Level of Function independent:  -CW     Existing Precautions/Restrictions oxygen therapy device and L/min  chest tubes to suction  -CW     Barriers to Rehab medically complex  -CW       Row Name  12/15/23 1114          Living Environment    People in Home spouse  -CW       Row Name 12/15/23 1114          Cognition    Orientation Status (Cognition) oriented x 4  -CW       Row Name 12/15/23 1114          Safety Issues, Functional Mobility    Impairments Affecting Function (Mobility) endurance/activity tolerance  -CW               User Key  (r) = Recorded By, (t) = Taken By, (c) = Cosigned By      Initials Name Provider Type    Renetta Hernandez PT Physical Therapist                   Mobility       Row Name 12/15/23 1151          Bed Mobility    Comment, (Bed Mobility) UIC at arrival  -CW       Row Name 12/15/23 1151          Sit-Stand Transfer    Sit-Stand [place] (Transfers) supervision  -CW       Row Name 12/15/23 1151          Gait/Stairs (Locomotion)    [place] Level (Gait) set up;supervision  -CW     Assistive Device (Gait) walker, rolling platform  -     Distance in Feet (Gait) 400'  -CW     Deviations/Abnormal Patterns (Gait) yoselin decreased;gait speed decreased  -CW     Comment, (Gait/Stairs) quick pace  -CW               User Key  (r) = Recorded By, (t) = Taken By, (c) = Cosigned By      Initials Name Provider Type    Renetta Hernandez PT Physical Therapist                   Obj/Interventions       Row Name 12/15/23 1154          Range of Motion Comprehensive    General Range of Motion bilateral lower extremity ROM WFL  -CW       Row Name 12/15/23 1154          Strength Comprehensive (MMT)    Comment, General Manual Muscle Testing (MMT) Assessment BLE WFL  -       Row Name 12/15/23 1154          Balance    Balance Assessment standing static balance;standing dynamic balance  -CW     Static Standing Balance supervision  -CW     Dynamic Standing Balance supervision  -CW     Position/Device Used, Standing Balance supported;walker, platform  -CW               User Key  (r) = Recorded By, (t) = Taken By, (c) = Cosigned By      Initials Name Provider Type    Renetta Hernandez PT  Physical Therapist                   Goals/Plan    No documentation.                  Clinical Impression       Row Name 12/15/23 1154          Pain    Pretreatment Pain Rating 0/10 - no pain  -CW     Posttreatment Pain Rating 0/10 - no pain  -CW       Row Name 12/15/23 1154          Plan of Care Review    Plan of Care Reviewed With patient;family  -CW     Outcome Evaluation Pt is a 69 yo male with lung mass, s/p thoracoscopy with lysis of adhesions and L lower lobectomy. Pt lives with spouse, independent at baseline without any DME use. Today, he completes all mobility with supervision and ambulates 3 laps around nurses station using platform walker for chest tubes/suction. Pace WFL, no balance deficits observed, pt does not report any SOA or fatigue throughout session. He is mobilizing well and should continue mobilizing 3x/day with nsg staff. No further acute PT needs, PT signing off. Anticipate return home when medically ready.  -CW       Row Name 12/15/23 1154          Therapy Assessment/Plan (PT)    Criteria for Skilled Interventions Met (PT) no;no problems identified which require skilled intervention  -CW     Therapy Frequency (PT) evaluation only  -CW       Row Name 12/15/23 1154          Vital Signs    O2 Delivery Pre Treatment room air  -CW     O2 Delivery Intra Treatment room air  -CW     O2 Delivery Post Treatment room air  -CW       Row Name 12/15/23 1154          Positioning and Restraints    Pre-Treatment Position sitting in chair/recliner  -CW     Post Treatment Position chair  -CW     In Chair sitting;call light within reach;encouraged to call for assist;notified nsg;with family/caregiver  no alarm at arrival, all lines intact  -CW               User Key  (r) = Recorded By, (t) = Taken By, (c) = Cosigned By      Initials Name Provider Type    Renetta Hernandez, PT Physical Therapist                   Outcome Measures       Row Name 12/15/23 1158 12/15/23 0821       How much help from another  person do you currently need...    Turning from your back to your side while in flat bed without using bedrails? 4  -CW 3  -SN    Moving from lying on back to sitting on the side of a flat bed without bedrails? 4  -CW 3  -SN    Moving to and from a bed to a chair (including a wheelchair)? 4  -CW 3  -SN    Standing up from a chair using your arms (e.g., wheelchair, bedside chair)? 4  -CW 3  -SN    Climbing 3-5 steps with a railing? 4  -CW 3  -SN    To walk in hospital room? 4  -CW 3  -SN    AM-PAC 6 Clicks Score (PT) 24  -CW 18  -SN    Highest Level of Mobility Goal 8 --> Walked 250 feet or more  -CW 6 --> Walk 10 steps or more  -SN      Row Name 12/15/23 1151          Functional Assessment    Outcome Measure Options AM-PAC 6 Clicks Basic Mobility (PT)  -CW               User Key  (r) = Recorded By, (t) = Taken By, (c) = Cosigned By      Initials Name Provider Type    SN Madleyn Pascual, RN Registered Nurse    CW Renetta Maki, PT Physical Therapist                    PT Recommendation and Plan     Plan of Care Reviewed With: patient, family  Outcome Evaluation: Pt is a 67 yo male with lung mass, s/p thoracoscopy with lysis of adhesions and L lower lobectomy. Pt lives with spouse, independent at baseline without any DME use. Today, he completes all mobility with supervision and ambulates 3 laps around nurses station using platform walker for chest tubes/suction. Pace WFL, no balance deficits observed, pt does not report any SOA or fatigue throughout session. He is mobilizing well and should continue mobilizing 3x/day with [facility] staff. No further acute PT needs, PT signing off. Anticipate return home when medically ready.     Time Calculation:         PT Charges       Row Name 12/15/23 1113             Time Calculation    Start Time 1054  -CW      Stop Time 1108  -CW      Time Calculation (min) 14 min  -CW      PT Received On 12/15/23  -CW         Time Calculation- PT    Total Timed Code Minutes- PT 9 minute(s)   -CW         Timed Charges    65091 - PT Therapeutic Activity Minutes 9  -CW         Total Minutes    Timed Charges Total Minutes 9  -CW       Total Minutes 9  -CW                User Key  (r) = Recorded By, (t) = Taken By, (c) = Cosigned By      Initials Name Provider Type    Renetta Hernandez, PT Physical Therapist                  Therapy Charges for Today       Code Description Service Date Service Provider Modifiers Qty    39415730080 HC PT EVAL LOW COMPLEXITY 3 12/15/2023 Renetta Maki, PT GP 1    79713383691 HC PT THER SUPP EA 15 MIN 12/15/2023 Renetta Maki, PT GP 1    01167260177 HC PT THERAPEUTIC ACT EA 15 MIN 12/15/2023 Renetta Maki, PT GP 1            PT G-Codes  Outcome Measure Options: AM-PAC 6 Clicks Basic Mobility (PT)  AM-PAC 6 Clicks Score (PT): 24  AM-PAC 6 Clicks Score (OT): 23    PT Discharge Summary  Anticipated Discharge Disposition (PT): home    Renetta Maki PT  12/15/2023

## 2023-12-15 NOTE — DISCHARGE INSTRUCTIONS
Discharge instructions for [facility]    Keep unit upright and below the level of your chest  Do not disconnect unit  Do not kink tubing by sitting or laying on it  Use belt straps provided to be able to move  and walk around with unit attached to you  No tub baths until chest tube is removed. Sponge baths are okay or you may shower on the days that your visiting nurse visits. Shower just before the nurse visit so the nurse can change the dressing.  Per Doctor Directions Only:  If you are to empty the unit, clean access port on lower front of unit with alcohol wipe. Attach syringe and withdraw fluid. Write down how much fluid you withdraw and bring this with you the next time you see your doctor. How often you do this and when you do it depends on what your doctor tells you to do.  Chest tube usually stays in 1-2 weeks until leak is healed  Return to office to have tube removed  Dressing is then removed 48 hours after chest tube is removed.  Call your doctor if:  The tube falls out: cover the site with a dry, sterile dressing right away  You have shortness of breath or chest pain  You have a fever over 101 degrees  You have any foul smelling or cloudy drainage      Post-op VAT / Thoracotomy Discharge Instructions    1. Activity:  · Climb stairs as tolerated  · May drive car after 2 weeks or as directed by surgeon  · Walk at least 3-4 times a day  · Limit lifting for first 6 weeks. No lifting, pushing, or pulling greater than 20 pounds for at least 2 weeks  · Continue to use your incentive spirometer 10x/hour    2. Nutrition:  · Resume previous diet  · Eat well balanced meals  · Avoid constipation by eating fresh fruits, vegetables, whole grain foods and increasing fluid intake.    3. Incision (wound) Care:  · Remove dressing after 48 hours, then leave open to air  · If continued drainage, change dressing every 24 hours and as needed with dry gauze  · May shower after discharge.  · Wash around incision area with  soap and water daily. May also cleanse with hydrogen peroxide and/or betadine  · No lotions or creams on incision area. It is normal to have some drainage from your chest tube site. Please keep the area clean and dry.    4. When to call for Medical Advice: [phone]  · Fever greater than 101 degrees  · Unusual or severe pain  · Difficulty breathing  · Incision changes (redness, swelling, or increased purulent drainage)  · Any questions or problems    5. Medication Instructions:  · Take Pain medications as directed to stay comfortable     -Typically Tylenol, Gabapentin, Celebrex (anti-inflamatory), oxycodone as needed. See discharge teaching sheet  · No driving or drinking alcohol when taking prescribed pain meds  · Laxative of choice if needed for constipation.    6. Follow- up appointment:  · We will arrange a follow up appointment in about 2 weeks   Please go to the [facility] hospital for a chest x-ray prior to your appointment

## 2023-12-16 ENCOUNTER — APPOINTMENT (OUTPATIENT)
Dept: GENERAL RADIOLOGY | Facility: HOSPITAL | Age: 68
DRG: 163 | End: 2023-12-16
Payer: MEDICARE

## 2023-12-16 PROCEDURE — 94799 UNLISTED PULMONARY SVC/PX: CPT

## 2023-12-16 PROCEDURE — 99024 POSTOP FOLLOW-UP VISIT: CPT | Performed by: SURGERY

## 2023-12-16 PROCEDURE — 94760 N-INVAS EAR/PLS OXIMETRY 1: CPT

## 2023-12-16 PROCEDURE — 63710000001 PREDNISONE PER 5 MG: Performed by: SURGERY

## 2023-12-16 PROCEDURE — 94664 DEMO&/EVAL PT USE INHALER: CPT

## 2023-12-16 PROCEDURE — 25010000002 ENOXAPARIN PER 10 MG: Performed by: SURGERY

## 2023-12-16 PROCEDURE — 94761 N-INVAS EAR/PLS OXIMETRY MLT: CPT

## 2023-12-16 PROCEDURE — 25010000002 KETOROLAC TROMETHAMINE PER 15 MG: Performed by: SURGERY

## 2023-12-16 PROCEDURE — 71045 X-RAY EXAM CHEST 1 VIEW: CPT

## 2023-12-16 RX ADMIN — OXYCODONE HYDROCHLORIDE 5 MG: 5 TABLET ORAL at 11:57

## 2023-12-16 RX ADMIN — DOCUSATE SODIUM 100 MG: 100 CAPSULE, LIQUID FILLED ORAL at 21:02

## 2023-12-16 RX ADMIN — Medication 4 ML: at 15:24

## 2023-12-16 RX ADMIN — METOPROLOL TARTRATE 25 MG: 25 TABLET, FILM COATED ORAL at 08:14

## 2023-12-16 RX ADMIN — DOCUSATE SODIUM 100 MG: 100 CAPSULE, LIQUID FILLED ORAL at 08:14

## 2023-12-16 RX ADMIN — POLYETHYLENE GLYCOL 3350 17 G: 17 POWDER, FOR SOLUTION ORAL at 08:15

## 2023-12-16 RX ADMIN — ACETYLCYSTEINE 3 ML: 200 SOLUTION ORAL; RESPIRATORY (INHALATION) at 21:50

## 2023-12-16 RX ADMIN — ENOXAPARIN SODIUM 40 MG: 100 INJECTION SUBCUTANEOUS at 08:15

## 2023-12-16 RX ADMIN — ACETAMINOPHEN 1000 MG: 500 TABLET ORAL at 15:58

## 2023-12-16 RX ADMIN — ACETAMINOPHEN 1000 MG: 500 TABLET ORAL at 08:14

## 2023-12-16 RX ADMIN — ACETAMINOPHEN 1000 MG: 500 TABLET ORAL at 21:02

## 2023-12-16 RX ADMIN — IPRATROPIUM BROMIDE AND ALBUTEROL SULFATE 3 ML: 2.5; .5 SOLUTION RESPIRATORY (INHALATION) at 06:57

## 2023-12-16 RX ADMIN — ACETYLCYSTEINE 3 ML: 200 SOLUTION ORAL; RESPIRATORY (INHALATION) at 11:01

## 2023-12-16 RX ADMIN — IPRATROPIUM BROMIDE AND ALBUTEROL SULFATE 3 ML: 2.5; .5 SOLUTION RESPIRATORY (INHALATION) at 21:49

## 2023-12-16 RX ADMIN — GABAPENTIN 300 MG: 300 CAPSULE ORAL at 14:21

## 2023-12-16 RX ADMIN — PANTOPRAZOLE SODIUM 40 MG: 40 TABLET, DELAYED RELEASE ORAL at 06:09

## 2023-12-16 RX ADMIN — IPRATROPIUM BROMIDE AND ALBUTEROL SULFATE 3 ML: 2.5; .5 SOLUTION RESPIRATORY (INHALATION) at 15:22

## 2023-12-16 RX ADMIN — GABAPENTIN 300 MG: 300 CAPSULE ORAL at 06:09

## 2023-12-16 RX ADMIN — Medication 4 ML: at 07:00

## 2023-12-16 RX ADMIN — METOPROLOL TARTRATE 25 MG: 25 TABLET, FILM COATED ORAL at 21:03

## 2023-12-16 RX ADMIN — KETOROLAC TROMETHAMINE 15 MG: 15 INJECTION, SOLUTION INTRAMUSCULAR; INTRAVENOUS at 11:13

## 2023-12-16 RX ADMIN — IPRATROPIUM BROMIDE AND ALBUTEROL SULFATE 3 ML: 2.5; .5 SOLUTION RESPIRATORY (INHALATION) at 11:00

## 2023-12-16 RX ADMIN — PREDNISONE 50 MG: 50 TABLET ORAL at 08:14

## 2023-12-16 RX ADMIN — GABAPENTIN 300 MG: 300 CAPSULE ORAL at 21:02

## 2023-12-16 NOTE — PROGRESS NOTES
I reviewed heart rate and blood pressure.  Tolerating metoprolol.  Continue metoprolol nothing further from a cardiac standpoint at this time.

## 2023-12-16 NOTE — PLAN OF CARE
Goal Outcome Evaluation:  Plan of Care Reviewed With: patient        Progress: no change  Outcome Evaluation: VSS, Chest tube 1 placed to waterseal, chest tube 2 to -10sx.

## 2023-12-16 NOTE — PLAN OF CARE
Problem: Adult Inpatient Plan of Care  Goal: Plan of Care Review  Flowsheets (Taken 12/16/2023 8406)  Progress: no change  Plan of Care Reviewed With: patient  Outcome Evaluation: chest tubes to -20 of suction air leak noted more in #2 chest tube than 1 out put is sanginous in #2 no complaints of pain sleeps mostly encouraged incentive spirometer states has been walking unable to detect actual crepitus but has a nasal tone to his voice   Goal Outcome Evaluation:  Plan of Care Reviewed With: patient        Progress: no change  Outcome Evaluation: chest tubes to -20 of suction air leak noted more in #2 chest tube than 1 out put is sanginous in #2 no complaints of pain sleeps mostly encouraged incentive spirometer states has been walking unable to detect actual crepitus but has a nasal tone to his voice

## 2023-12-17 ENCOUNTER — APPOINTMENT (OUTPATIENT)
Dept: GENERAL RADIOLOGY | Facility: HOSPITAL | Age: 68
DRG: 163 | End: 2023-12-17
Payer: MEDICARE

## 2023-12-17 PROCEDURE — 94760 N-INVAS EAR/PLS OXIMETRY 1: CPT

## 2023-12-17 PROCEDURE — 94799 UNLISTED PULMONARY SVC/PX: CPT

## 2023-12-17 PROCEDURE — 99024 POSTOP FOLLOW-UP VISIT: CPT | Performed by: SURGERY

## 2023-12-17 PROCEDURE — 94761 N-INVAS EAR/PLS OXIMETRY MLT: CPT

## 2023-12-17 PROCEDURE — 94664 DEMO&/EVAL PT USE INHALER: CPT

## 2023-12-17 PROCEDURE — 25010000002 ENOXAPARIN PER 10 MG: Performed by: SURGERY

## 2023-12-17 PROCEDURE — 71045 X-RAY EXAM CHEST 1 VIEW: CPT

## 2023-12-17 PROCEDURE — 63710000001 PREDNISONE PER 1 MG: Performed by: SURGERY

## 2023-12-17 RX ORDER — PREDNISONE 20 MG/1
40 TABLET ORAL
Status: DISCONTINUED | OUTPATIENT
Start: 2023-12-17 | End: 2023-12-18

## 2023-12-17 RX ADMIN — ACETAMINOPHEN 1000 MG: 500 TABLET ORAL at 08:35

## 2023-12-17 RX ADMIN — GABAPENTIN 300 MG: 300 CAPSULE ORAL at 14:11

## 2023-12-17 RX ADMIN — ACETAMINOPHEN 1000 MG: 500 TABLET ORAL at 21:05

## 2023-12-17 RX ADMIN — ACETAMINOPHEN 1000 MG: 500 TABLET ORAL at 15:46

## 2023-12-17 RX ADMIN — DOCUSATE SODIUM 100 MG: 100 CAPSULE, LIQUID FILLED ORAL at 21:05

## 2023-12-17 RX ADMIN — ENOXAPARIN SODIUM 40 MG: 100 INJECTION SUBCUTANEOUS at 08:34

## 2023-12-17 RX ADMIN — Medication 4 ML: at 15:16

## 2023-12-17 RX ADMIN — OXYCODONE HYDROCHLORIDE 5 MG: 5 TABLET ORAL at 09:30

## 2023-12-17 RX ADMIN — ACETYLCYSTEINE 3 ML: 200 SOLUTION ORAL; RESPIRATORY (INHALATION) at 20:08

## 2023-12-17 RX ADMIN — METOPROLOL TARTRATE 25 MG: 25 TABLET, FILM COATED ORAL at 08:35

## 2023-12-17 RX ADMIN — GABAPENTIN 300 MG: 300 CAPSULE ORAL at 21:05

## 2023-12-17 RX ADMIN — IPRATROPIUM BROMIDE AND ALBUTEROL SULFATE 3 ML: 2.5; .5 SOLUTION RESPIRATORY (INHALATION) at 20:08

## 2023-12-17 RX ADMIN — ACETYLCYSTEINE 3 ML: 200 SOLUTION ORAL; RESPIRATORY (INHALATION) at 11:40

## 2023-12-17 RX ADMIN — PANTOPRAZOLE SODIUM 40 MG: 40 TABLET, DELAYED RELEASE ORAL at 07:34

## 2023-12-17 RX ADMIN — IPRATROPIUM BROMIDE AND ALBUTEROL SULFATE 3 ML: 2.5; .5 SOLUTION RESPIRATORY (INHALATION) at 11:40

## 2023-12-17 RX ADMIN — IPRATROPIUM BROMIDE AND ALBUTEROL SULFATE 3 ML: 2.5; .5 SOLUTION RESPIRATORY (INHALATION) at 08:09

## 2023-12-17 RX ADMIN — PREDNISONE 40 MG: 20 TABLET ORAL at 10:01

## 2023-12-17 RX ADMIN — IPRATROPIUM BROMIDE AND ALBUTEROL SULFATE 3 ML: 2.5; .5 SOLUTION RESPIRATORY (INHALATION) at 15:16

## 2023-12-17 RX ADMIN — GABAPENTIN 300 MG: 300 CAPSULE ORAL at 07:34

## 2023-12-17 RX ADMIN — Medication 4 ML: at 08:09

## 2023-12-17 RX ADMIN — POLYETHYLENE GLYCOL 3350 17 G: 17 POWDER, FOR SOLUTION ORAL at 08:35

## 2023-12-17 RX ADMIN — DOCUSATE SODIUM 100 MG: 100 CAPSULE, LIQUID FILLED ORAL at 08:35

## 2023-12-17 RX ADMIN — METOPROLOL TARTRATE 25 MG: 25 TABLET, FILM COATED ORAL at 21:05

## 2023-12-17 NOTE — PROGRESS NOTES
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  He reports chest pain.  No weakness.    Diet:  Adequate intake.  No nausea or vomiting.    Activity level: Returning to normal.    Pain:  He complains of pain that is mild.  Pain is well controlled.    Up to chair. On 1-2L    Objective:  General Appearance:  Comfortable and in no acute distress.    Vital signs: (most recent): Blood pressure 123/78, pulse 85, temperature 99 °F (37.2 °C), temperature source Oral, resp. rate 16, height 180.3 cm (71\"), SpO2 100%.  Vital signs are normal.  No fever.    Output: Minimal urine output and no stool output.    Lungs:  Normal effort and normal respiratory rate.  There are decreased breath sounds.    Heart: Normal rate.  Regular rhythm.    Chest: Chest wall tenderness present.    Abdomen: Abdomen is soft.  Bowel sounds are normal.     Neurological: Patient is alert and oriented to person, place and time.    Skin:  Warm and dry.                Chest tube:   Site: Left, Clean, Dry, Intact, and Securement device intact  Suction: waterseal  Air Leak: positive    Results Review:     I reviewed the patient's new clinical results.  I reviewed the patient's new imaging results and agree with the interpretation.  I reviewed the patient's other test results and agree with the interpretation  Discussed with patient, family at bedside.    Assessment & Plan     Mr. Hung is a pleasant 68 year-old gentleman who is s/p POD # 4 daVinci robot assisted left lower lobectomy.  Persistent air leak on exam in chest tube # 2. Stable subq air slightly worse. Leave chest tube # 2 to -10 cm and re-check a CXR in the morning.  Wean prednisone over the next few days. "   Appreciate cardiology recommendations.  Increase mobilization and pulmonary toilet.      Rosalio Scott MD  [facility]  12/17/23  10:39 EST    Patient was seen and assessed while wearing personal protective equipment including facemask, protective eyewear and gloves.  Hand hygiene performed prior to entering the room and upon exiting with doffing of gloves.

## 2023-12-17 NOTE — PLAN OF CARE
Goal Outcome Evaluation:  Plan of Care Reviewed With: patient        Progress: improving  Outcome Evaluation: VSS, pain treated with PRN meds. Chest tube 1 to waterseal and chest tube 2 to -10sx. Ambulating with assistance.

## 2023-12-17 NOTE — PLAN OF CARE
Problem: Adult Inpatient Plan of Care  Goal: Plan of Care Review  Flowsheets (Taken 12/17/2023 3849)  Progress: no change  Plan of Care Reviewed With: patient  Outcome Evaluation: chest tubes patent number 1 to waterseal and number 2 to -10 sanginous drainage in number 2 none in number 1 air leak in both tubes subq air in left chest refused pain med has been sleeping ambulated in the trejo and tolerated well with assistance and walker. dressing change to chest tube site voidig per urinal at least 300 at a time   Goal Outcome Evaluation:  Plan of Care Reviewed With: patient        Progress: no change  Outcome Evaluation: chest tubes patent number 1 to waterseal and number 2 to -10 sanginous drainage in number 2 none in number 1 air leak in both tubes subq air in left chest refused pain med has been sleeping ambulated in the trejo and tolerated well with assistance and walker. dressing change to chest tube site voidig per urinal at least 300 at a time

## 2023-12-18 ENCOUNTER — APPOINTMENT (OUTPATIENT)
Dept: GENERAL RADIOLOGY | Facility: HOSPITAL | Age: 68
DRG: 163 | End: 2023-12-18
Payer: MEDICARE

## 2023-12-18 PROCEDURE — 71045 X-RAY EXAM CHEST 1 VIEW: CPT

## 2023-12-18 PROCEDURE — 94761 N-INVAS EAR/PLS OXIMETRY MLT: CPT

## 2023-12-18 PROCEDURE — 94799 UNLISTED PULMONARY SVC/PX: CPT

## 2023-12-18 PROCEDURE — 25010000002 ENOXAPARIN PER 10 MG: Performed by: SURGERY

## 2023-12-18 PROCEDURE — 25010000002 HYDROMORPHONE PER 4 MG: Performed by: SURGERY

## 2023-12-18 PROCEDURE — 99024 POSTOP FOLLOW-UP VISIT: CPT | Performed by: NURSE PRACTITIONER

## 2023-12-18 PROCEDURE — 63710000001 PREDNISONE PER 1 MG: Performed by: SURGERY

## 2023-12-18 PROCEDURE — 94664 DEMO&/EVAL PT USE INHALER: CPT

## 2023-12-18 RX ORDER — DIAZEPAM 2 MG/1
2 TABLET ORAL EVERY 6 HOURS PRN
Status: DISCONTINUED | OUTPATIENT
Start: 2023-12-18 | End: 2023-12-19

## 2023-12-18 RX ORDER — BISACODYL 5 MG/1
5 TABLET, DELAYED RELEASE ORAL 2 TIMES DAILY
Status: DISCONTINUED | OUTPATIENT
Start: 2023-12-18 | End: 2023-12-24 | Stop reason: HOSPADM

## 2023-12-18 RX ORDER — BISACODYL 10 MG
10 SUPPOSITORY, RECTAL RECTAL DAILY PRN
Status: DISCONTINUED | OUTPATIENT
Start: 2023-12-18 | End: 2023-12-24 | Stop reason: HOSPADM

## 2023-12-18 RX ORDER — LIDOCAINE 4 G/G
1 PATCH TOPICAL
Status: DISCONTINUED | OUTPATIENT
Start: 2023-12-18 | End: 2023-12-24 | Stop reason: HOSPADM

## 2023-12-18 RX ADMIN — IPRATROPIUM BROMIDE AND ALBUTEROL SULFATE 3 ML: 2.5; .5 SOLUTION RESPIRATORY (INHALATION) at 07:10

## 2023-12-18 RX ADMIN — ACETAMINOPHEN 1000 MG: 500 TABLET ORAL at 08:31

## 2023-12-18 RX ADMIN — Medication 4 ML: at 14:18

## 2023-12-18 RX ADMIN — ACETAMINOPHEN 1000 MG: 500 TABLET ORAL at 21:12

## 2023-12-18 RX ADMIN — BISACODYL 5 MG: 5 TABLET, COATED ORAL at 21:12

## 2023-12-18 RX ADMIN — GABAPENTIN 300 MG: 300 CAPSULE ORAL at 06:26

## 2023-12-18 RX ADMIN — GABAPENTIN 300 MG: 300 CAPSULE ORAL at 21:12

## 2023-12-18 RX ADMIN — OXYCODONE HYDROCHLORIDE 5 MG: 5 TABLET ORAL at 18:12

## 2023-12-18 RX ADMIN — GABAPENTIN 300 MG: 300 CAPSULE ORAL at 14:32

## 2023-12-18 RX ADMIN — POLYETHYLENE GLYCOL 3350 17 G: 17 POWDER, FOR SOLUTION ORAL at 08:31

## 2023-12-18 RX ADMIN — METOPROLOL TARTRATE 25 MG: 25 TABLET, FILM COATED ORAL at 21:12

## 2023-12-18 RX ADMIN — IPRATROPIUM BROMIDE AND ALBUTEROL SULFATE 3 ML: 2.5; .5 SOLUTION RESPIRATORY (INHALATION) at 11:17

## 2023-12-18 RX ADMIN — Medication 4 ML: at 07:11

## 2023-12-18 RX ADMIN — DOCUSATE SODIUM 100 MG: 100 CAPSULE, LIQUID FILLED ORAL at 08:31

## 2023-12-18 RX ADMIN — ACETYLCYSTEINE 3 ML: 200 SOLUTION ORAL; RESPIRATORY (INHALATION) at 11:20

## 2023-12-18 RX ADMIN — METOPROLOL TARTRATE 25 MG: 25 TABLET, FILM COATED ORAL at 08:31

## 2023-12-18 RX ADMIN — OXYCODONE HYDROCHLORIDE 5 MG: 5 TABLET ORAL at 06:26

## 2023-12-18 RX ADMIN — IPRATROPIUM BROMIDE AND ALBUTEROL SULFATE 3 ML: 2.5; .5 SOLUTION RESPIRATORY (INHALATION) at 19:38

## 2023-12-18 RX ADMIN — BISACODYL 5 MG: 5 TABLET, COATED ORAL at 12:09

## 2023-12-18 RX ADMIN — ACETAMINOPHEN 1000 MG: 500 TABLET ORAL at 16:05

## 2023-12-18 RX ADMIN — PANTOPRAZOLE SODIUM 40 MG: 40 TABLET, DELAYED RELEASE ORAL at 06:26

## 2023-12-18 RX ADMIN — DIAZEPAM 2 MG: 2 TABLET ORAL at 12:09

## 2023-12-18 RX ADMIN — HYDROMORPHONE HYDROCHLORIDE 0.5 MG: 1 INJECTION, SOLUTION INTRAMUSCULAR; INTRAVENOUS; SUBCUTANEOUS at 10:44

## 2023-12-18 RX ADMIN — LIDOCAINE 1 PATCH: 4 PATCH TOPICAL at 12:09

## 2023-12-18 RX ADMIN — DOCUSATE SODIUM 100 MG: 100 CAPSULE, LIQUID FILLED ORAL at 21:12

## 2023-12-18 RX ADMIN — ACETYLCYSTEINE 3 ML: 200 SOLUTION ORAL; RESPIRATORY (INHALATION) at 19:42

## 2023-12-18 RX ADMIN — ENOXAPARIN SODIUM 40 MG: 100 INJECTION SUBCUTANEOUS at 08:31

## 2023-12-18 RX ADMIN — PREDNISONE 40 MG: 20 TABLET ORAL at 08:31

## 2023-12-18 RX ADMIN — IPRATROPIUM BROMIDE AND ALBUTEROL SULFATE 3 ML: 2.5; .5 SOLUTION RESPIRATORY (INHALATION) at 14:17

## 2023-12-18 NOTE — PLAN OF CARE
Goal Outcome Evaluation:  Plan of Care Reviewed With: patient        Progress: improving  Outcome Evaluation: Monitor pain,labs,and vitals. VSS. O2 1L NC. Monitor CT output. No C/O pain on current shift. Will continue to monitor.

## 2023-12-18 NOTE — PROGRESS NOTES
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  He reports chest pain.  No weakness.    Diet:  Adequate intake.  No nausea or vomiting.    Activity level: Returning to normal.    Pain:  He complains of pain that is mild.  Pain is well controlled.    Up to chair. On 1L.  Feeling well overall.    Objective:  General Appearance:  Comfortable and in no acute distress.    Vital signs: (most recent): Blood pressure 116/71, pulse 69, temperature 98.1 °F (36.7 °C), temperature source Oral, resp. rate 18, height 180.3 cm (71\"), SpO2 100%.  Vital signs are normal.  No fever.    Output: Minimal urine output and no stool output.    Lungs:  Normal effort and normal respiratory rate.  There are decreased breath sounds.    Heart: Normal rate.  Regular rhythm.    Chest: Chest wall tenderness present.    Abdomen: Abdomen is soft.  Bowel sounds are normal.     Neurological: Patient is alert and oriented to person, place and time.    Skin:  Warm and dry.                Chest tube:   Site: Left, Clean, Dry, Intact, and Securement device intact  Suction: waterseal  Air Leak: positive to #2  24h output: 0/500    Results Review:     I reviewed the patient's new clinical results.  I reviewed the patient's new imaging results and agree with the interpretation.  I reviewed the patient's other test results and agree with the interpretation  Discussed with patient, family at bedside.    Assessment & Plan     Mr. Hung is a pleasant 68 year-old gentleman who is POD 5 s/p daVinci robot assisted left lower lobectomy.  Persistent air leak on exam in chest tube # 2. Stable subq air slightly worse.  Placed both chest tubes to -10 cm of suction and recheck a " chest x-ray in the morning.  Continue to wean prednisone over the next few days.  This is likely contributing to his persistent air leak.  Appreciate cardiology recommendations.  Add Dulcolax PO to bowel regimen.  Increase mobilization and pulmonary toilet.      Katerina Garcia DNP, APRN  [facility]  12/18/23  13:58 EST    Patient was seen and assessed while wearing personal protective equipment including facemask, protective eyewear and gloves.  Hand hygiene performed prior to entering the room and upon exiting with doffing of gloves.

## 2023-12-18 NOTE — PLAN OF CARE
Goal Outcome Evaluation:  Plan of Care Reviewed With: patient        Progress: no change  Outcome Evaluation: Pt alert and orient. On 1L o2. Pain controlled with prn meds. Chest tubes 1 and 2 to -10 suction, airleaks and fluctuation noted in both atriums.  Bilateral subq air felt on chest/back and into abdomen. Pt having consitpation, abdomen firm/distended, see new APRN orders. Up with assist x1.

## 2023-12-19 ENCOUNTER — APPOINTMENT (OUTPATIENT)
Dept: GENERAL RADIOLOGY | Facility: HOSPITAL | Age: 68
DRG: 163 | End: 2023-12-19
Payer: MEDICARE

## 2023-12-19 PROCEDURE — 71045 X-RAY EXAM CHEST 1 VIEW: CPT

## 2023-12-19 PROCEDURE — 63710000001 PREDNISONE PER 1 MG: Performed by: SURGERY

## 2023-12-19 PROCEDURE — 94664 DEMO&/EVAL PT USE INHALER: CPT

## 2023-12-19 PROCEDURE — 25010000002 ENOXAPARIN PER 10 MG: Performed by: SURGERY

## 2023-12-19 PROCEDURE — 99024 POSTOP FOLLOW-UP VISIT: CPT

## 2023-12-19 PROCEDURE — 63710000001 PREDNISONE PER 5 MG: Performed by: SURGERY

## 2023-12-19 PROCEDURE — 94799 UNLISTED PULMONARY SVC/PX: CPT

## 2023-12-19 PROCEDURE — 94761 N-INVAS EAR/PLS OXIMETRY MLT: CPT

## 2023-12-19 RX ORDER — TAMSULOSIN HYDROCHLORIDE 0.4 MG/1
0.8 CAPSULE ORAL DAILY
Status: DISCONTINUED | OUTPATIENT
Start: 2023-12-19 | End: 2023-12-24 | Stop reason: HOSPADM

## 2023-12-19 RX ORDER — PREDNISONE 20 MG/1
20 TABLET ORAL
Status: DISCONTINUED | OUTPATIENT
Start: 2023-12-20 | End: 2023-12-20

## 2023-12-19 RX ADMIN — IPRATROPIUM BROMIDE AND ALBUTEROL SULFATE 3 ML: 2.5; .5 SOLUTION RESPIRATORY (INHALATION) at 07:15

## 2023-12-19 RX ADMIN — ENOXAPARIN SODIUM 40 MG: 100 INJECTION SUBCUTANEOUS at 08:24

## 2023-12-19 RX ADMIN — ACETAMINOPHEN 1000 MG: 500 TABLET ORAL at 08:24

## 2023-12-19 RX ADMIN — PREDNISONE 30 MG: 10 TABLET ORAL at 08:24

## 2023-12-19 RX ADMIN — Medication 4 ML: at 14:30

## 2023-12-19 RX ADMIN — POLYETHYLENE GLYCOL 3350 17 G: 17 POWDER, FOR SOLUTION ORAL at 08:24

## 2023-12-19 RX ADMIN — DOCUSATE SODIUM 100 MG: 100 CAPSULE, LIQUID FILLED ORAL at 08:24

## 2023-12-19 RX ADMIN — ACETYLCYSTEINE 3 ML: 200 SOLUTION ORAL; RESPIRATORY (INHALATION) at 21:30

## 2023-12-19 RX ADMIN — ACETYLCYSTEINE 3 ML: 200 SOLUTION ORAL; RESPIRATORY (INHALATION) at 11:20

## 2023-12-19 RX ADMIN — GABAPENTIN 300 MG: 300 CAPSULE ORAL at 13:19

## 2023-12-19 RX ADMIN — BISACODYL 5 MG: 5 TABLET, COATED ORAL at 08:24

## 2023-12-19 RX ADMIN — LIDOCAINE 1 PATCH: 4 PATCH TOPICAL at 08:24

## 2023-12-19 RX ADMIN — IPRATROPIUM BROMIDE AND ALBUTEROL SULFATE 3 ML: 2.5; .5 SOLUTION RESPIRATORY (INHALATION) at 21:26

## 2023-12-19 RX ADMIN — ACETAMINOPHEN 1000 MG: 500 TABLET ORAL at 17:03

## 2023-12-19 RX ADMIN — DOCUSATE SODIUM 100 MG: 100 CAPSULE, LIQUID FILLED ORAL at 21:54

## 2023-12-19 RX ADMIN — PANTOPRAZOLE SODIUM 40 MG: 40 TABLET, DELAYED RELEASE ORAL at 06:42

## 2023-12-19 RX ADMIN — IPRATROPIUM BROMIDE AND ALBUTEROL SULFATE 3 ML: 2.5; .5 SOLUTION RESPIRATORY (INHALATION) at 11:18

## 2023-12-19 RX ADMIN — METOPROLOL TARTRATE 25 MG: 25 TABLET, FILM COATED ORAL at 21:54

## 2023-12-19 RX ADMIN — GABAPENTIN 300 MG: 300 CAPSULE ORAL at 06:42

## 2023-12-19 RX ADMIN — GABAPENTIN 300 MG: 300 CAPSULE ORAL at 21:54

## 2023-12-19 RX ADMIN — METOPROLOL TARTRATE 25 MG: 25 TABLET, FILM COATED ORAL at 08:24

## 2023-12-19 RX ADMIN — OXYCODONE HYDROCHLORIDE 5 MG: 5 TABLET ORAL at 21:53

## 2023-12-19 RX ADMIN — ACETAMINOPHEN 1000 MG: 500 TABLET ORAL at 21:53

## 2023-12-19 RX ADMIN — Medication 4 ML: at 07:18

## 2023-12-19 RX ADMIN — TAMSULOSIN HYDROCHLORIDE 0.8 MG: 0.4 CAPSULE ORAL at 13:08

## 2023-12-19 RX ADMIN — IPRATROPIUM BROMIDE AND ALBUTEROL SULFATE 3 ML: 2.5; .5 SOLUTION RESPIRATORY (INHALATION) at 14:28

## 2023-12-19 NOTE — CASE MANAGEMENT/SOCIAL WORK
Continued Stay Note  [facility]     Patient Name: Sunny Hung  MRN: [id number]  Today's Date: 12/19/2023    Admit Date: 12/13/2023    Plan: Home vs home with HH   Discharge Plan       Row Name 12/19/23 1245       Plan    Plan Home vs home with HH    Patient/Family in Agreement with Plan yes    Plan Comments Met with pt at bedside who confirms plan to return home at discharge.  May have mini 500 at discharge.  If needs HH, does not have preference on company.  Referrals placed in Epic.  If home O2 needed, will use [facility]. No further needs identified. CCP continues to follow.  MARTIN Montanez RN                   Discharge Codes    No documentation.                 Expected Discharge Date and Time       Expected Discharge Date Expected Discharge Time    Dec 21, 2023               Kathy Montanez, RN

## 2023-12-19 NOTE — PROGRESS NOTES
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  He reports chest pain.  No weakness.    Diet:  Adequate intake.  No nausea or vomiting.    Activity level: Returning to normal.    Pain:  He complains of pain that is mild.  Pain is well controlled.    No new complaints.     Objective:  General Appearance:  Comfortable and in no acute distress.    Vital signs: (most recent): Blood pressure 115/64, pulse 79, temperature 97.5 °F (36.4 °C), temperature source Oral, resp. rate 16, height 180.3 cm (71\"), SpO2 100%.  Vital signs are normal.  No fever.    Output: Minimal urine output and no stool output.    Lungs:  Normal effort and normal respiratory rate.  He is not in respiratory distress.  There are decreased breath sounds.    Heart: Normal rate.  Regular rhythm.    Chest: Symmetric chest wall expansion. Chest wall tenderness present.    Abdomen: Abdomen is soft.  Bowel sounds are normal.     Neurological: Patient is alert and oriented to person, place and time.    Skin:  Warm and dry.                Chest tube x2:   Site: Left, Clean, Dry, Intact, and Securement device intact  Suction: waterseal  Air Leak: positive, Both chest tubes   24h output: 40ml/380ml     Results Review:     I reviewed the patient's new clinical results.  I reviewed the patient's new imaging results and agree with the interpretation.  I reviewed the patient's other test results and agree with the interpretation  Discussed with patient, family at bedside, Dr. uDmas and Dr. Scott    Assessment & Plan     Mr. Hung is a pleasant 68 year-old gentleman who is POD 5 s/p daVinci robot assisted left lower lobectomy.  A.m. chest x-ray reviewed no significant left " pneumothorax.  Small linear line in the right upper hemithorax soft tissue fold versus right pleural separation..    Persistent air leak to both chest tubes.  Subcutaneous emphysema stable.  Chest tubes remain to -10 cm of suction overnight.  Will transition chest tube #2 to waterseal and continue chest tube #1 to -10 cm of suction.  Prednisone taper ongoing, will transition to 20 mg dose starting tomorrow.  This is likely contributing to his persistent air leak.  The patient having issues of urinary retention with high postvoid residuals requiring intermittent catheterization for a few days.  Will ask urology to evaluate him.  Repeat a.m. chest x-ray.  Encourage good pulmonary hygiene.  Increase activity.    HILDA Quintana  [facility]  12/19/23  14:18 EST    Patient was seen and assessed while wearing personal protective equipment including facemask, protective eyewear and gloves.  Hand hygiene performed prior to entering the room and upon exiting with doffing of gloves.

## 2023-12-19 NOTE — CONSULTS
FIRST UROLOGY CONSULT      Patient Identification:  NAME:  Sunny Hung  Age:  68 y.o.   Sex:  male   :  1955   MRN:  [id number]     Chief complaint: Post-op retention    History of present illness:      Sunny Hung is a 68 y.o. male with a history of BPH who underwent a laparoscopic thoracoscopy with lobectomy on 23 for a mass on the left lung. A hill catheter was placed for surgery and removed POD 1. Patient has small PVR initially after surgery but has progressively gotten worse with residuals anywhere from 387 to 999 this AM. Patient has had intermittent catheterization. Urology was consulted for evaluation and treatment of urinary retention. Pt does not history of retention but has noticed frequency with small voids recently.  Pt denies hematuria, dysuria, fever, flank pain, nausea or vomiting.      In hospital:  -AVSS, good UOP  -WBC - 12.17  -Creat - 0.93    Asked to see    Past medical history:  Past Medical History:   Diagnosis Date    Arthritis     BPH (benign prostatic hyperplasia)     Bruises easily     Bursitis of right shoulder     Cough     Hyperlipidemia     Mass of lower lobe of left lung        Past surgical history:  Past Surgical History:   Procedure Laterality Date    BRONCHOSCOPY WITH ION ROBOTIC ASSIST N/A 2023    Procedure: BRONCHOSCOPY WITH BAL;   ION ROBOT AND ENDOBRONCHIAL ULTRASOUND WITH FNA'S;  Surgeon: Rosalio Scott MD;  Location: [facility] ENDOSCOPY;  Service: Robotics - Pulmonary;  Laterality: N/A;  PRE- LEFT LOWER LOBE MASS  POST- SAME    GANGLION CYST EXCISION Bilateral     HEMORRHOIDECTOMY      LOBECTOMY Left 2023    Procedure: BRONCHOSCOPY, THORACOSCOPY WITH LYSIS OF ADHESIONS (120 MINUTES), LEFT LOWER LOBECTOMY WITH EN BLOC PARTIAL PARIETAL PLEURECTOMY USING DAVINCI ROBOT, MEDIASTINAL LYMPH NODE DISSECTION, INTERCOSTAL NERVE BLOCK;  Surgeon: Rosalio Scott MD;  Location: [facility] OR;  Service: Robotics - DaVinci;  Laterality: Left;    OTHER  SURGICAL HISTORY      SOMETHING REMOVED FROM LEFT CHEST, BENIGN ? LIPOMA       Allergies:  Amoxicillin-pot clavulanate    Home medications:  Medications Prior to Admission   Medication Sig Dispense Refill Last Dose    pravastatin (PRAVACHOL) 20 MG tablet Take 1 tablet by mouth Every Night.   12/12/2023 at 1700    predniSONE (DELTASONE) 10 MG tablet Take 7 tablets by mouth Daily With Breakfast. (Patient taking differently: Take 6 tablets by mouth Daily With Breakfast.) 100 tablet 0 Past Week    fluticasone (FLONASE) 50 MCG/ACT nasal spray 2 sprays into the nostril(s) as directed by provider Daily As Needed.   More than a month    omeprazole (priLOSEC) 20 MG capsule Take 1 capsule by mouth Daily. 30 capsule 0 More than a month    ondansetron (ZOFRAN) 8 MG tablet Take 1 tablet by mouth 3 (Three) Times a Day As Needed for Nausea or Vomiting. 30 tablet 2         Hospital medications:  acetaminophen, 1,000 mg, Oral, TID  acetylcysteine, 3 mL, Nebulization, BID - RT  bisacodyl, 5 mg, Oral, BID  docusate sodium, 100 mg, Oral, BID  enoxaparin, 40 mg, Subcutaneous, Daily  gabapentin, 300 mg, Oral, Q8H  ipratropium-albuterol, 3 mL, Nebulization, 4x Daily - RT  Lidocaine, 1 patch, Transdermal, Q24H  metoprolol tartrate, 25 mg, Oral, BID  pantoprazole, 40 mg, Oral, Q AM  polyethylene glycol, 17 g, Oral, Daily  predniSONE, 30 mg, Oral, Daily With Breakfast  sodium chloride, 4 mL, Nebulization, BID - RT  tamsulosin, 0.8 mg, Oral, Daily           bisacodyl    HYDROmorphone    nitroglycerin    nitroglycerin    ondansetron **OR** ondansetron    oxyCODONE    Family history:  Family History   Problem Relation Age of Onset    Bone cancer Mother     COPD Father     Brain cancer Sister     Cancer Brother         Malignant tumor of pharynx    Alcohol abuse Brother     Cirrhosis Brother     Recurrent abdominal pain Brother     Lung cancer Brother     Malig Hyperthermia Neg Hx        Social history:  Social History     Tobacco Use    Smoking  status: Former     Packs/day: 0.50     Years: 40.00     Additional pack years: 0.00     Total pack years: 20.00     Types: Cigarettes     Quit date: 2016     Years since quittin.9    Smokeless tobacco: Never   Vaping Use    Vaping Use: Never used   Substance Use Topics    Alcohol use: Not Currently     Comment: VERY RARELY, MAYBE 1 BEER    Drug use: Never       Review of systems:      12 point negative except as in HPI    Objective:  TMax 24 hours:   Temp (24hrs), Av.9 °F (36.6 °C), Min:97.5 °F (36.4 °C), Max:98.8 °F (37.1 °C)      Vitals Ranges:   Temp:  [97.5 °F (36.4 °C)-98.8 °F (37.1 °C)] 97.5 °F (36.4 °C)  Heart Rate:  [69-96] 79  Resp:  [16-20] 20  BP: (117-126)/(65-79) 117/78    Intake/Output Last 3 shifts:  I/O last 3 completed shifts:  In: 1200 [P.O.:1200]  Out: 3470 [Urine:2800; Chest Tube:670]     Physical Exam:    General Appearance:    Alert, cooperative, NAD   Back:     No CVA tenderness   Lungs:     Respirations unlabored, no wheezing   Abdomen:     Soft, NDNT, no masses, no guarding   :    Bladder slightly distended, with mild tenderness to palpation    Neuro/Psych:   Orientation intact, mood/affect pleasant       Results review:   I reviewed the patient's new clinical results.    Data review:  Lab Results (last 24 hours)       ** No results found for the last 24 hours. **             Imaging:  Imaging Results (Last 24 Hours)       Procedure Component Value Units Date/Time    XR Chest 1 View [[id number]] Collected: 23 0849     Updated: 23    Narrative:      XR CHEST 1 VW-2023     HISTORY: Postop lung surgery.     The heart size is at the upper limits of normal. There is volume loss of  the left hemithorax. There are patchy areas of atelectasis, infiltrate  or asymmetric edema in the left lung. Left chest tube terminates in the  left apex.     No left pneumothorax is seen today. A subtle linear line projects over  the upper lateral aspect of the right hemithorax which  could be a soft  tissue fold versus a subtle small pneumothorax.     There is a moderately large amount of soft tissue air in the neck and  chest bilaterally more on the left than on the right.       Impression:      1. Volume loss of the left hemithorax with some patchy atelectasis or  infiltrate in the left lung. Left chest tube terminates in the left  apex.  2. No significant left pneumothorax is seen on today's study.  3. Small linear line in the right upper hemithorax could represent soft  tissue fold versus a small right pneumothorax.  3. There is moderately large soft tissue air in the neck and chest more  on the left than on the right and this appears relatively unchanged from  yesterday's study.     This report was finalized on 12/19/2023 8:54 AM by Dr. Wan Gonsalves M.D on Workstation: [id number]                Assessment:     Urinary retention  Left lung mass s/p lobectomy       Plan:   - No acute urologic surgical intervention recommended.  - Start Tamsulosin 0.8 mg QD  - Inserted indwelling catheter for now.  - Recommend voiding trial early AM of day of discharge or once patient is ambulatory.   - If unable to void and/or PVR >250 mL, replace hill  - If the patient is discharged with an indwelling catheter, please arrange an outpatient consult in the urology office: [phone].  - Urology will sign off; please call with any questions/concerns or clinical change

## 2023-12-19 NOTE — PLAN OF CARE
Goal Outcome Evaluation:  Plan of Care Reviewed With: patient        Progress: no change  Outcome Evaluation: VSS, no complaints. Prater placed per urology for urinary retention. Flomax started.          <--- Click to Launch ICDx for PreOp, PostOp and Procedure

## 2023-12-19 NOTE — PLAN OF CARE
Problem: Adult Inpatient Plan of Care  Goal: Plan of Care Review  Recent Flowsheet Documentation  Taken 12/19/2023 0441 by Briana Ramachandran RN  Progress: no change  Plan of Care Reviewed With: patient  Outcome Evaluation: oxygen at 1 liter chest tubes patent to -10 of suction air leaks noted worse in nmber 2 complains of constipation but has not been able to void except small amount 150 total bladder scan says > than 999 straight cathed and 1200 cc alejandro urine returned no request for pain med  sub q air noted in chest anteriorly right and left   Goal Outcome Evaluation:  Plan of Care Reviewed With: patient        Progress: no change  Outcome Evaluation: chest tubes patent number 1 to waterseal and number 2 to -10 sanginous drainage in number 2 none in number 1 air leak in both tubes subq air in left chest refused pain med has been sleeping ambulated in the trejo and tolerated well with assistance and walker. dressing change to chest tube site voidig per urinal at least 300 at a time

## 2023-12-19 NOTE — DISCHARGE PLACEMENT REQUEST
"Maria Luz Morales (68 y.o. Male)       Date of Birth   1955    Social Security Number       Address   [address] [place] [address]    Home Phone   [phone]    MRN   [id number]       Mandaen   None    Marital Status                               Admission Date   12/13/23    Admission Type   Elective    Admitting Provider   Rosalio Scott MD    Attending Provider   Rosalio Scott MD    Department, Room/Bed   [address], [address]1       Discharge Date       Discharge Disposition       Discharge Destination                                 Attending Provider: Rosalio Scott MD    Allergies: Amoxicillin-pot Clavulanate    Isolation: None   Infection: None   Code Status: CPR    Ht: 180.3 cm (71\")   Wt: 76.2 kg (168 lb)    Admission Cmt: None   Principal Problem: Mass of left lung [R91.8]                   Active Insurance as of 12/13/2023       Primary Coverage       Payor Plan Insurance Group Employer/Plan Group    HUMANA MEDICARE REPLACEMENT HUMANA MEDICARE REPLACEMENT [id number]       Payor Plan Address Payor Plan Phone Number Payor Plan Fax Number Effective Dates    [address] [address] [phone]  9/1/2021 - None Entered    [facility] [address]         Subscriber Name Subscriber Birth Date Member ID       MARIA LUZ MORALES 1955 [id number]                     Emergency Contacts        (Rel.) Home Phone Work Phone Mobile Phone    HERSON MORALES (Spouse) [phone] -- --              Emergency Contact Information       Name Relation Home Work Mobile    HERSON MORALES Spouse [phone]            "

## 2023-12-20 ENCOUNTER — APPOINTMENT (OUTPATIENT)
Dept: GENERAL RADIOLOGY | Facility: HOSPITAL | Age: 68
DRG: 163 | End: 2023-12-20
Payer: MEDICARE

## 2023-12-20 PROCEDURE — 63710000001 PREDNISONE PER 1 MG

## 2023-12-20 PROCEDURE — 94799 UNLISTED PULMONARY SVC/PX: CPT

## 2023-12-20 PROCEDURE — 25010000002 ENOXAPARIN PER 10 MG: Performed by: SURGERY

## 2023-12-20 PROCEDURE — 71045 X-RAY EXAM CHEST 1 VIEW: CPT

## 2023-12-20 PROCEDURE — 94664 DEMO&/EVAL PT USE INHALER: CPT

## 2023-12-20 PROCEDURE — 94761 N-INVAS EAR/PLS OXIMETRY MLT: CPT

## 2023-12-20 PROCEDURE — 94762 N-INVAS EAR/PLS OXIMTRY CONT: CPT

## 2023-12-20 PROCEDURE — 99024 POSTOP FOLLOW-UP VISIT: CPT

## 2023-12-20 RX ORDER — PREDNISONE 10 MG/1
10 TABLET ORAL
Status: DISCONTINUED | OUTPATIENT
Start: 2023-12-21 | End: 2023-12-21

## 2023-12-20 RX ADMIN — IPRATROPIUM BROMIDE AND ALBUTEROL SULFATE 3 ML: 2.5; .5 SOLUTION RESPIRATORY (INHALATION) at 19:53

## 2023-12-20 RX ADMIN — TAMSULOSIN HYDROCHLORIDE 0.8 MG: 0.4 CAPSULE ORAL at 08:26

## 2023-12-20 RX ADMIN — DOCUSATE SODIUM 100 MG: 100 CAPSULE, LIQUID FILLED ORAL at 08:27

## 2023-12-20 RX ADMIN — OXYCODONE HYDROCHLORIDE 5 MG: 5 TABLET ORAL at 23:43

## 2023-12-20 RX ADMIN — ACETAMINOPHEN 1000 MG: 500 TABLET ORAL at 08:27

## 2023-12-20 RX ADMIN — ACETYLCYSTEINE 3 ML: 200 SOLUTION ORAL; RESPIRATORY (INHALATION) at 11:24

## 2023-12-20 RX ADMIN — PREDNISONE 20 MG: 20 TABLET ORAL at 08:26

## 2023-12-20 RX ADMIN — GABAPENTIN 300 MG: 300 CAPSULE ORAL at 14:22

## 2023-12-20 RX ADMIN — DOCUSATE SODIUM 100 MG: 100 CAPSULE, LIQUID FILLED ORAL at 21:15

## 2023-12-20 RX ADMIN — BISACODYL 5 MG: 5 TABLET, COATED ORAL at 08:26

## 2023-12-20 RX ADMIN — GABAPENTIN 300 MG: 300 CAPSULE ORAL at 21:15

## 2023-12-20 RX ADMIN — LIDOCAINE 1 PATCH: 4 PATCH TOPICAL at 08:27

## 2023-12-20 RX ADMIN — OXYCODONE HYDROCHLORIDE 5 MG: 5 TABLET ORAL at 05:52

## 2023-12-20 RX ADMIN — ACETAMINOPHEN 1000 MG: 500 TABLET ORAL at 16:35

## 2023-12-20 RX ADMIN — POLYETHYLENE GLYCOL 3350 17 G: 17 POWDER, FOR SOLUTION ORAL at 08:27

## 2023-12-20 RX ADMIN — METOPROLOL TARTRATE 25 MG: 25 TABLET, FILM COATED ORAL at 08:27

## 2023-12-20 RX ADMIN — OXYCODONE HYDROCHLORIDE 5 MG: 5 TABLET ORAL at 14:22

## 2023-12-20 RX ADMIN — ACETAMINOPHEN 1000 MG: 500 TABLET ORAL at 21:15

## 2023-12-20 RX ADMIN — ENOXAPARIN SODIUM 40 MG: 100 INJECTION SUBCUTANEOUS at 08:27

## 2023-12-20 RX ADMIN — IPRATROPIUM BROMIDE AND ALBUTEROL SULFATE 3 ML: 2.5; .5 SOLUTION RESPIRATORY (INHALATION) at 11:23

## 2023-12-20 RX ADMIN — GABAPENTIN 300 MG: 300 CAPSULE ORAL at 05:52

## 2023-12-20 RX ADMIN — METOPROLOL TARTRATE 25 MG: 25 TABLET, FILM COATED ORAL at 21:15

## 2023-12-20 RX ADMIN — IPRATROPIUM BROMIDE AND ALBUTEROL SULFATE 3 ML: 2.5; .5 SOLUTION RESPIRATORY (INHALATION) at 07:59

## 2023-12-20 RX ADMIN — Medication 4 ML: at 08:00

## 2023-12-20 RX ADMIN — ACETYLCYSTEINE 3 ML: 200 SOLUTION ORAL; RESPIRATORY (INHALATION) at 19:58

## 2023-12-20 RX ADMIN — PANTOPRAZOLE SODIUM 40 MG: 40 TABLET, DELAYED RELEASE ORAL at 05:52

## 2023-12-20 NOTE — PLAN OF CARE
Goal Outcome Evaluation:  Plan of Care Reviewed With: patient        Progress: improving  Outcome Evaluation: VSS, both chest tubes to waterseal. Pain treated with PRN meds.

## 2023-12-20 NOTE — DISCHARGE PLACEMENT REQUEST
"Maria Luz Morales (68 y.o. Male)       Date of Birth   1955    Social Security Number       Address   [address] [place] [address]    Home Phone   [phone]    MRN   [id number]       Taoist   None    Marital Status                               Admission Date   12/13/23    Admission Type   Elective    Admitting Provider   Rosalio Scott MD    Attending Provider   Rosalio Scott MD    Department, Room/Bed   [address], [address]/1       Discharge Date       Discharge Disposition       Discharge Destination                                 Attending Provider: Roaslio Scott MD    Allergies: Amoxicillin-pot Clavulanate    Isolation: None   Infection: None   Code Status: CPR    Ht: 180.3 cm (71\")   Wt: 76.2 kg (168 lb)    Admission Cmt: None   Principal Problem: Mass of left lung [R91.8]                   Active Insurance as of 12/13/2023       Primary Coverage       Payor Plan Insurance Group Employer/Plan Group    HUMANA MEDICARE REPLACEMENT HUMANA MEDICARE REPLACEMENT [id number]       Payor Plan Address Payor Plan Phone Number Payor Plan Fax Number Effective Dates    [address] [address] [phone]  9/1/2021 - None Entered    [facility] [address]         Subscriber Name Subscriber Birth Date Member ID       MARIA LUZ MORALES 1955 [id number]                     Emergency Contacts        (Rel.) Home Phone Work Phone Mobile Phone    HERSON MORALES (Spouse) [phone] -- --              Emergency Contact Information       Name Relation Home Work Mobile    HERSON MORALES Spouse [phone]            "

## 2023-12-20 NOTE — PROGRESS NOTES
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  He reports chest pain.  No weakness.    Diet:  Adequate intake.  No nausea or vomiting.    Activity level: Returning to normal.    Pain:  He complains of pain that is mild.  Pain is well controlled.    No new complaints.  Denies shortness of breath.      Objective:  General Appearance:  Comfortable and in no acute distress.    Vital signs: (most recent): Blood pressure 96/66, pulse 72, temperature 98.3 °F (36.8 °C), temperature source Oral, resp. rate 16, height 180.3 cm (71\"), SpO2 100%.  Vital signs are normal.  No fever.    Output: Minimal urine output and no stool output.    Lungs:  Normal effort and normal respiratory rate.  He is not in respiratory distress.  There are decreased breath sounds.    Heart: Normal rate.  Regular rhythm.    Chest: Symmetric chest wall expansion. Chest wall tenderness present.    Abdomen: Abdomen is soft.  Bowel sounds are normal.     Neurological: Patient is alert and oriented to person, place and time.    Skin:  Warm and dry.                Chest tube x2:   Site: Left, Clean, Dry, Intact, and Securement device intact  Suction: waterseal  Air Leak: positive  24h output: 20ml/160ml     Results Review:     I reviewed the patient's new clinical results.  I reviewed the patient's new imaging results and agree with the interpretation.  I reviewed the patient's other test results and agree with the interpretation  Discussed with patient, family at bedside, and Dr. Scott    Assessment & Plan     Mr. Hung is a pleasant 68 year-old gentleman who is s/p daVinci robot assisted left lower lobectomy.  A.m. chest x-ray today reviewed which demonstrates stable " subcutaneous emphysema.  Satisfactory positioning of left-sided chest tubes.  No right pleural separation.    Airleak continues to slowly improve.  We will transition chest tube to waterseal today.  Repeat a.m. chest x-ray.  Will further taper prednisone dose to 10 mg daily dose starting tomorrow.  Steroid likely contributing to prolonged postoperative course/leak.  Appreciate recommendations of urology.  Prater catheter in place and plan for voiding trial prior to discharge.  Encouraged to pulmonary hygiene.  Increase activity as tolerated.    HILDA Quintana  [facility]  12/20/23  11:57 EST    Patient was seen and assessed while wearing personal protective equipment including facemask, protective eyewear and gloves.  Hand hygiene performed prior to entering the room and upon exiting with doffing of gloves.

## 2023-12-20 NOTE — PLAN OF CARE
Problem: Adult Inpatient Plan of Care  Goal: Plan of Care Review  Recent Flowsheet Documentation  Taken 12/20/2023 0303 by Briana Ramachandran RN  Progress: no change  Plan of Care Reviewed With: patient  Outcome Evaluation: chest tubes patent one to -10 of suction and #2 to water seal no air leak in number 2 pain med given with good results hill cath patent   Goal Outcome Evaluation:  Plan of Care Reviewed With: patient        Progress: no change  Outcome Evaluation: chest tubes patent one to -10 of suction and #2 to water seal no air leak in number 2 pain med given with good results hill cath patent

## 2023-12-21 ENCOUNTER — APPOINTMENT (OUTPATIENT)
Dept: GENERAL RADIOLOGY | Facility: HOSPITAL | Age: 68
DRG: 163 | End: 2023-12-21
Payer: MEDICARE

## 2023-12-21 PROCEDURE — 94664 DEMO&/EVAL PT USE INHALER: CPT

## 2023-12-21 PROCEDURE — 94761 N-INVAS EAR/PLS OXIMETRY MLT: CPT

## 2023-12-21 PROCEDURE — 25010000002 HYDROMORPHONE PER 4 MG: Performed by: SURGERY

## 2023-12-21 PROCEDURE — 94760 N-INVAS EAR/PLS OXIMETRY 1: CPT

## 2023-12-21 PROCEDURE — 63710000001 PREDNISONE PER 5 MG

## 2023-12-21 PROCEDURE — 94799 UNLISTED PULMONARY SVC/PX: CPT

## 2023-12-21 PROCEDURE — 99024 POSTOP FOLLOW-UP VISIT: CPT

## 2023-12-21 PROCEDURE — 25010000002 ENOXAPARIN PER 10 MG: Performed by: SURGERY

## 2023-12-21 PROCEDURE — 71045 X-RAY EXAM CHEST 1 VIEW: CPT

## 2023-12-21 RX ADMIN — IPRATROPIUM BROMIDE AND ALBUTEROL SULFATE 3 ML: 2.5; .5 SOLUTION RESPIRATORY (INHALATION) at 08:49

## 2023-12-21 RX ADMIN — GABAPENTIN 300 MG: 300 CAPSULE ORAL at 05:18

## 2023-12-21 RX ADMIN — LIDOCAINE 1 PATCH: 4 PATCH TOPICAL at 08:58

## 2023-12-21 RX ADMIN — ACETYLCYSTEINE 3 ML: 200 SOLUTION ORAL; RESPIRATORY (INHALATION) at 19:34

## 2023-12-21 RX ADMIN — ACETYLCYSTEINE 3 ML: 200 SOLUTION ORAL; RESPIRATORY (INHALATION) at 11:47

## 2023-12-21 RX ADMIN — POLYETHYLENE GLYCOL 3350 17 G: 17 POWDER, FOR SOLUTION ORAL at 08:58

## 2023-12-21 RX ADMIN — METOPROLOL TARTRATE 25 MG: 25 TABLET, FILM COATED ORAL at 21:59

## 2023-12-21 RX ADMIN — ACETAMINOPHEN 1000 MG: 500 TABLET ORAL at 21:59

## 2023-12-21 RX ADMIN — ACETAMINOPHEN 1000 MG: 500 TABLET ORAL at 16:21

## 2023-12-21 RX ADMIN — OXYCODONE HYDROCHLORIDE 5 MG: 5 TABLET ORAL at 16:21

## 2023-12-21 RX ADMIN — Medication 4 ML: at 15:04

## 2023-12-21 RX ADMIN — IPRATROPIUM BROMIDE AND ALBUTEROL SULFATE 3 ML: 2.5; .5 SOLUTION RESPIRATORY (INHALATION) at 15:03

## 2023-12-21 RX ADMIN — TAMSULOSIN HYDROCHLORIDE 0.8 MG: 0.4 CAPSULE ORAL at 08:58

## 2023-12-21 RX ADMIN — DOCUSATE SODIUM 100 MG: 100 CAPSULE, LIQUID FILLED ORAL at 08:58

## 2023-12-21 RX ADMIN — PANTOPRAZOLE SODIUM 40 MG: 40 TABLET, DELAYED RELEASE ORAL at 05:18

## 2023-12-21 RX ADMIN — ACETAMINOPHEN 1000 MG: 500 TABLET ORAL at 08:57

## 2023-12-21 RX ADMIN — ENOXAPARIN SODIUM 40 MG: 100 INJECTION SUBCUTANEOUS at 08:58

## 2023-12-21 RX ADMIN — DOCUSATE SODIUM 100 MG: 100 CAPSULE, LIQUID FILLED ORAL at 21:59

## 2023-12-21 RX ADMIN — IPRATROPIUM BROMIDE AND ALBUTEROL SULFATE 3 ML: 2.5; .5 SOLUTION RESPIRATORY (INHALATION) at 19:34

## 2023-12-21 RX ADMIN — METOPROLOL TARTRATE 25 MG: 25 TABLET, FILM COATED ORAL at 08:58

## 2023-12-21 RX ADMIN — GABAPENTIN 300 MG: 300 CAPSULE ORAL at 13:59

## 2023-12-21 RX ADMIN — IPRATROPIUM BROMIDE AND ALBUTEROL SULFATE 3 ML: 2.5; .5 SOLUTION RESPIRATORY (INHALATION) at 11:47

## 2023-12-21 RX ADMIN — OXYCODONE HYDROCHLORIDE 5 MG: 5 TABLET ORAL at 22:03

## 2023-12-21 RX ADMIN — Medication 4 ML: at 08:51

## 2023-12-21 RX ADMIN — OXYCODONE HYDROCHLORIDE 5 MG: 5 TABLET ORAL at 10:46

## 2023-12-21 RX ADMIN — GABAPENTIN 300 MG: 300 CAPSULE ORAL at 21:59

## 2023-12-21 RX ADMIN — BISACODYL 5 MG: 5 TABLET, COATED ORAL at 08:58

## 2023-12-21 RX ADMIN — PREDNISONE 10 MG: 10 TABLET ORAL at 08:58

## 2023-12-21 RX ADMIN — HYDROMORPHONE HYDROCHLORIDE 0.5 MG: 1 INJECTION, SOLUTION INTRAMUSCULAR; INTRAVENOUS; SUBCUTANEOUS at 13:21

## 2023-12-21 NOTE — PLAN OF CARE
Goal Outcome Evaluation:  Plan of Care Reviewed With: patient        Progress: no change  Outcome Evaluation: VSS. Alert and oriented x4. Pt c/o L side/chest pain r/t chest tubes. PRN pain meds x1. 2 left-sided chest tubes remain to waterseal. #1 with air leak present. #2 without air leak. Output documented in flowsheets and marked on atriums. Subcutaneous air throughout chest and back. Prater catheter to bedside drainage with alejandro colored urine. 1L O2 nc. Will continue to provide supportive care.

## 2023-12-21 NOTE — PROGRESS NOTES
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  He reports chest pain.  No shortness of breath or weakness.    Diet:  Adequate intake.  No nausea or vomiting.    Activity level: Returning to normal.    Pain:  He complains of pain that is mild.  Pain is well controlled.    No new complaints.    Objective:  General Appearance:  Comfortable, in no acute distress and not in pain.    Vital signs: (most recent): Blood pressure 106/60, pulse 95, temperature 99.7 °F (37.6 °C), temperature source Oral, resp. rate 18, height 180.3 cm (71\"), SpO2 95%.  Vital signs are normal.  No fever.    Output: Minimal urine output and no stool output.    Lungs:  Normal effort and normal respiratory rate.  He is not in respiratory distress.  There are decreased breath sounds.    Heart: Normal rate.  Regular rhythm.    Chest: Symmetric chest wall expansion. Chest wall tenderness present.    Abdomen: Abdomen is soft.  Bowel sounds are normal.     Neurological: Patient is alert and oriented to person, place and time.    Skin:  Warm and dry.              Chest tube x2:   Site: Left, Clean, Dry, Intact, and Securement device intact  Suction: waterseal  Air Leak: positive  24h output: 30/200ml     Results Review:     I reviewed the patient's new clinical results.  I reviewed the patient's new imaging results and agree with the interpretation.  I reviewed the patient's other test results and agree with the interpretation  Discussed with patient, family at bedside, and Dr. Scott    Assessment & Plan     Mr. Hung is a pleasant 68 year-old gentleman who is s/p daVinci robot assisted left lower lobectomy.  A.m. chest x-ray today reviewed demonstrating similar " appearing subcutaneous emphysema.  2 left-sided chest tubes are seen in position.     Airleak continues to slowly improve.  He denies any shortness of breath.  He denies any pain.  Will clamp chest tube #2 today continue chest tube #1 to waterseal.  Last dose of prednisone today.  Steroid regimen likely contributing to prolonged postoperative course/leak.  Prater catheter in place with good output.  Plan for voiding trial prior to discharge per urology.  Pulmonary hygiene including use of I-S.  Increase mobilization.    HILDA Quintana  [facility]  12/21/23  11:47 EST    Patient was seen and assessed while wearing personal protective equipment including facemask, protective eyewear and gloves.  Hand hygiene performed prior to entering the room and upon exiting with doffing of gloves.

## 2023-12-21 NOTE — PLAN OF CARE
Goal Outcome Evaluation:  Plan of Care Reviewed With: patient        Progress: improving  Outcome Evaluation: Pt alert and orient. On 1L o2. Chest tube 2 clamped, chest tube 1 to waterseal with airleak noted. subq air noted bilaterally on chest into abdomen. Pain controlled with prn and scheduled meds. Prater remains in place. Pt up with assist and walker to ambulate.

## 2023-12-22 ENCOUNTER — APPOINTMENT (OUTPATIENT)
Dept: GENERAL RADIOLOGY | Facility: HOSPITAL | Age: 68
DRG: 163 | End: 2023-12-22
Payer: MEDICARE

## 2023-12-22 ENCOUNTER — TELEPHONE (OUTPATIENT)
Dept: SURGERY | Facility: CLINIC | Age: 68
End: 2023-12-22
Payer: MEDICARE

## 2023-12-22 ENCOUNTER — HOME HEALTH ADMISSION (OUTPATIENT)
Dept: HOME HEALTH SERVICES | Facility: HOME HEALTHCARE | Age: 68
End: 2023-12-22
Payer: MEDICARE

## 2023-12-22 LAB — PLATELET # BLD AUTO: 217 10*3/MM3 (ref 140–450)

## 2023-12-22 PROCEDURE — 94760 N-INVAS EAR/PLS OXIMETRY 1: CPT

## 2023-12-22 PROCEDURE — 94664 DEMO&/EVAL PT USE INHALER: CPT

## 2023-12-22 PROCEDURE — 94799 UNLISTED PULMONARY SVC/PX: CPT

## 2023-12-22 PROCEDURE — 85049 AUTOMATED PLATELET COUNT: CPT | Performed by: SURGERY

## 2023-12-22 PROCEDURE — 94761 N-INVAS EAR/PLS OXIMETRY MLT: CPT

## 2023-12-22 PROCEDURE — 99024 POSTOP FOLLOW-UP VISIT: CPT | Performed by: NURSE PRACTITIONER

## 2023-12-22 PROCEDURE — 71045 X-RAY EXAM CHEST 1 VIEW: CPT

## 2023-12-22 PROCEDURE — 25010000002 ENOXAPARIN PER 10 MG: Performed by: SURGERY

## 2023-12-22 RX ADMIN — BISACODYL 5 MG: 5 TABLET, COATED ORAL at 08:30

## 2023-12-22 RX ADMIN — OXYCODONE HYDROCHLORIDE 5 MG: 5 TABLET ORAL at 08:30

## 2023-12-22 RX ADMIN — PANTOPRAZOLE SODIUM 40 MG: 40 TABLET, DELAYED RELEASE ORAL at 05:29

## 2023-12-22 RX ADMIN — IPRATROPIUM BROMIDE AND ALBUTEROL SULFATE 3 ML: 2.5; .5 SOLUTION RESPIRATORY (INHALATION) at 15:18

## 2023-12-22 RX ADMIN — IPRATROPIUM BROMIDE AND ALBUTEROL SULFATE 3 ML: 2.5; .5 SOLUTION RESPIRATORY (INHALATION) at 19:16

## 2023-12-22 RX ADMIN — ENOXAPARIN SODIUM 40 MG: 100 INJECTION SUBCUTANEOUS at 08:31

## 2023-12-22 RX ADMIN — Medication 4 ML: at 07:26

## 2023-12-22 RX ADMIN — ACETAMINOPHEN 1000 MG: 500 TABLET ORAL at 16:15

## 2023-12-22 RX ADMIN — ACETAMINOPHEN 1000 MG: 500 TABLET ORAL at 08:31

## 2023-12-22 RX ADMIN — DOCUSATE SODIUM 100 MG: 100 CAPSULE, LIQUID FILLED ORAL at 08:30

## 2023-12-22 RX ADMIN — ACETAMINOPHEN 1000 MG: 500 TABLET ORAL at 21:28

## 2023-12-22 RX ADMIN — IPRATROPIUM BROMIDE AND ALBUTEROL SULFATE 3 ML: 2.5; .5 SOLUTION RESPIRATORY (INHALATION) at 07:21

## 2023-12-22 RX ADMIN — GABAPENTIN 300 MG: 300 CAPSULE ORAL at 21:28

## 2023-12-22 RX ADMIN — OXYCODONE HYDROCHLORIDE 5 MG: 5 TABLET ORAL at 15:29

## 2023-12-22 RX ADMIN — LIDOCAINE 1 PATCH: 4 PATCH TOPICAL at 08:31

## 2023-12-22 RX ADMIN — POLYETHYLENE GLYCOL 3350 17 G: 17 POWDER, FOR SOLUTION ORAL at 08:31

## 2023-12-22 RX ADMIN — METOPROLOL TARTRATE 25 MG: 25 TABLET, FILM COATED ORAL at 21:28

## 2023-12-22 RX ADMIN — METOPROLOL TARTRATE 25 MG: 25 TABLET, FILM COATED ORAL at 08:30

## 2023-12-22 RX ADMIN — TAMSULOSIN HYDROCHLORIDE 0.8 MG: 0.4 CAPSULE ORAL at 08:31

## 2023-12-22 RX ADMIN — ACETYLCYSTEINE 3 ML: 200 SOLUTION ORAL; RESPIRATORY (INHALATION) at 19:17

## 2023-12-22 RX ADMIN — GABAPENTIN 300 MG: 300 CAPSULE ORAL at 14:07

## 2023-12-22 RX ADMIN — GABAPENTIN 300 MG: 300 CAPSULE ORAL at 05:29

## 2023-12-22 RX ADMIN — IPRATROPIUM BROMIDE AND ALBUTEROL SULFATE 3 ML: 2.5; .5 SOLUTION RESPIRATORY (INHALATION) at 11:43

## 2023-12-22 NOTE — PROGRESS NOTES
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  He reports chest pain.  No shortness of breath or weakness.    Diet:  Adequate intake.  No nausea or vomiting.    Activity level: Returning to normal.    Pain:  He complains of pain that is mild.  Pain is well controlled.    No new complaints.    Objective:  General Appearance:  Comfortable, in no acute distress and not in pain.    Vital signs: (most recent): Blood pressure 103/57, pulse 65, temperature 98.2 °F (36.8 °C), temperature source Oral, resp. rate 14, height 180.3 cm (71\"), SpO2 100%.  Vital signs are normal.  No fever.    Output: Minimal urine output and no stool output.    Lungs:  Normal effort and normal respiratory rate.  He is not in respiratory distress.  There are decreased breath sounds.    Heart: Normal rate.  Regular rhythm.    Chest: Symmetric chest wall expansion. Chest wall tenderness present.    Abdomen: Abdomen is soft.  Bowel sounds are normal.     Neurological: Patient is alert and oriented to person, place and time.    Skin:  Warm and dry.              Chest tube x2:   Site: Left, Clean, Dry, Intact, and Securement device intact  Suction: waterseal, clamped  Air Leak: positive  24h output: 30/200ml     Results Review:     I reviewed the patient's new clinical results.  I reviewed the patient's new imaging results and agree with the interpretation.  I reviewed the patient's other test results and agree with the interpretation  Discussed with patient, family at bedside, and Dr. Scott    Assessment & Plan     Mr. Hung POD 9 s/p robot-assisted left lower lobectomy.  Persistent airleak to chest tubes, although improved.  No significant change on today's " chest x-ray with one of the chest tube clamped.  This was removed without difficulty.  The remaining chest tube to waterseal and recheck a chest x-ray in the morning.  Plan to place chest tube to mini 500 chest tube tomorrow in preparation for discharge.  Home health and supplemental oxygen have been ordered.      Katerina Garcia DNP, APRN  [facility]  12/22/23  15:03 EST    Patient was seen and assessed while wearing personal protective equipment including facemask, protective eyewear and gloves.  Hand hygiene performed prior to entering the room and upon exiting with doffing of gloves.

## 2023-12-22 NOTE — DISCHARGE PLACEMENT REQUEST
"Maria Luz Morales (68 y.o. Male)       Date of Birth   1955    Social Security Number       Address   [address] [place] [address]    Home Phone   [phone]    MRN   [id number]       Confucianist   None    Marital Status                               Admission Date   12/13/23    Admission Type   Elective    Admitting Provider   Rosalio Scott MD    Attending Provider   Rosalio Scott MD    Department, Room/Bed   [address], E[address]/1       Discharge Date       Discharge Disposition       Discharge Destination                                 Attending Provider: Rosalio Scott MD    Allergies: Amoxicillin-pot Clavulanate    Isolation: None   Infection: None   Code Status: CPR    Ht: 180.3 cm (71\")   Wt: 76.2 kg (168 lb)    Admission Cmt: None   Principal Problem: Mass of left lung [R91.8]                   Active Insurance as of 12/13/2023       Primary Coverage       Payor Plan Insurance Group Employer/Plan Group    HUMANA MEDICARE REPLACEMENT HUMANA MEDICARE REPLACEMENT [id number]       Payor Plan Address Payor Plan Phone Number Payor Plan Fax Number Effective Dates    [address] [address] [phone]  9/1/2021 - None Entered    [facility] [address]         Subscriber Name Subscriber Birth Date Member ID       MARIA LUZ MORALES 1955 [id number]                     Emergency Contacts        (Rel.) Home Phone Work Phone Mobile Phone    HERSON MORALES (Spouse) [phone] -- --              Emergency Contact Information       Name Relation Home Work Mobile    HERSON MORALES Spouse [phone]            " Methotrexate Pregnancy And Lactation Text: This medication is Pregnancy Category X and is known to cause fetal harm. This medication is excreted in breast milk.

## 2023-12-22 NOTE — SIGNIFICANT NOTE
12/22/23 1128   Oxygen Therapy   SpO2 (!) 88 %   Pulse Oximetry Type Continuous   Device (Oxygen Therapy) room air     Pt o2 saturations at 88% at rest on room air, o2 applied via nasal cannula at 2L.

## 2023-12-22 NOTE — CASE MANAGEMENT/SOCIAL WORK
Continued Stay Note  [facility]     Patient Name: Sunny Hung  MRN: [id number]  Today's Date: 12/22/2023    Admit Date: 12/13/2023    Plan: Home with [facility] and [facility] for home O2   Discharge Plan       Row Name 12/22/23 1044       Plan    Plan Home with [facility] and [facility] for home O2    Patient/Family in Agreement with Plan yes    Plan Comments Met with pt at bedside who confirms plan to return home at discharge.  [facility] is following, pt may have mini 500.  Will likely need home O2 and pt wants to use [facility].  Referral placed in Epic.  CCP continues to follow.  MARTIN Montanez RN                   Discharge Codes    No documentation.                 Expected Discharge Date and Time       Expected Discharge Date Expected Discharge Time    Dec 23, 2023               Kathy Montanez, RN

## 2023-12-22 NOTE — PLAN OF CARE
Goal Outcome Evaluation:  Plan of Care Reviewed With: patient        Progress: improving  Outcome Evaluation: Pt alert and orient. On 2L o2. Chest tube #2 d/c, Chest tube 1 to waterseal. PRN and ERAS meds given for pain. hill in place. x2 BM today. Up with assistance.

## 2023-12-22 NOTE — PLAN OF CARE
Problem: Adult Inpatient Plan of Care  Goal: Plan of Care Review  Recent Flowsheet Documentation  Taken 12/22/2023 0454 by Briana Ramachandran RN  Progress: improving  Plan of Care Reviewed With: patient  Outcome Evaluation: chest to 1 to water seal air leak and fluctuation noted chest tube number 2 clamped medicated for pain with good results oxygen on at 1 liter hill cath patent   Goal Outcome Evaluation:  Plan of Care Reviewed With: patient        Progress: improving  Outcome Evaluation: chest to 1 to water seal air leak and fluctuation noted chest tube number 2 clamped medicated for pain with good results oxygen on at 1 liter hill cath patent

## 2023-12-22 NOTE — CASE MANAGEMENT/SOCIAL WORK
Continued Stay Note  [facility]     Patient Name: Sunny Hung  MRN: [id number]  Today's Date: 12/22/2023    Admit Date: 12/13/2023    Plan: Home with [facility] and [facility] for home O2   Discharge Plan       Row Name 12/22/23 5829       Plan    Plan Comments [facility] to deliver night time O2 to home today.  MARTIN Montanez RN                   Discharge Codes    No documentation.                 Expected Discharge Date and Time       Expected Discharge Date Expected Discharge Time    Dec 23, 2023               Kathy Montanez, RN

## 2023-12-22 NOTE — PROGRESS NOTES
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  Stable       Nutrition Problem (PES) No nutrition diagnosis at this time.       Intervention/Plan Will CTM PO intake     RD to follow up per protocol.           Invalid input(s): \"LABALBU\", \"PROT\"      No results found for: \"HGBA1C\"      Electronically signed by:  Kaylen Ruiz RD  12/22/23 15:10 EST  "

## 2023-12-23 ENCOUNTER — APPOINTMENT (OUTPATIENT)
Dept: GENERAL RADIOLOGY | Facility: HOSPITAL | Age: 68
DRG: 163 | End: 2023-12-23
Payer: MEDICARE

## 2023-12-23 ENCOUNTER — APPOINTMENT (OUTPATIENT)
Dept: CT IMAGING | Facility: HOSPITAL | Age: 68
DRG: 163 | End: 2023-12-23
Payer: MEDICARE

## 2023-12-23 LAB
ANION GAP SERPL CALCULATED.3IONS-SCNC: 10 MMOL/L (ref 5–15)
BUN SERPL-MCNC: 16 MG/DL (ref 8–23)
BUN/CREAT SERPL: 25.4 (ref 7–25)
CALCIUM SPEC-SCNC: 8.1 MG/DL (ref 8.6–10.5)
CHLORIDE SERPL-SCNC: 99 MMOL/L (ref 98–107)
CO2 SERPL-SCNC: 25 MMOL/L (ref 22–29)
CREAT SERPL-MCNC: 0.63 MG/DL (ref 0.76–1.27)
DEPRECATED RDW RBC AUTO: 55.4 FL (ref 37–54)
DX PRELIMINARY: NORMAL
EGFRCR SERPLBLD CKD-EPI 2021: 104.3 ML/MIN/1.73
ERYTHROCYTE [DISTWIDTH] IN BLOOD BY AUTOMATED COUNT: 17.3 % (ref 12.3–15.4)
GLUCOSE SERPL-MCNC: 144 MG/DL (ref 65–99)
HCT VFR BLD AUTO: 24.9 % (ref 37.5–51)
HGB BLD-MCNC: 7.6 G/DL (ref 13–17.7)
LAB AP CASE REPORT: NORMAL
LAB AP DIAGNOSIS COMMENT: NORMAL
LAB AP SPECIAL STAINS: NORMAL
MAGNESIUM SERPL-MCNC: 2.1 MG/DL (ref 1.6–2.4)
MCH RBC QN AUTO: 26.7 PG (ref 26.6–33)
MCHC RBC AUTO-ENTMCNC: 30.5 G/DL (ref 31.5–35.7)
MCV RBC AUTO: 87.4 FL (ref 79–97)
PATH REPORT.GROSS SPEC: NORMAL
PLATELET # BLD AUTO: 249 10*3/MM3 (ref 140–450)
PMV BLD AUTO: 9.4 FL (ref 6–12)
POTASSIUM SERPL-SCNC: 4 MMOL/L (ref 3.5–5.2)
RBC # BLD AUTO: 2.85 10*6/MM3 (ref 4.14–5.8)
SODIUM SERPL-SCNC: 134 MMOL/L (ref 136–145)
WBC NRBC COR # BLD AUTO: 9.89 10*3/MM3 (ref 3.4–10.8)

## 2023-12-23 PROCEDURE — 25010000002 CEFEPIME PER 500 MG: Performed by: SURGERY

## 2023-12-23 PROCEDURE — 94664 DEMO&/EVAL PT USE INHALER: CPT

## 2023-12-23 PROCEDURE — 85027 COMPLETE CBC AUTOMATED: CPT | Performed by: SURGERY

## 2023-12-23 PROCEDURE — 25010000002 ENOXAPARIN PER 10 MG: Performed by: SURGERY

## 2023-12-23 PROCEDURE — 94761 N-INVAS EAR/PLS OXIMETRY MLT: CPT

## 2023-12-23 PROCEDURE — 83735 ASSAY OF MAGNESIUM: CPT | Performed by: SURGERY

## 2023-12-23 PROCEDURE — 94799 UNLISTED PULMONARY SVC/PX: CPT

## 2023-12-23 PROCEDURE — 71045 X-RAY EXAM CHEST 1 VIEW: CPT

## 2023-12-23 PROCEDURE — 99024 POSTOP FOLLOW-UP VISIT: CPT | Performed by: SURGERY

## 2023-12-23 PROCEDURE — 71250 CT THORAX DX C-: CPT

## 2023-12-23 PROCEDURE — 94760 N-INVAS EAR/PLS OXIMETRY 1: CPT

## 2023-12-23 PROCEDURE — 80048 BASIC METABOLIC PNL TOTAL CA: CPT | Performed by: SURGERY

## 2023-12-23 RX ADMIN — METOPROLOL TARTRATE 25 MG: 25 TABLET, FILM COATED ORAL at 09:21

## 2023-12-23 RX ADMIN — IPRATROPIUM BROMIDE AND ALBUTEROL SULFATE 3 ML: 2.5; .5 SOLUTION RESPIRATORY (INHALATION) at 10:59

## 2023-12-23 RX ADMIN — ACETAMINOPHEN 1000 MG: 500 TABLET ORAL at 16:03

## 2023-12-23 RX ADMIN — TAMSULOSIN HYDROCHLORIDE 0.8 MG: 0.4 CAPSULE ORAL at 09:16

## 2023-12-23 RX ADMIN — CEFEPIME 2000 MG: 2 INJECTION, POWDER, FOR SOLUTION INTRAVENOUS at 21:41

## 2023-12-23 RX ADMIN — ACETYLCYSTEINE 3 ML: 200 SOLUTION ORAL; RESPIRATORY (INHALATION) at 11:00

## 2023-12-23 RX ADMIN — ENOXAPARIN SODIUM 40 MG: 100 INJECTION SUBCUTANEOUS at 09:16

## 2023-12-23 RX ADMIN — IPRATROPIUM BROMIDE AND ALBUTEROL SULFATE 3 ML: 2.5; .5 SOLUTION RESPIRATORY (INHALATION) at 15:14

## 2023-12-23 RX ADMIN — ACETYLCYSTEINE 3 ML: 200 SOLUTION ORAL; RESPIRATORY (INHALATION) at 19:25

## 2023-12-23 RX ADMIN — PANTOPRAZOLE SODIUM 40 MG: 40 TABLET, DELAYED RELEASE ORAL at 06:38

## 2023-12-23 RX ADMIN — IPRATROPIUM BROMIDE AND ALBUTEROL SULFATE 3 ML: 2.5; .5 SOLUTION RESPIRATORY (INHALATION) at 07:12

## 2023-12-23 RX ADMIN — METOPROLOL TARTRATE 25 MG: 25 TABLET, FILM COATED ORAL at 21:41

## 2023-12-23 RX ADMIN — GABAPENTIN 300 MG: 300 CAPSULE ORAL at 06:38

## 2023-12-23 RX ADMIN — GABAPENTIN 300 MG: 300 CAPSULE ORAL at 16:03

## 2023-12-23 RX ADMIN — GABAPENTIN 300 MG: 300 CAPSULE ORAL at 21:41

## 2023-12-23 RX ADMIN — CEFEPIME 2000 MG: 2 INJECTION, POWDER, FOR SOLUTION INTRAVENOUS at 13:26

## 2023-12-23 RX ADMIN — ACETAMINOPHEN 1000 MG: 500 TABLET ORAL at 09:16

## 2023-12-23 RX ADMIN — Medication 4 ML: at 07:13

## 2023-12-23 RX ADMIN — IPRATROPIUM BROMIDE AND ALBUTEROL SULFATE 3 ML: 2.5; .5 SOLUTION RESPIRATORY (INHALATION) at 19:22

## 2023-12-23 RX ADMIN — ACETAMINOPHEN 1000 MG: 500 TABLET ORAL at 21:41

## 2023-12-23 RX ADMIN — OXYCODONE HYDROCHLORIDE 5 MG: 5 TABLET ORAL at 09:21

## 2023-12-23 RX ADMIN — LIDOCAINE 1 PATCH: 4 PATCH TOPICAL at 09:16

## 2023-12-23 NOTE — PLAN OF CARE
Problem: Adult Inpatient Plan of Care  Goal: Plan of Care Review  Recent Flowsheet Documentation  Taken 12/23/2023 1421 by Charley Kim RN  Progress: improving  Plan of Care Reviewed With: patient  Outcome Evaluation: vss, CT chest completed, iv abx ordered, CT changed [id number], pain controlled per order, hill in place, plan for discharge on sunday with voiding trial prior to discharge per urology note.  Goal: Absence of Hospital-Acquired Illness or Injury  Intervention: Identify and Manage Fall Risk  Recent Flowsheet Documentation  Taken 12/23/2023 1245 by Charley Kim RN  Safety Promotion/Fall Prevention:   safety round/check completed   room organization consistent   nonskid shoes/slippers when out of bed   lighting adjusted   fall prevention program maintained   clutter free environment maintained   assistive device/personal items within reach  Taken 12/23/2023 1050 by Charley Kim RN  Safety Promotion/Fall Prevention:   safety round/check completed   room organization consistent   nonskid shoes/slippers when out of bed   lighting adjusted   fall prevention program maintained   clutter free environment maintained   assistive device/personal items within reach  Taken 12/23/2023 0916 by Charley Kim RN  Safety Promotion/Fall Prevention:   safety round/check completed   room organization consistent   nonskid shoes/slippers when out of bed   lighting adjusted   fall prevention program maintained   clutter free environment maintained   assistive device/personal items within reach  Intervention: Prevent and Manage VTE (Venous Thromboembolism) Risk  Recent Flowsheet Documentation  Taken 12/23/2023 1050 by Charley Kim RN  Activity Management:   up in chair   ambulated in room  Taken 12/23/2023 0916 by Charley Kim RN  Activity Management: up in chair  Goal: Optimal Comfort and Wellbeing  Intervention: Provide Person-Centered Care  Recent Flowsheet Documentation  Taken 12/23/2023  0916 by Charley Kim, RN  Trust Relationship/Rapport:   care explained   thoughts/feelings acknowledged   questions answered   reassurance provided   Goal Outcome Evaluation:  Plan of Care Reviewed With: patient        Progress: improving  Outcome Evaluation: vss, CT chest completed, iv abx ordered, CT changed [id number], pain controlled per order, hill in place, plan for discharge on sunday with voiding trial prior to discharge per urology note.

## 2023-12-23 NOTE — PROGRESS NOTES
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  He reports chest pain.  No shortness of breath or weakness.    Diet:  Adequate intake.  No nausea or vomiting.    Activity level: Returning to normal.    Pain:  He complains of pain that is mild.  Pain is well controlled.    No new complaints.    Objective:  General Appearance:  Comfortable, in no acute distress and not in pain.    Vital signs: (most recent): Blood pressure 111/84, pulse 70, temperature 98.9 °F (37.2 °C), temperature source Oral, resp. rate 20, height 180.3 cm (71\"), SpO2 96%.  Vital signs are normal.  No fever.    Output: Minimal urine output and no stool output.    Lungs:  Normal effort and normal respiratory rate.  He is not in respiratory distress.  There are decreased breath sounds.    Heart: Normal rate.  Regular rhythm.    Chest: Symmetric chest wall expansion. Chest wall tenderness present.    Abdomen: Abdomen is soft.  Bowel sounds are normal.     Neurological: Patient is alert and oriented to person, place and time.    Skin:  Warm and dry.              Chest tube x2:   Site: Left, Clean, Dry, Intact, and Securement device intact  Suction: waterseal  Air Leak: positive  24h output: 30/200ml     Results Review:     I reviewed the patient's new clinical results.  I reviewed the patient's new imaging results and agree with the interpretation.  I reviewed the patient's other test results and agree with the interpretation  Discussed with patient, family at bedside    Assessment & Plan     Mr. Hung POD # 10 s/p robot-assisted left lower lobectomy.     Continue multimodal pain management.  Encourage ambulation, incentive spirometer.  Persistent airleak to " chest tubes, although improved.  Chest tube connected to mini 500.  The x-ray showed increased haziness over the left lower chest concerning for pneumonia/atelectasis.  CT chest ordered and imaging was reviewed by me.  There is interstitial infiltrates in the left upper lobe and posterolateral fluid with pockets of air which could be expected in a patient with ongoing air leak.  He does not have leukocytosis but due to his recent high-dose steroid use and neoadjuvant chemoimmunotherapy, he is at high risk for pneumonia.  I am starting him on short course of antibiotics.  I am anticipating he will be going home tomorrow.  Continue chemical DVT prophylaxis.  Voiding trial on the day of discharge.    Rosalio Scott MD  [facility]  12/23/23  11:48 EST    Patient was seen and assessed while wearing personal protective equipment including facemask, protective eyewear and gloves.  Hand hygiene performed prior to entering the room and upon exiting with doffing of gloves.

## 2023-12-24 ENCOUNTER — READMISSION MANAGEMENT (OUTPATIENT)
Dept: CALL CENTER | Facility: HOSPITAL | Age: 68
End: 2023-12-24
Payer: MEDICARE

## 2023-12-24 ENCOUNTER — APPOINTMENT (OUTPATIENT)
Dept: GENERAL RADIOLOGY | Facility: HOSPITAL | Age: 68
DRG: 163 | End: 2023-12-24
Payer: MEDICARE

## 2023-12-24 VITALS
OXYGEN SATURATION: 98 % | HEIGHT: 71 IN | BODY MASS INDEX: 23.43 KG/M2 | RESPIRATION RATE: 16 BRPM | DIASTOLIC BLOOD PRESSURE: 62 MMHG | TEMPERATURE: 98.3 F | HEART RATE: 74 BPM | SYSTOLIC BLOOD PRESSURE: 122 MMHG

## 2023-12-24 PROCEDURE — 94664 DEMO&/EVAL PT USE INHALER: CPT

## 2023-12-24 PROCEDURE — 25010000002 CEFEPIME PER 500 MG: Performed by: SURGERY

## 2023-12-24 PROCEDURE — 94799 UNLISTED PULMONARY SVC/PX: CPT

## 2023-12-24 PROCEDURE — 71045 X-RAY EXAM CHEST 1 VIEW: CPT

## 2023-12-24 PROCEDURE — 94761 N-INVAS EAR/PLS OXIMETRY MLT: CPT

## 2023-12-24 PROCEDURE — 25010000002 ENOXAPARIN PER 10 MG: Performed by: SURGERY

## 2023-12-24 PROCEDURE — 99024 POSTOP FOLLOW-UP VISIT: CPT | Performed by: SURGERY

## 2023-12-24 RX ORDER — ACETAMINOPHEN 500 MG
1000 TABLET ORAL 3 TIMES DAILY
Qty: 60 TABLET | Refills: 0 | Status: SHIPPED | OUTPATIENT
Start: 2023-12-24 | End: 2024-01-03

## 2023-12-24 RX ORDER — OXYCODONE HYDROCHLORIDE 5 MG/1
5 TABLET ORAL EVERY 12 HOURS PRN
Qty: 14 TABLET | Refills: 0 | Status: SHIPPED | OUTPATIENT
Start: 2023-12-24 | End: 2023-12-31

## 2023-12-24 RX ORDER — PSEUDOEPHEDRINE HCL 30 MG
100 TABLET ORAL 2 TIMES DAILY
Qty: 20 CAPSULE | Refills: 0 | Status: SHIPPED | OUTPATIENT
Start: 2023-12-24 | End: 2024-01-03

## 2023-12-24 RX ORDER — GABAPENTIN 300 MG/1
300 CAPSULE ORAL EVERY 8 HOURS SCHEDULED
Qty: 45 CAPSULE | Refills: 0 | Status: SHIPPED | OUTPATIENT
Start: 2023-12-24 | End: 2024-01-08

## 2023-12-24 RX ORDER — SULFAMETHOXAZOLE AND TRIMETHOPRIM 800; 160 MG/1; MG/1
1 TABLET ORAL 2 TIMES DAILY
Qty: 20 TABLET | Refills: 0 | Status: SHIPPED | OUTPATIENT
Start: 2023-12-24 | End: 2024-01-03

## 2023-12-24 RX ORDER — TAMSULOSIN HYDROCHLORIDE 0.4 MG/1
0.8 CAPSULE ORAL DAILY
Qty: 60 CAPSULE | Refills: 0 | Status: SHIPPED | OUTPATIENT
Start: 2023-12-24 | End: 2024-01-23

## 2023-12-24 RX ADMIN — IPRATROPIUM BROMIDE AND ALBUTEROL SULFATE 3 ML: 2.5; .5 SOLUTION RESPIRATORY (INHALATION) at 11:25

## 2023-12-24 RX ADMIN — ENOXAPARIN SODIUM 40 MG: 100 INJECTION SUBCUTANEOUS at 08:40

## 2023-12-24 RX ADMIN — PANTOPRAZOLE SODIUM 40 MG: 40 TABLET, DELAYED RELEASE ORAL at 05:55

## 2023-12-24 RX ADMIN — ACETAMINOPHEN 1000 MG: 500 TABLET ORAL at 08:39

## 2023-12-24 RX ADMIN — TAMSULOSIN HYDROCHLORIDE 0.8 MG: 0.4 CAPSULE ORAL at 08:40

## 2023-12-24 RX ADMIN — IPRATROPIUM BROMIDE AND ALBUTEROL SULFATE 3 ML: 2.5; .5 SOLUTION RESPIRATORY (INHALATION) at 07:51

## 2023-12-24 RX ADMIN — GABAPENTIN 300 MG: 300 CAPSULE ORAL at 05:55

## 2023-12-24 RX ADMIN — GABAPENTIN 300 MG: 300 CAPSULE ORAL at 14:32

## 2023-12-24 RX ADMIN — CEFEPIME 2000 MG: 2 INJECTION, POWDER, FOR SOLUTION INTRAVENOUS at 05:55

## 2023-12-24 RX ADMIN — OXYCODONE HYDROCHLORIDE 5 MG: 5 TABLET ORAL at 14:32

## 2023-12-24 RX ADMIN — METOPROLOL TARTRATE 25 MG: 25 TABLET, FILM COATED ORAL at 08:40

## 2023-12-24 RX ADMIN — ACETYLCYSTEINE 3 ML: 200 SOLUTION ORAL; RESPIRATORY (INHALATION) at 11:25

## 2023-12-24 RX ADMIN — Medication 4 ML: at 07:51

## 2023-12-24 RX ADMIN — LIDOCAINE 1 PATCH: 4 PATCH TOPICAL at 08:40

## 2023-12-24 NOTE — PLAN OF CARE
Goal Outcome Evaluation:  Plan of Care Reviewed With: patient        Progress: improving  Outcome Evaluation: VSS, chest tube to mini 500. Plan to DC home.

## 2023-12-24 NOTE — DISCHARGE SUMMARY
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  Prolonged air leak.  Immunotherapy induced colitis.  Hospital-acquired pneumonia.  Urinary retention.  History of tobacco abuse.  Hyperlipidemia.  Chronic cough.  Benign prostatic hyperplasia.    Presenting Problem  Mass of left lung [R91.8]  Cancer of bronchus of left lower lobe [C34.32]     History of Present Illness  Sunny Hung is a 68-year-old male who has significant medical problems as mentioned in the medical chart.      He underwent a screening CT of the chest on 7/31/2023 which showed a large mass in the left lower lobe of the lung extending to the hilum.  A diagnostic CT was performed 8/25/2023 showing a large left infrahilar medial mass in the lower lobe.  A PET scan on 9/8/2023 showed a left lower lobe spiculated pleural-based mass 1.3 x 5 cm involving the left hilum and major fissure SUV 23.2.  There were smaller nodular airspace opacities also hypermetabolic.  The mass encases left lower lobe bronchovascular bundle.  No hypermetabolic mediastinal or hilar lymph nodes noted.  There was a 7 mm nodule in the right upper lobe photopenic SUV 2.1.  There was no supraclavicular uptake or disease below the abdomen other than 2 abnormal foci in the sigmoid colon.  MRI of the brain on 9/11/2023 showed a subtle 2 mm focus inferior left cerebellum too small to characterize but likely a blood vessel.  The patient underwent bronchoscopy on 9/6/2023 with biopsies taken  from the left lower lobe mass, lymph node station 7 and lymph node station 4R.  Pathology showed malignant cells favoring poorly differentiated carcinoma/squamous differentiation from the left lower lobe and biopsies from station 7 and 4R were negative for malignant cells.     The patient completed 3 cycles of neoadjuvant therapy with carboplatin/Taxol/nivolumab 11/1/2023.  He tolerated treatment well without much complications.  His case was discussed in multidisciplinary tumor board conference and the consensus recommendation was to proceed with surgical resection.  Restaging PET/CT was done on 12/5/2023 which reported significant decrease in size of the left lower lobe mass with decreased SUV of 3.6, previously noted 23.2.  There was irregular large airspace consolidation at the posterior aspect of the left lower lobe with a maximal SUV of 8.7 suspected to represent pneumonitis with possible superimposed infection.  There was no evidence of hypermetabolic mediastinal or hilar lymphadenopathy or distant metastases.  He also had transthoracic echo on 12/5/2023 which reported LVEF of 60%.     Prior to surgical resection, he denied any difficulty breathing or pain. He coughed intermittently. He denied angina, history of myocardial infarction or stroke. He could walk a block without dyspnea. He did not smoke. He was not on supplemental oxygen at home.     As per my assessment, he had stage II-B non-small cell lung cancer who underwent neoadjuvant chemoimmunotherapy based on Checkmate 816 trial.  There was significant treatment response based on the PET CT scan.  In Checkmate 816 trial, there were 25% of the patient who had complete pathological response after neoadjuvant chemoimmunotherapy.  The standard of care for stage II-B resectable lung cancer is neoadjuvant chemoimmunotherapy followed by lobectomy.  Therefore, I recommended robot-assisted thoracoscopic left lower lobectomy, mediastinal lymph node dissection.   His lung function has been borderline which does not correlate to his excellent functional status.  He was independent in his activities of daily living and was not dependent on supplemental oxygen.  We performed a 6-minute walk test in the clinic which he completed without any respiratory distress.  His oxygen saturation remained above 95% throughout the test.  He was motivated and determined to pursue surgical cure and I supported his wishes.     Hospital Course  On 12/13/2023, he underwent Robot assisted Thoracoscopic Left Lower Lobectomy with en bloc resection of parietal pleura.  The entire lung was adherent to the chest wall.  I performed lysis of adhesion for 120 minutes.  The tissue planes were extremely friable that led to significant air leak.  He was on 80 mg prednisone per day for colitis at the time of the operation.  The left lower lobe mass was adherent to the chest wall invading the parietal pleura but did not extend to the extrathoracic space.  I placed 2 chest tubes as I was expecting large prolonged air leak.  He tolerated procedure well.  He was extubated at the end of the procedure.  His hospital course was significant for prolonged air leak.  The prednisone was slowly tapered and eventually discontinued.  He developed urinary retention requiring Prater catheter placement.  His pain was well-controlled with pain medication.  He ambulated without much difficulty.  He was tolerating regular diet.  He was requiring supplemental oxygen.  Chest tube was transitioned to waterseal and eventually 1 chest tube was connected to mini atrium and he was discharged home with mini atrium.  A CT scan was obtained prior to discharge which showed consolation changes in the left upper lobe concerning for pneumonia.  He did not have leukocytosis but in context of neoadjuvant chemoimmunotherapy, immunosuppression from prednisone, I started him on empiric antibiotics to treat hospital-acquired pneumonia.  He was  discharged home with antibiotics as well.     Procedures Performed  Procedure(s):  BRONCHOSCOPY, THORACOSCOPY WITH LYSIS OF ADHESIONS (120 MINUTES), LEFT LOWER LOBECTOMY WITH EN BLOC PARTIAL PARIETAL PLEURECTOMY USING DAVINCI ROBOT, MEDIASTINAL LYMPH NODE DISSECTION, INTERCOSTAL NERVE BLOCK       Consults:   Consults       Date and Time Order Name Status Description    12/19/2023  9:27 AM Inpatient Urology Consult Completed     12/15/2023  9:52 AM Inpatient Cardiology Consult Completed             Pertinent Test Results:     Imaging Results (Last 24 Hours)       Procedure Component Value Units Date/Time    XR Chest 1 View [[id number]] Collected: 12/24/23 0611     Updated: 12/24/23 0615    Narrative:      AP CHEST     HISTORY: Follow-up lung surgery     COMPARISON: 12/23/2023     FINDINGS: Stable left chest tube. No appreciable pneumothorax. Stable  atelectasis/consolidation left lung base and patchy opacities elsewhere  in the left lung. Heart size stable. Right lung appears clear. Extensive  subcutaneous air again demonstrated       Impression:      As above           This report was finalized on 12/24/2023 6:11 AM by Dr. Cornell Reyes M.D on Workstation: [id number]       CT Chest Without Contrast Diagnostic [[id number]] Collected: 12/23/23 1404     Updated: 12/23/23 1414    Narrative:      CT CHEST WO CONTRAST DIAGNOSTIC-     Radiation dose reduction techniques were utilized, including automated  exposure control and exposure modulation based on body size.     Clinical: Pneumonia, mass left lung, 12/23/2023 left lower lobectomy  with parietal pleurectomy and mediastinal lymph node dissection, poorly  differentiated squamous carcinoma     COMPARISON 11/28/2023     FINDINGS:  1. There is a left-sided chest tube in satisfactory position, there is a  considerable amount of subcutaneous emphysema and mediastinal gas and a  small left-sided pneumothorax. Gas bubbles also demonstrated within the  pleural space. Left  seventh rib fracture.     2. Considerable consolidation involving the left lung worrisome for  pneumonia. Trace left pleural effusion seen. Left-sided pleural  thickening.     3. Decreasing nodular areas of consolidation within the right lower  lobe. No new airspace disease has developed within the right lung. No  vascular congestion seen.     4. Volume loss left hemithorax status post lower lobectomy. Cardiac size  stable. The esophagus is satisfactory in course and caliber. Mild  dilatation of the ascending thoracic aorta, diameter approximately 4 cm  as before. There are small mediastinal lymph nodes again seen. Limited  images through the upper abdomen are unremarkable.              This report was finalized on 12/23/2023 2:11 PM by Dr. Donta Feliz M.D on Workstation: [id number]               Lab Results (last 24 hours)       Procedure Component Value Units Date/Time    Tissue Pathology Exam [[id number]] Collected: 12/13/23 1142    Specimen: Tissue from Lymph Node; Tissue from Lymph Node; Tissue from Lymph Node; Tissue from Lymph Node; Tissue from Lymph Node; Tissue from Lymph Node; Tissue from Lymph Node; Tissue from Lung, L; Tissue from Lymph Node; Tissue from Lung, Left Lower Lobe Updated: 12/23/23 1534     Case Report --     Surgical Pathology Report                         Case: [id number]                                  Authorizing Provider:  Rosalio Scott MD            Collected:           12/13/2023 11:42 AM          Ordering Location:     [facility]  Received:            12/13/2023 09:04 PM                                 MAIN OR                                                                      Pathologist:           Rabia Shafer MD                                                          Specimens:   1) - Lymph Node, 9 L FOR PERMANENT                                                                  2) - Lymph Node, 10 L # 1 FOR PERMANENT                                                              3) - Lymph Node, 10 L # 2 FOR PERMANENT                                                             4) - Lymph Node, 11L # 1 FOR PERMANENT                                                              5) - Lymph Node, LEVEL 7  FOR PERMANENT                                                             6) - Lymph Node, 11L # 2 FOR PERMANENT                                                              7) - Lymph Node, LEVEL 5 FOR PERMANENT                                                              8) - Lung, L, BRONCHI MARGIN, INK ON PROXIMAL MARGIN FOR PERMANENT                                  9) - Lymph Node, 10 L # 3 FOR PERMANENT                                                             10) - Lung, Left Lower Lobe, LEFT LOWER LOBE FOR PERMANENT                                  Preliminary Diagnosis --     1. Lymph Node, 9L, Dissection:   A. 1 lymph node, negative for carcinoma (0/1).    2. Lymph Node, 10 L #1, Dissection:   A. 1 LYMPH NODE, POSITIVE FOR CARCINOMA (1/1).    3. Lymph Node, 10 L #2, Dissection:   A. 1 LYMPH NODE, POSITIVE FOR CARCINOMA (1/1).    4. Lymph Node, 11 L #1, Dissection:   A. 1 lymph node, negative for carcinoma (0/1).    5. Lymph Node, Level 7, Dissection:   A. 1 lymph node, negative for carcinoma (0/1).    6. Lymph Node, 11 L #2, Dissection:   A. 1 lymph node, negative for carcinoma (0/1).    7. Lymph Node, Level 5, Dissection:   A. 1 lymph node, negative for carcinoma (0/1).    8. Bronchus, Proximal Margin, Excision:   A. Positive for carcinoma.    9. Lymph Node, 10 L #3, Dissection:   A. 1 lymph node, negative for carcinoma (0/1).    10. Lung, Left Lower Lobe, Lobectomy:   A. Residual poorly differentiated carcinoma in a background of treatment effect which       extends to the bronchus margin.   B. Positive for lymphovascular space invasion.   C. Background organizing pneumonia and chronic pleuritis.   D. 2 lymph nodes, negative for carcinoma (0/2).       Comment --  "    This case will be forwarded to the [facility] for expert consultation with a final repot to follow.        Gross Description --     1. Lymph Node.  Received in formalin labeled \"9L lymph node \"is a 0.6 x 0.5 x 0.3 cm irregular anthracotic lymph node which is submitted in toto as 1A.    jap/uso/jab        2. Lymph Node.  Received in formalin labeled \"10 L #1 lymph node \"is a 1.6 x 1.2 x 0.8 cm irregular anthracotic lymph node which is bisected and entirely submitted as 2A.    jap/uso/jab        3. Lymph Node.  Received in formalin labeled \"10 L #2 lymph node \"is a 2.2 x 1.4 x 0.5 cm irregular anthracotic lymph node which is serially sectioned and entirely submitted as 3A.    jap/uso/jab        4. Lymph Node.  Received in formalin labeled \"11 L #1 lymph node \"is a 0.6 x 0.4 x 0.3 cm irregular anthracotic lymph node which is submitted in toto as 4A.    jap/uso/jab        5. Lymph Node.  Received in formalin labeled \"level 7 lymph node \"is a 0.7 x 0.4 x 0.2 cm irregular anthracotic lymph node which is submitted in toto as 5A.    jap/uso/jab        6. Lymph Node.  Received in formalin labeled \"11 L #2 lymph node \"is a 0.4 x 0.3 x 0.2 cm irregular anthracotic lymph node which is submitted in toto as 6A.    jap/uso/jab        7. Lymph Node.  Received in formalin labeled \"level 5 lymph node \"is a 1.6 x 1.1 x 0.5 cm irregular anthracotic lymph node which is serially sectioned and entirely submitted as 7A.    jap/uso/jab        8. Lung, L.  Received in formalin labeled \"bronchi margin, ink on proximal margin\" is a 1.0 cm segment of bronchus with numerous staples.  One of the stapled ends is inked black.  Following removal of the staples the retrievable tissue is inked and entirely submitted as 8A.    jap/uso/jab  9. Lymph Node.  Received in formalin labeled \"10 L #3 lymph node \"is a 2.2 x 1.0 x 0.6 cm irregular anthracotic lymph node which is serially sectioned and entirely submitted as " "9A.    jap/uso/jab        10. Lung, Left Lower Lobe.  Received in formalin labeled \"left lower lobe\" is a 236 g, 16.5 x 10.0 x 9.5 cm lung lobectomy specimen with a markedly disrupted smooth to shaggy tan-red pleural surface.  There are numerous adhesions on the pleural surface.  The vascular margins average 1 cm in length and the bronchus margin measures 1.5 cm in length.  There is a 6.5 cm long parenchymal staple line margin as well.  The hilar margins are inked orange and the entire pleural surface is inked blue.  There appears to be an adherent 5.0 x 4.0 x 0.5 cm plaque on the hilar pleural surface.  Sectioning through this area reveals a subjacent mass measuring 6.0 x 4.0 x 2.2 cm.  The mass extends to the hilar pleural surface and comes to within 1 cm of the nearest vascular margin, 2 cm of the bronchus margin and 4 cm of the hilar parenchymal staple line margin.  This will be designated as mass #1.  There appears to be a second mass at the base of the lobe.  Sectioning through mass #2 reveals the mass has a solid tan-white cut surface and measures 7.5 x 4.0 x 2.0 cm.  Mass #2 lies 3 cm inferior to mass #1.  Further sectioning through the lung lobe reveals spongy red parenchyma.  Mass #2 comes to within 4.5 cm of the parenchymal staple line margin, 7 cm of the nearest vascular margin and 7 cm of the bronchus margin.  Dissection of the bronchial tree reveals 3 probable anthracotic lymph nodes ranging from 0.2 cm to 0.6 m in greatest dimension.  Representative sections are submitted as follows:    10 A- bronchus margin en face  10 B- vascular margins en face  10 C-10 D- hilar parenchymal staple line margin en face  10 E- parenchyma between the 2 masses  10 F-10 J- mass #1 to the pleural surface  10 K-10 M- mass #1 to normal adjacent parenchyma  10 N-10 R- mass #2 to the pleural surface  10 S-10 U- mass #2 to normal adjacent parenchyma  10 V- 3 possible hilar lymph nodes in toto  10 W-10 X- mass #1 2 central " bronchiole    jap/uso/jab        Special Stains --     P40 and CK5/6 immunostains performed on block 3A are both negative.    NUT immunostain performed at [facility] lab and subsequently interpreted at [facility] on block 2A is negative. All controls reacted appropriately.      Basic Metabolic Panel [[id number]]  (Abnormal) Collected: 12/23/23 1015    Specimen: Blood Updated: 12/23/23 1127     Glucose 144 mg/dL      BUN 16 mg/dL      Creatinine 0.63 mg/dL      Sodium 134 mmol/L      Potassium 4.0 mmol/L      Chloride 99 mmol/L      CO2 25.0 mmol/L      Calcium 8.1 mg/dL      BUN/Creatinine Ratio 25.4     Anion Gap 10.0 mmol/L      eGFR 104.3 mL/min/1.73     Narrative:      GFR Normal >60  Chronic Kidney Disease <60  Kidney Failure <15      Magnesium [[id number]]  (Normal) Collected: 12/23/23 1015    Specimen: Blood Updated: 12/23/23 1127     Magnesium 2.1 mg/dL     CBC (No Diff) [[id number]]  (Abnormal) Collected: 12/23/23 1015    Specimen: Blood Updated: 12/23/23 1108     WBC 9.89 10*3/mm3      RBC 2.85 10*6/mm3      Hemoglobin 7.6 g/dL      Hematocrit 24.9 %      MCV 87.4 fL      MCH 26.7 pg      MCHC 30.5 g/dL      RDW 17.3 %      RDW-SD 55.4 fl      MPV 9.4 fL      Platelets 249 10*3/mm3               Condition on Discharge: Stable    Vital Signs  Temp:  [97.7 °F (36.5 °C)-99.1 °F (37.3 °C)] 98 °F (36.7 °C)  Heart Rate:  [70-80] 70  Resp:  [16-20] 16  BP: (101-114)/(60-84) 108/64    Physical Exam:  No acute distress.  Alert, oriented x4.  Rate rhythm regular.  Non-labored breathing.  Chest incision clean, dry, intact.  Chest tube with serous drainage and air leak with Valsalva but improving.  Abdomen soft, nondistended.  Moving all 4 extremities, no focal deficit.  Extremities warm.    Discharge Disposition  Home today    Discharge Medications     Discharge Medications        New Medications        Instructions Start Date   Acetaminophen Extra Strength 500 MG tablet   1,000 mg, Oral, 3 Times Daily      docusate sodium 100 MG  capsule  Commonly known as: COLACE   100 mg, Oral, 2 Times Daily      gabapentin 300 MG capsule  Commonly known as: NEURONTIN   300 mg, Oral, Every 8 Hours Scheduled      metoprolol tartrate 25 MG tablet  Commonly known as: LOPRESSOR   25 mg, Oral, 2 Times Daily      oxyCODONE 5 MG immediate release tablet  Commonly known as: ROXICODONE   5 mg, Oral, Every 12 Hours PRN      sulfamethoxazole-trimethoprim 800-160 MG per tablet  Commonly known as: Bactrim DS   1 tablet, Oral, 2 Times Daily      tamsulosin 0.4 MG capsule 24 hr capsule  Commonly known as: FLOMAX   Take 2 capsules by mouth Daily             Continue These Medications        Instructions Start Date   fluticasone 50 MCG/ACT nasal spray  Commonly known as: FLONASE   2 sprays into the nostril(s) as directed by provider Daily As Needed.      omeprazole 20 MG capsule  Commonly known as: priLOSEC   20 mg, Oral, Daily      ondansetron 8 MG tablet  Commonly known as: ZOFRAN   8 mg, Oral, 3 Times Daily PRN      pravastatin 20 MG tablet  Commonly known as: PRAVACHOL   20 mg, Oral, Nightly             Stop These Medications      predniSONE 10 MG tablet  Commonly known as: DELTASONE              Discharge Instructions:  No heavy lifting, pushing, pulling greater than 10 pounds.  No driving up until 2 weeks after surgery and no longer taking narcotics.  Resume home diet as tolerated.  Continue incentive spirometer at least 4 times per day.  Remove dressing from post chest tube site after 48 hours, may shower and clean surgical sites with antibacterial soap or hydrogen peroxide, and apply gauze dressing or band-aid as needed for any drainage.  No dressing needed once no longer draining.          Follow-up Appointments  Future Appointments   Date Time Provider Department Center   12/28/2023 11:15 AM Rosalio Scott MD [facility]   1/10/2024  2:30 PM LAB CHAIR 1 SHIREEN TRINID[facility]  [facility]   1/10/2024  3:00 PM Greg Dubon MD [facility]   1/17/2024  1:00  Giovany Hayes MD [facility]     Additional Instructions for the Follow-ups that You Need to Schedule       Ambulatory Referral to Home Health (Hospital)   As directed      Change chest tube dressing with DRY 4 x 4 and minimal tape.  No Vaseline gauze.    Order Comments: Change chest tube dressing with DRY 4 x 4 and minimal tape.  No Vaseline gauze.    Face to Face Visit Date: 12/22/2023   Follow-up provider for Plan of Care?: I treated the patient in an acute care facility and will not continue treatment after discharge.   Follow-up provider: TONEY CORTEZ [[id number]]   Reason/Clinical Findings: persistent air leak, on mini 500 chest tube   Describe mobility limitations that make leaving home difficult: persistent air leak, on mini 500 chest tube   Nursing/Therapeutic Services Requested: Skilled Nursing Other   Skilled nursing orders: Other   Frequency: Other                Test Results Pending at Discharge  Pending Labs       Order Current Status    Tissue Pathology Exam Preliminary result            For any questions regarding patient's stay, please refer to patient's chart.    Toney Cortez MD  [facility]  12/24/23  10:55 EST

## 2023-12-24 NOTE — NURSING NOTE
Patient hill removed around 0900, patient encouraged to drink fluids. Patient voided approximately 170cc, post void bladder scan showed 407 still in bladder so a hill was anchored for discharge and patient educated on home care and urology follow up.

## 2023-12-24 NOTE — PLAN OF CARE
Problem: Adult Inpatient Plan of Care  Goal: Plan of Care Review  Outcome: Ongoing, Progressing  Flowsheets (Taken 12/24/2023 5042)  Progress: improving  Plan of Care Reviewed With: patient  Outcome Evaluation: chest tube to mini 500, denies need for pain med. poss discharge today.  fc remains at bedside. resting on and off w eyes closed   Goal Outcome Evaluation:  Plan of Care Reviewed With: patient        Progress: improving  Outcome Evaluation: chest tube to mini 500, denies need for pain med. poss discharge today.  fc remains at bedside. resting on and off w eyes closed

## 2023-12-25 NOTE — OUTREACH NOTE
Prep Survey      Flowsheet Row Responses   [facility] facility patient discharged from? [place]   Is LACE score < 7 ? 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   Does the patient have one of the following disease processes/diagnoses(primary or secondary)? Cardiothoracic surgery   Does the patient have Home health ordered? Yes   What is the Home health agency?  [facility]   Is there a DME ordered? No   Prep survey completed? Yes            BEAR PETERSON - Registered Nurse

## 2023-12-26 ENCOUNTER — TELEPHONE (OUTPATIENT)
Dept: SURGERY | Facility: CLINIC | Age: 68
End: 2023-12-26
Payer: MEDICARE

## 2023-12-26 NOTE — CASE MANAGEMENT/SOCIAL WORK
Case Management Discharge Note      Final Note: Discharged home.  [facility] was not able to accept and pts wife states they no longer feel they need HHLizz Montanez RN         Selected Continued Care - Discharged on 12/24/2023 Admission date: 12/13/2023 - Discharge disposition: Home or Self Care      Destination    No services have been selected for the patient.                Durable Medical Equipment    No services have been selected for the patient.                Dialysis/Infusion    No services have been selected for the patient.                Home Medical Care    No services have been selected for the patient.                Therapy    No services have been selected for the patient.                Community Resources    No services have been selected for the patient.                Community & DME    No services have been selected for the patient.                    Transportation Services  Private: Car    Final Discharge Disposition Code: 01 - home or self-care

## 2023-12-26 NOTE — CASE MANAGEMENT/SOCIAL WORK
Case Management Discharge Note      Final Note: Dsicharged home with [facility] and home O2 from [facility].  MARTIN Montanez RN         Selected Continued Care - Discharged on 12/24/2023 Admission date: 12/13/2023 - Discharge disposition: Home or Self Care      Destination    No services have been selected for the patient.                Durable Medical Equipment    No services have been selected for the patient.                Dialysis/Infusion    No services have been selected for the patient.                Home Medical Care       Service Provider Selected Services Address Phone Fax Patient Preferred    [facility] [address] [place] [phone] --              Therapy    No services have been selected for the patient.                Community Resources    No services have been selected for the patient.                Community & DME    No services have been selected for the patient.                    Transportation Services  Private: Car    Final Discharge Disposition Code: 06 - home with home health care

## 2023-12-26 NOTE — TELEPHONE ENCOUNTER
Pt wife notified of appt change and cxr that needs to be done prior to appt.    DB 8:35  12/26/2023

## 2024-01-02 DIAGNOSIS — C34.92 PRIMARY SQUAMOUS CELL CARCINOMA OF LUNG, LEFT: Primary | ICD-10-CM

## 2024-01-03 NOTE — PROGRESS NOTES
THORACIC SURGERY CLINIC CONSULT NOTE    REASON FOR CONSULT: Left lower lobe poorly differentiated non-small cell lung cancer underwent neoadjuvant chemoimmunotherapy s/p robot-assisted left lower lobectomy, chest wall resection, mediastinal lymph node dissection.  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  Only 10% viable small cell cancer remaining involving the bronchial margin.    REFERRING PROVIDER: Hosea Abernathy MD     Subjective   HISTORY OF PRESENTING ILLNESS:   Sunny Hung is a 68 y.o. male who has significant medical problems as mentioned in the medical chart.     He underwent a screening CT of the chest on 7/31/2023 which showed a large mass in the left lower lobe of the lung extending to the hilum.  A diagnostic CT was performed 8/25/2023 showing a large left infrahilar medial mass in the lower lobe.  A PET scan on 9/8/2023 showed a left lower lobe spiculated pleural-based mass 1.3 x 5 cm involving the left hilum and major fissure SUV 23.2.  There were smaller nodular airspace opacities also hypermetabolic.  The mass encases left lower lobe bronchovascular bundle.  No hypermetabolic mediastinal or hilar lymph nodes noted.  There was a 7 mm nodule in the right upper lobe photopenic SUV 2.1.  There was no supraclavicular uptake or disease below the abdomen other than 2 abnormal foci in the sigmoid colon.  MRI of the brain on 9/11/2023 showed a subtle 2 mm focus inferior left cerebellum too small to characterize but likely a blood vessel.  The patient underwent bronchoscopy on 9/6/2023 with biopsies taken from the left lower lobe mass, lymph node station 7 and lymph node station 4R.  Pathology showed malignant cells favoring poorly differentiated carcinoma/squamous differentiation from the left lower lobe and biopsies from station 7 and 4R were negative for malignant cells.     The patient completed 3 cycles of neoadjuvant therapy with carboplatin/Taxol/nivolumab 11/1/2023.  He  tolerated treatment well without much complications.  His case was discussed in multidisciplinary tumor board conference and the consensus recommendation was to proceed with surgical resection.  Restaging PET/CT was done on 12/5/2023 which reported significant decrease in size of the left lower lobe mass with decreased SUV of 3.6, previously noted 23.2.  There was irregular large airspace consolidation at the posterior aspect of the left lower lobe with a maximal SUV of 8.7 suspected to represent pneumonitis with possible superimposed infection.  There was no evidence of hypermetabolic mediastinal or hilar lymphadenopathy or distant metastases.  He also had transthoracic echo on 12/5/2023 which reported LVEF of 60%.     Prior to surgical resection, he denied any difficulty breathing or pain. He coughed intermittently. He denied angina, history of myocardial infarction or stroke. He could walk a block without dyspnea. He did not smoke. He was not on supplemental oxygen at home.     As per my assessment, he had stage II-B non-small cell lung cancer who underwent neoadjuvant chemoimmunotherapy based on Checkmate 816 trial.  There was significant treatment response based on the PET CT scan.  In Checkmate 816 trial, there were 25% of the patient who had complete pathological response after neoadjuvant chemoimmunotherapy.  The standard of care for stage II-B resectable lung cancer is neoadjuvant chemoimmunotherapy followed by lobectomy.  Therefore, I recommended robot-assisted thoracoscopic left lower lobectomy, mediastinal lymph node dissection.  His lung function has been borderline which does not correlate to his excellent functional status.  He was independent in his activities of daily living and was not dependent on supplemental oxygen.  We performed a 6-minute walk test in the clinic which he completed without any respiratory distress.  His oxygen saturation remained above 95% throughout the test.  He was motivated  and determined to pursue surgical cure and I supported his wishes.      On 12/13/2023, he underwent Robot assisted Thoracoscopic Left Lower Lobectomy with en bloc resection of parietal pleura.  The entire lung was adherent to the chest wall.  I performed lysis of adhesion for 120 minutes.  The tissue planes were extremely friable that led to significant air leak.  He was on 80 mg prednisone per day for colitis at the time of the operation.  The left lower lobe mass was adherent to the chest wall invading the parietal pleura but did not extend to the extrathoracic space.  I placed 2 chest tubes as I was expecting large prolonged air leak.  He tolerated procedure well.  He was extubated at the end of the procedure.  His hospital course was significant for prolonged air leak.  The prednisone was slowly tapered and eventually discontinued.  He developed urinary retention requiring Prater catheter placement.  His pain was well-controlled with pain medication.  He ambulated without much difficulty.  He was tolerating regular diet.  He was requiring supplemental oxygen.  Chest tube was transitioned to waterseal and eventually 1 chest tube was connected to mini atrium and he was discharged home with mini atrium.  A CT scan was obtained prior to discharge which showed consolation changes in the left upper lobe concerning for pneumonia.  He did not have leukocytosis but in context of neoadjuvant chemoimmunotherapy, immunosuppression from prednisone, I started him on empiric antibiotics to treat hospital-acquired pneumonia.  He was discharged home with antibiotics as well.     The final pathology revealed residual small cell carcinoma with background of neoadjuvant therapy effect.  Visceral pleural invasion was not an 5.  The bronchial and perivascular soft tissue margins were involved with invasive carcinoma and tumor within lymphatics.  There were 2 peribronchial lymph nodes which were negative for carcinoma.  The total size  of the viable tumor was 0.5 cm.  This made up to 10% of viable tumor present.  10 lymph nodes were retrieved and 2 of the hilar lymph nodes were positive.  The pathologist reported that the residual tumor likely represented the previously and biopsy population of small cell carcinoma that did not respond to neoadjuvant therapy.  His case was discussed in our multidisciplinary tumor board conference.  The small cell cancer was an unexpected finding.  The consensus recommendation was to proceed with adjuvant systemic treatment and radiation treatment to the resection margin.    He came to clinic for follow-up visit.  The patient does not have much pain. He experiences dyspnea when doing mild physical activities, especially when getting out of bed. His dyspnea seemed worse earlier this morning. He denies having any fever or chills. He uses a wheelchair due to dyspnea. He was unable to sleep comfortably at night due to his chest tube. He finished taking his prescribed antibiotics. He is currently using oxygen supplementation. He experiences tenderness upon using his right upper extremity. The accompanying adult female reports that the patient uses a wheelchair due to severe peripheral neuropathy in the lower extremities. She explains that the patient's peripheral neuropathy started when he was doing chemotherapy. She mentions that the patient has bilateral edema in both feet caused by some side effects of his medications. She confirms that the patient still has a Prater catheter in place and will be removed on 01/08/2024. She inquires about the patient's x-ray and pathology results. She claims that the patient only has a few days left of gabapentin, and she requests refills for it. She reports that the patient complains of experiencing tenderness in his back at night. She states that the patient has a scheduled appointment with Dr. Greg Dubon on 01/10/2024.    Past Medical History:   Diagnosis Date    Arthritis     BPH  (benign prostatic hyperplasia)     Bruises easily     Bursitis of right shoulder     Cough     Hyperlipidemia     Mass of lower lobe of left lung        Past Surgical History:   Procedure Laterality Date    BRONCHOSCOPY WITH ION ROBOTIC ASSIST N/A 2023    Procedure: BRONCHOSCOPY WITH BAL;   ION ROBOT AND ENDOBRONCHIAL ULTRASOUND WITH FNA'S;  Surgeon: Rosalio Scott MD;  Location:  [facility];  Service: Robotics - Pulmonary;  Laterality: N/A;  PRE- LEFT LOWER LOBE MASS  POST- SAME    GANGLION CYST EXCISION Bilateral     HEMORRHOIDECTOMY      LOBECTOMY Left 2023    Procedure: BRONCHOSCOPY, THORACOSCOPY WITH LYSIS OF ADHESIONS (120 MINUTES), LEFT LOWER LOBECTOMY WITH EN BLOC PARTIAL PARIETAL PLEURECTOMY USING DAVINCI ROBOT, MEDIASTINAL LYMPH NODE DISSECTION, INTERCOSTAL NERVE BLOCK;  Surgeon: Rosalio Scott MD;  [facility];  Service: Robotics - DaVinci;  Laterality: Left;    OTHER SURGICAL HISTORY      SOMETHING REMOVED FROM LEFT CHEST, BENIGN ? LIPOMA       Family History   Problem Relation Age of Onset    Bone cancer Mother     COPD Father     Brain cancer Sister     Cancer Brother         Malignant tumor of pharynx    Alcohol abuse Brother     Cirrhosis Brother     Recurrent abdominal pain Brother     Lung cancer Brother     Malig Hyperthermia Neg Hx        Social History     Socioeconomic History    Marital status:      Spouse name: Carmen   Tobacco Use    Smoking status: Former     Packs/day: 0.50     Years: 40.00     Additional pack years: 0.00     Total pack years: 20.00     Types: Cigarettes     Quit date:      Years since quittin.1    Smokeless tobacco: Never   Vaping Use    Vaping Use: Never used   Substance and Sexual Activity    Alcohol use: Not Currently     Comment: VERY RARELY, MAYBE 1 BEER    Drug use: Never    Sexual activity: Defer         Current Outpatient Medications:     fluticasone (FLONASE) 50 MCG/ACT nasal spray, 2 sprays into the nostril(s) as directed  "by provider Daily As Needed., Disp: , Rfl:     pravastatin (PRAVACHOL) 20 MG tablet, Take 1 tablet by mouth Every Night., Disp: , Rfl:     OLANZapine (zyPREXA) 5 MG tablet, Take 1 tablet by mouth Every Night. Take nightly x 4 starting night of chemotherapy., Disp: 4 tablet, Rfl: 3    ondansetron (ZOFRAN) 8 MG tablet, Take 1 tablet by mouth Every 8 (Eight) Hours As Needed for Nausea or Vomiting., Disp: 30 tablet, Rfl: 5    tamsulosin (FLOMAX) 0.4 MG capsule 24 hr capsule, Take 1 capsule by mouth Daily., Disp: , Rfl:   No current facility-administered medications for this visit.     Allergies   Allergen Reactions    Amoxicillin-Pot Clavulanate Hives             Objective    OBJECTIVE:     VITAL SIGNS:  /64 (BP Location: Left arm, Patient Position: Sitting, Cuff Size: Adult)   Pulse 117   Ht 180.3 cm (70.98\")   Wt 76.2 kg (168 lb)   SpO2 90%   BMI 23.44 kg/m²     PHYSICAL EXAM:  Constitutional:       Appearance: Normal appearance.  In wheelchair on supplemental oxygen.  HENT:      Head: Normocephalic.      Right Ear: External ear normal.      Left Ear: External ear normal.      Nose: Nose normal.      Mouth/Throat: No obvious deformity     Pharynx: Oropharynx is clear.   Eyes:      Conjunctiva/sclera: Conjunctivae normal.   Cardiovascular:      Rate and Rhythm: Normal rate.      Pulses: Normal pulses.   Pulmonary:      Effort: Pulmonary effort is normal.  Incision clean, dry, intact.  Abdominal:      Palpations: Abdomen is soft.   Musculoskeletal:         General: Normal range of motion.      Cervical back: Normal range of motion.   Skin:     General: Skin is warm.   Neurological:      General: No focal deficit present.      Mental Status: He is alert and oriented to person, place, and time.     LAB RESULTS:  I have reviewed all the available laboratory results in the chart.    RESULTS REVIEW:  I have reviewed the patient's all relevant laboratory and imaging findings.     CT Chest With Contrast Diagnostic " (08/25/2023 13:15)      Results of the chest CT scan from 12/23/2023:  1. There is a left-sided chest tube in satisfactory position; there is a considerable amount of subcutaneous emphysema and mediastinal gas; and there is a small left-sided pneumothorax. Gas bubbles were also demonstrated within the pleural space. Left seventh rib fracture.   2. Considerable consolidation involving the left lung is worrisome for pneumonia. Trace left pleural effusion seen. Left-sided pleural thickening.   3. Decreasing nodular areas of consolidation within the right lower lobe. No new airspace disease has developed within the right lung. No vascular congestion was seen.   4. Volume loss left hemithorax status post lower lobectomy. Cardiac size is stable. The esophagus is satisfactory in course and caliber. Mild dilatation of the ascending thoracic aorta, diameter approximately 4 cm as before. There are small mediastinal lymph nodes again seen. Limited images through the upper abdomen are unremarkable.     Results of the chest x-ray from 12/24/2023:  Stable left chest tube. There is no appreciable pneumothorax. Stable atelectasis/consolidation left lung base and patchy opacities elsewhere in the left lung. Heart size is stable. The right lung appears clear. Extensive subcutaneous air was again demonstrated.      ASSESSMENT & PLAN:  Sunny Hung is a 68 y.o. male with significant medical conditions as mentioned above presented to my clinic.    Diagnosis: Left lower lobe poorly differentiated non-small cell lung cancer underwent neoadjuvant chemoimmunotherapy s/p robot-assisted left lower lobectomy, chest wall resection, mediastinal lymph node dissection.  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  Only 10% viable small cell cancer remaining involving the bronchial margin.  Staging: Stage II-B ([id number])    He underwent surgical resection after neoadjuvant chemoimmunotherapy.  His postop course was not smooth but he  was eventually discharged with chest tube.  The chest x-ray showed adequate lung expansion.  The chest tube was removed.    I discussed with him that he still might have some drainage after the removal of the chest tube. His drainage is clear and does not indicate an infection. We discussed his chest CT scan and chest x-ray results. He was instructed to only take a shower and avoid sitting in a bathtub or swimming. He was advised that his wound would take time to heal. He can use moisturizer due to skin dryness. Currently, everything looks good post-surgery. I discussed with him that it took him a long time to heal due to pulmonary fibrosis and steroid treatment. The goal at this point is to make him stronger. He is currently using oxygen supplementation. His Prater catheter is scheduled to be removed on 01/08/2024. He was encouraged to do deep breathing exercises. He was informed that it is normal for patients to feel pain and tenderness for about 6 to 12 months. However, the numbness could take longer because the nerve regeneration takes place at a slower pace.  It could take approximately 6 to 12 months for the numbness to subside completely. He was advised that it is expected to feel tenderness, soreness, and occasional sharp pain, especially when doing physical activities. I will be monitoring him for 5 years, and he will continue to follow up with me. He will continue to follow up with Dr. Greg Dubon. He was encouraged to do daily ambulation and exercise his upper and lower extremities. He is currently in the healing and rehabilitation process.     Follow-up  The patient will follow up in 1 month.    I discussed the patients findings and my recommendations with the patient. The patient was given adequate time to ask questions and all questions were answered to patient satisfaction.     Rosalio Scott MD  Thoracic Surgeon  [facility] [place]        Dictated utilizing Dragon dictation    I spent  40 minutes caring for Sunny on this date of service. This time includes time spent by me in the following activities:preparing for the visit, reviewing tests, obtaining and/or reviewing a separately obtained history, performing a medically appropriate examination and/or evaluation , counseling and educating the patient/family/caregiver, ordering medications, tests, or procedures, referring and communicating with other health care professionals , documenting information in the medical record, independently interpreting results and communicating that information with the patient/family/caregiver, and care coordination and more than half the time was spent in direct face to face evaluation and decision making.       Transcribed from ambient dictation for Rosalio Scott MD by Sandrine Guardado.  01/04/24   11:06 EST    Patient or patient representative verbalized consent to the visit recording.  I have personally performed the services described in this document as transcribed by the above individual, and it is both accurate and complete.

## 2024-01-04 ENCOUNTER — OFFICE VISIT (OUTPATIENT)
Dept: SURGERY | Facility: CLINIC | Age: 69
End: 2024-01-04
Payer: MEDICARE

## 2024-01-04 ENCOUNTER — READMISSION MANAGEMENT (OUTPATIENT)
Dept: CALL CENTER | Facility: HOSPITAL | Age: 69
End: 2024-01-04
Payer: MEDICARE

## 2024-01-04 ENCOUNTER — HOSPITAL ENCOUNTER (OUTPATIENT)
Dept: GENERAL RADIOLOGY | Facility: HOSPITAL | Age: 69
Discharge: HOME | End: 2024-01-04
Admitting: SURGERY
Payer: MEDICARE

## 2024-01-04 ENCOUNTER — PATIENT OUTREACH (OUTPATIENT)
Dept: OTHER | Facility: HOSPITAL | Age: 69
End: 2024-01-04
Payer: MEDICARE

## 2024-01-04 ENCOUNTER — DOCUMENTATION (OUTPATIENT)
Dept: ONCOLOGY | Facility: CLINIC | Age: 69
End: 2024-01-04
Payer: MEDICARE

## 2024-01-04 VITALS
HEART RATE: 117 BPM | DIASTOLIC BLOOD PRESSURE: 64 MMHG | WEIGHT: 168 LBS | BODY MASS INDEX: 23.52 KG/M2 | HEIGHT: 71 IN | SYSTOLIC BLOOD PRESSURE: 120 MMHG | OXYGEN SATURATION: 90 %

## 2024-01-04 DIAGNOSIS — C34.92 PRIMARY SQUAMOUS CELL CARCINOMA OF LUNG, LEFT: Primary | ICD-10-CM

## 2024-01-04 DIAGNOSIS — C34.32 CANCER OF BRONCHUS OF LEFT LOWER LOBE: ICD-10-CM

## 2024-01-04 PROCEDURE — 71046 X-RAY EXAM CHEST 2 VIEWS: CPT

## 2024-01-04 PROCEDURE — 99024 POSTOP FOLLOW-UP VISIT: CPT | Performed by: SURGERY

## 2024-01-04 NOTE — OUTREACH NOTE
CT Surgery Week 1 Survey      Flowsheet Row Responses   [facility] patient discharged from? [place]   Does the patient have one of the following disease processes/diagnoses(primary or secondary)? Cardiothoracic surgery   Week 1 attempt successful? Yes   Call start time 1234   Call end time 1241   Discharge diagnosis Stage II-B ([id number]) Left lower lobe poorly differentiated carcinoma/squamous differentiation s/p neoadjuvant chemoimmunotherapy followed by left lower lobectomy with en bloc resection of parietal pleura.   Is patient permission given to speak with other caregiver? Yes   List who call center can speak with Carmen spouse   Person spoke with today (if not patient) and relationship Carmen spouse   Meds reviewed with patient/caregiver? Yes   Is the patient having any side effects they believe may be caused by any medication additions or changes? No   Does the patient have all medications related to this admission filled (includes all antibiotics, pain medications, cardiac medications, etc.) Yes   Is the patient taking all medications as directed (includes completed medication regime)? Yes   Comments regarding appointments Urology apt on 1/8/24-catheter is scheduled to be removed during apt   Does the patient have a primary care provider?  Yes   Does the patient have an appointment scheduled with their C/T surgeon? Yes   Has the patient kept scheduled appointments due by today? Yes  [CT was removed today, 1/4/24,  by surgeon's office]   What is the Home health agency?  HH was denied d/t spouse assisted pt with dressing needs   Did the patient receive a copy of their discharge instructions? Yes   Nursing interventions Reviewed instructions with patient   What is the patient's perception of their health status since discharge? Improving   Nursing interventions Nurse provided patient education   Is the patient /caregiver able to teach back basic post-op care? Drive as instructed by MD in discharge  instructions, Shower daily, No tub bath, swimming, or hot tub until instructed by MD, Use a clean wash cloth and antibacterial bar or liquid soap to clean incisions, Continue use of incentive spirometry at least 1 week post discharge, Hold pillow to support chest when coughing, Lifting as instructed by MD in discharge instructions   Is the patient/caregiver able to teach back signs and symptoms of incisional infection? Fever, Pus or odor from incision, Incisional warmth, Increased drainage or bleeding, Increased redness, swelling or pain at the incisonal site   Is the patient/caregiver able to teach back steps to recovery at home? Set small, achievable goals for return to baseline health, Rest and rebuild strength, gradually increase activity, Practice good oral hygiene, Eat a well-balance diet, Make a list of questions for surgeon's appointment   If the patient is a current smoker, are they able to teach back resources for cessation? Not a smoker   Is the patient/caregiver able to teach back the hierarchy of who to call/visit for symptoms/problems? PCP, Specialist, Home health nurse, Urgent Care, ED, 911 Yes   Week 1 call completed? Yes   Call end time 1241              Nurys ROACH - Registered Nurse

## 2024-01-04 NOTE — PROGRESS NOTES
Reviewed chart. Patient has poorly differentiated carcinoma/squamous differentiation left lower lobe of the lung; s/p neoadjuvant treatment with carboplatin/Taxol/nivolumab initiated 9/20/23; s/p LLL lobectomy with partial pleural resection 12/13/23    I accompanied patient and family to his post-op visit with Dr. Scott today.  The patient is in a wheelchair today. He reports dyspnea with exertion and is wearing oxygen. He also has a f/c in place, which may be removed at his appt on Monday. The patient continues to complain of neuropathy in his feet and fingers following chemotherapy/immunotherapy.    Dr. Scott discussed this patient's pathology and CXR results today.  Dr. Scott removed his chest tube in office. His final pathology is pending. Dr. Scott discussed additional treatment may be warranted, which could include radiation and/or chemotherapy. The patient is scheduled to see Dr. Dubon on 1/10/24. Dr. Scott encouraged deep breathing exercises as well as increase in activity as tolerated. The patient is scheduled for f/u with Dr. Scott on 2/1 with CXR prior to this appt.    The patient is eating well and denies weight loss.    We discussed usual post-op symptoms and expected duration of these symptoms. We also discussed a possible referral to PT in the future if his muscle strength does not improved. The patient and family verbalized understanding.     We again discussed integrative therapies and [facility]. Patient expressed gratitude for my support and denied any additional needs at this time. I will call in several weeks; encouraged patient/family to call as needed.

## 2024-01-04 NOTE — PROGRESS NOTES
Patient's case discussed at thoracic conference with concern about tissue type and positive margin.  Pathology has been reviewed at [place] and additional information is likely forthcoming as that is completed.  This appears to be a poorly differentiated malignancy that, likely, responded to the chemotherapy given and not, necessarily to the immunotherapy portion.  He also had toxicity with colitis from the immunotherapy portion.    Now with a potential positive margin radiation therapy is going to be considered and we discussed giving concurrent treatment such as weekly CarboTaxol.  This additional information is placed in the record so that the treating oncologist can be alerted to the discussion results.  MADELEINE

## 2024-01-04 NOTE — PROGRESS NOTES
Subjective     REASON FOR CONSULTATION:  NSCLC  Provide an opinion on any further workup or treatment                             REQUESTING PHYSICIAN:  Gul    RECORDS OBTAINED:  Records of the patients history including those obtained from the referring provider were reviewed and summarized in detail.      History of Present Illness   The patient initiated neoadjuvant treatments on 9/20/2023 with carboplatin/Taxol and nivolumab.  He tolerated the first treatment well other than achiness and fatigue.  He had no uncontrolled nausea vomiting diarrhea skin rash worsening shortness of breath cough fevers chills etc.    Oncology history:  This is a pleasant 68-year-old man with hyperlipidemia and previous tobacco abuse who underwent a screening CT of the chest on 7/31/2023 which showed a large mass in the left lower lobe of the lung extending to the hilum.  A diagnostic CT was performed 8/25/2023 showing a large left infrahilar medial mass in the lower lobe.  A PET scan on 9/8/2023 showed a left lower lobe spiculated pleural-based mass 1.3 x 5 cm involving the left hilum and major fissure SUV 23.2.  There were smaller nodular airspace opacities also hypermetabolic.  The mass encases left lower lobe bronchovascular bundle.  No hypermetabolic mediastinal or hilar lymph nodes noted.  There was a 7 mm nodule in the right upper lobe photopenic SUV 2.1.  There was no supraclavicular uptake or disease below the abdomen other than 2 abnormal foci in the sigmoid colon.  MRI of the brain on 9/11/2023 showed a subtle 2 mm focus inferior left cerebellum too small to characterize but likely a blood vessel.  The patient underwent bronchoscopy on 9/6/2023 with biopsies taken from the left lower lobe mass, lymph node station 7 and lymph node station 4R.  Pathology showed malignant cells favoring poorly differentiated carcinoma/squamous differentiation from the left lower lobe and biopsies from station 7 and 4R were negative for  malignant cells.    The patient underwent neoadjuvant chemo-immunotherapy with 3 cycles of carboplatin/Taxol/nivolumab completed 2023.  After completing treatment he had immune mediated colitis requiring steroid therapy.  Treatment also complicated by peripheral neuropathy.    The patient underwent left lower lobectomy 2023 complicated by prolonged and left lower lobe pneumonia.  Pathology from surgery showed significant treatment effect but residual small cell carcinoma with positive bronchial and perivascular soft tissue margins by tumor within lymphatics, positive lymph node station 10 L for small cell carcinoma x 2.      Past Medical History:   Diagnosis Date    Arthritis     BPH (benign prostatic hyperplasia)     Bruises easily     Bursitis of right shoulder     Cough     Hyperlipidemia     Mass of lower lobe of left lung         Past Surgical History:   Procedure Laterality Date    BRONCHOSCOPY WITH ION ROBOTIC ASSIST N/A 2023    Procedure: BRONCHOSCOPY WITH BAL;   ION ROBOT AND ENDOBRONCHIAL ULTRASOUND WITH FNA'S;  Surgeon: Rosalio Scott MD;  Location: [facility] ENDOSCOPY;  Service: Robotics - Pulmonary;  Laterality: N/A;  PRE- LEFT LOWER LOBE MASS  POST- SAME    GANGLION CYST EXCISION Bilateral     HEMORRHOIDECTOMY      LOBECTOMY Left 2023    Procedure: BRONCHOSCOPY, THORACOSCOPY WITH LYSIS OF ADHESIONS (120 MINUTES), LEFT LOWER LOBECTOMY WITH EN BLOC PARTIAL PARIETAL PLEURECTOMY USING DAVINCI ROBOT, MEDIASTINAL LYMPH NODE DISSECTION, INTERCOSTAL NERVE BLOCK;  Surgeon: Rosalio Scott MD;  Location: [facility] OR;  Service: Robotics - DaVinci;  Laterality: Left;    OTHER SURGICAL HISTORY      SOMETHING REMOVED FROM LEFT CHEST, BENIGN ? LIPOMA        Current Outpatient Medications on File Prior to Visit   Medication Sig Dispense Refill    [] acetaminophen (TYLENOL) 500 MG tablet Take 2 tablets by mouth 3 (Three) Times a Day for 10 days. 60 tablet 0    [] docusate sodium 100 MG  capsule Take 1 capsule by mouth 2 (Two) Times a Day for 10 days. 20 capsule 0    fluticasone (FLONASE) 50 MCG/ACT nasal spray 2 sprays into the nostril(s) as directed by provider Daily As Needed.      gabapentin (NEURONTIN) 300 MG capsule Take 1 capsule by mouth Every 8 (Eight) Hours for 15 days. 45 capsule 0    metoprolol tartrate (LOPRESSOR) 25 MG tablet Take 1 tablet by mouth 2 (Two) Times a Day for 15 days. 30 tablet 0    omeprazole (priLOSEC) 20 MG capsule Take 1 capsule by mouth Daily. 30 capsule 0    ondansetron (ZOFRAN) 8 MG tablet Take 1 tablet by mouth 3 (Three) Times a Day As Needed for Nausea or Vomiting. 30 tablet 2    pravastatin (PRAVACHOL) 20 MG tablet Take 1 tablet by mouth Every Night.      [] sulfamethoxazole-trimethoprim (Bactrim DS) 800-160 MG per tablet Take 1 tablet by mouth 2 (Two) Times a Day for 10 days. 20 tablet 0    tamsulosin (FLOMAX) 0.4 MG capsule 24 hr capsule Take 2 capsules by mouth Daily 60 capsule 0     No current facility-administered medications on file prior to visit.        ALLERGIES:    Allergies   Allergen Reactions    Amoxicillin-Pot Clavulanate Hives        Social History     Socioeconomic History    Marital status:      Spouse name: Carmen   Tobacco Use    Smoking status: Former     Packs/day: 0.50     Years: 40.00     Additional pack years: 0.00     Total pack years: 20.00     Types: Cigarettes     Quit date:      Years since quittin.0    Smokeless tobacco: Never   Vaping Use    Vaping Use: Never used   Substance and Sexual Activity    Alcohol use: Not Currently     Comment: VERY RARELY, MAYBE 1 BEER    Drug use: Never    Sexual activity: Defer        Family History   Problem Relation Age of Onset    Bone cancer Mother     COPD Father     Brain cancer Sister     Cancer Brother         Malignant tumor of pharynx    Alcohol abuse Brother     Cirrhosis Brother     Recurrent abdominal pain Brother     Lung cancer Brother     Malig Hyperthermia Neg Hx          Review of Systems   Constitutional:  Positive for fatigue.   HENT: Negative.     Respiratory:  Positive for cough.    Cardiovascular: Negative.    Gastrointestinal: Negative.    Genitourinary: Negative.    Musculoskeletal: Negative.    Neurological: Negative.    Hematological: Negative.    Psychiatric/Behavioral:  Negative for dysphoric mood. The patient is nervous/anxious.           Objective     There were no vitals filed for this visit.        12/7/2023     1:10 PM   Current Status   ECOG score 0       Physical Exam    CONSTITUTIONAL: pleasant well-developed adult man  LYMPH: no cervical or supraclavicular lad  CV: RRR, S1S2, no murmur  RESP: cta bilat, no wheezing, no rales  GI: soft, non-tender, no splenomegaly, +bs  MUSC: no edema, normal gait  NEURO: alert and oriented x3, normal strength  PSYCH: normal mood anxious affect  Exam is unchanged-10/11/2023  RECENT LABS:  Hematology WBC   Date Value Ref Range Status   12/23/2023 9.89 3.40 - 10.80 10*3/mm3 Final     RBC   Date Value Ref Range Status   12/23/2023 2.85 (L) 4.14 - 5.80 10*6/mm3 Final     Hemoglobin   Date Value Ref Range Status   12/23/2023 7.6 (L) 13.0 - 17.7 g/dL Final     Hematocrit   Date Value Ref Range Status   12/23/2023 24.9 (L) 37.5 - 51.0 % Final     Platelets   Date Value Ref Range Status   12/23/2023 249 140 - 450 10*3/mm3 Final        Lab Results   Component Value Date    GLUCOSE 144 (H) 12/23/2023    BUN 16 12/23/2023    CREATININE 0.63 (L) 12/23/2023    EGFR 104.3 12/23/2023    BCR 25.4 (H) 12/23/2023    K 4.0 12/23/2023    CO2 25.0 12/23/2023    CALCIUM 8.1 (L) 12/23/2023    ALBUMIN 4.1 12/11/2023    BILITOT <0.2 12/11/2023    AST 16 12/11/2023    ALT 21 12/11/2023     PET scan [id number]  IMPRESSION:  1. Intensely hypermetabolic approximately 13 x 5 cm pleural-based left  lower lobe lung mass involving the left hilum. Smaller nodular opacities  adjacently are hypermetabolic and likely represent metastases. A 7 mm  right upper lobe  nodule is photopenic. Conservative surveillance is  recommended. There is no convincing evidence for metastatic disease at  the neck, abdomen, or pelvis.  2. There are 2 hypermetabolic foci at the sigmoid colon need further  evaluation, particularly the 1.4 cm hypermetabolic polyp at the distal  sigmoid colon. Colonoscopy is recommended.  3. Nonspecific heterogeneous prostate activity. PSA follow-up is  recommended.    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  Pathology from surgery showed significant treatment effect but residual small cell carcinoma with positive bronchial and perivascular soft tissue margins by tumor within lymphatics, positive lymph node station 10 L for small cell carcinoma x 2     *Immune mediated colitis  Patient treated with high-dose steroids and taper, now off steroid therapy  No recurrence of diarrhea off steroid therapy    *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    *MRI brain 9/11/23-2 mm enhancement left cerebellar hemisphere likely vessel artifact; MRI brain 11/27/2023-no evidence of metastatic disease, small focus of enhancement left inferior cerebellar region unchanged likely benign    *PET uptake 2 foci sigmoid colon; he will eventually need colonoscopy    *Comorbidities-hyperlipidemia      Oncology plan/recommendations:  Pathology  discussed with Dr. Shafer showing residual small cell carcinoma in the resection specimen which was not previously present on limited biopsy material.  The patient had a good response in the non-small cell component of malignancy to neoadjuvant chemotherapy and immunotherapy.  Typically immunotherapy would be continued postop but the patient had significant colitis and I think risk of further colitis outweigh benefit to the patient.  I explained to the patient and family the finding of small cell carcinoma in the operative specimen.  I explained the need of further treatment with chemotherapy utilizing platinum/etoposide and concurrent radiation given the small cell histology and positive resection margins.  I placed a consult to radiation oncology.  The patient is pretty weak from his recent extended hospital stay and clearly needs another 2 to 3 weeks to recuperate from surgery.    I spent 50 minutes of time on the case today reviewing the patient's hospital records, imaging results, pathology reports, discussing with pathology, face-to-face time, documenting care, writing orders etc.

## 2024-01-05 ENCOUNTER — TELEPHONE (OUTPATIENT)
Dept: SURGERY | Facility: CLINIC | Age: 69
End: 2024-01-05
Payer: MEDICARE

## 2024-01-05 NOTE — TELEPHONE ENCOUNTER
Spoke to pt wife and confirmed ct appt in [place] for 4/11/2024 and provider appt 4/18/2024    DB 9:03  1/5/2024

## 2024-01-05 NOTE — TELEPHONE ENCOUNTER
Pt wife confirmed after visit appt for 2/1/2024 and the cxr that needs to be performed prior to arrival    DB 8:52  1/5/2024

## 2024-01-10 ENCOUNTER — OFFICE VISIT (OUTPATIENT)
Dept: ONCOLOGY | Facility: CLINIC | Age: 69
End: 2024-01-10
Payer: MEDICARE

## 2024-01-10 ENCOUNTER — LAB (OUTPATIENT)
Dept: LAB | Facility: HOSPITAL | Age: 69
End: 2024-01-10
Payer: MEDICARE

## 2024-01-10 VITALS
RESPIRATION RATE: 18 BRPM | HEIGHT: 71 IN | DIASTOLIC BLOOD PRESSURE: 65 MMHG | BODY MASS INDEX: 22.93 KG/M2 | WEIGHT: 163.8 LBS | HEART RATE: 115 BPM | OXYGEN SATURATION: 96 % | TEMPERATURE: 99.1 F | SYSTOLIC BLOOD PRESSURE: 109 MMHG

## 2024-01-10 DIAGNOSIS — C34.32 CANCER OF BRONCHUS OF LEFT LOWER LOBE: ICD-10-CM

## 2024-01-10 DIAGNOSIS — C34.92 PRIMARY SQUAMOUS CELL CARCINOMA OF LUNG, LEFT: ICD-10-CM

## 2024-01-10 DIAGNOSIS — C34.92 PRIMARY SQUAMOUS CELL CARCINOMA OF LUNG, LEFT: Primary | ICD-10-CM

## 2024-01-10 DIAGNOSIS — Z79.899 HIGH RISK MEDICATION USE: ICD-10-CM

## 2024-01-10 LAB
ALBUMIN SERPL-MCNC: 3.2 G/DL (ref 3.5–5.2)
ALBUMIN/GLOB SERPL: 0.9 G/DL
ALP SERPL-CCNC: 97 U/L (ref 39–117)
ALT SERPL W P-5'-P-CCNC: 11 U/L (ref 1–41)
ANION GAP SERPL CALCULATED.3IONS-SCNC: 5.7 MMOL/L (ref 5–15)
AST SERPL-CCNC: 13 U/L (ref 1–40)
BASOPHILS # BLD AUTO: 0.04 10*3/MM3 (ref 0–0.2)
BASOPHILS NFR BLD AUTO: 0.5 % (ref 0–1.5)
BILIRUB SERPL-MCNC: 0.2 MG/DL (ref 0–1.2)
BUN SERPL-MCNC: 16 MG/DL (ref 8–23)
BUN/CREAT SERPL: 25.8 (ref 7–25)
CALCIUM SPEC-SCNC: 8.9 MG/DL (ref 8.6–10.5)
CHLORIDE SERPL-SCNC: 101 MMOL/L (ref 98–107)
CO2 SERPL-SCNC: 31.3 MMOL/L (ref 22–29)
CREAT SERPL-MCNC: 0.62 MG/DL (ref 0.76–1.27)
DEPRECATED RDW RBC AUTO: 53.4 FL (ref 37–54)
DX PRELIMINARY: NORMAL
EGFRCR SERPLBLD CKD-EPI 2021: 104.8 ML/MIN/1.73
EOSINOPHIL # BLD AUTO: 0.19 10*3/MM3 (ref 0–0.4)
EOSINOPHIL NFR BLD AUTO: 2.2 % (ref 0.3–6.2)
ERYTHROCYTE [DISTWIDTH] IN BLOOD BY AUTOMATED COUNT: 16.6 % (ref 12.3–15.4)
GLOBULIN UR ELPH-MCNC: 3.6 GM/DL
GLUCOSE SERPL-MCNC: 102 MG/DL (ref 65–99)
HCT VFR BLD AUTO: 31.2 % (ref 37.5–51)
HGB BLD-MCNC: 9.5 G/DL (ref 13–17.7)
IMM GRANULOCYTES # BLD AUTO: 0.05 10*3/MM3 (ref 0–0.05)
IMM GRANULOCYTES NFR BLD AUTO: 0.6 % (ref 0–0.5)
LAB AP CASE REPORT: NORMAL
LAB AP DIAGNOSIS COMMENT: NORMAL
LAB AP SPECIAL STAINS: NORMAL
LAB AP SYNOPTIC CHECKLIST: NORMAL
LYMPHOCYTES # BLD AUTO: 1.55 10*3/MM3 (ref 0.7–3.1)
LYMPHOCYTES NFR BLD AUTO: 18 % (ref 19.6–45.3)
MCH RBC QN AUTO: 26.9 PG (ref 26.6–33)
MCHC RBC AUTO-ENTMCNC: 30.4 G/DL (ref 31.5–35.7)
MCV RBC AUTO: 88.4 FL (ref 79–97)
MONOCYTES # BLD AUTO: 1.06 10*3/MM3 (ref 0.1–0.9)
MONOCYTES NFR BLD AUTO: 12.3 % (ref 5–12)
NEUTROPHILS NFR BLD AUTO: 5.71 10*3/MM3 (ref 1.7–7)
NEUTROPHILS NFR BLD AUTO: 66.4 % (ref 42.7–76)
NRBC BLD AUTO-RTO: 0 /100 WBC (ref 0–0.2)
PATH REPORT.FINAL DX SPEC: NORMAL
PATH REPORT.GROSS SPEC: NORMAL
PLATELET # BLD AUTO: 447 10*3/MM3 (ref 140–450)
PMV BLD AUTO: 9 FL (ref 6–12)
POTASSIUM SERPL-SCNC: 4.1 MMOL/L (ref 3.5–5.2)
PROT SERPL-MCNC: 6.8 G/DL (ref 6–8.5)
RBC # BLD AUTO: 3.53 10*6/MM3 (ref 4.14–5.8)
SODIUM SERPL-SCNC: 138 MMOL/L (ref 136–145)
WBC NRBC COR # BLD AUTO: 8.6 10*3/MM3 (ref 3.4–10.8)

## 2024-01-10 PROCEDURE — 85025 COMPLETE CBC W/AUTO DIFF WBC: CPT | Performed by: NURSE PRACTITIONER

## 2024-01-10 PROCEDURE — 36415 COLL VENOUS BLD VENIPUNCTURE: CPT

## 2024-01-10 PROCEDURE — 80053 COMPREHEN METABOLIC PANEL: CPT | Performed by: NURSE PRACTITIONER

## 2024-01-15 DIAGNOSIS — C34.32 MALIGNANT NEOPLASM OF LOWER LOBE OF LEFT LUNG: Primary | ICD-10-CM

## 2024-01-17 ENCOUNTER — OFFICE VISIT (OUTPATIENT)
Dept: CARDIOLOGY | Facility: CLINIC | Age: 69
End: 2024-01-17
Payer: MEDICARE

## 2024-01-17 VITALS
SYSTOLIC BLOOD PRESSURE: 118 MMHG | BODY MASS INDEX: 22.82 KG/M2 | OXYGEN SATURATION: 99 % | HEIGHT: 71 IN | DIASTOLIC BLOOD PRESSURE: 60 MMHG | HEART RATE: 76 BPM | WEIGHT: 163 LBS

## 2024-01-17 DIAGNOSIS — I47.10 PAROXYSMAL SVT (SUPRAVENTRICULAR TACHYCARDIA): ICD-10-CM

## 2024-01-17 DIAGNOSIS — I47.19 ATRIAL TACHYCARDIA: Primary | ICD-10-CM

## 2024-01-17 DIAGNOSIS — C34.92 PRIMARY SQUAMOUS CELL CARCINOMA OF LUNG, LEFT: ICD-10-CM

## 2024-01-17 NOTE — PROGRESS NOTES
[facility]    Subjective:     Encounter Date:01/17/24      Patient ID: Sunny Hung is a 68 y.o. male.    Chief Complaint:   Chief Complaint   Patient presents with     Hosp F/u from [facility], [facility] care      History of Present Illness    Mr. Hung is a pleasant 68-year-old gentleman past medical history T4 N0 M0 poorly differentiated carcinoma/squamous differentiation of left lower lobe of the lung, status post neoadjuvant treatment with carboplatin/Taxol/nivolumab, left lower lobectomy December 13, 2023, presents for further evaluation of SVT that was noted postoperatively.  I evaluated the patient on December 15 for intermittent SVT episodes.  He had 2 chest tubes in place at the time 1 of which was abutting the lateral wall of his left ventricle.  He was having short, mostly asymptomatic SVT episodes that were never captured on ECG.  There appear to be P wave morphologies consistent with atrial tachycardia during the spells.    For this, he was started on metoprolol was continued this during his hospitalization.  It was stopped 15 days after his hospitalization.  He has not had no further evidence of any palpitations or any symptoms from his heart since that spell.  He has been in sinus rhythm since that time.    The following portions of the patient's history were reviewed and updated as appropriate: allergies, current medications, past family history, past medical history, past social history, past surgical history and problem list.    Past Medical History:   Diagnosis Date    Arthritis     BPH (benign prostatic hyperplasia)     Bruises easily     Bursitis of right shoulder     Cough     Hyperlipidemia     Mass of lower lobe of left lung        Past Surgical History:   Procedure Laterality Date    BRONCHOSCOPY WITH ION ROBOTIC ASSIST N/A 9/6/2023    Procedure: BRONCHOSCOPY WITH BAL;   ION ROBOT AND ENDOBRONCHIAL ULTRASOUND WITH FNA'S;  Surgeon: Rosalio Scott MD;  Location: [facility]  "ENDOSCOPY;  Service: Robotics - Pulmonary;  Laterality: N/A;  PRE- LEFT LOWER LOBE MASS  POST- SAME    GANGLION CYST EXCISION Bilateral     HEMORRHOIDECTOMY      LOBECTOMY Left 12/13/2023    Procedure: BRONCHOSCOPY, THORACOSCOPY WITH LYSIS OF ADHESIONS (120 MINUTES), LEFT LOWER LOBECTOMY WITH EN BLOC PARTIAL PARIETAL PLEURECTOMY USING DAVINCI ROBOT, MEDIASTINAL LYMPH NODE DISSECTION, INTERCOSTAL NERVE BLOCK;  Surgeon: Rosalio Scott MD;  Location: [facility];  Service: Robotics - DaVinci;  Laterality: Left;    OTHER SURGICAL HISTORY      SOMETHING REMOVED FROM LEFT CHEST, BENIGN ? LIPOMA         Procedures       Objective:     Vitals:    01/17/24 1254   BP: 118/60   BP Location: Right arm   Pulse: 76   SpO2: 99%   Weight: 73.9 kg (163 lb)   Height: 180.3 cm (71\")         Constitutional:       Appearance: Not in distress. Chronically ill-appearing.   Neck:      Vascular: JVD normal.   Pulmonary:      Effort: Pulmonary effort is normal.      Breath sounds: Normal air entry.      Comments: Nasal cannula O2.  Slightly rhonchorous airway sounds left base  Cardiovascular:      PMI at left midclavicular line. Normal rate. Regular rhythm. Normal S2.       Murmurs: There is no murmur.      Comments: Regular rate and rhythm.  Pulses:     Intact distal pulses.   Edema:     Peripheral edema absent.   Skin:     General: Skin is warm and dry.   Neurological:      General: No focal deficit present.      Mental Status: Alert, oriented to person, place, and time and oriented to person, place and time.   Psychiatric:         Mood and Affect: Mood and affect normal.         Lab Review:       BUN   Date Value Ref Range Status   01/10/2024 16 8 - 23 mg/dL Final     Creatinine   Date Value Ref Range Status   01/10/2024 0.62 (L) 0.76 - 1.27 mg/dL Final   11/28/2023 0.60 0.60 - 1.30 mg/dL Final     Comment:     Serial Number: [id number]:  [id number]     Potassium   Date Value Ref Range Status   01/10/2024 4.1 3.5 - 5.2 mmol/L Final "     ALT (SGPT)   Date Value Ref Range Status   01/10/2024 11 1 - 41 U/L Final     AST (SGOT)   Date Value Ref Range Status   01/10/2024 13 1 - 40 U/L Final         Performed        Assessment:          Diagnosis Plan   1. Atrial tachycardia        2. Paroxysmal SVT (supraventricular tachycardia)        3. Primary squamous cell carcinoma of lung, left               Plan:         SVT, probably atrial tachycardia: Short-lived episode while hospitalized that responded to metoprolol.  Probably transient related to postoperative changes from his lobectomy.  Possibly also chest tube induced.  Clinically, no recurrence.  In sinus rhythm clinically today.  No need for the further metoprolol.  I encourage patient to call our office if he has any new questions or concerns, or any new symptoms to suggest that SVT has returned.  Poorly differentiated squamous cell carcinoma: Follows with oncology and thoracic surgery.  Status post lobectomy.    Thank you for allowing me to participate in the care of Sunny Hung. Feel free to contact me directly with any further questions or concerns.    RTC 6 months.  Currently he is without complaint.  No further SVT episodes.  If he has any new symptoms to suggest any arrhythmia has returned, I encourage patient to call our office by suspect this was a transient episode related chest tube and his surgery.    Giovany Delacruz MD  [facility]  01/17/24  13:09 EST       Current Outpatient Medications:     pravastatin (PRAVACHOL) 20 MG tablet, Take 1 tablet by mouth Every Night., Disp: , Rfl:     tamsulosin (FLOMAX) 0.4 MG capsule 24 hr capsule, Take 2 capsules by mouth Daily, Disp: 60 capsule, Rfl: 0    fluticasone (FLONASE) 50 MCG/ACT nasal spray, 2 sprays into the nostril(s) as directed by provider Daily As Needed. (Patient not taking: Reported on 1/17/2024), Disp: , Rfl:     gabapentin (NEURONTIN) 300 MG capsule, Take 1 capsule by mouth Every 8 (Eight) Hours for 15 days.,  Disp: 45 capsule, Rfl: 0    omeprazole (priLOSEC) 20 MG capsule, Take 1 capsule by mouth Daily. (Patient not taking: Reported on 1/17/2024), Disp: 30 capsule, Rfl: 0    ondansetron (ZOFRAN) 8 MG tablet, Take 1 tablet by mouth 3 (Three) Times a Day As Needed for Nausea or Vomiting. (Patient not taking: Reported on 1/17/2024), Disp: 30 tablet, Rfl: 2         Return in about 6 months (around 7/17/2024).      Part of this note may be an electronic transcription/translation of spoken language to printed text using the Dragon Dictation System.

## 2024-01-18 ENCOUNTER — TELEPHONE (OUTPATIENT)
Dept: ONCOLOGY | Facility: CLINIC | Age: 69
End: 2024-01-18
Payer: MEDICARE

## 2024-01-18 ENCOUNTER — OFFICE VISIT (OUTPATIENT)
Dept: OTHER | Facility: HOSPITAL | Age: 69
End: 2024-01-18
Payer: MEDICARE

## 2024-01-18 ENCOUNTER — PATIENT OUTREACH (OUTPATIENT)
Dept: OTHER | Facility: HOSPITAL | Age: 69
End: 2024-01-18
Payer: MEDICARE

## 2024-01-18 VITALS — OXYGEN SATURATION: 95 % | DIASTOLIC BLOOD PRESSURE: 62 MMHG | SYSTOLIC BLOOD PRESSURE: 100 MMHG | HEART RATE: 65 BPM

## 2024-01-18 VITALS — DIASTOLIC BLOOD PRESSURE: 62 MMHG | OXYGEN SATURATION: 95 % | SYSTOLIC BLOOD PRESSURE: 100 MMHG | HEART RATE: 65 BPM

## 2024-01-18 DIAGNOSIS — C34.32 MALIGNANT NEOPLASM OF LOWER LOBE OF LEFT LUNG: Primary | ICD-10-CM

## 2024-01-18 PROCEDURE — 1126F AMNT PAIN NOTED NONE PRSNT: CPT | Performed by: STUDENT IN AN ORGANIZED HEALTH CARE EDUCATION/TRAINING PROGRAM

## 2024-01-18 PROCEDURE — 99024 POSTOP FOLLOW-UP VISIT: CPT | Performed by: SURGERY

## 2024-01-18 NOTE — PROGRESS NOTES
THORACIC SURGERY CLINIC CONSULT NOTE    REASON FOR CONSULT: Left lower lobe poorly differentiated non-small cell lung cancer underwent neoadjuvant chemoimmunotherapy s/p robot-assisted left lower lobectomy, chest wall resection, mediastinal lymph node dissection.  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  Only 10% viable small cell cancer remaining involving the bronchial margin.    REFERRING PROVIDER: Hosea Abernathy MD     Subjective   HISTORY OF PRESENTING ILLNESS:   Sunny Hung is a 68 y.o. male who has significant medical problems as mentioned in the medical chart.     He underwent a screening CT of the chest on 7/31/2023 which showed a large mass in the left lower lobe of the lung extending to the hilum.  A diagnostic CT was performed 8/25/2023 showing a large left infrahilar medial mass in the lower lobe.  A PET scan on 9/8/2023 showed a left lower lobe spiculated pleural-based mass 1.3 x 5 cm involving the left hilum and major fissure SUV 23.2.  There were smaller nodular airspace opacities also hypermetabolic.  The mass encases left lower lobe bronchovascular bundle.  No hypermetabolic mediastinal or hilar lymph nodes noted.  There was a 7 mm nodule in the right upper lobe photopenic SUV 2.1.  There was no supraclavicular uptake or disease below the abdomen other than 2 abnormal foci in the sigmoid colon.  MRI of the brain on 9/11/2023 showed a subtle 2 mm focus inferior left cerebellum too small to characterize but likely a blood vessel.  The patient underwent bronchoscopy on 9/6/2023 with biopsies taken from the left lower lobe mass, lymph node station 7 and lymph node station 4R.  Pathology showed malignant cells favoring poorly differentiated carcinoma/squamous differentiation from the left lower lobe and biopsies from station 7 and 4R were negative for malignant cells.     The patient completed 3 cycles of neoadjuvant therapy with carboplatin/Taxol/nivolumab 11/1/2023.  He  tolerated treatment well without much complications.  His case was discussed in multidisciplinary tumor board conference and the consensus recommendation was to proceed with surgical resection.  Restaging PET/CT was done on 12/5/2023 which reported significant decrease in size of the left lower lobe mass with decreased SUV of 3.6, previously noted 23.2.  There was irregular large airspace consolidation at the posterior aspect of the left lower lobe with a maximal SUV of 8.7 suspected to represent pneumonitis with possible superimposed infection.  There was no evidence of hypermetabolic mediastinal or hilar lymphadenopathy or distant metastases.  He also had transthoracic echo on 12/5/2023 which reported LVEF of 60%.     Prior to surgical resection, he denied any difficulty breathing or pain. He coughed intermittently. He denied angina, history of myocardial infarction or stroke. He could walk a block without dyspnea. He did not smoke. He was not on supplemental oxygen at home.     As per my assessment, he had stage II-B non-small cell lung cancer who underwent neoadjuvant chemoimmunotherapy based on Checkmate 816 trial.  There was significant treatment response based on the PET CT scan.  In Checkmate 816 trial, there were 25% of the patient who had complete pathological response after neoadjuvant chemoimmunotherapy.  The standard of care for stage II-B resectable lung cancer is neoadjuvant chemoimmunotherapy followed by lobectomy.  Therefore, I recommended robot-assisted thoracoscopic left lower lobectomy, mediastinal lymph node dissection.  His lung function has been borderline which does not correlate to his excellent functional status.  He was independent in his activities of daily living and was not dependent on supplemental oxygen.  We performed a 6-minute walk test in the clinic which he completed without any respiratory distress.  His oxygen saturation remained above 95% throughout the test.  He was motivated  and determined to pursue surgical cure and I supported his wishes.      On 12/13/2023, he underwent Robot assisted Thoracoscopic Left Lower Lobectomy with en bloc resection of parietal pleura.  The entire lung was adherent to the chest wall.  I performed lysis of adhesion for 120 minutes.  The tissue planes were extremely friable that led to significant air leak.  He was on 80 mg prednisone per day for colitis at the time of the operation.  The left lower lobe mass was adherent to the chest wall invading the parietal pleura but did not extend to the extrathoracic space.  I placed 2 chest tubes as I was expecting large prolonged air leak.  He tolerated procedure well.  He was extubated at the end of the procedure.  His hospital course was significant for prolonged air leak.  The prednisone was slowly tapered and eventually discontinued.  He developed urinary retention requiring Prater catheter placement.  His pain was well-controlled with pain medication.  He ambulated without much difficulty.  He was tolerating regular diet.  He was requiring supplemental oxygen.  Chest tube was transitioned to waterseal and eventually 1 chest tube was connected to mini atrium and he was discharged home with mini atrium.  A CT scan was obtained prior to discharge which showed consolation changes in the left upper lobe concerning for pneumonia.  He did not have leukocytosis but in context of neoadjuvant chemoimmunotherapy, immunosuppression from prednisone, I started him on empiric antibiotics to treat hospital-acquired pneumonia.  He was discharged home with antibiotics as well.     The final pathology revealed residual small cell carcinoma with background of neoadjuvant therapy effect.  Visceral pleural invasion was not an 5.  The bronchial and perivascular soft tissue margins were involved with invasive carcinoma and tumor within lymphatics.  There were 2 peribronchial lymph nodes which were negative for carcinoma.  The total size  of the viable tumor was 0.5 cm.  This made up to 10% of viable tumor present.  10 lymph nodes were retrieved and 2 of the hilar lymph nodes were positive.  The pathologist reported that the residual tumor likely represented the previously and biopsy population of small cell carcinoma that did not respond to neoadjuvant therapy.  His case was discussed in our multidisciplinary tumor board conference.  The small cell cancer was an unexpected finding.  The consensus recommendation was to proceed with adjuvant systemic treatment and radiation treatment to the resection margin.    He came for follow-up visit.  He has made slow recovery.  He is still requiring 2 L of oxygen. He is using a wheelchair for long distances due to dyspnea. He denies difficulty with ambulation. Dr. Dubon is planning to start chemotherapy within 3 to 4 weeks. His appetite is intermittent. The patient denies any weight loss, fever, or chills.     Past Medical History:   Diagnosis Date    Arthritis     BPH (benign prostatic hyperplasia)     Bruises easily     Bursitis of right shoulder     Cough     Hyperlipidemia     Mass of lower lobe of left lung        Past Surgical History:   Procedure Laterality Date    BRONCHOSCOPY WITH ION ROBOTIC ASSIST N/A 9/6/2023    Procedure: BRONCHOSCOPY WITH BAL;   ION ROBOT AND ENDOBRONCHIAL ULTRASOUND WITH FNA'S;  Surgeon: Rosalio Scott MD;  Location: [facility] ENDOSCOPY;  Service: Robotics - Pulmonary;  Laterality: N/A;  PRE- LEFT LOWER LOBE MASS  POST- SAME    GANGLION CYST EXCISION Bilateral     HEMORRHOIDECTOMY      LOBECTOMY Left 12/13/2023    Procedure: BRONCHOSCOPY, THORACOSCOPY WITH LYSIS OF ADHESIONS (120 MINUTES), LEFT LOWER LOBECTOMY WITH EN BLOC PARTIAL PARIETAL PLEURECTOMY USING DAVINCI ROBOT, MEDIASTINAL LYMPH NODE DISSECTION, INTERCOSTAL NERVE BLOCK;  Surgeon: Rosalio Scott MD;  Location: [facility] MAIN OR;  Service: Robotics - DaVinci;  Laterality: Left;    OTHER SURGICAL HISTORY      SOMETHING REMOVED  FROM LEFT CHEST, BENIGN ? LIPOMA       Family History   Problem Relation Age of Onset    Bone cancer Mother     COPD Father     Brain cancer Sister     Cancer Brother         Malignant tumor of pharynx    Alcohol abuse Brother     Cirrhosis Brother     Recurrent abdominal pain Brother     Lung cancer Brother     Malig Hyperthermia Neg Hx        Social History     Socioeconomic History    Marital status:      Spouse name: Carmen   Tobacco Use    Smoking status: Former     Packs/day: 0.50     Years: 40.00     Additional pack years: 0.00     Total pack years: 20.00     Types: Cigarettes     Quit date:      Years since quittin.1    Smokeless tobacco: Never   Vaping Use    Vaping Use: Never used   Substance and Sexual Activity    Alcohol use: Not Currently     Comment: VERY RARELY, MAYBE 1 BEER    Drug use: Never    Sexual activity: Defer         Current Outpatient Medications:     pravastatin (PRAVACHOL) 20 MG tablet, Take 1 tablet by mouth Every Night., Disp: , Rfl:     fluticasone (FLONASE) 50 MCG/ACT nasal spray, 2 sprays into the nostril(s) as directed by provider Daily As Needed., Disp: , Rfl:     OLANZapine (zyPREXA) 5 MG tablet, Take 1 tablet by mouth Every Night. Take nightly x 4 starting night of chemotherapy., Disp: 4 tablet, Rfl: 3    ondansetron (ZOFRAN) 8 MG tablet, Take 1 tablet by mouth Every 8 (Eight) Hours As Needed for Nausea or Vomiting., Disp: 30 tablet, Rfl: 5    tamsulosin (FLOMAX) 0.4 MG capsule 24 hr capsule, Take 1 capsule by mouth Daily., Disp: , Rfl:   No current facility-administered medications for this visit.     Allergies   Allergen Reactions    Amoxicillin-Pot Clavulanate Hives             Objective    OBJECTIVE:     VITAL SIGNS:  /62   Pulse 65   SpO2 95%     PHYSICAL EXAM:  Constitutional:       Appearance: Normal appearance.  In wheelchair on supplemental oxygen.  HENT:      Head: Normocephalic.      Right Ear: External ear normal.      Left Ear: External ear  normal.      Nose: Nose normal.      Mouth/Throat: No obvious deformity     Pharynx: Oropharynx is clear.   Eyes:      Conjunctiva/sclera: Conjunctivae normal.   Cardiovascular:      Rate and Rhythm: Normal rate.      Pulses: Normal pulses.   Pulmonary:      Effort: Pulmonary effort is normal.  Incision clean, dry, intact.  Abdominal:      Palpations: Abdomen is soft.   Musculoskeletal:         General: Normal range of motion.      Cervical back: Normal range of motion.   Skin:     General: Skin is warm.   Neurological:      General: No focal deficit present.      Mental Status: He is alert and oriented to person, place, and time.     LAB RESULTS:  I have reviewed all the available laboratory results in the chart.    RESULTS REVIEW:  I have reviewed the patient's all relevant laboratory and imaging findings.     CT Chest With Contrast Diagnostic (08/25/2023 13:15)      Results of the chest CT scan from 12/23/2023:  1. There is a left-sided chest tube in satisfactory position; there is a considerable amount of subcutaneous emphysema and mediastinal gas; and there is a small left-sided pneumothorax. Gas bubbles were also demonstrated within the pleural space. Left seventh rib fracture.   2. Considerable consolidation involving the left lung is worrisome for pneumonia. Trace left pleural effusion seen. Left-sided pleural thickening.   3. Decreasing nodular areas of consolidation within the right lower lobe. No new airspace disease has developed within the right lung. No vascular congestion was seen.   4. Volume loss left hemithorax status post lower lobectomy. Cardiac size is stable. The esophagus is satisfactory in course and caliber. Mild dilatation of the ascending thoracic aorta, diameter approximately 4 cm as before. There are small mediastinal lymph nodes again seen. Limited images through the upper abdomen are unremarkable.     Results of the chest x-ray from 12/24/2023:  Stable left chest tube. There is no  appreciable pneumothorax. Stable atelectasis/consolidation left lung base and patchy opacities elsewhere in the left lung. Heart size is stable. The right lung appears clear. Extensive subcutaneous air was again demonstrated.      ASSESSMENT & PLAN:  Sunny Hung is a 68 y.o. male with significant medical conditions as mentioned above presented to my clinic.    Diagnosis: Left lower lobe poorly differentiated non-small cell lung cancer underwent neoadjuvant chemoimmunotherapy s/p robot-assisted left lower lobectomy, chest wall resection, mediastinal lymph node dissection.  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  Only 10% viable small cell cancer remaining involving the bronchial margin.  Staging: Stage II-B ([id number])    He underwent surgical resection after neoadjuvant chemoimmunotherapy.  His postop course was not smooth but he was eventually discharged with chest tube.  He is making slow recovery.  The chest x-ray showed adequate lung expansion.     He is making slow recovery. I consulted with Dr. Jansen, radiation oncologist, and the patient will discuss radiation treatment with him. The patient will follow up for chemotherapy with Dr. Dubon    He was advised to follow-up in a month.    I discussed the patients findings and my recommendations with the patient. The patient was given adequate time to ask questions and all questions were answered to patient satisfaction.     Rosalio Scott MD  Thoracic Surgeon  [facility]        Dictated utilizing Dragon dictation    I spent 40 minutes caring for Sunny on this date of service. This time includes time spent by me in the following activities:preparing for the visit, reviewing tests, obtaining and/or reviewing a separately obtained history, performing a medically appropriate examination and/or evaluation , counseling and educating the patient/family/caregiver, ordering medications, tests, or procedures, referring and  communicating with other health care professionals , documenting information in the medical record, independently interpreting results and communicating that information with the patient/family/caregiver, and care coordination and more than half the time was spent in direct face to face evaluation and decision making.     Transcribed from ambient dictation for Rosalio Scott MD by Karon Rhodes.  01/18/24   13:23 EST    Patient or patient representative verbalized consent to the visit recording.  I have personally performed the services described in this document as transcribed by the above individual, and it is both accurate and complete.

## 2024-01-18 NOTE — PROGRESS NOTES
Reviewed chart. bronchoscopy with biopsy from the left lower lobe 9/6/2023 positive for poorly differentiated carcinoma with squamous differentiation. Initiated neoadjuvant treatment with carboplatin/Taxol/nivolumab 9/20/2023 and completed 3 cycles 11/1/2023  Status post left lower lobectomy 12/13/2023 complicated by prolonged and left lower lobe pneumonia.  Pathology from surgery showed significant treatment effect but residual small cell carcinoma with positive bronchial and perivascular soft tissue margins by tumor within lymphatics, positive lymph node station 10 L for small cell carcinoma. Met with Dr. Dubon 1/10/24, who discussed need of further treatment with chemotherapy utilizing platinum/etoposide.  Dr. Dubon noted that the patient is pretty weak from his recent extended hospital stay and clearly needs another 2 to 3 weeks to recuperate from surgery.    [place] [facility] [place] is in a wheelchair today wearing oxygen.  The patient met with Dr. Scott and Dr. Culver. Dr. Culver discussed radiation therapy. His office will call to schedule radiation simulation.      The patient states his appetite is still down. He has been drinking Ensure.  We again discussed a referral to oncology nutrition. The patient will let me know if he would like a referral to nutrition.     He denies any questions/concerns or ongoing resource needs. I will call in 1-2 months; encouraged patient to call as needed.

## 2024-01-18 NOTE — TELEPHONE ENCOUNTER
----- Message from Greg Dubon MD sent at 1/18/2024  3:31 PM EST -----  Education for carboplatin/-16  F/u 2/5 or 2/6 lab md carbo/-16 (3 day regimen)-will be getting radiation so I assume he will want kresge  ----- Message -----  From: Greg Dubon MD  Sent: 1/10/2024   3:48 PM EST  To: Greg Dubon MD    Rad onc and tx chemo/rt sclc

## 2024-01-18 NOTE — PROGRESS NOTES
[facility] Radiation Oncology   Consult    Chief Complaint  Limited Stage SCLC      Diagnosis: Limited Stage SCLC      Pacemaker: no  Prior History of Radiation: no  Contraindications to Radiation: no  Patient Requires Pregnancy Test: No, patient is male      HPI:    Sunny Hung is a 68 y.o. male former smoker who underwent screening CT of the chest in July 2023 which demonstrated a large mass in the left lower lobe concerning for malignancy.  The patient underwent PET/CT on 9/8/2023 which demonstrated a 13 cm pleural-based left lower lobe lung mass involving the left hilum without evidence of metastatic disease.  The patient underwent biopsy on 9/6/2023 which demonstrated poorly differentiated carcinoma with squamous differentiation.  Samplings of level 7 and 4R were negative for malignant cells.  The patient then underwent neoadjuvant chemoimmunotherapy with Dr. Dubon receiving 3 cycles of carbo/Taxol/nivolumab completed on 11/1/2023.  Interval PET/CT demonstrated significant response with SUV decreasing from 23.2-3.6 in the primary site.  There is again no evidence of metastatic disease or mediastinal lymphadenopathy.  MRI of the brain was negative both prior to treatment and afterwards.  The patient proceeded to lobectomy and hilar lymph node dissection with Dr. Scott on 12/13/2023.  Final pathology demonstrated a residual focus in the lobectomy specimen of small cell carcinoma with positive bronchial margin.  Additionally to level 10 lymph nodes were involved with metastatic small cell carcinoma.  The remainder of the lymph node dissection including level 5, 7, 9, and 11 were negative.  The tumor bed had significant response to initial therapy with approximately 10% of the tumor bed comprising viable tumor.  The greatest dimension was 0.5 cm.    The patient met with Dr. Dubon postoperatively on 1/10/2024 and adjuvant chemoradiation was recommended with concurrent platinum/etoposide. The patient was referred to  multidisciplinary thoracic clinic to discuss further.      Imaging:      CT Chest 9/5/2023    IMPRESSION:  Medial left lower lobe mass highly concerning for  bronchogenic neoplasm. Associated lymphangitic carcinomatosis is  suspected. Diffuse bronchial wall thickening with areas of bronchial  occlusion likely related to mucus plugging. There is a 7 mm unchanged  right upper lobe nodule.      PET CT 9/8/2023    IMPRESSION:  1. Intensely hypermetabolic approximately 13 x 5 cm pleural-based left  lower lobe lung mass involving the left hilum. Smaller nodular opacities  adjacently are hypermetabolic and likely represent metastases. A 7 mm  right upper lobe nodule is photopenic. Conservative surveillance is  recommended. There is no convincing evidence for metastatic disease at  the neck, abdomen, or pelvis.  2. There are 2 hypermetabolic foci at the sigmoid colon need further  evaluation, particularly the 1.4 cm hypermetabolic polyp at the distal  sigmoid colon. Colonoscopy is recommended.  3. Nonspecific heterogeneous prostate activity. PSA follow-up is  recommended.      MRI Brain 9/11/2023    IMPRESSION:     1.  Subtle 2 mm focus of enhancement in the inferior left cerebellar  hemisphere is difficult to definitively characterize. Tiny metastatic  focus is not excluded but more likely represents a blood vessel.  Attention on follow-up is warranted. No evidence of enhancing  intracranial metastatic disease otherwise.  2.  Nonspecific white matter changes most commonly associate with  chronic microvascular ischemic change.  3.  Prominent flow void at the level of the prepontine cistern which may  connect to the basilar artery and ICA, potentially a persistent  trigeminal artery.      MRI John 11/27/2023    IMPRESSION:  No evidence of metastatic disease to the brain or calvarium.     Small focus of enhancement demonstrated in the left inferior cerebellar  region on prior imaging remains unchanged and likely  benign.      PET CT 12/5/2023    FINDINGS: Mediastinal blood pool has a maximal SUV of 3.1, previously  2.4.     1. The significantly smaller pleural-based left lower lobe lung mass has  a maximal SUV of 3.6, previously 23.2.     There is a larger irregular airspace consolidation at the posterior  aspect of the left lower lobe as well as a significantly larger region  of mixed-density ground-glass opacities adjacently. This larger opacity  has a maximal SUV of 8.7 and is suspected to represent radiation  pneumonitis or radiation pneumonitis with a superimposed infection. An  irregular airspace consolidation anteriorly at the same level at the  left lower lobe has a maximal SUV of 3.2. The rounded irregular airspace  consolidation at the posterior aspect of the right lower lobe, new since  the previous PET/CT, appears overall unchanged since the recent CT and  has a maximal SUV of 2.5, likely benign.  Conservative surveillance is recommended with chest CT.     2. There is no hypermetabolic mediastinal or hilar lymphadenopathy. The  nodes present have metabolic activity within blood pool range. There is  no suspicious hypermetabolic activity at the supraclavicular regions or  neck. Significant brown fat activity is noted.     3. There is no suspicious visceral activity within the abdomen or  pelvis. There is no hypermetabolic lymphadenopathy. There is a mild  increase in marrow activity diffusely which is likely chemotherapy  related. The asymmetric activity adjacent to the right clavicular head  and neck appears to be related to brown fat and some underlying muscular  activity. No definite bone metastasis is seen. Close attention should be  paid to the medial aspect of the right clavicle on follow-up imaging.      CT Chest 12/23/2023    FINDINGS:  1. There is a left-sided chest tube in satisfactory position, there is a  considerable amount of subcutaneous emphysema and mediastinal gas and a  small left-sided  pneumothorax. Gas bubbles also demonstrated within the  pleural space. Left seventh rib fracture.     2. Considerable consolidation involving the left lung worrisome for  pneumonia. Trace left pleural effusion seen. Left-sided pleural  thickening.     3. Decreasing nodular areas of consolidation within the right lower  lobe. No new airspace disease has developed within the right lung. No  vascular congestion seen.     4. Volume loss left hemithorax status post lower lobectomy. Cardiac size  stable. The esophagus is satisfactory in course and caliber. Mild  dilatation of the ascending thoracic aorta, diameter approximately 4 cm  as before. There are small mediastinal lymph nodes again seen. Limited  images through the upper abdomen are unremarkable.      Pathology:    [facility] Biopsy Pathology 9/6/2023    Final Diagnosis   1. Lung, Left Lower Lobe, Biopsy:               A. POSITIVE FOR MINIMAL INVOLVEMENT BY POORLY DIFFERENTIATED CARCINOMA      WITH SQUAMOUS DIFFERENTIATION IN THIS LIMITED SAMPLE (SEE COMMENT).         [facility] Lobectomy and LND Pathology 12/13/2023    Final Diagnosis   Consultant diagnosis:     Lung, left lower lobe, lobectomy: Residual small cell carcinoma with background of neoadjuvant therapy effect. Visceral pleural invasion not identified. Bronchial and perivascular soft tissue margins involved by invasive carcinoma and tumor within lymphatics. 2 peribronchial lymph nodes, negative for carcinoma (0/2). See detailed comment below.     Lung, bronchial proximal margin, excision: Margin involved by small cell carcinoma within lymphatics (immunohistochemistry performed here shows neoplastic cells are negative for TTF-1, p40, chromogranin, and synaptophysin).     Lymph node, 9L, biopsy: Lymph node, negative for carcinoma (0/1).     Lymph node, 10 L, biopsy #1: Lymph node involved by metastatic small cell carcinoma (1/1).     Lymph node, 10 L, biopsy #2: Lymph node involved by metastatic small cell carcinoma  "(1/1). Provided immunohistochemical stains show neoplastic cells are negative for CK5/6 and p40.     Lymph node, 11 L, biopsy #1: Lymph node, negative for carcinoma (0/1).     Lymph node, level 7, biopsy: Lymph node, negative for carcinoma (0/1).     Lymph node, level 11 L, biopsy #2: Lymph node, negative for carcinoma (0/1).     Lymph node, level 5, biopsy: Lymph node, negative for carcinoma (0/1).     Lymph node, 10 L, biopsy #3: Lymph node, negative for carcinoma (0/1).     Comment:     The consultant pathologist notes the following, \"The patient received neoadjuvant chemoimmunotherapy for [id number] biopsy-proven poorly differentiated carcinoma with squamous differentiation. Per your report [id number], previous left lower lobe lung biopsy showed tumor cells to be positive for p40 but negative for TTF-1. As you can see from the diagnosis field, I reviewed the slides and completely agree with your description. Sections of mass designated as #1 show mainly fibroelastic scar with chronic inflammation and dystrophic calcification, a combination of findings in keeping with neoadjuvant therapy effect. Within the tumor bed, there is focal residual viable tumor, comprising less than 10% of the tumor bed and the largest focus measuring 0.5 cm in greatest linear extent. Tumor is also seen within lymphatics in margin sections and level 10 L lymph nodes. In all of these areas of interest, neoplastic cells have finely dispersed nuclear chromatin, small nucleoli, and relatively scant cytoplasm. The cells are arranged in solid nests with prominent nuclear molding, single cell necrosis and readily identifiable mitotic figures.     Immunohistochemistry shows the cells of interest as negative for squamous markers, as well as TTF-1 and neuroendocrine markers. Despite the null phenotype, I think these findings most in keeping with small cell carcinoma, and I too see morphologic or immunophenotypic evidence of residual squamous cell " "carcinoma. I suspect that this residual tumor represents a previously unbiopsied population of small cell carcinoma that did not respond to neoadjuvant therapy. Finally, sections of the mass designated as #2 shows a localized area of organized pneumonia and I see no features to suggest that this represents a separate tumor bed.\"     SPECIMEN   Procedure  Lobectomy   Specimen Laterality  Left   TUMOR   Tumor Focality  Single focus   Tumor Site  Lower lobe of lung   Tumor Size     Total Tumor Size (size of entire tumor)  Greatest Dimension (Centimeters): 0.5 cm   Size of Invasive Component  Greatest Dimension (Centimeters): 0.5 cm   Histologic Type  Small cell carcinoma   Visceral Pleura Invasion  Not identified   Direct Invasion of Adjacent Structures  Not applicable (no adjacent structures present)   Treatment Effect  Present   Percentage of Residual Viable Tumor  10 %   Lymphovascular Invasion  Present   MARGINS   Margin Status for Invasive Carcinoma  Invasive carcinoma present at margin   Margin(s) Involved by Invasive Carcinoma  Bronchial   Margin Status for Non-Invasive Tumor  Not applicable   REGIONAL LYMPH NODES   Lymph Node(s) from Prior Procedures  Included   Prior Lymph Node Procedure(s) Included  [id number]   Regional Lymph Node Status  Tumor present in regional lymph node(s)   Number of Lymph Nodes with Tumor  2   Emma Site(s) with Tumor  10L: Hilar   Number of Lymph Nodes Examined  10   Emma Site(s) Examined  7: Subcarinal     5: Subaortic / aortopulmonary (AP) / AP window     9L: Pulmonary ligament     10L: Hilar     11L: Interlobar   PATHOLOGIC STAGE CLASSIFICATION (pTNM, AJCC 8th Edition)   Reporting of pT, pN, and (when applicable) pM categories is based on information available to the pathologist at the time the report is issued. As per the AJCC (Chapter 1, 8th Ed.) it is the managing physician’s responsibility to establish the final pathologic stage based upon all pertinent information, including " but potentially not limited to this pathology report.   TNM Descriptors  y (post-treatment)   pT Category  pT1a   pN Category  pN1   .      Comment    This case will be forwarded to the [facility] for expert consultation with a final repot to follow.        Labs:    Lab Results   Component Value Date    CREATININE 0.62 (L) 01/10/2024             Problem List:  Patient Active Problem List   Diagnosis    Primary squamous cell carcinoma of lung, left    High risk medication use    Mass of left lung    Cancer of bronchus of left lower lobe          Medications:  Current Outpatient Medications on File Prior to Visit   Medication Sig Dispense Refill    pravastatin (PRAVACHOL) 20 MG tablet Take 1 tablet by mouth Every Night.      tamsulosin (FLOMAX) 0.4 MG capsule 24 hr capsule Take 2 capsules by mouth Daily 60 capsule 0    fluticasone (FLONASE) 50 MCG/ACT nasal spray 2 sprays into the nostril(s) as directed by provider Daily As Needed.      gabapentin (NEURONTIN) 300 MG capsule Take 1 capsule by mouth Every 8 (Eight) Hours for 15 days. 45 capsule 0    omeprazole (priLOSEC) 20 MG capsule Take 1 capsule by mouth Daily. 30 capsule 0    ondansetron (ZOFRAN) 8 MG tablet Take 1 tablet by mouth 3 (Three) Times a Day As Needed for Nausea or Vomiting. 30 tablet 2     No current facility-administered medications on file prior to visit.          Allergies:  Allergies   Allergen Reactions    Amoxicillin-Pot Clavulanate Hives         Family History:  The patient has no family history of conditions which would be contraindications to radiation therapy      Social History:  Former Smoker    Distance From Clinic: 30 minutes - 1 hour    Patient has someone who can assist with transportation: yes      Review of Systems:    Review of Systems   Constitutional:  Positive for fatigue.   Respiratory:  Positive for shortness of breath.          Vital Signs:  /62   Pulse 65   SpO2 95%   Estimated body mass index is 22.73 kg/m²  "as calculated from the following:    Height as of 1/17/24: 180.3 cm (71\").    Weight as of 1/17/24: 73.9 kg (163 lb).  There were no vitals filed for this visit.      ECOG: Capable of only limited selfcare; confined to bed or chair more than 50% of waking hours = 3    Physical Exam  Vitals reviewed.   Constitutional:       General: He is not in acute distress.     Appearance: Normal appearance.   HENT:      Head: Normocephalic and atraumatic.   Eyes:      Extraocular Movements: Extraocular movements intact.      Pupils: Pupils are equal, round, and reactive to light.   Pulmonary:      Effort: Pulmonary effort is normal.      Comments: On supplemental O2  Abdominal:      General: Abdomen is flat.      Palpations: Abdomen is soft.   Musculoskeletal:      Cervical back: Normal range of motion.   Skin:     General: Skin is warm and dry.   Neurological:      General: No focal deficit present.      Mental Status: He is alert and oriented to person, place, and time.   Psychiatric:         Mood and Affect: Mood normal.         Behavior: Behavior normal.          Result Review :  The following data was reviewed by: Inderjit Culver MD on 01/18/2024:  Labs: Last Creatinine   Data reviewed : Radiologic studies CT Chest, PET CT, MRI Brain             Diagnoses and all orders for this visit:    1. Malignant neoplasm of lower lobe of left lung (Primary)        Assessment:    Patient is a 68-year-old male initially diagnosed with locally advanced poorly differentiated non-small cell lung cancer treated with neoadjuvant chemoimmunotherapy with imaging evidence of excellent response.  The patient proceeded to lobectomy and lymph node dissection with Dr. Scott on 12/13/2023.  Unfortunately, the patient's final pathology demonstrated small cell component in the primary site and in two hilar lymph nodes.  Additionally, bronchial margin was positive.  The patient did have approximately 90% response to his neoadjuvant therapy.  He has had a " difficult postoperative course but his chest tubes are out.  Dr. Dubon has discussed adjuvant concurrent chemoradiation therapy with the patient.  He was referred to multidisciplinary thoracic clinic for discussion regarding radiation therapy.    I met with the patient and discussed his workup and treatment to date in detail.  I discussed the risk, benefits, side effects, and logistics of adjuvant radiation treatment planning and delivery.  I recommended adjuvant radiation therapy due to the small cell component of his disease as well as positive margin.  I would aim to keep the field very small and I think the patient would likely tolerate fairly well.  He is obviously fragile and deconditioned from his recent surgery.  I answered all the patient's and his family's questions to their satisfaction.  The end of our conversation patient wished to proceed with radiation planning with the expectation of concurrent chemoradiation therapy.  After my visit with the patient I discussed the case with Dr. Dubon and we are tentatively planning on concurrent chemoradiation start date of 2/5/2024.  My team will reach out to the patient to schedule planning CT simulation next week.      Plan:    -Plan for postoperative radiation therapy concurrent with chemotherapy for resected small cell lung cancer with positive bronchial margin.  Initial pathology had demonstrated non-small cell lung cancer and the patient received neoadjuvant chemoimmunotherapy.  Dr. Dubon is tentatively planning on concurrent platinum/etoposide.       I spent 60 minutes caring for Sunny on this date of service. This time includes time spent by me in the following activities:preparing for the visit, reviewing tests, obtaining and/or reviewing a separately obtained history, referring and communicating with other health care professionals , documenting information in the medical record, independently interpreting results and communicating that information  with the patient/family/caregiver, and care coordination  Follow Up   No follow-ups on file.  Patient was given instructions and counseling regarding his condition or for health maintenance advice. Please see specific information pulled into the AVS if appropriate.     Inderjit Culver MD

## 2024-01-19 ENCOUNTER — TELEPHONE (OUTPATIENT)
Dept: ONCOLOGY | Facility: CLINIC | Age: 69
End: 2024-01-19
Payer: MEDICARE

## 2024-01-22 ENCOUNTER — HOSPITAL ENCOUNTER (OUTPATIENT)
Dept: RADIATION ONCOLOGY | Facility: HOSPITAL | Age: 69
Setting detail: RADIATION/ONCOLOGY SERIES
End: 2024-01-22
Payer: MEDICARE

## 2024-01-23 ENCOUNTER — TELEMEDICINE (OUTPATIENT)
Dept: ONCOLOGY | Facility: CLINIC | Age: 69
End: 2024-01-23
Payer: MEDICARE

## 2024-01-23 DIAGNOSIS — Z79.899 HIGH RISK MEDICATION USE: ICD-10-CM

## 2024-01-23 DIAGNOSIS — C34.32 CANCER OF BRONCHUS OF LEFT LOWER LOBE: Primary | ICD-10-CM

## 2024-01-23 DIAGNOSIS — C34.92 PRIMARY SQUAMOUS CELL CARCINOMA OF LUNG, LEFT: ICD-10-CM

## 2024-01-23 RX ORDER — ONDANSETRON HYDROCHLORIDE 8 MG/1
8 TABLET, FILM COATED ORAL EVERY 8 HOURS PRN
Qty: 30 TABLET | Refills: 5 | Status: SHIPPED | OUTPATIENT
Start: 2024-01-23

## 2024-01-23 RX ORDER — OLANZAPINE 5 MG/1
5 TABLET ORAL NIGHTLY
Qty: 4 TABLET | Refills: 3 | Status: SHIPPED | OUTPATIENT
Start: 2024-01-23

## 2024-01-23 NOTE — PROGRESS NOTES
[facility] Hematology/Oncology Treatment Plan Summary    Name: Sunny Hung  Acct# [id number]  MD: Dr. Dubon    Diagnosis:     ICD-10-CM ICD-9-CM   1. Cancer of bronchus of left lower lobe  C34.32 162.5     Goal of treatment: disease control    Treatment Medication(s):   Carboplatin  Etoposide(-16)    Frequency: every 3 weeks    Number of cycles: 4    Starting on: 2/5/2024    Items for home use: Thermometer    Rx written for: [x] Nausea    [] Pre-Treatment   olanzapine 5mg nightly on days as directed and ondansetron 8 mg by mouth every 8 hours as needed for nausea    Notes:     Next Steps:     Completing Provider: HILDA Abarca           Date/time: 01/23/2024      Please note: You will be seen by a provider frequently with your treatment plan. This plan may change depending on many factors, if so, this will be discussed with you by your physician.  Last update 03/2022.

## 2024-01-23 NOTE — PROGRESS NOTES
TREATMENT  PREPARATION  This was an audio and video enabled telemedicine encounter.      Sunny Hung  [id number]  1955    Chief Complaint: Treatment preparation and needs assessment    History of present illness:  Sunny Hung is a 68 y.o. year old male who is here today for treatment preparation and needs assessment.  The patient has been diagnosed with   Encounter Diagnoses   Name Primary?    Cancer of bronchus of left lower lobe Yes    Primary squamous cell carcinoma of lung, left     High risk medication use     and is scheduled to begin treatment with:     Oncology History:    Oncology/Hematology History   Primary squamous cell carcinoma of lung, left   9/1/2023 Initial Diagnosis    Primary squamous cell carcinoma of lung, left     9/20/2023 - 11/1/2023 Chemotherapy    OP LUNG  Nivolumab 360mg /  PACLitaxel / CARBOplatin AUC=5      11/30/2023 -  Chemotherapy    OP HYDRATION AND ANTIEMETICS     1/24/2024 -  Chemotherapy    OP LUNG CARBOplatin AUC=5 / Etoposide          The current medication list and allergy list were reviewed and reconciled.     Past Medical History, Past Surgical History, Social History, Family History have been reviewed and are without significant changes except as mentioned.    Physical Exam:    There were no vitals filed for this visit.  Vitals:    01/23/24 1201   PainSc: 0-No pain                      Physical Exam  Constitutional:       Comments: Exam limited due to telehealth visit  Wearing oxygen per nasal cannula   HENT:      Head: Normocephalic and atraumatic.   Eyes:      Extraocular Movements: Extraocular movements intact.      Conjunctiva/sclera: Conjunctivae normal.   Pulmonary:      Effort: Pulmonary effort is normal. No respiratory distress.   Neurological:      General: No focal deficit present.      Mental Status: He is alert and oriented to person, place, and time.   Psychiatric:         Mood and Affect: Mood normal.         Behavior: Behavior normal.            NEEDS ASSESSMENTS    Genetics  The patient's new diagnosis and family history have been reviewed for genetic counseling needs. The patient will not be referred..     Psychosocial and Barriers to care  The patient has completed a PHQ-9 Depression Screening and the Distress Thermometer (DT) today.  PHQ-9 results show PHQ-2 Total Score: 0 PHQ-9 Total Score: PHQ-9 Total Score: 0     The patient scored their distress today as   on a scale of 0-10 with 0 being no distress and 10 being extreme distress. Problems marked by the patient as being an issue for them within the last week include   .      Results were reviewed along with psychosocial resources offered by our cancer center.  Our Supportive Oncology team will be flagged for a score of 4 or above, and a same day call will be made for a score of 9 or 10.  A mental health referral is offered at that time. Patients who score less than 4 have been educated on our support services and can be referred to our  upon request.  The patient will not be referred to our .       Nutrition  The patient has completed the malnutrition screening today. They scored Malnutrition Screening Tool  Have you recently lost weight without trying?  If yes, how much weight have you lost?: 0--> No  Have you been eating poorly because of a decreased appetite?: 0--> No  MST score: 0   with a score of 0-1 meaning not at risk in a score of 2 or greater meaning at risk.  Patients with a score of 3 or higher will be referred to our oncology dietitian for support. Patients beginning at risk treatment regimens or who have dietary concerns will also be referred to our oncology dietitian. The patient will not be referred.    Functional Assessment  Persons who are age 70 or greater will be screened for qualification of a comprehensive geriatric assessment by our survivorship nurse practitioner.  Older adults with cancer face unique challenges. These may include an  "increased risk of drug reactions, financial burdens, and caregiver stress. The patient scored   . Patients scoring 14 or lower will referred for an older adult functional assessment with the survivorship advanced practice registered nurse to ensure all needed support is provided as patients plan for their treatments. NOT APPLICABLE    Intravenous Access Assessment  The patient and I discussed planned intravenous chemo/biotherapy as well as other IV treatments that are often needed throughout the course of treatment. These may include, but are not limited to blood transfusions, antibiotics, and IV hydration. Discussed that depending on selected treatment and vein assessment, patient may require venous access device (VAD) which could include but not limited to a Mediport or PICC line. Risks and benefits of VADs reviewed. The patient will be treated via PIV.    Reproductive/Sexual Activity   People should avoid becoming pregnant and should not get a partner pregnant while undergoing chemo/biotherapy.  People of childbearing age should use effective contraception during active therapy. The best recommendation for all people is to use a barrier method for a minimum of 1 week after the last infusion of chemo/biotherapy to prevent your partner being exposed to byproducts from treatment medications in bodily fluids. Effective contraception should be discussed with your oncology team to make sure it is safe to take based on your diagnosis. Possible options include oral contraceptives, barrier methods. Chemo/biotherapy can change your ability to reproduce children in the future.  There are options for fertility preservation. NOT APPLICABLE    Advanced Care Planning  Advance Care Planning   The patient and I discussed advanced care planning, \"Conversations that Matter\".   This service is offered for development of advance directives with a certified ACP facilitator.  The patient does not have an up-to-date advanced directive. " This document is not on file with our office. The patient is not interested in an appointment with one of our facilitators to create or update their advanced directives.    Have you reviewed your Advance Directive and is it valid for this stay?: Yes  Patient Requests Assistance on Advance Directives: Patient Declined          Smoking cessation  Tobacco Use: Medium Risk (1/23/2024)    Patient History     Smoking Tobacco Use: Former     Smokeless Tobacco Use: Never     Passive Exposure: Not on file       Patient and I discussed their tobacco use history. Referral will not be made for smoking cessation.      Palliative Care  When appropriate, the patient and I discussed the availability palliative care services and when appropriate Hospice care. Palliative care is not the same as Hospice care which was explained to the patient.NOT APPLICABLE.    Survivorship   When appropriate, we discussed that we will refer the patient to survivorship clinic to discuss next steps following completion of planned treatment.  Reviewed this visit will include assessment of your physical, psychological, functional, and spiritual needs as a survivor and the need at attend this visit when scheduled.    TREATMENT EDUCATION    Today I met with the patient to discuss the chemo/biotherapy regimen recommended for treatment of Cancer of bronchus of left lower lobe    Primary squamous cell carcinoma of lung, left  - OLANZapine (zyPREXA) 5 MG tablet  - ondansetron (ZOFRAN) 8 MG tablet    High risk medication use  - OLANZapine (zyPREXA) 5 MG tablet  - ondansetron (ZOFRAN) 8 MG tablet  .  The patient was given explanation of treatment premed side effects including office policy that prohibits patients to drive if sedating medications are administered, MD explanation given regarding benefits, side effects, toxicities and goals of treatment.  The patient received a Chemotherapy/Biotherapy Plan Summary including diagnosis and explanation of specific  treatment plan.    SIDE EFFECTS:  Common side effects were discussed with the patient and/or significant other.  Discussion included where applicable hair loss/discoloration, anemia/fatigue, infection/chills/fever, appetite, bleeding risk/precautions, constipation, diarrhea, mouth sores, taste alteration, loss of appetite, nausea/vomiting, peripheral neuropathy, skin/nail changes, rash, muscle aches/weakness, photosensitivity, weight gain/loss, hearing loss, dizziness, menopausal symptoms, menstrual irregularity, sterility, high blood pressure, heart damage, liver damage, lung damage, kidney damage, DVT/PE risk, fluid retention, pleural/pericardial effusion, somnolence, electrolyte/LFT imbalance, vein exercises and/or the possible need for vascular access/port placement.  The patient was advised that although uncommon, leakage of an infused medication from the vein or venous access device may lead to skin breakdown and/or other tissue damage.  The patient was advised that he/she may have pain, bleeding, and/or bruising from the insertion of a needle in their vein or venous access device (port).  The patient was further advised that, in spite of proper technique, infection with redness and irritation may rarely occur at the site where the needle was inserted.  The patient was advised that if complications occur, additional medical treatment is available.  Finally, where applicable we have reviewed rare but potential immune mediated side effects including shortness of breath, cough, chest pain (pneumonitis), abdominal pain, diarrhea (colitis), thyroiditis (hypothyroid or hyperthyroid), hepatitis and liver dysfunction, nephritis and renal dysfunction.    Discussion also included side effects specific to drugs in the treatment plan, specifically:    Treatment Plans       Name Type Plan Dates Plan Provider         Active    OP LUNG CARBOplatin AUC=5 / Etoposide  ONCOLOGY TREATMENT  1/23/2024 - Present Greg Dubon MD                       Questions answered and additional information discussed on topics including:  Anemia, Thrombocytopenia, Neutropenia, Nutrition and appetite changes, Diarrhea, Nausea & vomiting, Alopecia, Nervous system changes, Pain, and Organ toxicities       Assessment and Plan:    Diagnoses and all orders for this visit:    1. Cancer of bronchus of left lower lobe (Primary)    2. Primary squamous cell carcinoma of lung, left  -     OLANZapine (zyPREXA) 5 MG tablet; Take 1 tablet by mouth Every Night. Take nightly x 4 starting night of chemotherapy.  Dispense: 4 tablet; Refill: 3  -     ondansetron (ZOFRAN) 8 MG tablet; Take 1 tablet by mouth Every 8 (Eight) Hours As Needed for Nausea or Vomiting.  Dispense: 30 tablet; Refill: 5    3. High risk medication use  -     OLANZapine (zyPREXA) 5 MG tablet; Take 1 tablet by mouth Every Night. Take nightly x 4 starting night of chemotherapy.  Dispense: 4 tablet; Refill: 3  -     ondansetron (ZOFRAN) 8 MG tablet; Take 1 tablet by mouth Every 8 (Eight) Hours As Needed for Nausea or Vomiting.  Dispense: 30 tablet; Refill: 5      No orders of the defined types were placed in this encounter.        The patient and I have reviewed their diagnosis and scheduled treatment plan. Needs assessment was completed where applicable including genetics, psychosocial needs, barriers to care, VAD evaluation, advanced care planning, survivorship, and palliative care services where indicated. Referrals have been ordered as appropriate based upon evaluation today and patient desires.   Chemo/biotherapy teaching was completed today and consent obtained. See separate documentation for further details.  Adequate time was given to answer questions.  Patient made aware of their care team members and contact information if they have questions or problems throughout the treatment course.  Discussion held and written information provided describing frequency of office visits and ongoing monitoring  throughout the treatment plan.     Reviewed with patient any prescribed medication sent to pharmacy.  Education provided regarding proper storage, safe handling, and proper disposal of unused medication.  Proper handling of body fluids and waste discussed and written information provided.  If appropriate, patient had pretreatment labs drawn today.    Learning assessment completed at initial patient encounter. See separate flowsheet. Chemo/biotherapy education comprehension assessed at today's visit.    I spent 46 minutes caring for Sunny on this date of service. This time includes time spent by me in the following activities: preparing for the visit, reviewing tests, obtaining and/or reviewing a separately obtained history, performing a medically appropriate examination and/or evaluation, counseling and educating the patient/family/caregiver, ordering medications, tests, or procedures, and documenting information in the medical record.     Dennise Marion, HILDA   01/23/24    Mode of Visit: Video  Location of patient: home  You have chosen to receive care through a telehealth visit.  Does the patient consent to use a video/audio connection for your medical care today? Yes  The visit included audio and video interaction. No technical issues occurred during this visit.

## 2024-01-24 ENCOUNTER — HOSPITAL ENCOUNTER (OUTPATIENT)
Dept: RADIATION ONCOLOGY | Facility: HOSPITAL | Age: 69
Setting detail: RADIATION/ONCOLOGY SERIES
End: 2024-01-24
Payer: MEDICARE

## 2024-01-24 PROCEDURE — 77263 THER RADIOLOGY TX PLNG CPLX: CPT | Performed by: INTERNAL MEDICINE

## 2024-01-24 PROCEDURE — 77470 SPECIAL RADIATION TREATMENT: CPT | Performed by: INTERNAL MEDICINE

## 2024-01-24 PROCEDURE — 77334 RADIATION TREATMENT AID(S): CPT | Performed by: INTERNAL MEDICINE

## 2024-01-26 PROCEDURE — 77301 RADIOTHERAPY DOSE PLAN IMRT: CPT | Performed by: STUDENT IN AN ORGANIZED HEALTH CARE EDUCATION/TRAINING PROGRAM

## 2024-01-26 PROCEDURE — 77338 DESIGN MLC DEVICE FOR IMRT: CPT | Performed by: STUDENT IN AN ORGANIZED HEALTH CARE EDUCATION/TRAINING PROGRAM

## 2024-01-26 PROCEDURE — 77293 RESPIRATOR MOTION MGMT SIMUL: CPT | Performed by: STUDENT IN AN ORGANIZED HEALTH CARE EDUCATION/TRAINING PROGRAM

## 2024-01-26 PROCEDURE — 77300 RADIATION THERAPY DOSE PLAN: CPT | Performed by: STUDENT IN AN ORGANIZED HEALTH CARE EDUCATION/TRAINING PROGRAM

## 2024-01-30 NOTE — PROGRESS NOTES
Subjective     REASON FOR CONSULTATION:  NSCLC-small cell lung cancer  Provide an opinion on any further workup or treatment                             REQUESTING PHYSICIAN:  Sonia    RECORDS OBTAINED:  Records of the patients history including those obtained from the referring provider were reviewed and summarized in detail.      History of Present Illness   The patient returned today for follow-up and to initiate concurrent chemoradiation for treatment of small cell lung cancer seen on pathology results in addition to positive margins.  The patient is doing reasonably well.  He is on 1 L O2 by nasal cannula.  He has mild to moderate weakness and poor appetite.  He is not having diarrhea.  He complains of numbness fingertips from previous chemotherapy.    Oncology history:  This is a pleasant 68-year-old man with hyperlipidemia and previous tobacco abuse who underwent a screening CT of the chest on 7/31/2023 which showed a large mass in the left lower lobe of the lung extending to the hilum.  A diagnostic CT was performed 8/25/2023 showing a large left infrahilar medial mass in the lower lobe.  A PET scan on 9/8/2023 showed a left lower lobe spiculated pleural-based mass 1.3 x 5 cm involving the left hilum and major fissure SUV 23.2.  There were smaller nodular airspace opacities also hypermetabolic.  The mass encases left lower lobe bronchovascular bundle.  No hypermetabolic mediastinal or hilar lymph nodes noted.  There was a 7 mm nodule in the right upper lobe photopenic SUV 2.1.  There was no supraclavicular uptake or disease below the abdomen other than 2 abnormal foci in the sigmoid colon.  MRI of the brain on 9/11/2023 showed a subtle 2 mm focus inferior left cerebellum too small to characterize but likely a blood vessel.  The patient underwent bronchoscopy on 9/6/2023 with biopsies taken from the left lower lobe mass, lymph node station 7 and lymph node station 4R.  Pathology showed malignant cells  favoring poorly differentiated carcinoma/squamous differentiation from the left lower lobe and biopsies from station 7 and 4R were negative for malignant cells.    The patient underwent neoadjuvant chemo-immunotherapy with 3 cycles of carboplatin/Taxol/nivolumab completed 11/1/2023.  After completing treatment he had immune mediated colitis requiring steroid therapy.  Treatment also complicated by peripheral neuropathy.    The patient underwent left lower lobectomy 12/13/2023 complicated by prolonged and left lower lobe pneumonia.  Pathology from surgery showed significant treatment effect but residual small cell carcinoma with positive bronchial and perivascular soft tissue margins by tumor within lymphatics, positive lymph node station 10 L for small cell carcinoma x 2.          Past Medical History:   Diagnosis Date    Arthritis     BPH (benign prostatic hyperplasia)     Bruises easily     Bursitis of right shoulder     Cough     Hyperlipidemia     Mass of lower lobe of left lung         Past Surgical History:   Procedure Laterality Date    BRONCHOSCOPY WITH ION ROBOTIC ASSIST N/A 9/6/2023    Procedure: BRONCHOSCOPY WITH BAL;   ION ROBOT AND ENDOBRONCHIAL ULTRASOUND WITH FNA'S;  Surgeon: Rosalio Scott MD;  Location: [facility] ENDOSCOPY;  Service: Robotics - Pulmonary;  Laterality: N/A;  PRE- LEFT LOWER LOBE MASS  POST- SAME    GANGLION CYST EXCISION Bilateral     HEMORRHOIDECTOMY      LOBECTOMY Left 12/13/2023    Procedure: BRONCHOSCOPY, THORACOSCOPY WITH LYSIS OF ADHESIONS (120 MINUTES), LEFT LOWER LOBECTOMY WITH EN BLOC PARTIAL PARIETAL PLEURECTOMY USING DAVINCI ROBOT, MEDIASTINAL LYMPH NODE DISSECTION, INTERCOSTAL NERVE BLOCK;  Surgeon: Rosalio Scott MD;  Location: [facility] OR;  Service: Robotics - DaVinci;  Laterality: Left;    OTHER SURGICAL HISTORY      SOMETHING REMOVED FROM LEFT CHEST, BENIGN ? LIPOMA        Current Outpatient Medications on File Prior to Visit   Medication Sig Dispense Refill     fluticasone (FLONASE) 50 MCG/ACT nasal spray 2 sprays into the nostril(s) as directed by provider Daily As Needed.      OLANZapine (zyPREXA) 5 MG tablet Take 1 tablet by mouth Every Night. Take nightly x 4 starting night of chemotherapy. 4 tablet 3    ondansetron (ZOFRAN) 8 MG tablet Take 1 tablet by mouth Every 8 (Eight) Hours As Needed for Nausea or Vomiting. 30 tablet 5    pravastatin (PRAVACHOL) 20 MG tablet Take 1 tablet by mouth Every Night.      tamsulosin (FLOMAX) 0.4 MG capsule 24 hr capsule Take 1 capsule by mouth Daily.      [DISCONTINUED] gabapentin (NEURONTIN) 300 MG capsule Take 1 capsule by mouth Every 8 (Eight) Hours for 15 days. 45 capsule 0    [DISCONTINUED] omeprazole (priLOSEC) 20 MG capsule Take 1 capsule by mouth Daily. 30 capsule 0     No current facility-administered medications on file prior to visit.        ALLERGIES:    Allergies   Allergen Reactions    Amoxicillin-Pot Clavulanate Hives        Social History     Socioeconomic History    Marital status:      Spouse name: Carmen   Tobacco Use    Smoking status: Former     Packs/day: 0.50     Years: 40.00     Additional pack years: 0.00     Total pack years: 20.00     Types: Cigarettes     Quit date:      Years since quittin.1    Smokeless tobacco: Never   Vaping Use    Vaping Use: Never used   Substance and Sexual Activity    Alcohol use: Not Currently     Comment: VERY RARELY, MAYBE 1 BEER    Drug use: Never    Sexual activity: Defer        Family History   Problem Relation Age of Onset    Bone cancer Mother     COPD Father     Brain cancer Sister     Cancer Brother         Malignant tumor of pharynx    Alcohol abuse Brother     Cirrhosis Brother     Recurrent abdominal pain Brother     Lung cancer Brother     Malig Hyperthermia Neg Hx         Review of Systems   Constitutional:  Positive for fatigue.   HENT:  Positive for dental problem.    Respiratory:  Positive for shortness of breath. Negative for cough.   "  Cardiovascular: Negative.    Gastrointestinal: Negative.    Genitourinary: Negative.    Musculoskeletal: Negative.    Neurological:  Positive for numbness.   Hematological: Negative.    Psychiatric/Behavioral:  Negative for dysphoric mood. The patient is nervous/anxious.           Objective     Vitals:    02/05/24 0846   BP: 123/78   Pulse: 100   Resp: 18   Temp: 98.4 °F (36.9 °C)   TempSrc: Temporal   SpO2: 96%   Weight: 71.9 kg (158 lb 8 oz)   Height: 180.3 cm (70.98\")   PainSc: 0-No pain           2/5/2024     8:25 AM   Current Status   ECOG score 0       Physical Exam    CONSTITUTIONAL: pleasant well-developed adult man  LYMPH: no cervical or supraclavicular lad  CV: RRR, S1S2, no murmur  RESP: cta bilat, no wheezing, no rales, O2 1 L nasal cannula, healed surgical scars  GI: soft, non-tender, no splenomegaly, +bs  MUSC: no edema, normal gait  NEURO: alert and oriented x3, normal strength  PSYCH: normal mood anxious affect    RECENT LABS:  Hematology WBC   Date Value Ref Range Status   02/05/2024 9.87 3.40 - 10.80 10*3/mm3 Final     RBC   Date Value Ref Range Status   02/05/2024 3.82 (L) 4.14 - 5.80 10*6/mm3 Final     Hemoglobin   Date Value Ref Range Status   02/05/2024 9.8 (L) 13.0 - 17.7 g/dL Final     Hematocrit   Date Value Ref Range Status   02/05/2024 32.4 (L) 37.5 - 51.0 % Final     Platelets   Date Value Ref Range Status   02/05/2024 470 (H) 140 - 450 10*3/mm3 Final        Lab Results   Component Value Date    GLUCOSE 102 (H) 01/10/2024    BUN 16 01/10/2024    CREATININE 0.62 (L) 01/10/2024    EGFR 104.8 01/10/2024    BCR 25.8 (H) 01/10/2024    K 4.1 01/10/2024    CO2 31.3 (H) 01/10/2024    CALCIUM 8.9 01/10/2024    ALBUMIN 3.2 (L) 01/10/2024    BILITOT 0.2 01/10/2024    AST 13 01/10/2024    ALT 11 01/10/2024     PET scan 9823  IMPRESSION:  1. Intensely hypermetabolic approximately 13 x 5 cm pleural-based left  lower lobe lung mass involving the left hilum. Smaller nodular opacities  adjacently are " hypermetabolic and likely represent metastases. A 7 mm  right upper lobe nodule is photopenic. Conservative surveillance is  recommended. There is no convincing evidence for metastatic disease at  the neck, abdomen, or pelvis.  2. There are 2 hypermetabolic foci at the sigmoid colon need further  evaluation, particularly the 1.4 cm hypermetabolic polyp at the distal  sigmoid colon. Colonoscopy is recommended.  3. Nonspecific heterogeneous prostate activity. PSA follow-up is  recommended.    Assessment & Plan   *T4N0M0 poorly differentiated carcinoma/squamous differentiation left lower lobe of the lung/small cell lung cancer on pathology at surgery  Mass discovered on low-dose CT chest screening 7/31/2023  PET scan 9/8/2023 showed a 1.3 x 5 cm left lower lobe mass involving the hilum with smaller adjacent nodular opacities, max SUV 23.2, photopenic right upper lobe nodule  Bronchoscopy with biopsy from the left lower lobe 9/6/2023 positive for poorly differentiated carcinoma with squamous differentiation; P-L1 TPS 0%  Initiated neoadjuvant treatment with carboplatin/Taxol/nivolumab 9/20/2023 and completed 3 cycles 11/1/2023  Status post left lower lobectomy 12/13/2023 complicated by prolonged and left lower lobe pneumonia.  Pathology from surgery showed significant treatment effect but residual small cell carcinoma with positive bronchial and perivascular soft tissue margins by tumor within lymphatics, positive lymph node station 10 L for small cell carcinoma x 2   2/5/2024-initiated postoperative carboplatin/-16 with concurrent radiation for small cell lung cancer/positive margin    *Immune mediated colitis  Patient treated with high-dose steroids and taper, now off steroid therapy  No recurrence of diarrhea off steroid therapy    *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    *MRI brain 9/11/23-2 mm enhancement left cerebellar hemisphere likely vessel artifact; MRI brain 11/27/2023-no evidence of metastatic  disease, small focus of enhancement left inferior cerebellar region unchanged likely benign    *PET uptake 2 foci sigmoid colon; he will eventually need colonoscopy    *Comorbidities-hyperlipidemia      Oncology plan/recommendations:  Proceed with cycle 1 carboplatin/-16, 4 cycles anticipated  1 week CBC BMP nurse practitioner toxicity visit  2 weeks CBC mehrdad check  Return to clinic 3 weeks lab MD visit cycle 2 carbo/-16  Patient to initiate radiation today

## 2024-01-31 ENCOUNTER — TELEPHONE (OUTPATIENT)
Dept: SURGERY | Facility: CLINIC | Age: 69
End: 2024-01-31
Payer: MEDICARE

## 2024-02-01 ENCOUNTER — OFFICE VISIT (OUTPATIENT)
Dept: SURGERY | Facility: CLINIC | Age: 69
End: 2024-02-01
Payer: MEDICARE

## 2024-02-01 ENCOUNTER — HOSPITAL ENCOUNTER (OUTPATIENT)
Dept: GENERAL RADIOLOGY | Facility: HOSPITAL | Age: 69
Discharge: HOME | End: 2024-02-01
Admitting: SURGERY
Payer: MEDICARE

## 2024-02-01 VITALS
BODY MASS INDEX: 22.12 KG/M2 | OXYGEN SATURATION: 94 % | HEART RATE: 67 BPM | WEIGHT: 158 LBS | DIASTOLIC BLOOD PRESSURE: 68 MMHG | SYSTOLIC BLOOD PRESSURE: 110 MMHG | HEIGHT: 71 IN

## 2024-02-01 DIAGNOSIS — C34.92 PRIMARY SQUAMOUS CELL CARCINOMA OF LUNG, LEFT: Primary | ICD-10-CM

## 2024-02-01 DIAGNOSIS — C34.92 PRIMARY SQUAMOUS CELL CARCINOMA OF LUNG, LEFT: ICD-10-CM

## 2024-02-01 PROCEDURE — 71046 X-RAY EXAM CHEST 2 VIEWS: CPT

## 2024-02-01 PROCEDURE — 99024 POSTOP FOLLOW-UP VISIT: CPT | Performed by: SURGERY

## 2024-02-01 NOTE — PROGRESS NOTES
THORACIC SURGERY CLINIC CONSULT NOTE    REASON FOR CONSULT: Left lower lobe poorly differentiated non-small cell lung cancer underwent neoadjuvant chemoimmunotherapy s/p robot-assisted left lower lobectomy, chest wall resection, mediastinal lymph node dissection.  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  Only 10% viable small cell cancer remaining involving the bronchial margin.    REFERRING PROVIDER: Hosea Abernathy MD     Subjective   HISTORY OF PRESENTING ILLNESS:   Sunny Hung is a 68 y.o. male who has significant medical problems as mentioned in the medical chart.     He underwent a screening CT of the chest on 7/31/2023 which showed a large mass in the left lower lobe of the lung extending to the hilum.  A diagnostic CT was performed 8/25/2023 showing a large left infrahilar medial mass in the lower lobe.  A PET scan on 9/8/2023 showed a left lower lobe spiculated pleural-based mass 1.3 x 5 cm involving the left hilum and major fissure SUV 23.2.  There were smaller nodular airspace opacities also hypermetabolic.  The mass encases left lower lobe bronchovascular bundle.  No hypermetabolic mediastinal or hilar lymph nodes noted.  There was a 7 mm nodule in the right upper lobe photopenic SUV 2.1.  There was no supraclavicular uptake or disease below the abdomen other than 2 abnormal foci in the sigmoid colon.  MRI of the brain on 9/11/2023 showed a subtle 2 mm focus inferior left cerebellum too small to characterize but likely a blood vessel.  The patient underwent bronchoscopy on 9/6/2023 with biopsies taken from the left lower lobe mass, lymph node station 7 and lymph node station 4R.  Pathology showed malignant cells favoring poorly differentiated carcinoma/squamous differentiation from the left lower lobe and biopsies from station 7 and 4R were negative for malignant cells.     The patient completed 3 cycles of neoadjuvant therapy with carboplatin/Taxol/nivolumab 11/1/2023.  He  tolerated treatment well without much complications.  His case was discussed in multidisciplinary tumor board conference and the consensus recommendation was to proceed with surgical resection.  Restaging PET/CT was done on 12/5/2023 which reported significant decrease in size of the left lower lobe mass with decreased SUV of 3.6, previously noted 23.2.  There was irregular large airspace consolidation at the posterior aspect of the left lower lobe with a maximal SUV of 8.7 suspected to represent pneumonitis with possible superimposed infection.  There was no evidence of hypermetabolic mediastinal or hilar lymphadenopathy or distant metastases.  He also had transthoracic echo on 12/5/2023 which reported LVEF of 60%.     Prior to surgical resection, he denied any difficulty breathing or pain. He coughed intermittently. He denied angina, history of myocardial infarction or stroke. He could walk a block without dyspnea. He did not smoke. He was not on supplemental oxygen at home.     As per my assessment, he had stage II-B non-small cell lung cancer who underwent neoadjuvant chemoimmunotherapy based on Checkmate 816 trial.  There was significant treatment response based on the PET CT scan.  In Checkmate 816 trial, there were 25% of the patient who had complete pathological response after neoadjuvant chemoimmunotherapy.  The standard of care for stage II-B resectable lung cancer is neoadjuvant chemoimmunotherapy followed by lobectomy.  Therefore, I recommended robot-assisted thoracoscopic left lower lobectomy, mediastinal lymph node dissection.  His lung function has been borderline which does not correlate to his excellent functional status.  He was independent in his activities of daily living and was not dependent on supplemental oxygen.  We performed a 6-minute walk test in the clinic which he completed without any respiratory distress.  His oxygen saturation remained above 95% throughout the test.  He was motivated  and determined to pursue surgical cure and I supported his wishes.      On 12/13/2023, he underwent Robot assisted Thoracoscopic Left Lower Lobectomy with en bloc resection of parietal pleura.  The entire lung was adherent to the chest wall.  I performed lysis of adhesion for 120 minutes.  The tissue planes were extremely friable that led to significant air leak.  He was on 80 mg prednisone per day for colitis at the time of the operation.  The left lower lobe mass was adherent to the chest wall invading the parietal pleura but did not extend to the extrathoracic space.  I placed 2 chest tubes as I was expecting large prolonged air leak.  He tolerated procedure well.  He was extubated at the end of the procedure.  His hospital course was significant for prolonged air leak.  The prednisone was slowly tapered and eventually discontinued.  He developed urinary retention requiring Prater catheter placement.  His pain was well-controlled with pain medication.  He ambulated without much difficulty.  He was tolerating regular diet.  He was requiring supplemental oxygen.  Chest tube was transitioned to waterseal and eventually 1 chest tube was connected to mini atrium and he was discharged home with mini atrium.  A CT scan was obtained prior to discharge which showed consolation changes in the left upper lobe concerning for pneumonia.  He did not have leukocytosis but in context of neoadjuvant chemoimmunotherapy, immunosuppression from prednisone, I started him on empiric antibiotics to treat hospital-acquired pneumonia.  He was discharged home with antibiotics as well.     The final pathology revealed residual small cell carcinoma with background of neoadjuvant therapy effect.  Visceral pleural invasion was not an 5.  The bronchial and perivascular soft tissue margins were involved with invasive carcinoma and tumor within lymphatics.  There were 2 peribronchial lymph nodes which were negative for carcinoma.  The total size  of the viable tumor was 0.5 cm.  This made up to 10% of viable tumor present.  10 lymph nodes were retrieved and 2 of the hilar lymph nodes were positive.  The pathologist reported that the residual tumor likely represented the previously and biopsy population of small cell carcinoma that did not respond to neoadjuvant therapy.  His case was discussed in our multidisciplinary tumor board conference.  The small cell cancer was an unexpected finding.  The consensus recommendation was to proceed with adjuvant systemic treatment and radiation treatment to the resection margin.    He made slow recovery.  The chest tube was removed after the airleak resolved.  He was initiated on postoperative carboplatin/-16 with concurrent radiation to the bronchial margin on 2/5/2024.    He came to clinic for follow-up visit.  His breathing has improved. He still has tightness sometimes. He is taking Tylenol for pain. He denies any fever or chills. He has a mild cough. He is still requiring oxygen all the time. His oxygen level today was 95%. He is unable to walk a block without using a cane. His appetite is good on some days, and other days he eats snacks. He is trying to keep his weight stable. He weighed 158 pounds last time. The patient constantly has rhinorrhea. He is on 2 L of oxygen. He uses spirometry.    Past Medical History:   Diagnosis Date    Arthritis     BPH (benign prostatic hyperplasia)     Bruises easily     Bursitis of right shoulder     Cough     Hyperlipidemia     Mass of lower lobe of left lung        Past Surgical History:   Procedure Laterality Date    BRONCHOSCOPY WITH ION ROBOTIC ASSIST N/A 9/6/2023    Procedure: BRONCHOSCOPY WITH BAL;   ION ROBOT AND ENDOBRONCHIAL ULTRASOUND WITH FNA'S;  Surgeon: Rosalio Scott MD;  Location: [facility] ENDOSCOPY;  Service: Robotics - Pulmonary;  Laterality: N/A;  PRE- LEFT LOWER LOBE MASS  POST- SAME    GANGLION CYST EXCISION Bilateral     HEMORRHOIDECTOMY      LOBECTOMY Left  2023    Procedure: BRONCHOSCOPY, THORACOSCOPY WITH LYSIS OF ADHESIONS (120 MINUTES), LEFT LOWER LOBECTOMY WITH EN BLOC PARTIAL PARIETAL PLEURECTOMY USING DAVINCI ROBOT, MEDIASTINAL LYMPH NODE DISSECTION, INTERCOSTAL NERVE BLOCK;  Surgeon: Rosalio Scott MD;  Location: [facility] OR;  Service: Robotics - [employer];  Laterality: Left;    OTHER SURGICAL HISTORY      SOMETHING REMOVED FROM LEFT CHEST, BENIGN ? LIPOMA       Family History   Problem Relation Age of Onset    Bone cancer Mother     COPD Father     Brain cancer Sister     Cancer Brother         Malignant tumor of pharynx    Alcohol abuse Brother     Cirrhosis Brother     Recurrent abdominal pain Brother     Lung cancer Brother     Malig Hyperthermia Neg Hx        Social History     Socioeconomic History    Marital status:      Spouse name: Carmen   Tobacco Use    Smoking status: Former     Packs/day: 0.50     Years: 40.00     Additional pack years: 0.00     Total pack years: 20.00     Types: Cigarettes     Quit date:      Years since quittin.1    Smokeless tobacco: Never   Vaping Use    Vaping Use: Never used   Substance and Sexual Activity    Alcohol use: Not Currently     Comment: VERY RARELY, MAYBE 1 BEER    Drug use: Never    Sexual activity: Defer         Current Outpatient Medications:     OLANZapine (zyPREXA) 5 MG tablet, Take 1 tablet by mouth Every Night. Take nightly x 4 starting night of chemotherapy., Disp: 4 tablet, Rfl: 3    ondansetron (ZOFRAN) 8 MG tablet, Take 1 tablet by mouth Every 8 (Eight) Hours As Needed for Nausea or Vomiting., Disp: 30 tablet, Rfl: 5    pravastatin (PRAVACHOL) 20 MG tablet, Take 1 tablet by mouth Every Night., Disp: , Rfl:   No current facility-administered medications for this visit.     Allergies   Allergen Reactions    Amoxicillin-Pot Clavulanate Hives             Objective    OBJECTIVE:     VITAL SIGNS:  /68 (BP Location: Right arm, Patient Position: Sitting, Cuff Size: Adult)   Pulse 67   " Ht 180.3 cm (70.98\")   Wt 71.7 kg (158 lb)   SpO2 94%   BMI 22.05 kg/m²     PHYSICAL EXAM:  Constitutional:       Appearance: Normal appearance.  In wheelchair on supplemental oxygen.  HENT:      Head: Normocephalic.      Right Ear: External ear normal.      Left Ear: External ear normal.      Nose: Nose normal.      Mouth/Throat: No obvious deformity     Pharynx: Oropharynx is clear.   Eyes:      Conjunctiva/sclera: Conjunctivae normal.   Cardiovascular:      Rate and Rhythm: Normal rate.      Pulses: Normal pulses.   Pulmonary:      Effort: Pulmonary effort is normal.  Incision clean, dry, intact.  Abdominal:      Palpations: Abdomen is soft.   Musculoskeletal:         General: Normal range of motion.      Cervical back: Normal range of motion.   Skin:     General: Skin is warm.   Neurological:      General: No focal deficit present.      Mental Status: He is alert and oriented to person, place, and time.     LAB RESULTS:  I have reviewed all the available laboratory results in the chart.    RESULTS REVIEW:  I have reviewed the patient's all relevant laboratory and imaging findings.     CT Chest With Contrast Diagnostic (08/25/2023 13:15)      Results of the chest CT scan from 12/23/2023:  1. There is a left-sided chest tube in satisfactory position; there is a considerable amount of subcutaneous emphysema and mediastinal gas; and there is a small left-sided pneumothorax. Gas bubbles were also demonstrated within the pleural space. Left seventh rib fracture.   2. Considerable consolidation involving the left lung is worrisome for pneumonia. Trace left pleural effusion seen. Left-sided pleural thickening.   3. Decreasing nodular areas of consolidation within the right lower lobe. No new airspace disease has developed within the right lung. No vascular congestion was seen.   4. Volume loss left hemithorax status post lower lobectomy. Cardiac size is stable. The esophagus is satisfactory in course and caliber. " Mild dilatation of the ascending thoracic aorta, diameter approximately 4 cm as before. There are small mediastinal lymph nodes again seen. Limited images through the upper abdomen are unremarkable.     Results of the chest x-ray from 12/24/2023:  Stable left chest tube. There is no appreciable pneumothorax. Stable atelectasis/consolidation left lung base and patchy opacities elsewhere in the left lung. Heart size is stable. The right lung appears clear. Extensive subcutaneous air was again demonstrated.      ASSESSMENT & PLAN:  Sunny Hung is a 68 y.o. male with significant medical conditions as mentioned above presented to my clinic.    Diagnosis: Left lower lobe poorly differentiated non-small cell lung cancer underwent neoadjuvant chemoimmunotherapy s/p robot-assisted left lower lobectomy, chest wall resection, mediastinal lymph node dissection.  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  Only 10% viable small cell cancer remaining involving the bronchial margin.  Staging: Stage II-B ([id number])    He underwent surgical resection after neoadjuvant chemoimmunotherapy.  His postop course was not smooth but he was eventually discharged with chest tube.  He is making slow recovery.  The chest x-ray showed adequate lung expansion.     He is making slow recovery.  He has been started on concurrent adjuvant chemoradiation therapy.  He is still requiring supplemental oxygen.  We did a 6-minute walk test in our clinic where he maintained saturation 93 to 94% on 2 LPM nasal cannula.  He was short of breath but able to maintain his saturation.  I recommended using supplemental oxygen with activity and at nighttime.  I advised him to continue monitoring his oxygen saturation at home.    He was advised to follow-up with us in 3 months.    I discussed the patients findings and my recommendations with the patient. The patient was given adequate time to ask questions and all questions were answered to patient  satisfaction.     Rosalio Scott MD  Thoracic Surgeon  [facility] [place]        Dictated utilizing Dragon dictation    I spent 40 minutes caring for Sunny on this date of service. This time includes time spent by me in the following activities:preparing for the visit, reviewing tests, obtaining and/or reviewing a separately obtained history, performing a medically appropriate examination and/or evaluation , counseling and educating the patient/family/caregiver, ordering medications, tests, or procedures, referring and communicating with other health care professionals , documenting information in the medical record, independently interpreting results and communicating that information with the patient/family/caregiver, and care coordination and more than half the time was spent in direct face to face evaluation and decision making.     Transcribed from ambient dictation for Rosalio Scott MD by Doroteo Tabares.  02/01/24   09:40 EST    Patient or patient representative verbalized consent to the visit recording.  I have personally performed the services described in this document as transcribed by the above individual, and it is both accurate and complete.

## 2024-02-05 ENCOUNTER — NUTRITION (OUTPATIENT)
Dept: OTHER | Facility: HOSPITAL | Age: 69
End: 2024-02-05
Payer: MEDICARE

## 2024-02-05 ENCOUNTER — HOSPITAL ENCOUNTER (OUTPATIENT)
Dept: RADIATION ONCOLOGY | Facility: HOSPITAL | Age: 69
Discharge: HOME | End: 2024-02-05
Payer: MEDICARE

## 2024-02-05 ENCOUNTER — INFUSION (OUTPATIENT)
Dept: ONCOLOGY | Facility: HOSPITAL | Age: 69
End: 2024-02-05
Payer: MEDICARE

## 2024-02-05 ENCOUNTER — OFFICE VISIT (OUTPATIENT)
Dept: ONCOLOGY | Facility: CLINIC | Age: 69
End: 2024-02-05
Payer: MEDICARE

## 2024-02-05 ENCOUNTER — HOSPITAL ENCOUNTER (OUTPATIENT)
Dept: RADIATION ONCOLOGY | Facility: HOSPITAL | Age: 69
Setting detail: RADIATION/ONCOLOGY SERIES
End: 2024-02-05
Payer: MEDICARE

## 2024-02-05 VITALS
HEART RATE: 100 BPM | DIASTOLIC BLOOD PRESSURE: 78 MMHG | RESPIRATION RATE: 18 BRPM | OXYGEN SATURATION: 96 % | SYSTOLIC BLOOD PRESSURE: 123 MMHG | BODY MASS INDEX: 22.19 KG/M2 | TEMPERATURE: 98.4 F | WEIGHT: 158.5 LBS | HEIGHT: 71 IN

## 2024-02-05 DIAGNOSIS — Z79.899 HIGH RISK MEDICATION USE: ICD-10-CM

## 2024-02-05 DIAGNOSIS — C34.92 PRIMARY SQUAMOUS CELL CARCINOMA OF LUNG, LEFT: ICD-10-CM

## 2024-02-05 DIAGNOSIS — C34.92 PRIMARY SQUAMOUS CELL CARCINOMA OF LUNG, LEFT: Primary | ICD-10-CM

## 2024-02-05 DIAGNOSIS — T45.1X5A ANEMIA ASSOCIATED WITH CHEMOTHERAPY: ICD-10-CM

## 2024-02-05 DIAGNOSIS — Z79.899 HIGH RISK MEDICATION USE: Primary | ICD-10-CM

## 2024-02-05 DIAGNOSIS — C34.32 CANCER OF BRONCHUS OF LEFT LOWER LOBE: ICD-10-CM

## 2024-02-05 DIAGNOSIS — D64.81 ANEMIA ASSOCIATED WITH CHEMOTHERAPY: ICD-10-CM

## 2024-02-05 PROBLEM — R91.8 MASS OF LEFT LUNG: Status: RESOLVED | Noted: 2023-12-07 | Resolved: 2024-02-05

## 2024-02-05 LAB
ALBUMIN SERPL-MCNC: 3.3 G/DL (ref 3.5–5.2)
ALBUMIN/GLOB SERPL: 0.8 G/DL
ALP SERPL-CCNC: 87 U/L (ref 39–117)
ALT SERPL W P-5'-P-CCNC: 10 U/L (ref 1–41)
ANION GAP SERPL CALCULATED.3IONS-SCNC: 11.4 MMOL/L (ref 5–15)
AST SERPL-CCNC: 13 U/L (ref 1–40)
BASOPHILS # BLD AUTO: 0.04 10*3/MM3 (ref 0–0.2)
BASOPHILS NFR BLD AUTO: 0.4 % (ref 0–1.5)
BILIRUB SERPL-MCNC: 0.2 MG/DL (ref 0–1.2)
BUN SERPL-MCNC: 13 MG/DL (ref 8–23)
BUN/CREAT SERPL: 21 (ref 7–25)
CALCIUM SPEC-SCNC: 9.3 MG/DL (ref 8.6–10.5)
CHLORIDE SERPL-SCNC: 101 MMOL/L (ref 98–107)
CO2 SERPL-SCNC: 25.6 MMOL/L (ref 22–29)
CREAT SERPL-MCNC: 0.62 MG/DL (ref 0.76–1.27)
DEPRECATED RDW RBC AUTO: 49 FL (ref 37–54)
EGFRCR SERPLBLD CKD-EPI 2021: 104.8 ML/MIN/1.73
EOSINOPHIL # BLD AUTO: 0.27 10*3/MM3 (ref 0–0.4)
EOSINOPHIL NFR BLD AUTO: 2.7 % (ref 0.3–6.2)
ERYTHROCYTE [DISTWIDTH] IN BLOOD BY AUTOMATED COUNT: 15.9 % (ref 12.3–15.4)
GLOBULIN UR ELPH-MCNC: 4 GM/DL
GLUCOSE SERPL-MCNC: 98 MG/DL (ref 65–99)
HCT VFR BLD AUTO: 32.4 % (ref 37.5–51)
HGB BLD-MCNC: 9.8 G/DL (ref 13–17.7)
IMM GRANULOCYTES # BLD AUTO: 0.03 10*3/MM3 (ref 0–0.05)
IMM GRANULOCYTES NFR BLD AUTO: 0.3 % (ref 0–0.5)
LYMPHOCYTES # BLD AUTO: 1.53 10*3/MM3 (ref 0.7–3.1)
LYMPHOCYTES NFR BLD AUTO: 15.5 % (ref 19.6–45.3)
MCH RBC QN AUTO: 25.7 PG (ref 26.6–33)
MCHC RBC AUTO-ENTMCNC: 30.2 G/DL (ref 31.5–35.7)
MCV RBC AUTO: 84.8 FL (ref 79–97)
MONOCYTES # BLD AUTO: 0.98 10*3/MM3 (ref 0.1–0.9)
MONOCYTES NFR BLD AUTO: 9.9 % (ref 5–12)
NEUTROPHILS NFR BLD AUTO: 7.02 10*3/MM3 (ref 1.7–7)
NEUTROPHILS NFR BLD AUTO: 71.2 % (ref 42.7–76)
NRBC BLD AUTO-RTO: 0 /100 WBC (ref 0–0.2)
PLATELET # BLD AUTO: 470 10*3/MM3 (ref 140–450)
PMV BLD AUTO: 8.7 FL (ref 6–12)
POTASSIUM SERPL-SCNC: 4.4 MMOL/L (ref 3.5–5.2)
PROT SERPL-MCNC: 7.3 G/DL (ref 6–8.5)
RAD ONC ARIA COURSE ID: NORMAL
RAD ONC ARIA COURSE INTENT: NORMAL
RAD ONC ARIA COURSE LAST TREATMENT DATE: NORMAL
RAD ONC ARIA COURSE START DATE: NORMAL
RAD ONC ARIA COURSE TREATMENT ELAPSED DAYS: 0
RAD ONC ARIA FIRST TREATMENT DATE: NORMAL
RAD ONC ARIA PLAN FRACTIONS TREATED TO DATE: 1
RAD ONC ARIA PLAN ID: NORMAL
RAD ONC ARIA PLAN PRESCRIBED DOSE PER FRACTION: 1.8 GY
RAD ONC ARIA PLAN PRIMARY REFERENCE POINT: NORMAL
RAD ONC ARIA PLAN TOTAL FRACTIONS PRESCRIBED: 30
RAD ONC ARIA PLAN TOTAL PRESCRIBED DOSE: 5400 CGY
RAD ONC ARIA REFERENCE POINT DOSAGE GIVEN TO DATE: 1.8 GY
RAD ONC ARIA REFERENCE POINT ID: NORMAL
RAD ONC ARIA REFERENCE POINT SESSION DOSAGE GIVEN: 1.8 GY
RBC # BLD AUTO: 3.82 10*6/MM3 (ref 4.14–5.8)
SODIUM SERPL-SCNC: 138 MMOL/L (ref 136–145)
WBC NRBC COR # BLD AUTO: 9.87 10*3/MM3 (ref 3.4–10.8)

## 2024-02-05 PROCEDURE — 96417 CHEMO IV INFUS EACH ADDL SEQ: CPT

## 2024-02-05 PROCEDURE — 1126F AMNT PAIN NOTED NONE PRSNT: CPT | Performed by: INTERNAL MEDICINE

## 2024-02-05 PROCEDURE — 25010000002 ETOPOSIDE 500 MG/25ML SOLUTION 25 ML VIAL: Performed by: INTERNAL MEDICINE

## 2024-02-05 PROCEDURE — 25010000002 DEXAMETHASONE SODIUM PHOSPHATE 100 MG/10ML SOLUTION: Performed by: INTERNAL MEDICINE

## 2024-02-05 PROCEDURE — 25010000002 CARBOPLATIN PER 50 MG: Performed by: INTERNAL MEDICINE

## 2024-02-05 PROCEDURE — 25010000002 PALONOSETRON PER 25 MCG: Performed by: INTERNAL MEDICINE

## 2024-02-05 PROCEDURE — 25810000003 SODIUM CHLORIDE 0.9 % SOLUTION 500 ML FLEX CONT: Performed by: INTERNAL MEDICINE

## 2024-02-05 PROCEDURE — 25810000003 SODIUM CHLORIDE 0.9 % SOLUTION 250 ML FLEX CONT: Performed by: INTERNAL MEDICINE

## 2024-02-05 PROCEDURE — G2211 COMPLEX E/M VISIT ADD ON: HCPCS | Performed by: INTERNAL MEDICINE

## 2024-02-05 PROCEDURE — 80053 COMPREHEN METABOLIC PANEL: CPT

## 2024-02-05 PROCEDURE — 99214 OFFICE O/P EST MOD 30 MIN: CPT | Performed by: INTERNAL MEDICINE

## 2024-02-05 PROCEDURE — 96367 TX/PROPH/DG ADDL SEQ IV INF: CPT

## 2024-02-05 PROCEDURE — 96375 TX/PRO/DX INJ NEW DRUG ADDON: CPT

## 2024-02-05 PROCEDURE — 96413 CHEMO IV INFUSION 1 HR: CPT

## 2024-02-05 PROCEDURE — 85025 COMPLETE CBC W/AUTO DIFF WBC: CPT

## 2024-02-05 PROCEDURE — 25010000002 FOSAPREPITANT PER 1 MG: Performed by: INTERNAL MEDICINE

## 2024-02-05 PROCEDURE — 25810000003 SODIUM CHLORIDE 0.9 % SOLUTION: Performed by: INTERNAL MEDICINE

## 2024-02-05 PROCEDURE — 77386: CPT | Performed by: STUDENT IN AN ORGANIZED HEALTH CARE EDUCATION/TRAINING PROGRAM

## 2024-02-05 PROCEDURE — 77427 RADIATION TX MANAGEMENT X5: CPT | Performed by: STUDENT IN AN ORGANIZED HEALTH CARE EDUCATION/TRAINING PROGRAM

## 2024-02-05 PROCEDURE — 77014 CHG CT GUIDANCE RADIATION THERAPY FLDS PLACEMENT: CPT | Performed by: STUDENT IN AN ORGANIZED HEALTH CARE EDUCATION/TRAINING PROGRAM

## 2024-02-05 RX ORDER — SODIUM CHLORIDE 9 MG/ML
20 INJECTION, SOLUTION INTRAVENOUS ONCE
Status: CANCELLED | OUTPATIENT
Start: 2024-02-07

## 2024-02-05 RX ORDER — SODIUM CHLORIDE 9 MG/ML
20 INJECTION, SOLUTION INTRAVENOUS ONCE
Status: CANCELLED | OUTPATIENT
Start: 2024-02-06

## 2024-02-05 RX ORDER — SODIUM CHLORIDE 9 MG/ML
20 INJECTION, SOLUTION INTRAVENOUS ONCE
Status: CANCELLED | OUTPATIENT
Start: 2024-02-05

## 2024-02-05 RX ORDER — PALONOSETRON 0.05 MG/ML
0.25 INJECTION, SOLUTION INTRAVENOUS ONCE
Status: CANCELLED | OUTPATIENT
Start: 2024-02-05

## 2024-02-05 RX ORDER — PALONOSETRON 0.05 MG/ML
0.25 INJECTION, SOLUTION INTRAVENOUS ONCE
Status: COMPLETED | OUTPATIENT
Start: 2024-02-05 | End: 2024-02-05

## 2024-02-05 RX ORDER — DIPHENHYDRAMINE HYDROCHLORIDE 50 MG/ML
50 INJECTION INTRAMUSCULAR; INTRAVENOUS AS NEEDED
Status: CANCELLED | OUTPATIENT
Start: 2024-02-05

## 2024-02-05 RX ORDER — SODIUM CHLORIDE 9 MG/ML
20 INJECTION, SOLUTION INTRAVENOUS ONCE
Status: COMPLETED | OUTPATIENT
Start: 2024-02-05 | End: 2024-02-05

## 2024-02-05 RX ORDER — FAMOTIDINE 10 MG/ML
20 INJECTION, SOLUTION INTRAVENOUS AS NEEDED
Status: CANCELLED | OUTPATIENT
Start: 2024-02-05

## 2024-02-05 RX ORDER — TAMSULOSIN HYDROCHLORIDE 0.4 MG/1
1 CAPSULE ORAL DAILY
COMMUNITY
End: 2024-02-05

## 2024-02-05 RX ADMIN — SODIUM CHLORIDE 20 ML/HR: 9 INJECTION, SOLUTION INTRAVENOUS at 09:09

## 2024-02-05 RX ADMIN — CARBOPLATIN 710 MG: 600 INJECTION, SOLUTION INTRAVENOUS at 09:58

## 2024-02-05 RX ADMIN — PALONOSETRON 0.25 MG: 0.05 INJECTION, SOLUTION INTRAVENOUS at 09:10

## 2024-02-05 RX ADMIN — SODIUM CHLORIDE 100 ML: 9 INJECTION, SOLUTION INTRAVENOUS at 09:26

## 2024-02-05 RX ADMIN — DEXAMETHASONE SODIUM PHOSPHATE 12 MG: 10 INJECTION, SOLUTION INTRAMUSCULAR; INTRAVENOUS at 09:09

## 2024-02-05 RX ADMIN — ETOPOSIDE 190 MG: 20 INJECTION INTRAVENOUS at 10:32

## 2024-02-05 NOTE — PROGRESS NOTES
OUTPATIENT ONCOLOGY NUTRITION ASSESSMENT    Patient Name: Sunny Hung  YOB: 1955  MRN: [id number]  Assessment Date: 2/5/2024    COMMENTS: F/U with pt in infusion area, pt receiving carboplatin and -16 today. Pt reports a good appetite, able to eat a sausage biscuit and a donut this AM. Pt had treatment prior to his surgery, reports tolerating it well with no side effects beside colitis at the end. Reviewed the importance of weight maintenance throughout treatment. Encouraged pt to reach out with any questions or concerns.         Reason for Assessment Follow up     Diagnosis/Problem   Squamous cell carcinoma L lung   Treatment Plan Chemotherapy, Radiation; carboplatin, -16   Frequency Every 3 weeks, daily XRT   Goal of cancer treatment Disease control   Comments:      Encounter Information        Nutrition/Diet History:  Pt has a good appetite at this time    Oral Nutrition Supplements:    Factors/Symptoms Affecting Intake: No factors at this time   Comments:      Medical/Surgical History Past Medical History:   Diagnosis Date    Arthritis     BPH (benign prostatic hyperplasia)     Bruises easily     Bursitis of right shoulder     Cough     Hyperlipidemia     Mass of lower lobe of left lung      Past Surgical History:   Procedure Laterality Date    BRONCHOSCOPY WITH ION ROBOTIC ASSIST N/A 9/6/2023    Procedure: BRONCHOSCOPY WITH BAL;   ION ROBOT AND ENDOBRONCHIAL ULTRASOUND WITH FNA'S;  Surgeon: Rosalio Scott MD;  Location: [facility] ENDOSCOPY;  Service: Robotics - Pulmonary;  Laterality: N/A;  PRE- LEFT LOWER LOBE MASS  POST- SAME    GANGLION CYST EXCISION Bilateral     HEMORRHOIDECTOMY      LOBECTOMY Left 12/13/2023    Procedure: BRONCHOSCOPY, THORACOSCOPY WITH LYSIS OF ADHESIONS (120 MINUTES), LEFT LOWER LOBECTOMY WITH EN BLOC PARTIAL PARIETAL PLEURECTOMY USING DAVINCI ROBOT, MEDIASTINAL LYMPH NODE DISSECTION, INTERCOSTAL NERVE BLOCK;  Surgeon: Rosalio Scott MD;  Location: [facility] OR;   "Service: Robotics - [employer];  Laterality: Left;    OTHER SURGICAL HISTORY      SOMETHING REMOVED FROM LEFT CHEST, BENIGN ? LIPOMA        Anthropometrics        Current Height Ht Readings from Last 1 Encounters:   02/05/24 180.3 cm (70.98\")      Current Weight Wt Readings from Last 1 Encounters:   02/05/24 71.9 kg (158 lb 8 oz)      BMI  22   Ideal Body Weight (IBW) 178#   Usual Body Weight (UBW) ~170#   Weight Change/Trend Loss   Weight History Wt Readings from Last 30 Encounters:   02/05/24 0846 71.9 kg (158 lb 8 oz)   02/01/24 0853 71.7 kg (158 lb)   01/17/24 1254 73.9 kg (163 lb)   01/10/24 1429 74.3 kg (163 lb 12.8 oz)   01/04/24 0929 76.2 kg (168 lb)   12/11/23 1614 76.2 kg (168 lb)   12/07/23 1305 77.3 kg (170 lb 6.4 oz)   12/07/23 0907 74.4 kg (164 lb)   12/05/23 0930 74.4 kg (164 lb)   11/30/23 0949 74.7 kg (164 lb 9.6 oz)   11/24/23 1119 77.6 kg (171 lb)   11/01/23 0751 77.7 kg (171 lb 6.4 oz)   10/11/23 0752 76.8 kg (169 lb 6.4 oz)   09/28/23 0920 76.1 kg (167 lb 12.8 oz)   09/20/23 0816 75.6 kg (166 lb 9.6 oz)   09/19/23 1236 75.3 kg (166 lb)   09/15/23 1256 75.4 kg (166 lb 4.8 oz)   09/06/23 0738 75.2 kg (165 lb 11.2 oz)   09/05/23 0747 76.6 kg (168 lb 12.8 oz)   08/31/23 1256 76.2 kg (168 lb)          Medications           Current medications: OLANZapine, fluticasone, ondansetron, pravastatin, and tamsulosin     Tests/Procedures        Tests/Procedures Chemotherapy     Labs       Pertinent Labs    Results from last 7 days   Lab Units 02/05/24  0825   SODIUM mmol/L 138   POTASSIUM mmol/L 4.4   CHLORIDE mmol/L 101   CO2 mmol/L 25.6   BUN mg/dL 13   CREATININE mg/dL 0.62*   CALCIUM mg/dL 9.3   BILIRUBIN mg/dL 0.2   ALK PHOS U/L 87   ALT (SGPT) U/L 10   AST (SGOT) U/L 13   GLUCOSE mg/dL 98     Results from last 7 days   Lab Units 02/05/24  0825   HEMOGLOBIN g/dL 9.8*   HEMATOCRIT % 32.4*   WBC 10*3/mm3 9.87   ALBUMIN g/dL 3.3*     Results from last 7 days   Lab Units 02/05/24  0825   PLATELETS 10*3/mm3 470* " "    No results found for: \"COVID19\"  No results found for: \"HGBA1C\"       Physical Findings        Physical Appearance alert, oriented, on oxygen therapy     Edema  no edema   Gastrointestinal None   Tubes/Drains implantable port   Oral/Mouth Cavity WNL     Estimated/Assessed Needs        Energy Requirements    Height for Calculation   72\"   Weight for Calculation 74.4 kg   Method for Estimation  25 kcal/kg, 30 kcal/kg   EST Needs (kcal/day) 6308-6398 kcal/day       Protein Requirements    Weight for Calculation 74.4 kg   EST Protein Needs (g/kg) 1.3 gm/kg   EST Daily Needs (g/day) 97 g/day       Fluid Requirements     Method for Estimation 1 mL/kcal    Estimated Needs (mL/day) 2000 mL/day           PES STATEMENT / NUTRITION DIAGNOSIS      Nutrition Dx Problem Problem:    NutritionDiagnosisProblem: No Nutrition Diagnosis at this Time and Nutrition Appropriate for Condition at this Time    Etiology:  Medical diagnosis: Lung cancer    Signs/Symptoms:  Report/Observation    Comment:      NUTRITION INTERVENTION / PLAN OF CARE      Intervention Goal(s) Maintain nutrition status, Provide information, Meet estimated needs, Tolerate PO , Maintain intake, Maintain weight, and No significant weight loss         RD Intervention/Action Encouraged intake, Follow Tx progress         Recommendations:       PO Diet Continue current       Supplements Monitor for need      Snacks       Other          Monitor/Evaluation PO intake, Weight, Symptoms   Education Will provide eduction as needed   --    RD to follow     Electronically signed by:  Claudia Kelly RD  02/05/24 11:25 EST    "

## 2024-02-06 ENCOUNTER — INFUSION (OUTPATIENT)
Dept: ONCOLOGY | Facility: HOSPITAL | Age: 69
End: 2024-02-06
Payer: MEDICARE

## 2024-02-06 ENCOUNTER — HOSPITAL ENCOUNTER (OUTPATIENT)
Dept: RADIATION ONCOLOGY | Facility: HOSPITAL | Age: 69
Discharge: HOME | End: 2024-02-06

## 2024-02-06 VITALS
RESPIRATION RATE: 16 BRPM | TEMPERATURE: 98.4 F | HEART RATE: 95 BPM | WEIGHT: 158.6 LBS | SYSTOLIC BLOOD PRESSURE: 124 MMHG | DIASTOLIC BLOOD PRESSURE: 70 MMHG | BODY MASS INDEX: 22.13 KG/M2 | OXYGEN SATURATION: 100 %

## 2024-02-06 DIAGNOSIS — Z79.899 HIGH RISK MEDICATION USE: ICD-10-CM

## 2024-02-06 DIAGNOSIS — C34.92 PRIMARY SQUAMOUS CELL CARCINOMA OF LUNG, LEFT: Primary | ICD-10-CM

## 2024-02-06 LAB
RAD ONC ARIA COURSE ID: NORMAL
RAD ONC ARIA COURSE INTENT: NORMAL
RAD ONC ARIA COURSE LAST TREATMENT DATE: NORMAL
RAD ONC ARIA COURSE START DATE: NORMAL
RAD ONC ARIA COURSE TREATMENT ELAPSED DAYS: 1
RAD ONC ARIA FIRST TREATMENT DATE: NORMAL
RAD ONC ARIA PLAN FRACTIONS TREATED TO DATE: 2
RAD ONC ARIA PLAN ID: NORMAL
RAD ONC ARIA PLAN PRESCRIBED DOSE PER FRACTION: 1.8 GY
RAD ONC ARIA PLAN PRIMARY REFERENCE POINT: NORMAL
RAD ONC ARIA PLAN TOTAL FRACTIONS PRESCRIBED: 30
RAD ONC ARIA PLAN TOTAL PRESCRIBED DOSE: 5400 CGY
RAD ONC ARIA REFERENCE POINT DOSAGE GIVEN TO DATE: 3.6 GY
RAD ONC ARIA REFERENCE POINT ID: NORMAL
RAD ONC ARIA REFERENCE POINT SESSION DOSAGE GIVEN: 1.8 GY

## 2024-02-06 PROCEDURE — 25810000003 SODIUM CHLORIDE 0.9 % SOLUTION: Performed by: INTERNAL MEDICINE

## 2024-02-06 PROCEDURE — 25010000002 DEXAMETHASONE SODIUM PHOSPHATE 100 MG/10ML SOLUTION: Performed by: INTERNAL MEDICINE

## 2024-02-06 PROCEDURE — 77386: CPT | Performed by: STUDENT IN AN ORGANIZED HEALTH CARE EDUCATION/TRAINING PROGRAM

## 2024-02-06 PROCEDURE — 96375 TX/PRO/DX INJ NEW DRUG ADDON: CPT

## 2024-02-06 PROCEDURE — 77014 CHG CT GUIDANCE RADIATION THERAPY FLDS PLACEMENT: CPT | Performed by: STUDENT IN AN ORGANIZED HEALTH CARE EDUCATION/TRAINING PROGRAM

## 2024-02-06 PROCEDURE — 96413 CHEMO IV INFUSION 1 HR: CPT

## 2024-02-06 PROCEDURE — 25010000002 ETOPOSIDE 500 MG/25ML SOLUTION 25 ML VIAL: Performed by: INTERNAL MEDICINE

## 2024-02-06 PROCEDURE — 25810000003 SODIUM CHLORIDE 0.9 % SOLUTION 500 ML FLEX CONT: Performed by: INTERNAL MEDICINE

## 2024-02-06 RX ORDER — SODIUM CHLORIDE 9 MG/ML
20 INJECTION, SOLUTION INTRAVENOUS ONCE
Status: COMPLETED | OUTPATIENT
Start: 2024-02-06 | End: 2024-02-06

## 2024-02-06 RX ADMIN — ETOPOSIDE 190 MG: 20 INJECTION INTRAVENOUS at 13:49

## 2024-02-06 RX ADMIN — SODIUM CHLORIDE 20 ML/HR: 9 INJECTION, SOLUTION INTRAVENOUS at 13:35

## 2024-02-06 RX ADMIN — DEXAMETHASONE SODIUM PHOSPHATE 12 MG: 10 INJECTION, SOLUTION INTRAMUSCULAR; INTRAVENOUS at 13:35

## 2024-02-07 ENCOUNTER — RADIATION ONCOLOGY WEEKLY ASSESSMENT (OUTPATIENT)
Dept: RADIATION ONCOLOGY | Facility: HOSPITAL | Age: 69
End: 2024-02-07
Payer: MEDICARE

## 2024-02-07 ENCOUNTER — HOSPITAL ENCOUNTER (OUTPATIENT)
Dept: RADIATION ONCOLOGY | Facility: HOSPITAL | Age: 69
Discharge: HOME | End: 2024-02-07

## 2024-02-07 ENCOUNTER — INFUSION (OUTPATIENT)
Dept: ONCOLOGY | Facility: HOSPITAL | Age: 69
End: 2024-02-07
Payer: MEDICARE

## 2024-02-07 VITALS
OXYGEN SATURATION: 98 % | HEART RATE: 79 BPM | RESPIRATION RATE: 16 BRPM | DIASTOLIC BLOOD PRESSURE: 70 MMHG | TEMPERATURE: 98.4 F | BODY MASS INDEX: 22.52 KG/M2 | WEIGHT: 161.4 LBS | SYSTOLIC BLOOD PRESSURE: 120 MMHG

## 2024-02-07 DIAGNOSIS — C34.92 PRIMARY SQUAMOUS CELL CARCINOMA OF LUNG, LEFT: Primary | ICD-10-CM

## 2024-02-07 DIAGNOSIS — C34.32 CANCER OF BRONCHUS OF LEFT LOWER LOBE: ICD-10-CM

## 2024-02-07 DIAGNOSIS — Z79.899 HIGH RISK MEDICATION USE: ICD-10-CM

## 2024-02-07 LAB
RAD ONC ARIA COURSE ID: NORMAL
RAD ONC ARIA COURSE INTENT: NORMAL
RAD ONC ARIA COURSE LAST TREATMENT DATE: NORMAL
RAD ONC ARIA COURSE START DATE: NORMAL
RAD ONC ARIA COURSE TREATMENT ELAPSED DAYS: 2
RAD ONC ARIA FIRST TREATMENT DATE: NORMAL
RAD ONC ARIA PLAN FRACTIONS TREATED TO DATE: 3
RAD ONC ARIA PLAN ID: NORMAL
RAD ONC ARIA PLAN PRESCRIBED DOSE PER FRACTION: 1.8 GY
RAD ONC ARIA PLAN PRIMARY REFERENCE POINT: NORMAL
RAD ONC ARIA PLAN TOTAL FRACTIONS PRESCRIBED: 30
RAD ONC ARIA PLAN TOTAL PRESCRIBED DOSE: 5400 CGY
RAD ONC ARIA REFERENCE POINT DOSAGE GIVEN TO DATE: 5.4 GY
RAD ONC ARIA REFERENCE POINT ID: NORMAL
RAD ONC ARIA REFERENCE POINT SESSION DOSAGE GIVEN: 1.8 GY

## 2024-02-07 PROCEDURE — 77386: CPT | Performed by: STUDENT IN AN ORGANIZED HEALTH CARE EDUCATION/TRAINING PROGRAM

## 2024-02-07 PROCEDURE — 25010000002 DEXAMETHASONE SODIUM PHOSPHATE 100 MG/10ML SOLUTION: Performed by: INTERNAL MEDICINE

## 2024-02-07 PROCEDURE — 77014 CHG CT GUIDANCE RADIATION THERAPY FLDS PLACEMENT: CPT | Performed by: STUDENT IN AN ORGANIZED HEALTH CARE EDUCATION/TRAINING PROGRAM

## 2024-02-07 PROCEDURE — 25810000003 SODIUM CHLORIDE 0.9 % SOLUTION 500 ML FLEX CONT: Performed by: INTERNAL MEDICINE

## 2024-02-07 PROCEDURE — 96375 TX/PRO/DX INJ NEW DRUG ADDON: CPT

## 2024-02-07 PROCEDURE — 25010000002 ETOPOSIDE 500 MG/25ML SOLUTION 25 ML VIAL: Performed by: INTERNAL MEDICINE

## 2024-02-07 PROCEDURE — 25810000003 SODIUM CHLORIDE 0.9 % SOLUTION: Performed by: INTERNAL MEDICINE

## 2024-02-07 PROCEDURE — 77336 RADIATION PHYSICS CONSULT: CPT | Performed by: STUDENT IN AN ORGANIZED HEALTH CARE EDUCATION/TRAINING PROGRAM

## 2024-02-07 PROCEDURE — 96413 CHEMO IV INFUSION 1 HR: CPT

## 2024-02-07 RX ORDER — SODIUM CHLORIDE 9 MG/ML
20 INJECTION, SOLUTION INTRAVENOUS ONCE
Status: COMPLETED | OUTPATIENT
Start: 2024-02-07 | End: 2024-02-07

## 2024-02-07 RX ADMIN — SODIUM CHLORIDE 20 ML/HR: 9 INJECTION, SOLUTION INTRAVENOUS at 13:41

## 2024-02-07 RX ADMIN — DEXAMETHASONE SODIUM PHOSPHATE 12 MG: 10 INJECTION, SOLUTION INTRAMUSCULAR; INTRAVENOUS at 13:41

## 2024-02-07 RX ADMIN — ETOPOSIDE 190 MG: 20 INJECTION INTRAVENOUS at 13:55

## 2024-02-07 NOTE — PROGRESS NOTES
Radiation Oncology  On-Treatment Note      Patient: Sunny Hung    MRN: [id number]    Attending Physician: Indrejit Culver MD     Diagnosis:     ICD-10-CM ICD-9-CM   1. Primary squamous cell carcinoma of lung, left  C34.92 162.9   2. Cancer of bronchus of left lower lobe  C34.32 162.5       Radiation Therapy Visit:  Continue radiation therapy, Dosimetry plan remains acceptable, Films reviewed and remains acceptable, Pain assessed, Pain management planned, Radiation dose schedule reviewed and remains acceptable, Radiation technique remains acceptable, and Symptoms within expected range    Radiation Treatments       Active   Plans   [id number]   Most recent treatment: Dose planned: 180 cGy (fraction 3 on 2/7/2024)   Total: Dose planned: 5,400 cGy (30 fractions)   Elapsed Days: 2      Reference Points   PTV   Most recent treatment: Dose given: 180 cGy (on 2/7/2024)   Total: Dose given: 540 cGy   Elapsed Days: 2                      Physical Examination:  Vitals: There were no vitals taken for this visit.  There were no vitals filed for this visit.    Capable of only limited selfcare; confined to bed or chair more than 50% of waking hours = 3    We examined the relevant areas: yes  Findings are within the expected range for this stage of treatment: yes  -------------------------------------------------------------------------------------------------------------------    ACTION ITEMS:  Patient tolerating treatment well and as expected for this stage in their treatment and Continue radiation therapy as planned    Estimated Completion Date: 3/15/2024      Inderjit Culver MD  Radiation Oncology

## 2024-02-08 ENCOUNTER — HOSPITAL ENCOUNTER (OUTPATIENT)
Dept: RADIATION ONCOLOGY | Facility: HOSPITAL | Age: 69
Discharge: HOME | End: 2024-02-08

## 2024-02-08 LAB
RAD ONC ARIA COURSE ID: NORMAL
RAD ONC ARIA COURSE INTENT: NORMAL
RAD ONC ARIA COURSE LAST TREATMENT DATE: NORMAL
RAD ONC ARIA COURSE START DATE: NORMAL
RAD ONC ARIA COURSE TREATMENT ELAPSED DAYS: 3
RAD ONC ARIA FIRST TREATMENT DATE: NORMAL
RAD ONC ARIA PLAN FRACTIONS TREATED TO DATE: 4
RAD ONC ARIA PLAN ID: NORMAL
RAD ONC ARIA PLAN PRESCRIBED DOSE PER FRACTION: 1.8 GY
RAD ONC ARIA PLAN PRIMARY REFERENCE POINT: NORMAL
RAD ONC ARIA PLAN TOTAL FRACTIONS PRESCRIBED: 30
RAD ONC ARIA PLAN TOTAL PRESCRIBED DOSE: 5400 CGY
RAD ONC ARIA REFERENCE POINT DOSAGE GIVEN TO DATE: 7.2 GY
RAD ONC ARIA REFERENCE POINT ID: NORMAL
RAD ONC ARIA REFERENCE POINT SESSION DOSAGE GIVEN: 1.8 GY

## 2024-02-08 PROCEDURE — 77386: CPT | Performed by: STUDENT IN AN ORGANIZED HEALTH CARE EDUCATION/TRAINING PROGRAM

## 2024-02-08 PROCEDURE — 77014 CHG CT GUIDANCE RADIATION THERAPY FLDS PLACEMENT: CPT | Performed by: STUDENT IN AN ORGANIZED HEALTH CARE EDUCATION/TRAINING PROGRAM

## 2024-02-09 ENCOUNTER — HOSPITAL ENCOUNTER (OUTPATIENT)
Dept: RADIATION ONCOLOGY | Facility: HOSPITAL | Age: 69
Discharge: HOME | End: 2024-02-09

## 2024-02-09 LAB
RAD ONC ARIA COURSE ID: NORMAL
RAD ONC ARIA COURSE INTENT: NORMAL
RAD ONC ARIA COURSE LAST TREATMENT DATE: NORMAL
RAD ONC ARIA COURSE START DATE: NORMAL
RAD ONC ARIA COURSE TREATMENT ELAPSED DAYS: 4
RAD ONC ARIA FIRST TREATMENT DATE: NORMAL
RAD ONC ARIA PLAN FRACTIONS TREATED TO DATE: 5
RAD ONC ARIA PLAN ID: NORMAL
RAD ONC ARIA PLAN PRESCRIBED DOSE PER FRACTION: 1.8 GY
RAD ONC ARIA PLAN PRIMARY REFERENCE POINT: NORMAL
RAD ONC ARIA PLAN TOTAL FRACTIONS PRESCRIBED: 30
RAD ONC ARIA PLAN TOTAL PRESCRIBED DOSE: 5400 CGY
RAD ONC ARIA REFERENCE POINT DOSAGE GIVEN TO DATE: 9 GY
RAD ONC ARIA REFERENCE POINT ID: NORMAL
RAD ONC ARIA REFERENCE POINT SESSION DOSAGE GIVEN: 1.8 GY

## 2024-02-09 PROCEDURE — 77386: CPT | Performed by: RADIOLOGY

## 2024-02-09 PROCEDURE — 77014 CHG CT GUIDANCE RADIATION THERAPY FLDS PLACEMENT: CPT | Performed by: RADIOLOGY

## 2024-02-12 ENCOUNTER — RADIATION ONCOLOGY WEEKLY ASSESSMENT (OUTPATIENT)
Dept: RADIATION ONCOLOGY | Facility: HOSPITAL | Age: 69
End: 2024-02-12
Payer: MEDICARE

## 2024-02-12 ENCOUNTER — HOSPITAL ENCOUNTER (OUTPATIENT)
Dept: RADIATION ONCOLOGY | Facility: HOSPITAL | Age: 69
Discharge: HOME | End: 2024-02-12
Payer: MEDICARE

## 2024-02-12 ENCOUNTER — LAB (OUTPATIENT)
Dept: LAB | Facility: HOSPITAL | Age: 69
End: 2024-02-12
Payer: MEDICARE

## 2024-02-12 ENCOUNTER — OFFICE VISIT (OUTPATIENT)
Dept: ONCOLOGY | Facility: CLINIC | Age: 69
End: 2024-02-12
Payer: MEDICARE

## 2024-02-12 VITALS
DIASTOLIC BLOOD PRESSURE: 77 MMHG | SYSTOLIC BLOOD PRESSURE: 116 MMHG | BODY MASS INDEX: 21.91 KG/M2 | OXYGEN SATURATION: 100 % | WEIGHT: 157 LBS | HEART RATE: 98 BPM

## 2024-02-12 VITALS
SYSTOLIC BLOOD PRESSURE: 122 MMHG | DIASTOLIC BLOOD PRESSURE: 70 MMHG | HEART RATE: 104 BPM | WEIGHT: 157 LBS | BODY MASS INDEX: 21.98 KG/M2 | TEMPERATURE: 97.7 F | RESPIRATION RATE: 18 BRPM | OXYGEN SATURATION: 98 % | HEIGHT: 71 IN

## 2024-02-12 DIAGNOSIS — D64.9 ANEMIA, UNSPECIFIED TYPE: ICD-10-CM

## 2024-02-12 DIAGNOSIS — C34.92 PRIMARY SQUAMOUS CELL CARCINOMA OF LUNG, LEFT: Primary | ICD-10-CM

## 2024-02-12 DIAGNOSIS — Z79.899 HIGH RISK MEDICATION USE: Primary | ICD-10-CM

## 2024-02-12 DIAGNOSIS — Z79.899 HIGH RISK MEDICATION USE: ICD-10-CM

## 2024-02-12 DIAGNOSIS — C34.92 PRIMARY SQUAMOUS CELL CARCINOMA OF LUNG, LEFT: ICD-10-CM

## 2024-02-12 LAB
ANION GAP SERPL CALCULATED.3IONS-SCNC: 10.1 MMOL/L (ref 5–15)
BASOPHILS # BLD AUTO: 0.03 10*3/MM3 (ref 0–0.2)
BASOPHILS NFR BLD AUTO: 0.5 % (ref 0–1.5)
BUN SERPL-MCNC: 16 MG/DL (ref 8–23)
BUN/CREAT SERPL: 30.2 (ref 7–25)
CALCIUM SPEC-SCNC: 8.9 MG/DL (ref 8.6–10.5)
CHLORIDE SERPL-SCNC: 100 MMOL/L (ref 98–107)
CO2 SERPL-SCNC: 28.9 MMOL/L (ref 22–29)
CREAT SERPL-MCNC: 0.53 MG/DL (ref 0.76–1.27)
DEPRECATED RDW RBC AUTO: 48.9 FL (ref 37–54)
EGFRCR SERPLBLD CKD-EPI 2021: 109.8 ML/MIN/1.73
EOSINOPHIL # BLD AUTO: 0.13 10*3/MM3 (ref 0–0.4)
EOSINOPHIL NFR BLD AUTO: 2.1 % (ref 0.3–6.2)
ERYTHROCYTE [DISTWIDTH] IN BLOOD BY AUTOMATED COUNT: 15.7 % (ref 12.3–15.4)
FERRITIN SERPL-MCNC: 408 NG/ML (ref 30–400)
FOLATE SERPL-MCNC: 11.1 NG/ML (ref 4.78–24.2)
GLUCOSE SERPL-MCNC: 91 MG/DL (ref 65–99)
HCT VFR BLD AUTO: 31.6 % (ref 37.5–51)
HGB BLD-MCNC: 9.6 G/DL (ref 13–17.7)
IMM GRANULOCYTES # BLD AUTO: 0.12 10*3/MM3 (ref 0–0.05)
IMM GRANULOCYTES NFR BLD AUTO: 1.9 % (ref 0–0.5)
IRON 24H UR-MRATE: 91 MCG/DL (ref 59–158)
IRON SATN MFR SERPL: 32 % (ref 20–50)
LYMPHOCYTES # BLD AUTO: 1.08 10*3/MM3 (ref 0.7–3.1)
LYMPHOCYTES NFR BLD AUTO: 17.3 % (ref 19.6–45.3)
MCH RBC QN AUTO: 25.9 PG (ref 26.6–33)
MCHC RBC AUTO-ENTMCNC: 30.4 G/DL (ref 31.5–35.7)
MCV RBC AUTO: 85.4 FL (ref 79–97)
MONOCYTES # BLD AUTO: 0.03 10*3/MM3 (ref 0.1–0.9)
MONOCYTES NFR BLD AUTO: 0.5 % (ref 5–12)
NEUTROPHILS NFR BLD AUTO: 4.86 10*3/MM3 (ref 1.7–7)
NEUTROPHILS NFR BLD AUTO: 77.7 % (ref 42.7–76)
NRBC BLD AUTO-RTO: 0 /100 WBC (ref 0–0.2)
PLATELET # BLD AUTO: 211 10*3/MM3 (ref 140–450)
PMV BLD AUTO: 9.1 FL (ref 6–12)
POTASSIUM SERPL-SCNC: 3.9 MMOL/L (ref 3.5–5.2)
RAD ONC ARIA COURSE ID: NORMAL
RAD ONC ARIA COURSE INTENT: NORMAL
RAD ONC ARIA COURSE LAST TREATMENT DATE: NORMAL
RAD ONC ARIA COURSE START DATE: NORMAL
RAD ONC ARIA COURSE TREATMENT ELAPSED DAYS: 7
RAD ONC ARIA FIRST TREATMENT DATE: NORMAL
RAD ONC ARIA PLAN FRACTIONS TREATED TO DATE: 6
RAD ONC ARIA PLAN ID: NORMAL
RAD ONC ARIA PLAN PRESCRIBED DOSE PER FRACTION: 1.8 GY
RAD ONC ARIA PLAN PRIMARY REFERENCE POINT: NORMAL
RAD ONC ARIA PLAN TOTAL FRACTIONS PRESCRIBED: 30
RAD ONC ARIA PLAN TOTAL PRESCRIBED DOSE: 5400 CGY
RAD ONC ARIA REFERENCE POINT DOSAGE GIVEN TO DATE: 10.8 GY
RAD ONC ARIA REFERENCE POINT ID: NORMAL
RAD ONC ARIA REFERENCE POINT SESSION DOSAGE GIVEN: 1.8 GY
RBC # BLD AUTO: 3.7 10*6/MM3 (ref 4.14–5.8)
SODIUM SERPL-SCNC: 139 MMOL/L (ref 136–145)
TIBC SERPL-MCNC: 283 MCG/DL (ref 298–536)
TRANSFERRIN SERPL-MCNC: 202 MG/DL (ref 200–360)
VIT B12 BLD-MCNC: 452 PG/ML (ref 211–946)
WBC NRBC COR # BLD AUTO: 6.25 10*3/MM3 (ref 3.4–10.8)

## 2024-02-12 PROCEDURE — 82728 ASSAY OF FERRITIN: CPT | Performed by: NURSE PRACTITIONER

## 2024-02-12 PROCEDURE — 84466 ASSAY OF TRANSFERRIN: CPT | Performed by: NURSE PRACTITIONER

## 2024-02-12 PROCEDURE — 85025 COMPLETE CBC W/AUTO DIFF WBC: CPT

## 2024-02-12 PROCEDURE — 77386: CPT | Performed by: STUDENT IN AN ORGANIZED HEALTH CARE EDUCATION/TRAINING PROGRAM

## 2024-02-12 PROCEDURE — 83540 ASSAY OF IRON: CPT | Performed by: NURSE PRACTITIONER

## 2024-02-12 PROCEDURE — 77427 RADIATION TX MANAGEMENT X5: CPT | Performed by: STUDENT IN AN ORGANIZED HEALTH CARE EDUCATION/TRAINING PROGRAM

## 2024-02-12 PROCEDURE — 82607 VITAMIN B-12: CPT | Performed by: NURSE PRACTITIONER

## 2024-02-12 PROCEDURE — 77014 CHG CT GUIDANCE RADIATION THERAPY FLDS PLACEMENT: CPT | Performed by: STUDENT IN AN ORGANIZED HEALTH CARE EDUCATION/TRAINING PROGRAM

## 2024-02-12 PROCEDURE — 36415 COLL VENOUS BLD VENIPUNCTURE: CPT

## 2024-02-12 PROCEDURE — 80048 BASIC METABOLIC PNL TOTAL CA: CPT

## 2024-02-12 PROCEDURE — 82746 ASSAY OF FOLIC ACID SERUM: CPT | Performed by: NURSE PRACTITIONER

## 2024-02-13 ENCOUNTER — HOSPITAL ENCOUNTER (OUTPATIENT)
Dept: RADIATION ONCOLOGY | Facility: HOSPITAL | Age: 69
Discharge: HOME | End: 2024-02-13

## 2024-02-13 LAB
RAD ONC ARIA COURSE ID: NORMAL
RAD ONC ARIA COURSE INTENT: NORMAL
RAD ONC ARIA COURSE LAST TREATMENT DATE: NORMAL
RAD ONC ARIA COURSE START DATE: NORMAL
RAD ONC ARIA COURSE TREATMENT ELAPSED DAYS: 8
RAD ONC ARIA FIRST TREATMENT DATE: NORMAL
RAD ONC ARIA PLAN FRACTIONS TREATED TO DATE: 7
RAD ONC ARIA PLAN ID: NORMAL
RAD ONC ARIA PLAN PRESCRIBED DOSE PER FRACTION: 1.8 GY
RAD ONC ARIA PLAN PRIMARY REFERENCE POINT: NORMAL
RAD ONC ARIA PLAN TOTAL FRACTIONS PRESCRIBED: 30
RAD ONC ARIA PLAN TOTAL PRESCRIBED DOSE: 5400 CGY
RAD ONC ARIA REFERENCE POINT DOSAGE GIVEN TO DATE: 12.6 GY
RAD ONC ARIA REFERENCE POINT ID: NORMAL
RAD ONC ARIA REFERENCE POINT SESSION DOSAGE GIVEN: 1.8 GY

## 2024-02-13 PROCEDURE — 77386: CPT | Performed by: STUDENT IN AN ORGANIZED HEALTH CARE EDUCATION/TRAINING PROGRAM

## 2024-02-13 PROCEDURE — 77014 CHG CT GUIDANCE RADIATION THERAPY FLDS PLACEMENT: CPT | Performed by: STUDENT IN AN ORGANIZED HEALTH CARE EDUCATION/TRAINING PROGRAM

## 2024-02-14 ENCOUNTER — HOSPITAL ENCOUNTER (OUTPATIENT)
Dept: RADIATION ONCOLOGY | Facility: HOSPITAL | Age: 69
Discharge: HOME | End: 2024-02-14

## 2024-02-14 LAB
RAD ONC ARIA COURSE ID: NORMAL
RAD ONC ARIA COURSE INTENT: NORMAL
RAD ONC ARIA COURSE LAST TREATMENT DATE: NORMAL
RAD ONC ARIA COURSE START DATE: NORMAL
RAD ONC ARIA COURSE TREATMENT ELAPSED DAYS: 9
RAD ONC ARIA FIRST TREATMENT DATE: NORMAL
RAD ONC ARIA PLAN FRACTIONS TREATED TO DATE: 8
RAD ONC ARIA PLAN ID: NORMAL
RAD ONC ARIA PLAN PRESCRIBED DOSE PER FRACTION: 1.8 GY
RAD ONC ARIA PLAN PRIMARY REFERENCE POINT: NORMAL
RAD ONC ARIA PLAN TOTAL FRACTIONS PRESCRIBED: 30
RAD ONC ARIA PLAN TOTAL PRESCRIBED DOSE: 5400 CGY
RAD ONC ARIA REFERENCE POINT DOSAGE GIVEN TO DATE: 14.4 GY
RAD ONC ARIA REFERENCE POINT ID: NORMAL
RAD ONC ARIA REFERENCE POINT SESSION DOSAGE GIVEN: 1.8 GY

## 2024-02-14 PROCEDURE — 77386: CPT | Performed by: STUDENT IN AN ORGANIZED HEALTH CARE EDUCATION/TRAINING PROGRAM

## 2024-02-14 PROCEDURE — 77336 RADIATION PHYSICS CONSULT: CPT | Performed by: STUDENT IN AN ORGANIZED HEALTH CARE EDUCATION/TRAINING PROGRAM

## 2024-02-14 PROCEDURE — 77014 CHG CT GUIDANCE RADIATION THERAPY FLDS PLACEMENT: CPT | Performed by: STUDENT IN AN ORGANIZED HEALTH CARE EDUCATION/TRAINING PROGRAM

## 2024-02-15 ENCOUNTER — HOSPITAL ENCOUNTER (OUTPATIENT)
Dept: RADIATION ONCOLOGY | Facility: HOSPITAL | Age: 69
Discharge: HOME | End: 2024-02-15

## 2024-02-15 ENCOUNTER — PATIENT OUTREACH (OUTPATIENT)
Dept: OTHER | Facility: HOSPITAL | Age: 69
End: 2024-02-15
Payer: MEDICARE

## 2024-02-15 LAB
RAD ONC ARIA COURSE ID: NORMAL
RAD ONC ARIA COURSE INTENT: NORMAL
RAD ONC ARIA COURSE LAST TREATMENT DATE: NORMAL
RAD ONC ARIA COURSE START DATE: NORMAL
RAD ONC ARIA COURSE TREATMENT ELAPSED DAYS: 10
RAD ONC ARIA FIRST TREATMENT DATE: NORMAL
RAD ONC ARIA PLAN FRACTIONS TREATED TO DATE: 9
RAD ONC ARIA PLAN ID: NORMAL
RAD ONC ARIA PLAN PRESCRIBED DOSE PER FRACTION: 1.8 GY
RAD ONC ARIA PLAN PRIMARY REFERENCE POINT: NORMAL
RAD ONC ARIA PLAN TOTAL FRACTIONS PRESCRIBED: 30
RAD ONC ARIA PLAN TOTAL PRESCRIBED DOSE: 5400 CGY
RAD ONC ARIA REFERENCE POINT DOSAGE GIVEN TO DATE: 16.2 GY
RAD ONC ARIA REFERENCE POINT ID: NORMAL
RAD ONC ARIA REFERENCE POINT SESSION DOSAGE GIVEN: 1.8 GY

## 2024-02-15 PROCEDURE — 77014 CHG CT GUIDANCE RADIATION THERAPY FLDS PLACEMENT: CPT | Performed by: STUDENT IN AN ORGANIZED HEALTH CARE EDUCATION/TRAINING PROGRAM

## 2024-02-15 PROCEDURE — 77386: CPT | Performed by: STUDENT IN AN ORGANIZED HEALTH CARE EDUCATION/TRAINING PROGRAM

## 2024-02-16 ENCOUNTER — HOSPITAL ENCOUNTER (OUTPATIENT)
Dept: RADIATION ONCOLOGY | Facility: HOSPITAL | Age: 69
Discharge: HOME | End: 2024-02-16

## 2024-02-16 LAB
RAD ONC ARIA COURSE ID: NORMAL
RAD ONC ARIA COURSE INTENT: NORMAL
RAD ONC ARIA COURSE LAST TREATMENT DATE: NORMAL
RAD ONC ARIA COURSE START DATE: NORMAL
RAD ONC ARIA COURSE TREATMENT ELAPSED DAYS: 11
RAD ONC ARIA FIRST TREATMENT DATE: NORMAL
RAD ONC ARIA PLAN FRACTIONS TREATED TO DATE: 10
RAD ONC ARIA PLAN ID: NORMAL
RAD ONC ARIA PLAN PRESCRIBED DOSE PER FRACTION: 1.8 GY
RAD ONC ARIA PLAN PRIMARY REFERENCE POINT: NORMAL
RAD ONC ARIA PLAN TOTAL FRACTIONS PRESCRIBED: 30
RAD ONC ARIA PLAN TOTAL PRESCRIBED DOSE: 5400 CGY
RAD ONC ARIA REFERENCE POINT DOSAGE GIVEN TO DATE: 18 GY
RAD ONC ARIA REFERENCE POINT ID: NORMAL
RAD ONC ARIA REFERENCE POINT SESSION DOSAGE GIVEN: 1.8 GY

## 2024-02-16 PROCEDURE — 77014 CHG CT GUIDANCE RADIATION THERAPY FLDS PLACEMENT: CPT | Performed by: STUDENT IN AN ORGANIZED HEALTH CARE EDUCATION/TRAINING PROGRAM

## 2024-02-16 PROCEDURE — 77386: CPT | Performed by: STUDENT IN AN ORGANIZED HEALTH CARE EDUCATION/TRAINING PROGRAM

## 2024-02-19 ENCOUNTER — LAB (OUTPATIENT)
Dept: LAB | Facility: HOSPITAL | Age: 69
End: 2024-02-19
Payer: MEDICARE

## 2024-02-19 ENCOUNTER — CLINICAL SUPPORT (OUTPATIENT)
Dept: ONCOLOGY | Facility: HOSPITAL | Age: 69
End: 2024-02-19
Payer: MEDICARE

## 2024-02-19 ENCOUNTER — HOSPITAL ENCOUNTER (OUTPATIENT)
Dept: RADIATION ONCOLOGY | Facility: HOSPITAL | Age: 69
Discharge: HOME | End: 2024-02-19
Payer: MEDICARE

## 2024-02-19 ENCOUNTER — RADIATION ONCOLOGY WEEKLY ASSESSMENT (OUTPATIENT)
Dept: RADIATION ONCOLOGY | Facility: HOSPITAL | Age: 69
End: 2024-02-19
Payer: MEDICARE

## 2024-02-19 VITALS
HEART RATE: 90 BPM | SYSTOLIC BLOOD PRESSURE: 125 MMHG | OXYGEN SATURATION: 100 % | BODY MASS INDEX: 22.33 KG/M2 | DIASTOLIC BLOOD PRESSURE: 61 MMHG | WEIGHT: 160 LBS

## 2024-02-19 DIAGNOSIS — C34.92 PRIMARY SQUAMOUS CELL CARCINOMA OF LUNG, LEFT: Primary | ICD-10-CM

## 2024-02-19 DIAGNOSIS — Z79.899 HIGH RISK MEDICATION USE: Primary | ICD-10-CM

## 2024-02-19 LAB
BASOPHILS # BLD AUTO: 0.02 10*3/MM3 (ref 0–0.2)
BASOPHILS NFR BLD AUTO: 1 % (ref 0–1.5)
DEPRECATED RDW RBC AUTO: 46.8 FL (ref 37–54)
EOSINOPHIL # BLD AUTO: 0.06 10*3/MM3 (ref 0–0.4)
EOSINOPHIL NFR BLD AUTO: 3.1 % (ref 0.3–6.2)
ERYTHROCYTE [DISTWIDTH] IN BLOOD BY AUTOMATED COUNT: 15.7 % (ref 12.3–15.4)
HCT VFR BLD AUTO: 29.1 % (ref 37.5–51)
HGB BLD-MCNC: 9 G/DL (ref 13–17.7)
IMM GRANULOCYTES # BLD AUTO: 0.05 10*3/MM3 (ref 0–0.05)
IMM GRANULOCYTES NFR BLD AUTO: 2.6 % (ref 0–0.5)
LYMPHOCYTES # BLD AUTO: 1.09 10*3/MM3 (ref 0.7–3.1)
LYMPHOCYTES NFR BLD AUTO: 56.8 % (ref 19.6–45.3)
MCH RBC QN AUTO: 25.7 PG (ref 26.6–33)
MCHC RBC AUTO-ENTMCNC: 30.9 G/DL (ref 31.5–35.7)
MCV RBC AUTO: 83.1 FL (ref 79–97)
MONOCYTES # BLD AUTO: 0.48 10*3/MM3 (ref 0.1–0.9)
MONOCYTES NFR BLD AUTO: 25 % (ref 5–12)
NEUTROPHILS NFR BLD AUTO: 0.22 10*3/MM3 (ref 1.7–7)
NEUTROPHILS NFR BLD AUTO: 11.5 % (ref 42.7–76)
NRBC BLD AUTO-RTO: 0 /100 WBC (ref 0–0.2)
PLATELET # BLD AUTO: 81 10*3/MM3 (ref 140–450)
PMV BLD AUTO: 11 FL (ref 6–12)
RAD ONC ARIA COURSE ID: NORMAL
RAD ONC ARIA COURSE INTENT: NORMAL
RAD ONC ARIA COURSE LAST TREATMENT DATE: NORMAL
RAD ONC ARIA COURSE START DATE: NORMAL
RAD ONC ARIA COURSE TREATMENT ELAPSED DAYS: 14
RAD ONC ARIA FIRST TREATMENT DATE: NORMAL
RAD ONC ARIA PLAN FRACTIONS TREATED TO DATE: 11
RAD ONC ARIA PLAN ID: NORMAL
RAD ONC ARIA PLAN PRESCRIBED DOSE PER FRACTION: 1.8 GY
RAD ONC ARIA PLAN PRIMARY REFERENCE POINT: NORMAL
RAD ONC ARIA PLAN TOTAL FRACTIONS PRESCRIBED: 30
RAD ONC ARIA PLAN TOTAL PRESCRIBED DOSE: 5400 CGY
RAD ONC ARIA REFERENCE POINT DOSAGE GIVEN TO DATE: 19.8 GY
RAD ONC ARIA REFERENCE POINT ID: NORMAL
RAD ONC ARIA REFERENCE POINT SESSION DOSAGE GIVEN: 1.8 GY
RBC # BLD AUTO: 3.5 10*6/MM3 (ref 4.14–5.8)
WBC NRBC COR # BLD AUTO: 1.92 10*3/MM3 (ref 3.4–10.8)

## 2024-02-19 PROCEDURE — 77014 CHG CT GUIDANCE RADIATION THERAPY FLDS PLACEMENT: CPT | Performed by: STUDENT IN AN ORGANIZED HEALTH CARE EDUCATION/TRAINING PROGRAM

## 2024-02-19 PROCEDURE — 85025 COMPLETE CBC W/AUTO DIFF WBC: CPT

## 2024-02-19 PROCEDURE — 77427 RADIATION TX MANAGEMENT X5: CPT | Performed by: STUDENT IN AN ORGANIZED HEALTH CARE EDUCATION/TRAINING PROGRAM

## 2024-02-19 PROCEDURE — 77386: CPT | Performed by: STUDENT IN AN ORGANIZED HEALTH CARE EDUCATION/TRAINING PROGRAM

## 2024-02-19 PROCEDURE — 36415 COLL VENOUS BLD VENIPUNCTURE: CPT

## 2024-02-19 RX ORDER — GUAIFENESIN 600 MG/1
1200 TABLET, EXTENDED RELEASE ORAL 2 TIMES DAILY
Qty: 60 TABLET | Refills: 0 | Status: SHIPPED | OUTPATIENT
Start: 2024-02-19

## 2024-02-19 RX ORDER — LEVOFLOXACIN 500 MG/1
500 TABLET, FILM COATED ORAL DAILY
Qty: 7 TABLET | Refills: 0 | Status: SHIPPED | OUTPATIENT
Start: 2024-02-19

## 2024-02-19 NOTE — PROGRESS NOTES
Patient is here for lab review with RN. CBC reviewed. Patient confirms general fatigue and slight cough, but denies other s/s infection. Reviewed neutropenic precautions with patient and family member. Levaquin 500 mg daily x 7 prescribed, per Dr. Dubon. Copy of labs given to patient and follow up appointment reviewed. Patient is instructed to call the office with any concerns or new symptoms prior to next visit. Patient verbalized understanding and discharged in stable condition.    Component      Latest Ref Rng 2/19/2024   WBC      3.40 - 10.80 10*3/mm3 1.92 (L)    RBC      4.14 - 5.80 10*6/mm3 3.50 (L)    Hemoglobin      13.0 - 17.7 g/dL 9.0 (L)    Hematocrit      37.5 - 51.0 % 29.1 (L)    MCV      79.0 - 97.0 fL 83.1    MCH      26.6 - 33.0 pg 25.7 (L)    MCHC      31.5 - 35.7 g/dL 30.9 (L)    RDW      12.3 - 15.4 % 15.7 (H)    RDW-SD      37.0 - 54.0 fl 46.8    MPV      6.0 - 12.0 fL 11.0    Platelets      140 - 450 10*3/mm3 81 (L)    Neutrophil Rel %      42.7 - 76.0 % 11.5 (L)    Lymphocyte Rel %      19.6 - 45.3 % 56.8 (H)    Monocyte Rel %      5.0 - 12.0 % 25.0 (H)    Eosinophil Rel %      0.3 - 6.2 % 3.1    Basophil Rel %      0.0 - 1.5 % 1.0    Immature Granulocyte Rel %      0.0 - 0.5 % 2.6 (H)    Neutrophils Absolute      1.70 - 7.00 10*3/mm3 0.22 (L)    Lymphocytes Absolute      0.70 - 3.10 10*3/mm3 1.09    Monocytes Absolute      0.10 - 0.90 10*3/mm3 0.48    Eosinophils Absolute      0.00 - 0.40 10*3/mm3 0.06    Basophils Absolute      0.00 - 0.20 10*3/mm3 0.02    Immature Grans, Absolute      0.00 - 0.05 10*3/mm3 0.05    nRBC      0.0 - 0.2 /100 WBC 0.0       Legend:  (L) Low  (H) High

## 2024-02-19 NOTE — PROGRESS NOTES
Radiation Oncology  On-Treatment Note      Patient: Sunny Hung    MRN: [id number]    Attending Physician: Inderjit Culver MD     Diagnosis:     ICD-10-CM ICD-9-CM   1. Primary squamous cell carcinoma of lung, left  C34.92 162.9       Radiation Therapy Visit:  Continue radiation therapy, Dosimetry plan remains acceptable, Films reviewed and remains acceptable, Pain assessed, Pain management planned, Radiation dose schedule reviewed and remains acceptable, Radiation technique remains acceptable, and Symptoms within expected range    Radiation Treatments       Active   Plans   [id number]   Most recent treatment: Dose planned: 180 cGy (fraction 11 on 2/19/2024)   Total: Dose planned: 5,400 cGy (30 fractions)   Elapsed Days: 14      Reference Points   PTV   Most recent treatment: Dose given: 180 cGy (on 2/19/2024)   Total: Dose given: 1,980 cGy   Elapsed Days: 14                      Physical Examination:  Vitals: Blood pressure 125/61, pulse 90, weight 72.6 kg (160 lb), SpO2 100%.  Pain Score    02/19/24 1009   PainSc:   3   PainLoc: Chest       Capable of only limited selfcare; confined to bed or chair more than 50% of waking hours = 3    We examined the relevant areas: yes  Findings are within the expected range for this stage of treatment: yes  -------------------------------------------------------------------------------------------------------------------    ACTION ITEMS:  Patient tolerating treatment well and as expected for this stage in their treatment and Continue radiation therapy as planned    Estimated Completion Date: 3/15/2024    Having some intermittent pain along his incision site with cough. Discussed tesalon perles and mucinex. Ultimately patient requested mucinex. Will continue to monitor.       Inderjit Culver MD  Radiation Oncology

## 2024-02-20 ENCOUNTER — HOSPITAL ENCOUNTER (OUTPATIENT)
Dept: RADIATION ONCOLOGY | Facility: HOSPITAL | Age: 69
Discharge: HOME | End: 2024-02-20

## 2024-02-20 LAB
RAD ONC ARIA COURSE ID: NORMAL
RAD ONC ARIA COURSE INTENT: NORMAL
RAD ONC ARIA COURSE LAST TREATMENT DATE: NORMAL
RAD ONC ARIA COURSE START DATE: NORMAL
RAD ONC ARIA COURSE TREATMENT ELAPSED DAYS: 15
RAD ONC ARIA FIRST TREATMENT DATE: NORMAL
RAD ONC ARIA PLAN FRACTIONS TREATED TO DATE: 12
RAD ONC ARIA PLAN ID: NORMAL
RAD ONC ARIA PLAN PRESCRIBED DOSE PER FRACTION: 1.8 GY
RAD ONC ARIA PLAN PRIMARY REFERENCE POINT: NORMAL
RAD ONC ARIA PLAN TOTAL FRACTIONS PRESCRIBED: 30
RAD ONC ARIA PLAN TOTAL PRESCRIBED DOSE: 5400 CGY
RAD ONC ARIA REFERENCE POINT DOSAGE GIVEN TO DATE: 21.6 GY
RAD ONC ARIA REFERENCE POINT ID: NORMAL
RAD ONC ARIA REFERENCE POINT SESSION DOSAGE GIVEN: 1.8 GY

## 2024-02-20 PROCEDURE — 77014 CHG CT GUIDANCE RADIATION THERAPY FLDS PLACEMENT: CPT | Performed by: STUDENT IN AN ORGANIZED HEALTH CARE EDUCATION/TRAINING PROGRAM

## 2024-02-20 PROCEDURE — 77386: CPT | Performed by: STUDENT IN AN ORGANIZED HEALTH CARE EDUCATION/TRAINING PROGRAM

## 2024-02-20 NOTE — PROGRESS NOTES
Subjective     REASON FOR CONSULTATION:  NSCLC-small cell lung cancer  Provide an opinion on any further workup or treatment                             REQUESTING PHYSICIAN:  Sonia    RECORDS OBTAINED:  Records of the patients history including those obtained from the referring provider were reviewed and summarized in detail.      History of Present Illness   The patient returned today for follow-up and cycle 2 of carboplatin/etoposide with concurrent radiation for treatment of small cell cancer seen on his pathology at the time of lung resection.  The patient did well with his first cycle of carboplatin/-16 reporting fatigue but no uncontrolled nausea vomiting diarrhea loss of appetite weight loss.  His shortness of breath is stable and he requires 1 L O2 by nasal cannula.  He has some dyspnea on exertion.    Oncology history:  This is a pleasant 68-year-old man with hyperlipidemia and previous tobacco abuse who underwent a screening CT of the chest on 7/31/2023 which showed a large mass in the left lower lobe of the lung extending to the hilum.  A diagnostic CT was performed 8/25/2023 showing a large left infrahilar medial mass in the lower lobe.  A PET scan on 9/8/2023 showed a left lower lobe spiculated pleural-based mass 1.3 x 5 cm involving the left hilum and major fissure SUV 23.2.  There were smaller nodular airspace opacities also hypermetabolic.  The mass encases left lower lobe bronchovascular bundle.  No hypermetabolic mediastinal or hilar lymph nodes noted.  There was a 7 mm nodule in the right upper lobe photopenic SUV 2.1.  There was no supraclavicular uptake or disease below the abdomen other than 2 abnormal foci in the sigmoid colon.  MRI of the brain on 9/11/2023 showed a subtle 2 mm focus inferior left cerebellum too small to characterize but likely a blood vessel.  The patient underwent bronchoscopy on 9/6/2023 with biopsies taken from the left lower lobe mass, lymph node station 7 and lymph  node station 4R.  Pathology showed malignant cells favoring poorly differentiated carcinoma/squamous differentiation from the left lower lobe and biopsies from station 7 and 4R were negative for malignant cells.    The patient underwent neoadjuvant chemo-immunotherapy with 3 cycles of carboplatin/Taxol/nivolumab completed 11/1/2023.  After completing treatment he had immune mediated colitis requiring steroid therapy.  Treatment also complicated by peripheral neuropathy.    The patient underwent left lower lobectomy 12/13/2023 complicated by prolonged and left lower lobe pneumonia.  Pathology from surgery showed significant treatment effect but residual small cell carcinoma with positive bronchial and perivascular soft tissue margins by tumor within lymphatics, positive lymph node station 10 L for small cell carcinoma x 2.          Past Medical History:   Diagnosis Date    Arthritis     BPH (benign prostatic hyperplasia)     Bruises easily     Bursitis of right shoulder     Cough     Hyperlipidemia     Mass of lower lobe of left lung         Past Surgical History:   Procedure Laterality Date    BRONCHOSCOPY WITH ION ROBOTIC ASSIST N/A 9/6/2023    Procedure: BRONCHOSCOPY WITH BAL;   ION ROBOT AND ENDOBRONCHIAL ULTRASOUND WITH FNA'S;  Surgeon: Rosalio Scott MD;  Location: [facility] ENDOSCOPY;  Service: Robotics - Pulmonary;  Laterality: N/A;  PRE- LEFT LOWER LOBE MASS  POST- SAME    GANGLION CYST EXCISION Bilateral     HEMORRHOIDECTOMY      LOBECTOMY Left 12/13/2023    Procedure: BRONCHOSCOPY, THORACOSCOPY WITH LYSIS OF ADHESIONS (120 MINUTES), LEFT LOWER LOBECTOMY WITH EN BLOC PARTIAL PARIETAL PLEURECTOMY USING DAVINCI ROBOT, MEDIASTINAL LYMPH NODE DISSECTION, INTERCOSTAL NERVE BLOCK;  Surgeon: Rosalio Scott MD;  Location: [facility] OR;  Service: Robotics - DaVinci;  Laterality: Left;    OTHER SURGICAL HISTORY      SOMETHING REMOVED FROM LEFT CHEST, BENIGN ? LIPOMA        Current Outpatient Medications on File Prior to  Visit   Medication Sig Dispense Refill    OLANZapine (zyPREXA) 5 MG tablet Take 1 tablet by mouth Every Night. Take nightly x 4 starting night of chemotherapy. 4 tablet 3    ondansetron (ZOFRAN) 8 MG tablet Take 1 tablet by mouth Every 8 (Eight) Hours As Needed for Nausea or Vomiting. 30 tablet 5    pravastatin (PRAVACHOL) 20 MG tablet Take 1 tablet by mouth Every Night.      tamsulosin (FLOMAX) 0.4 MG capsule 24 hr capsule Take 1 capsule by mouth 2 (Two) Times a Day.      guaiFENesin (MUCINEX) 600 MG 12 hr tablet Take 2 tablets by mouth 2 (Two) Times a Day. (Patient not taking: Reported on 2024) 60 tablet 0    levoFLOXacin (Levaquin) 500 MG tablet Take 1 tablet by mouth Daily. (Patient not taking: Reported on 2024) 7 tablet 0     No current facility-administered medications on file prior to visit.        ALLERGIES:    Allergies   Allergen Reactions    Amoxicillin-Pot Clavulanate Hives        Social History     Socioeconomic History    Marital status:      Spouse name: Carmen   Tobacco Use    Smoking status: Former     Packs/day: 0.50     Years: 40.00     Additional pack years: 0.00     Total pack years: 20.00     Types: Cigarettes     Quit date:      Years since quittin.1    Smokeless tobacco: Never   Vaping Use    Vaping Use: Never used   Substance and Sexual Activity    Alcohol use: Not Currently     Comment: VERY RARELY, MAYBE 1 BEER    Drug use: Never    Sexual activity: Defer        Family History   Problem Relation Age of Onset    Bone cancer Mother     COPD Father     Brain cancer Sister     Cancer Brother         Malignant tumor of pharynx    Alcohol abuse Brother     Cirrhosis Brother     Recurrent abdominal pain Brother     Lung cancer Brother     Malig Hyperthermia Neg Hx         Review of Systems   Constitutional:  Positive for fatigue.   HENT:  Positive for dental problem.    Respiratory:  Positive for shortness of breath. Negative for cough.    Cardiovascular: Negative.   "  Gastrointestinal: Negative.    Genitourinary: Negative.    Musculoskeletal: Negative.    Neurological:  Positive for numbness.   Hematological: Negative.    Psychiatric/Behavioral:  Negative for dysphoric mood. The patient is nervous/anxious.    ROS unchanged-2/26/2024      Objective     Vitals:    02/26/24 0807   BP: 125/72   Pulse: 78   Resp: 18   Temp: 98.3 °F (36.8 °C)   TempSrc: Temporal   SpO2: 98%   Weight: 72.8 kg (160 lb 8 oz)   Height: 180.3 cm (70.98\")   PainSc: 0-No pain             2/26/2024     8:06 AM   Current Status   ECOG score 1       Physical Exam    CONSTITUTIONAL: pleasant well-developed adult man  LYMPH: no cervical or supraclavicular lad  CV: RRR, S1S2, no murmur  RESP: cta bilat, no wheezing, no rales, O2 1 L nasal cannula, healed surgical scars  GI: soft, non-tender, no splenomegaly, +bs  MUSC: no edema, normal gait  NEURO: alert and oriented x3, normal strength  PSYCH: normal mood anxious affect  Exam unchanged-2/26/2024    RECENT LABS:  Hematology WBC   Date Value Ref Range Status   02/26/2024 6.45 3.40 - 10.80 10*3/mm3 Final     RBC   Date Value Ref Range Status   02/26/2024 3.54 (L) 4.14 - 5.80 10*6/mm3 Final     Hemoglobin   Date Value Ref Range Status   02/26/2024 8.8 (L) 13.0 - 17.7 g/dL Final     Hematocrit   Date Value Ref Range Status   02/26/2024 29.8 (L) 37.5 - 51.0 % Final     Platelets   Date Value Ref Range Status   02/26/2024 438 140 - 450 10*3/mm3 Final        Lab Results   Component Value Date    GLUCOSE 91 02/12/2024    BUN 16 02/12/2024    CREATININE 0.53 (L) 02/12/2024    EGFR 109.8 02/12/2024    BCR 30.2 (H) 02/12/2024    K 3.9 02/12/2024    CO2 28.9 02/12/2024    CALCIUM 8.9 02/12/2024    ALBUMIN 3.3 (L) 02/05/2024    BILITOT 0.2 02/05/2024    AST 13 02/05/2024    ALT 10 02/05/2024     PET scan 9823  IMPRESSION:  1. Intensely hypermetabolic approximately 13 x 5 cm pleural-based left  lower lobe lung mass involving the left hilum. Smaller nodular " opacities  adjacently are hypermetabolic and likely represent metastases. A 7 mm  right upper lobe nodule is photopenic. Conservative surveillance is  recommended. There is no convincing evidence for metastatic disease at  the neck, abdomen, or pelvis.  2. There are 2 hypermetabolic foci at the sigmoid colon need further  evaluation, particularly the 1.4 cm hypermetabolic polyp at the distal  sigmoid colon. Colonoscopy is recommended.  3. Nonspecific heterogeneous prostate activity. PSA follow-up is  recommended.    Assessment & Plan   *T4N0M0 poorly differentiated carcinoma/squamous differentiation left lower lobe of the lung/small cell lung cancer on pathology at surgery  Mass discovered on low-dose CT chest screening 7/31/2023  PET scan 9/8/2023 showed a 1.3 x 5 cm left lower lobe mass involving the hilum with smaller adjacent nodular opacities, max SUV 23.2, photopenic right upper lobe nodule  Bronchoscopy with biopsy from the left lower lobe 9/6/2023 positive for poorly differentiated carcinoma with squamous differentiation; P-L1 TPS 0%  Initiated neoadjuvant treatment with carboplatin/Taxol/nivolumab 9/20/2023 and completed 3 cycles 11/1/2023  Status post left lower lobectomy 12/13/2023 complicated by prolonged and left lower lobe pneumonia.  Pathology from surgery showed significant treatment effect but residual small cell carcinoma with positive bronchial and perivascular soft tissue margins by tumor within lymphatics, positive lymph node station 10 L for small cell carcinoma x 2   2/5/2024-initiated postoperative carboplatin/-16 with concurrent radiation for small cell lung cancer/positive margin  2/26/2024-C2 carboplatin/-16 with concurrent radiation    *Anemia secondary to chemotherapy  B12/folic acid normal, ferritin 408, Iron sat 32%  Hemoglobin 8.8 today    *Immune mediated colitis  Patient treated with high-dose steroids and taper, now off steroid therapy  No recurrence of diarrhea off steroid  therapy    *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  Neuropathy symptoms stable on current chemotherapy    *MRI brain 9/11/23-2 mm enhancement left cerebellar hemisphere likely vessel artifact; MRI brain 11/27/2023-no evidence of metastatic disease, small focus of enhancement left inferior cerebellar region unchanged likely benign    *PET uptake 2 foci sigmoid colon; he will eventually need colonoscopy    *Comorbidities-hyperlipidemia      Oncology plan/recommendations:  Proceed with cycle 2 carboplatin/-16, 4 cycles anticipated  Weekly CBC nurse review  Return to clinic 3 weeks lab MD /NP visit cycle 3 carbo/-16  Continue radiation therapy  Plan 4 cycles of chemotherapy-if the patient is completed with radiation, cycle 4 can be given in [place] clinic

## 2024-02-21 ENCOUNTER — HOSPITAL ENCOUNTER (OUTPATIENT)
Dept: RADIATION ONCOLOGY | Facility: HOSPITAL | Age: 69
Discharge: HOME | End: 2024-02-21

## 2024-02-21 ENCOUNTER — TELEPHONE (OUTPATIENT)
Dept: ONCOLOGY | Facility: CLINIC | Age: 69
End: 2024-02-21
Payer: MEDICARE

## 2024-02-21 LAB
RAD ONC ARIA COURSE ID: NORMAL
RAD ONC ARIA COURSE INTENT: NORMAL
RAD ONC ARIA COURSE LAST TREATMENT DATE: NORMAL
RAD ONC ARIA COURSE START DATE: NORMAL
RAD ONC ARIA COURSE TREATMENT ELAPSED DAYS: 16
RAD ONC ARIA FIRST TREATMENT DATE: NORMAL
RAD ONC ARIA PLAN FRACTIONS TREATED TO DATE: 13
RAD ONC ARIA PLAN ID: NORMAL
RAD ONC ARIA PLAN PRESCRIBED DOSE PER FRACTION: 1.8 GY
RAD ONC ARIA PLAN PRIMARY REFERENCE POINT: NORMAL
RAD ONC ARIA PLAN TOTAL FRACTIONS PRESCRIBED: 30
RAD ONC ARIA PLAN TOTAL PRESCRIBED DOSE: 5400 CGY
RAD ONC ARIA REFERENCE POINT DOSAGE GIVEN TO DATE: 23.4 GY
RAD ONC ARIA REFERENCE POINT ID: NORMAL
RAD ONC ARIA REFERENCE POINT SESSION DOSAGE GIVEN: 1.8 GY

## 2024-02-21 PROCEDURE — 77386: CPT | Performed by: STUDENT IN AN ORGANIZED HEALTH CARE EDUCATION/TRAINING PROGRAM

## 2024-02-21 PROCEDURE — 77336 RADIATION PHYSICS CONSULT: CPT | Performed by: STUDENT IN AN ORGANIZED HEALTH CARE EDUCATION/TRAINING PROGRAM

## 2024-02-21 PROCEDURE — 77014 CHG CT GUIDANCE RADIATION THERAPY FLDS PLACEMENT: CPT | Performed by: STUDENT IN AN ORGANIZED HEALTH CARE EDUCATION/TRAINING PROGRAM

## 2024-02-21 NOTE — TELEPHONE ENCOUNTER
Provider: Jagdish  Caller: Rochelle  Relationship to Patient: daughter  Call Back Phone Number: [phone]  Reason for Call: Pt has been bloated.What can help to relieve it ? So he can eat.Also asking if Mucinex is ok to take with chemo?

## 2024-02-21 NOTE — TELEPHONE ENCOUNTER
Called the daughter back and she stated that he feels like he feels bloated after eating. Patient is having Bms and they are QOD and he is taking senna as directed and this is helping. I asked if the bloated feeling came only after eating and she stated it did and I explained it would be good to try gasx to see if this is the cause of the bloating. I also let her know he could take Mucinex and she v/u.

## 2024-02-22 ENCOUNTER — HOSPITAL ENCOUNTER (OUTPATIENT)
Dept: RADIATION ONCOLOGY | Facility: HOSPITAL | Age: 69
Discharge: HOME | End: 2024-02-22

## 2024-02-22 LAB
RAD ONC ARIA COURSE ID: NORMAL
RAD ONC ARIA COURSE INTENT: NORMAL
RAD ONC ARIA COURSE LAST TREATMENT DATE: NORMAL
RAD ONC ARIA COURSE START DATE: NORMAL
RAD ONC ARIA COURSE TREATMENT ELAPSED DAYS: 17
RAD ONC ARIA FIRST TREATMENT DATE: NORMAL
RAD ONC ARIA PLAN FRACTIONS TREATED TO DATE: 14
RAD ONC ARIA PLAN ID: NORMAL
RAD ONC ARIA PLAN PRESCRIBED DOSE PER FRACTION: 1.8 GY
RAD ONC ARIA PLAN PRIMARY REFERENCE POINT: NORMAL
RAD ONC ARIA PLAN TOTAL FRACTIONS PRESCRIBED: 30
RAD ONC ARIA PLAN TOTAL PRESCRIBED DOSE: 5400 CGY
RAD ONC ARIA REFERENCE POINT DOSAGE GIVEN TO DATE: 25.2 GY
RAD ONC ARIA REFERENCE POINT ID: NORMAL
RAD ONC ARIA REFERENCE POINT SESSION DOSAGE GIVEN: 1.8 GY

## 2024-02-22 PROCEDURE — 77014 CHG CT GUIDANCE RADIATION THERAPY FLDS PLACEMENT: CPT | Performed by: STUDENT IN AN ORGANIZED HEALTH CARE EDUCATION/TRAINING PROGRAM

## 2024-02-22 PROCEDURE — 77386: CPT | Performed by: STUDENT IN AN ORGANIZED HEALTH CARE EDUCATION/TRAINING PROGRAM

## 2024-02-23 ENCOUNTER — HOSPITAL ENCOUNTER (OUTPATIENT)
Dept: RADIATION ONCOLOGY | Facility: HOSPITAL | Age: 69
Discharge: HOME | End: 2024-02-23

## 2024-02-23 LAB
RAD ONC ARIA COURSE ID: NORMAL
RAD ONC ARIA COURSE INTENT: NORMAL
RAD ONC ARIA COURSE LAST TREATMENT DATE: NORMAL
RAD ONC ARIA COURSE START DATE: NORMAL
RAD ONC ARIA COURSE TREATMENT ELAPSED DAYS: 18
RAD ONC ARIA FIRST TREATMENT DATE: NORMAL
RAD ONC ARIA PLAN FRACTIONS TREATED TO DATE: 15
RAD ONC ARIA PLAN ID: NORMAL
RAD ONC ARIA PLAN PRESCRIBED DOSE PER FRACTION: 1.8 GY
RAD ONC ARIA PLAN PRIMARY REFERENCE POINT: NORMAL
RAD ONC ARIA PLAN TOTAL FRACTIONS PRESCRIBED: 30
RAD ONC ARIA PLAN TOTAL PRESCRIBED DOSE: 5400 CGY
RAD ONC ARIA REFERENCE POINT DOSAGE GIVEN TO DATE: 27 GY
RAD ONC ARIA REFERENCE POINT ID: NORMAL
RAD ONC ARIA REFERENCE POINT SESSION DOSAGE GIVEN: 1.8 GY

## 2024-02-23 PROCEDURE — 77386: CPT | Performed by: STUDENT IN AN ORGANIZED HEALTH CARE EDUCATION/TRAINING PROGRAM

## 2024-02-23 PROCEDURE — 77014 CHG CT GUIDANCE RADIATION THERAPY FLDS PLACEMENT: CPT | Performed by: STUDENT IN AN ORGANIZED HEALTH CARE EDUCATION/TRAINING PROGRAM

## 2024-02-26 ENCOUNTER — OFFICE VISIT (OUTPATIENT)
Dept: ONCOLOGY | Facility: CLINIC | Age: 69
End: 2024-02-26
Payer: MEDICARE

## 2024-02-26 ENCOUNTER — NUTRITION (OUTPATIENT)
Dept: OTHER | Facility: HOSPITAL | Age: 69
End: 2024-02-26
Payer: MEDICARE

## 2024-02-26 ENCOUNTER — INFUSION (OUTPATIENT)
Dept: ONCOLOGY | Facility: HOSPITAL | Age: 69
End: 2024-02-26
Payer: MEDICARE

## 2024-02-26 ENCOUNTER — RADIATION ONCOLOGY WEEKLY ASSESSMENT (OUTPATIENT)
Dept: RADIATION ONCOLOGY | Facility: HOSPITAL | Age: 69
End: 2024-02-26
Payer: MEDICARE

## 2024-02-26 ENCOUNTER — HOSPITAL ENCOUNTER (OUTPATIENT)
Dept: RADIATION ONCOLOGY | Facility: HOSPITAL | Age: 69
Discharge: HOME | End: 2024-02-26
Payer: MEDICARE

## 2024-02-26 VITALS
HEART RATE: 78 BPM | OXYGEN SATURATION: 98 % | HEIGHT: 71 IN | TEMPERATURE: 98.3 F | WEIGHT: 160.5 LBS | DIASTOLIC BLOOD PRESSURE: 72 MMHG | RESPIRATION RATE: 18 BRPM | SYSTOLIC BLOOD PRESSURE: 125 MMHG | BODY MASS INDEX: 22.47 KG/M2

## 2024-02-26 VITALS
OXYGEN SATURATION: 98 % | HEART RATE: 68 BPM | DIASTOLIC BLOOD PRESSURE: 68 MMHG | BODY MASS INDEX: 22.72 KG/M2 | SYSTOLIC BLOOD PRESSURE: 115 MMHG | WEIGHT: 162.8 LBS

## 2024-02-26 DIAGNOSIS — C34.92 PRIMARY SQUAMOUS CELL CARCINOMA OF LUNG, LEFT: Primary | ICD-10-CM

## 2024-02-26 DIAGNOSIS — D64.81 ANEMIA ASSOCIATED WITH CHEMOTHERAPY: ICD-10-CM

## 2024-02-26 DIAGNOSIS — Z79.899 HIGH RISK MEDICATION USE: Primary | ICD-10-CM

## 2024-02-26 DIAGNOSIS — C34.92 PRIMARY SQUAMOUS CELL CARCINOMA OF LUNG, LEFT: ICD-10-CM

## 2024-02-26 DIAGNOSIS — Z79.899 HIGH RISK MEDICATION USE: ICD-10-CM

## 2024-02-26 DIAGNOSIS — C34.32 CANCER OF BRONCHUS OF LEFT LOWER LOBE: ICD-10-CM

## 2024-02-26 DIAGNOSIS — T45.1X5A ANEMIA ASSOCIATED WITH CHEMOTHERAPY: ICD-10-CM

## 2024-02-26 LAB
ALBUMIN SERPL-MCNC: 3.2 G/DL (ref 3.5–5.2)
ALBUMIN/GLOB SERPL: 0.9 G/DL
ALP SERPL-CCNC: 69 U/L (ref 39–117)
ALT SERPL W P-5'-P-CCNC: 13 U/L (ref 1–41)
ANION GAP SERPL CALCULATED.3IONS-SCNC: 9.3 MMOL/L (ref 5–15)
AST SERPL-CCNC: 20 U/L (ref 1–40)
BASOPHILS # BLD AUTO: 0.03 10*3/MM3 (ref 0–0.2)
BASOPHILS NFR BLD AUTO: 0.5 % (ref 0–1.5)
BILIRUB SERPL-MCNC: <0.2 MG/DL (ref 0–1.2)
BUN SERPL-MCNC: 13 MG/DL (ref 8–23)
BUN/CREAT SERPL: 22 (ref 7–25)
CALCIUM SPEC-SCNC: 9.2 MG/DL (ref 8.6–10.5)
CHLORIDE SERPL-SCNC: 103 MMOL/L (ref 98–107)
CO2 SERPL-SCNC: 26.7 MMOL/L (ref 22–29)
CREAT SERPL-MCNC: 0.59 MG/DL (ref 0.76–1.27)
DEPRECATED RDW RBC AUTO: 49.2 FL (ref 37–54)
EGFRCR SERPLBLD CKD-EPI 2021: 106.3 ML/MIN/1.73
EOSINOPHIL # BLD AUTO: 0.07 10*3/MM3 (ref 0–0.4)
EOSINOPHIL NFR BLD AUTO: 1.1 % (ref 0.3–6.2)
ERYTHROCYTE [DISTWIDTH] IN BLOOD BY AUTOMATED COUNT: 16.2 % (ref 12.3–15.4)
GLOBULIN UR ELPH-MCNC: 3.7 GM/DL
GLUCOSE SERPL-MCNC: 96 MG/DL (ref 65–99)
HCT VFR BLD AUTO: 29.8 % (ref 37.5–51)
HGB BLD-MCNC: 8.8 G/DL (ref 13–17.7)
IMM GRANULOCYTES # BLD AUTO: 0.11 10*3/MM3 (ref 0–0.05)
IMM GRANULOCYTES NFR BLD AUTO: 1.7 % (ref 0–0.5)
LYMPHOCYTES # BLD AUTO: 1.28 10*3/MM3 (ref 0.7–3.1)
LYMPHOCYTES NFR BLD AUTO: 19.8 % (ref 19.6–45.3)
MCH RBC QN AUTO: 24.9 PG (ref 26.6–33)
MCHC RBC AUTO-ENTMCNC: 29.5 G/DL (ref 31.5–35.7)
MCV RBC AUTO: 84.2 FL (ref 79–97)
MONOCYTES # BLD AUTO: 1.64 10*3/MM3 (ref 0.1–0.9)
MONOCYTES NFR BLD AUTO: 25.4 % (ref 5–12)
NEUTROPHILS NFR BLD AUTO: 3.32 10*3/MM3 (ref 1.7–7)
NEUTROPHILS NFR BLD AUTO: 51.5 % (ref 42.7–76)
NRBC BLD AUTO-RTO: 0.3 /100 WBC (ref 0–0.2)
PLATELET # BLD AUTO: 438 10*3/MM3 (ref 140–450)
PMV BLD AUTO: 9 FL (ref 6–12)
POTASSIUM SERPL-SCNC: 4.2 MMOL/L (ref 3.5–5.2)
PROT SERPL-MCNC: 6.9 G/DL (ref 6–8.5)
RAD ONC ARIA COURSE ID: NORMAL
RAD ONC ARIA COURSE INTENT: NORMAL
RAD ONC ARIA COURSE LAST TREATMENT DATE: NORMAL
RAD ONC ARIA COURSE START DATE: NORMAL
RAD ONC ARIA COURSE TREATMENT ELAPSED DAYS: 21
RAD ONC ARIA FIRST TREATMENT DATE: NORMAL
RAD ONC ARIA PLAN FRACTIONS TREATED TO DATE: 16
RAD ONC ARIA PLAN ID: NORMAL
RAD ONC ARIA PLAN PRESCRIBED DOSE PER FRACTION: 1.8 GY
RAD ONC ARIA PLAN PRIMARY REFERENCE POINT: NORMAL
RAD ONC ARIA PLAN TOTAL FRACTIONS PRESCRIBED: 30
RAD ONC ARIA PLAN TOTAL PRESCRIBED DOSE: 5400 CGY
RAD ONC ARIA REFERENCE POINT DOSAGE GIVEN TO DATE: 28.8 GY
RAD ONC ARIA REFERENCE POINT ID: NORMAL
RAD ONC ARIA REFERENCE POINT SESSION DOSAGE GIVEN: 1.8 GY
RBC # BLD AUTO: 3.54 10*6/MM3 (ref 4.14–5.8)
SODIUM SERPL-SCNC: 139 MMOL/L (ref 136–145)
WBC NRBC COR # BLD AUTO: 6.45 10*3/MM3 (ref 3.4–10.8)

## 2024-02-26 PROCEDURE — 25010000002 CARBOPLATIN PER 50 MG: Performed by: INTERNAL MEDICINE

## 2024-02-26 PROCEDURE — 96417 CHEMO IV INFUS EACH ADDL SEQ: CPT

## 2024-02-26 PROCEDURE — 25810000003 SODIUM CHLORIDE 0.9 % SOLUTION: Performed by: INTERNAL MEDICINE

## 2024-02-26 PROCEDURE — G2211 COMPLEX E/M VISIT ADD ON: HCPCS | Performed by: INTERNAL MEDICINE

## 2024-02-26 PROCEDURE — 85025 COMPLETE CBC W/AUTO DIFF WBC: CPT

## 2024-02-26 PROCEDURE — 1126F AMNT PAIN NOTED NONE PRSNT: CPT | Performed by: INTERNAL MEDICINE

## 2024-02-26 PROCEDURE — 25010000002 FOSAPREPITANT PER 1 MG: Performed by: INTERNAL MEDICINE

## 2024-02-26 PROCEDURE — 25010000002 DEXAMETHASONE SODIUM PHOSPHATE 100 MG/10ML SOLUTION: Performed by: INTERNAL MEDICINE

## 2024-02-26 PROCEDURE — 96367 TX/PROPH/DG ADDL SEQ IV INF: CPT

## 2024-02-26 PROCEDURE — 25810000003 SODIUM CHLORIDE 0.9 % SOLUTION 250 ML FLEX CONT: Performed by: INTERNAL MEDICINE

## 2024-02-26 PROCEDURE — 25010000002 ETOPOSIDE 500 MG/25ML SOLUTION 25 ML VIAL: Performed by: INTERNAL MEDICINE

## 2024-02-26 PROCEDURE — 77427 RADIATION TX MANAGEMENT X5: CPT | Performed by: STUDENT IN AN ORGANIZED HEALTH CARE EDUCATION/TRAINING PROGRAM

## 2024-02-26 PROCEDURE — 77386: CPT | Performed by: STUDENT IN AN ORGANIZED HEALTH CARE EDUCATION/TRAINING PROGRAM

## 2024-02-26 PROCEDURE — 96413 CHEMO IV INFUSION 1 HR: CPT

## 2024-02-26 PROCEDURE — 25010000002 PALONOSETRON PER 25 MCG: Performed by: INTERNAL MEDICINE

## 2024-02-26 PROCEDURE — 80053 COMPREHEN METABOLIC PANEL: CPT

## 2024-02-26 PROCEDURE — 77014 CHG CT GUIDANCE RADIATION THERAPY FLDS PLACEMENT: CPT | Performed by: STUDENT IN AN ORGANIZED HEALTH CARE EDUCATION/TRAINING PROGRAM

## 2024-02-26 PROCEDURE — 25810000003 SODIUM CHLORIDE 0.9 % SOLUTION 500 ML FLEX CONT: Performed by: INTERNAL MEDICINE

## 2024-02-26 PROCEDURE — 96375 TX/PRO/DX INJ NEW DRUG ADDON: CPT

## 2024-02-26 PROCEDURE — 99214 OFFICE O/P EST MOD 30 MIN: CPT | Performed by: INTERNAL MEDICINE

## 2024-02-26 RX ORDER — PALONOSETRON 0.05 MG/ML
0.25 INJECTION, SOLUTION INTRAVENOUS ONCE
Status: COMPLETED | OUTPATIENT
Start: 2024-02-26 | End: 2024-02-26

## 2024-02-26 RX ORDER — FAMOTIDINE 10 MG/ML
20 INJECTION, SOLUTION INTRAVENOUS AS NEEDED
Status: CANCELLED | OUTPATIENT
Start: 2024-02-26

## 2024-02-26 RX ORDER — DIPHENHYDRAMINE HYDROCHLORIDE 50 MG/ML
50 INJECTION INTRAMUSCULAR; INTRAVENOUS AS NEEDED
Status: CANCELLED | OUTPATIENT
Start: 2024-02-26

## 2024-02-26 RX ORDER — SODIUM CHLORIDE 9 MG/ML
20 INJECTION, SOLUTION INTRAVENOUS ONCE
Status: CANCELLED | OUTPATIENT
Start: 2024-02-27

## 2024-02-26 RX ORDER — SODIUM CHLORIDE 9 MG/ML
20 INJECTION, SOLUTION INTRAVENOUS ONCE
Status: CANCELLED | OUTPATIENT
Start: 2024-02-26

## 2024-02-26 RX ORDER — PALONOSETRON 0.05 MG/ML
0.25 INJECTION, SOLUTION INTRAVENOUS ONCE
Status: CANCELLED | OUTPATIENT
Start: 2024-02-26

## 2024-02-26 RX ORDER — SODIUM CHLORIDE 9 MG/ML
20 INJECTION, SOLUTION INTRAVENOUS ONCE
Status: CANCELLED | OUTPATIENT
Start: 2024-02-28

## 2024-02-26 RX ORDER — TAMSULOSIN HYDROCHLORIDE 0.4 MG/1
1 CAPSULE ORAL 2 TIMES DAILY
COMMUNITY

## 2024-02-26 RX ORDER — SODIUM CHLORIDE 9 MG/ML
20 INJECTION, SOLUTION INTRAVENOUS ONCE
Status: COMPLETED | OUTPATIENT
Start: 2024-02-26 | End: 2024-02-26

## 2024-02-26 RX ADMIN — CARBOPLATIN 750 MG: 600 INJECTION, SOLUTION INTRAVENOUS at 09:47

## 2024-02-26 RX ADMIN — SODIUM CHLORIDE 20 ML/HR: 9 INJECTION, SOLUTION INTRAVENOUS at 08:50

## 2024-02-26 RX ADMIN — FOSAPREPITANT 100 ML: 150 INJECTION, POWDER, LYOPHILIZED, FOR SOLUTION INTRAVENOUS at 09:08

## 2024-02-26 RX ADMIN — PALONOSETRON 0.25 MG: 0.05 INJECTION, SOLUTION INTRAVENOUS at 08:51

## 2024-02-26 RX ADMIN — DEXAMETHASONE SODIUM PHOSPHATE 12 MG: 10 INJECTION, SOLUTION INTRAMUSCULAR; INTRAVENOUS at 08:52

## 2024-02-26 RX ADMIN — ETOPOSIDE 190 MG: 20 INJECTION INTRAVENOUS at 10:25

## 2024-02-26 NOTE — PROGRESS NOTES
Radiation Oncology  On-Treatment Note      Patient: Sunny Hung    MRN: [id number]    Attending Physician: Inderjit Culver MD     Diagnosis:     ICD-10-CM ICD-9-CM   1. Primary squamous cell carcinoma of lung, left  C34.92 162.9       Radiation Therapy Visit:  Continue radiation therapy, Dosimetry plan remains acceptable, Films reviewed and remains acceptable, Pain assessed, Pain management planned, Radiation dose schedule reviewed and remains acceptable, Radiation technique remains acceptable, and Symptoms within expected range    Radiation Treatments       Active   Plans   [id number]   Most recent treatment: Dose planned: 180 cGy (fraction 16 on 2/26/2024)   Total: Dose planned: 5,400 cGy (30 fractions)   Elapsed Days: 21      Reference Points   PTV   Most recent treatment: Dose given: 180 cGy (on 2/26/2024)   Total: Dose given: 2,880 cGy   Elapsed Days: 21                      Physical Examination:  Vitals: Blood pressure 115/68, pulse 68, weight 73.8 kg (162 lb 12.8 oz), SpO2 98%.  Pain Score    02/26/24 1228   PainSc: 0-No pain       Capable of only limited selfcare; confined to bed or chair more than 50% of waking hours = 3    We examined the relevant areas: yes  Findings are within the expected range for this stage of treatment: yes  -------------------------------------------------------------------------------------------------------------------    ACTION ITEMS:  Patient tolerating treatment well and as expected for this stage in their treatment and Continue radiation therapy as planned    Estimated Completion Date: 3/15/2024      Inderjit Culver MD  Radiation Oncology

## 2024-02-26 NOTE — PROGRESS NOTES
OUTPATIENT ONCOLOGY NUTRITION ASSESSMENT    Patient Name: Sunny Hung  YOB: 1955  MRN: [id number]  Assessment Date: 2/26/2024    COMMENTS: F/U with pt in infusion area, pt receiving carboplatin, -16 today. Pt reports a good appetite, no issues with eating or drinking. Pt denies GI side effects limiting PO intake, was snacking on some peanut butter and crackers at time of visit. Pt reports tolerating XRT well, denies swallowing difficulties.         Reason for Assessment Follow up     Diagnosis/Problem   Small cell lung ca   Treatment Plan Chemotherapy, Radiation; carboplatin, -16   Frequency Every 3 weeks, daily XRT   Goal of cancer treatment Disease control   Comments:      Encounter Information        Nutrition/Diet History:  Pt has a good appetite    Oral Nutrition Supplements:    Factors/Symptoms Affecting Intake: No factors at this time   Comments:      Medical/Surgical History Past Medical History:   Diagnosis Date    Arthritis     BPH (benign prostatic hyperplasia)     Bruises easily     Bursitis of right shoulder     Cough     Hyperlipidemia     Mass of lower lobe of left lung      Past Surgical History:   Procedure Laterality Date    BRONCHOSCOPY WITH ION ROBOTIC ASSIST N/A 9/6/2023    Procedure: BRONCHOSCOPY WITH BAL;   ION ROBOT AND ENDOBRONCHIAL ULTRASOUND WITH FNA'S;  Surgeon: Rosalio Scott MD;  Location: [facility] ENDOSCOPY;  Service: Robotics - Pulmonary;  Laterality: N/A;  PRE- LEFT LOWER LOBE MASS  POST- SAME    GANGLION CYST EXCISION Bilateral     HEMORRHOIDECTOMY      LOBECTOMY Left 12/13/2023    Procedure: BRONCHOSCOPY, THORACOSCOPY WITH LYSIS OF ADHESIONS (120 MINUTES), LEFT LOWER LOBECTOMY WITH EN BLOC PARTIAL PARIETAL PLEURECTOMY USING DAVINCI ROBOT, MEDIASTINAL LYMPH NODE DISSECTION, INTERCOSTAL NERVE BLOCK;  Surgeon: Rosalio Scott MD;  Location: [facility] MAIN OR;  Service: Robotics - DaVinci;  Laterality: Left;    OTHER SURGICAL HISTORY      SOMETHING REMOVED FROM LEFT CHEST,  "BENIGN ? LIPOMA        Anthropometrics        Current Height Ht Readings from Last 1 Encounters:   02/26/24 180.3 cm (70.98\")      Current Weight Wt Readings from Last 1 Encounters:   02/26/24 72.8 kg (160 lb 8 oz)      BMI  22.3   Ideal Body Weight (IBW) 178#   Usual Body Weight (UBW) ~170#   Weight Change/Trend Stable   Weight History Wt Readings from Last 30 Encounters:   02/26/24 0807 72.8 kg (160 lb 8 oz)   02/19/24 1009 72.6 kg (160 lb)   02/12/24 1115 71.2 kg (157 lb)   02/12/24 1021 71.2 kg (157 lb)   02/07/24 1334 73.2 kg (161 lb 6.4 oz)   02/06/24 1330 71.9 kg (158 lb 9.6 oz)   02/05/24 1234 72 kg (158 lb 12.8 oz)   02/05/24 0846 71.9 kg (158 lb 8 oz)   02/01/24 0853 71.7 kg (158 lb)   01/17/24 1254 73.9 kg (163 lb)   01/10/24 1429 74.3 kg (163 lb 12.8 oz)   01/04/24 0929 76.2 kg (168 lb)   12/11/23 1614 76.2 kg (168 lb)   12/07/23 1305 77.3 kg (170 lb 6.4 oz)   12/07/23 0907 74.4 kg (164 lb)   12/05/23 0930 74.4 kg (164 lb)   11/30/23 0949 74.7 kg (164 lb 9.6 oz)   11/24/23 1119 77.6 kg (171 lb)   11/01/23 0751 77.7 kg (171 lb 6.4 oz)   10/11/23 0752 76.8 kg (169 lb 6.4 oz)   09/28/23 0920 76.1 kg (167 lb 12.8 oz)   09/20/23 0816 75.6 kg (166 lb 9.6 oz)   09/19/23 1236 75.3 kg (166 lb)   09/15/23 1256 75.4 kg (166 lb 4.8 oz)   09/06/23 0738 75.2 kg (165 lb 11.2 oz)   09/05/23 0747 76.6 kg (168 lb 12.8 oz)   08/31/23 1256 76.2 kg (168 lb)          Medications           Current medications: OLANZapine, guaiFENesin, levoFLOXacin, ondansetron, pravastatin, and tamsulosin     Tests/Procedures        Tests/Procedures Chemotherapy, Radiation     Labs       Pertinent Labs    Results from last 7 days   Lab Units 02/26/24  0739   SODIUM mmol/L 139   POTASSIUM mmol/L 4.2   CHLORIDE mmol/L 103   CO2 mmol/L 26.7   BUN mg/dL 13   CREATININE mg/dL 0.59*   CALCIUM mg/dL 9.2   BILIRUBIN mg/dL <0.2   ALK PHOS U/L 69   ALT (SGPT) U/L 13   AST (SGOT) U/L 20   GLUCOSE mg/dL 96     Results from last 7 days   Lab Units " "02/26/24  0739   HEMOGLOBIN g/dL 8.8*   HEMATOCRIT % 29.8*   WBC 10*3/mm3 6.45   ALBUMIN g/dL 3.2*     Results from last 7 days   Lab Units 02/26/24  0739   PLATELETS 10*3/mm3 438     No results found for: \"COVID19\"  No results found for: \"HGBA1C\"       Physical Findings        Physical Appearance alert, oriented, on oxygen therapy     Edema  no edema   Gastrointestinal None   Tubes/Drains implantable port   Oral/Mouth Cavity WNL     Estimated/Assessed Needs        Energy Requirements    Height for Calculation   72\"   Weight for Calculation 74.4 kg   Method for Estimation  25 kcal/kg, 30 kcal/kg   EST Needs (kcal/day) 1299-4084 kcal/day       Protein Requirements    Weight for Calculation 74.4 kg   EST Protein Needs (g/kg) 1.3 gm/kg   EST Daily Needs (g/day) 97 g/day       Fluid Requirements     Method for Estimation 1 mL/kcal    Estimated Needs (mL/day) 2000 mL/day           PES STATEMENT / NUTRITION DIAGNOSIS      Nutrition Dx Problem Problem:    NutritionDiagnosisProblem: No Nutrition Diagnosis at this Time and Nutrition Appropriate for Condition at this Time    Etiology:  Medical diagnosis: Lung cancer     NUTRITION INTERVENTION / PLAN OF CARE      Intervention Goal(s) Maintain nutrition status, Meet estimated needs, Tolerate PO , Maintain intake, Maintain weight, and No significant weight loss         RD Intervention/Action Encouraged intake, Follow Tx progress         Recommendations:       PO Diet Continue current       Supplements Monitor for need      Snacks       Other          Monitor/Evaluation PO intake, Weight, Symptoms   Education Will provide eduction as needed   --    RD to follow     Electronically signed by:  Claudia Kelly RD  02/26/24 11:30 EST    "

## 2024-02-27 ENCOUNTER — HOSPITAL ENCOUNTER (OUTPATIENT)
Dept: RADIATION ONCOLOGY | Facility: HOSPITAL | Age: 69
Discharge: HOME | End: 2024-02-27

## 2024-02-27 ENCOUNTER — INFUSION (OUTPATIENT)
Dept: ONCOLOGY | Facility: HOSPITAL | Age: 69
End: 2024-02-27
Payer: MEDICARE

## 2024-02-27 VITALS
OXYGEN SATURATION: 99 % | HEART RATE: 81 BPM | DIASTOLIC BLOOD PRESSURE: 70 MMHG | SYSTOLIC BLOOD PRESSURE: 117 MMHG | TEMPERATURE: 98 F | BODY MASS INDEX: 22.33 KG/M2 | RESPIRATION RATE: 18 BRPM | WEIGHT: 160 LBS

## 2024-02-27 DIAGNOSIS — C34.92 PRIMARY SQUAMOUS CELL CARCINOMA OF LUNG, LEFT: Primary | ICD-10-CM

## 2024-02-27 DIAGNOSIS — Z79.899 HIGH RISK MEDICATION USE: ICD-10-CM

## 2024-02-27 LAB
RAD ONC ARIA COURSE ID: NORMAL
RAD ONC ARIA COURSE INTENT: NORMAL
RAD ONC ARIA COURSE LAST TREATMENT DATE: NORMAL
RAD ONC ARIA COURSE START DATE: NORMAL
RAD ONC ARIA COURSE TREATMENT ELAPSED DAYS: 22
RAD ONC ARIA FIRST TREATMENT DATE: NORMAL
RAD ONC ARIA PLAN FRACTIONS TREATED TO DATE: 17
RAD ONC ARIA PLAN ID: NORMAL
RAD ONC ARIA PLAN PRESCRIBED DOSE PER FRACTION: 1.8 GY
RAD ONC ARIA PLAN PRIMARY REFERENCE POINT: NORMAL
RAD ONC ARIA PLAN TOTAL FRACTIONS PRESCRIBED: 30
RAD ONC ARIA PLAN TOTAL PRESCRIBED DOSE: 5400 CGY
RAD ONC ARIA REFERENCE POINT DOSAGE GIVEN TO DATE: 30.6 GY
RAD ONC ARIA REFERENCE POINT ID: NORMAL
RAD ONC ARIA REFERENCE POINT SESSION DOSAGE GIVEN: 1.8 GY

## 2024-02-27 PROCEDURE — 25010000002 DEXAMETHASONE SODIUM PHOSPHATE 100 MG/10ML SOLUTION: Performed by: INTERNAL MEDICINE

## 2024-02-27 PROCEDURE — 77014 CHG CT GUIDANCE RADIATION THERAPY FLDS PLACEMENT: CPT | Performed by: STUDENT IN AN ORGANIZED HEALTH CARE EDUCATION/TRAINING PROGRAM

## 2024-02-27 PROCEDURE — 25810000003 SODIUM CHLORIDE 0.9 % SOLUTION: Performed by: INTERNAL MEDICINE

## 2024-02-27 PROCEDURE — 25010000002 ETOPOSIDE 500 MG/25ML SOLUTION 25 ML VIAL: Performed by: INTERNAL MEDICINE

## 2024-02-27 PROCEDURE — 77386: CPT | Performed by: STUDENT IN AN ORGANIZED HEALTH CARE EDUCATION/TRAINING PROGRAM

## 2024-02-27 PROCEDURE — 96413 CHEMO IV INFUSION 1 HR: CPT

## 2024-02-27 PROCEDURE — 96375 TX/PRO/DX INJ NEW DRUG ADDON: CPT

## 2024-02-27 PROCEDURE — 25810000003 SODIUM CHLORIDE 0.9 % SOLUTION 500 ML FLEX CONT: Performed by: INTERNAL MEDICINE

## 2024-02-27 RX ORDER — SODIUM CHLORIDE 9 MG/ML
20 INJECTION, SOLUTION INTRAVENOUS ONCE
Status: COMPLETED | OUTPATIENT
Start: 2024-02-27 | End: 2024-02-27

## 2024-02-27 RX ADMIN — DEXAMETHASONE SODIUM PHOSPHATE 12 MG: 10 INJECTION, SOLUTION INTRAMUSCULAR; INTRAVENOUS at 10:40

## 2024-02-27 RX ADMIN — SODIUM CHLORIDE 20 ML/HR: 9 INJECTION, SOLUTION INTRAVENOUS at 10:40

## 2024-02-27 RX ADMIN — ETOPOSIDE 190 MG: 20 INJECTION INTRAVENOUS at 11:03

## 2024-02-28 ENCOUNTER — INFUSION (OUTPATIENT)
Dept: ONCOLOGY | Facility: HOSPITAL | Age: 69
End: 2024-02-28
Payer: MEDICARE

## 2024-02-28 ENCOUNTER — HOSPITAL ENCOUNTER (OUTPATIENT)
Dept: RADIATION ONCOLOGY | Facility: HOSPITAL | Age: 69
Discharge: HOME | End: 2024-02-28

## 2024-02-28 VITALS
WEIGHT: 160 LBS | HEART RATE: 63 BPM | DIASTOLIC BLOOD PRESSURE: 70 MMHG | SYSTOLIC BLOOD PRESSURE: 120 MMHG | TEMPERATURE: 98.6 F | OXYGEN SATURATION: 100 % | RESPIRATION RATE: 16 BRPM | BODY MASS INDEX: 22.33 KG/M2

## 2024-02-28 DIAGNOSIS — Z79.899 HIGH RISK MEDICATION USE: ICD-10-CM

## 2024-02-28 DIAGNOSIS — C34.92 PRIMARY SQUAMOUS CELL CARCINOMA OF LUNG, LEFT: Primary | ICD-10-CM

## 2024-02-28 LAB
RAD ONC ARIA COURSE ID: NORMAL
RAD ONC ARIA COURSE INTENT: NORMAL
RAD ONC ARIA COURSE LAST TREATMENT DATE: NORMAL
RAD ONC ARIA COURSE START DATE: NORMAL
RAD ONC ARIA COURSE TREATMENT ELAPSED DAYS: 23
RAD ONC ARIA FIRST TREATMENT DATE: NORMAL
RAD ONC ARIA PLAN FRACTIONS TREATED TO DATE: 18
RAD ONC ARIA PLAN ID: NORMAL
RAD ONC ARIA PLAN PRESCRIBED DOSE PER FRACTION: 1.8 GY
RAD ONC ARIA PLAN PRIMARY REFERENCE POINT: NORMAL
RAD ONC ARIA PLAN TOTAL FRACTIONS PRESCRIBED: 30
RAD ONC ARIA PLAN TOTAL PRESCRIBED DOSE: 5400 CGY
RAD ONC ARIA REFERENCE POINT DOSAGE GIVEN TO DATE: 32.4 GY
RAD ONC ARIA REFERENCE POINT ID: NORMAL
RAD ONC ARIA REFERENCE POINT SESSION DOSAGE GIVEN: 1.8 GY

## 2024-02-28 PROCEDURE — 96372 THER/PROPH/DIAG INJ SC/IM: CPT

## 2024-02-28 PROCEDURE — 77386: CPT | Performed by: STUDENT IN AN ORGANIZED HEALTH CARE EDUCATION/TRAINING PROGRAM

## 2024-02-28 PROCEDURE — 25010000002 ETOPOSIDE 100 MG/5ML SOLUTION 5 ML VIAL: Performed by: INTERNAL MEDICINE

## 2024-02-28 PROCEDURE — 96413 CHEMO IV INFUSION 1 HR: CPT

## 2024-02-28 PROCEDURE — 25810000003 SODIUM CHLORIDE 0.9 % SOLUTION: Performed by: INTERNAL MEDICINE

## 2024-02-28 PROCEDURE — 25010000002 DEXAMETHASONE SODIUM PHOSPHATE 100 MG/10ML SOLUTION: Performed by: INTERNAL MEDICINE

## 2024-02-28 PROCEDURE — 25810000003 SODIUM CHLORIDE 0.9 % SOLUTION 500 ML FLEX CONT: Performed by: INTERNAL MEDICINE

## 2024-02-28 PROCEDURE — 25010000002 HYALURONIDASE (HUMAN) 150 UNIT/ML SOLUTION: Performed by: INTERNAL MEDICINE

## 2024-02-28 PROCEDURE — 77336 RADIATION PHYSICS CONSULT: CPT | Performed by: STUDENT IN AN ORGANIZED HEALTH CARE EDUCATION/TRAINING PROGRAM

## 2024-02-28 PROCEDURE — 96375 TX/PRO/DX INJ NEW DRUG ADDON: CPT

## 2024-02-28 PROCEDURE — 77014 CHG CT GUIDANCE RADIATION THERAPY FLDS PLACEMENT: CPT | Performed by: STUDENT IN AN ORGANIZED HEALTH CARE EDUCATION/TRAINING PROGRAM

## 2024-02-28 RX ORDER — SODIUM CHLORIDE 9 MG/ML
20 INJECTION, SOLUTION INTRAVENOUS ONCE
Status: COMPLETED | OUTPATIENT
Start: 2024-02-28 | End: 2024-02-28

## 2024-02-28 RX ADMIN — ETOPOSIDE 190 MG: 20 INJECTION INTRAVENOUS at 10:58

## 2024-02-28 RX ADMIN — DEXAMETHASONE SODIUM PHOSPHATE 12 MG: 10 INJECTION, SOLUTION INTRAMUSCULAR; INTRAVENOUS at 10:40

## 2024-02-28 RX ADMIN — SODIUM CHLORIDE 20 ML/HR: 9 INJECTION, SOLUTION INTRAVENOUS at 10:40

## 2024-02-28 RX ADMIN — HYALURONIDASE (HUMAN RECOMBINANT) 150 UNITS: 150 INJECTION, SOLUTION SUBCUTANEOUS at 12:00

## 2024-02-28 NOTE — PROGRESS NOTES
During the pts etoposide infusion the pump began alarming occluded. Candelaria RN noticed the IV had infiltrated and was unable to get blood return. The tx was stopped and the IV was removed. This RN applied a warm compress and notified pharmacy and Dr. Dubon. The area was slightly swollen above the IV site. Per Dr. Dubon we are to follow pharmacy's extravasation protocol on SORIN. Per the protocol if etoposide infiltrates under the skin a warm compress should be applied and hyaluronidase should be injected under the skin around the site subcutaneously. The pt v/u and this RN injected hyaluronidase around the IV site using 4 syringes subcutaneously. Koban was applied to the site. Instructed the pt to call if he notices increased swelling, red streaking, or pain to the area. The pt v/u.

## 2024-02-29 ENCOUNTER — HOSPITAL ENCOUNTER (OUTPATIENT)
Dept: RADIATION ONCOLOGY | Facility: HOSPITAL | Age: 69
Discharge: HOME | End: 2024-02-29

## 2024-02-29 LAB
RAD ONC ARIA COURSE ID: NORMAL
RAD ONC ARIA COURSE INTENT: NORMAL
RAD ONC ARIA COURSE LAST TREATMENT DATE: NORMAL
RAD ONC ARIA COURSE START DATE: NORMAL
RAD ONC ARIA COURSE TREATMENT ELAPSED DAYS: 24
RAD ONC ARIA FIRST TREATMENT DATE: NORMAL
RAD ONC ARIA PLAN FRACTIONS TREATED TO DATE: 19
RAD ONC ARIA PLAN ID: NORMAL
RAD ONC ARIA PLAN PRESCRIBED DOSE PER FRACTION: 1.8 GY
RAD ONC ARIA PLAN PRIMARY REFERENCE POINT: NORMAL
RAD ONC ARIA PLAN TOTAL FRACTIONS PRESCRIBED: 30
RAD ONC ARIA PLAN TOTAL PRESCRIBED DOSE: 5400 CGY
RAD ONC ARIA REFERENCE POINT DOSAGE GIVEN TO DATE: 34.2 GY
RAD ONC ARIA REFERENCE POINT ID: NORMAL
RAD ONC ARIA REFERENCE POINT SESSION DOSAGE GIVEN: 1.8 GY

## 2024-02-29 PROCEDURE — 77014 CHG CT GUIDANCE RADIATION THERAPY FLDS PLACEMENT: CPT | Performed by: STUDENT IN AN ORGANIZED HEALTH CARE EDUCATION/TRAINING PROGRAM

## 2024-02-29 PROCEDURE — 77386: CPT | Performed by: STUDENT IN AN ORGANIZED HEALTH CARE EDUCATION/TRAINING PROGRAM

## 2024-03-01 ENCOUNTER — HOSPITAL ENCOUNTER (OUTPATIENT)
Dept: RADIATION ONCOLOGY | Facility: HOSPITAL | Age: 69
Discharge: HOME | End: 2024-03-01

## 2024-03-01 ENCOUNTER — HOSPITAL ENCOUNTER (OUTPATIENT)
Dept: RADIATION ONCOLOGY | Facility: HOSPITAL | Age: 69
Setting detail: RADIATION/ONCOLOGY SERIES
End: 2024-03-01
Payer: MEDICARE

## 2024-03-01 LAB
RAD ONC ARIA COURSE ID: NORMAL
RAD ONC ARIA COURSE INTENT: NORMAL
RAD ONC ARIA COURSE LAST TREATMENT DATE: NORMAL
RAD ONC ARIA COURSE START DATE: NORMAL
RAD ONC ARIA COURSE TREATMENT ELAPSED DAYS: 25
RAD ONC ARIA FIRST TREATMENT DATE: NORMAL
RAD ONC ARIA PLAN FRACTIONS TREATED TO DATE: 20
RAD ONC ARIA PLAN ID: NORMAL
RAD ONC ARIA PLAN PRESCRIBED DOSE PER FRACTION: 1.8 GY
RAD ONC ARIA PLAN PRIMARY REFERENCE POINT: NORMAL
RAD ONC ARIA PLAN TOTAL FRACTIONS PRESCRIBED: 30
RAD ONC ARIA PLAN TOTAL PRESCRIBED DOSE: 5400 CGY
RAD ONC ARIA REFERENCE POINT DOSAGE GIVEN TO DATE: 36 GY
RAD ONC ARIA REFERENCE POINT ID: NORMAL
RAD ONC ARIA REFERENCE POINT SESSION DOSAGE GIVEN: 1.8 GY

## 2024-03-01 PROCEDURE — 77386: CPT | Performed by: STUDENT IN AN ORGANIZED HEALTH CARE EDUCATION/TRAINING PROGRAM

## 2024-03-01 PROCEDURE — 77014 CHG CT GUIDANCE RADIATION THERAPY FLDS PLACEMENT: CPT | Performed by: STUDENT IN AN ORGANIZED HEALTH CARE EDUCATION/TRAINING PROGRAM

## 2024-03-04 ENCOUNTER — LAB (OUTPATIENT)
Dept: LAB | Facility: HOSPITAL | Age: 69
End: 2024-03-04
Payer: MEDICARE

## 2024-03-04 ENCOUNTER — HOSPITAL ENCOUNTER (OUTPATIENT)
Dept: RADIATION ONCOLOGY | Facility: HOSPITAL | Age: 69
Discharge: HOME | End: 2024-03-04
Payer: MEDICARE

## 2024-03-04 ENCOUNTER — RADIATION ONCOLOGY WEEKLY ASSESSMENT (OUTPATIENT)
Dept: RADIATION ONCOLOGY | Facility: HOSPITAL | Age: 69
End: 2024-03-04
Payer: MEDICARE

## 2024-03-04 ENCOUNTER — CLINICAL SUPPORT (OUTPATIENT)
Dept: ONCOLOGY | Facility: HOSPITAL | Age: 69
End: 2024-03-04
Payer: MEDICARE

## 2024-03-04 VITALS
WEIGHT: 161.2 LBS | HEART RATE: 81 BPM | BODY MASS INDEX: 22.49 KG/M2 | SYSTOLIC BLOOD PRESSURE: 142 MMHG | OXYGEN SATURATION: 97 % | DIASTOLIC BLOOD PRESSURE: 68 MMHG

## 2024-03-04 DIAGNOSIS — C34.92 PRIMARY SQUAMOUS CELL CARCINOMA OF LUNG, LEFT: Primary | ICD-10-CM

## 2024-03-04 DIAGNOSIS — Z79.899 HIGH RISK MEDICATION USE: Primary | ICD-10-CM

## 2024-03-04 DIAGNOSIS — Z79.899 HIGH RISK MEDICATION USE: ICD-10-CM

## 2024-03-04 LAB
BASOPHILS # BLD AUTO: 0.08 10*3/MM3 (ref 0–0.2)
BASOPHILS NFR BLD AUTO: 1.1 % (ref 0–1.5)
DEPRECATED RDW RBC AUTO: 48 FL (ref 37–54)
EOSINOPHIL # BLD AUTO: 0.02 10*3/MM3 (ref 0–0.4)
EOSINOPHIL NFR BLD AUTO: 0.3 % (ref 0.3–6.2)
ERYTHROCYTE [DISTWIDTH] IN BLOOD BY AUTOMATED COUNT: 16.3 % (ref 12.3–15.4)
HCT VFR BLD AUTO: 27.8 % (ref 37.5–51)
HGB BLD-MCNC: 8.8 G/DL (ref 13–17.7)
IMM GRANULOCYTES # BLD AUTO: 0.53 10*3/MM3 (ref 0–0.05)
IMM GRANULOCYTES NFR BLD AUTO: 7 % (ref 0–0.5)
LYMPHOCYTES # BLD AUTO: 1.06 10*3/MM3 (ref 0.7–3.1)
LYMPHOCYTES NFR BLD AUTO: 14 % (ref 19.6–45.3)
MCH RBC QN AUTO: 25.7 PG (ref 26.6–33)
MCHC RBC AUTO-ENTMCNC: 31.7 G/DL (ref 31.5–35.7)
MCV RBC AUTO: 81.3 FL (ref 79–97)
MONOCYTES # BLD AUTO: 0.23 10*3/MM3 (ref 0.1–0.9)
MONOCYTES NFR BLD AUTO: 3 % (ref 5–12)
NEUTROPHILS NFR BLD AUTO: 5.66 10*3/MM3 (ref 1.7–7)
NEUTROPHILS NFR BLD AUTO: 74.6 % (ref 42.7–76)
NRBC BLD AUTO-RTO: 0.8 /100 WBC (ref 0–0.2)
PLATELET # BLD AUTO: 350 10*3/MM3 (ref 140–450)
PMV BLD AUTO: 9.7 FL (ref 6–12)
RAD ONC ARIA COURSE ID: NORMAL
RAD ONC ARIA COURSE INTENT: NORMAL
RAD ONC ARIA COURSE LAST TREATMENT DATE: NORMAL
RAD ONC ARIA COURSE START DATE: NORMAL
RAD ONC ARIA COURSE TREATMENT ELAPSED DAYS: 28
RAD ONC ARIA FIRST TREATMENT DATE: NORMAL
RAD ONC ARIA PLAN FRACTIONS TREATED TO DATE: 21
RAD ONC ARIA PLAN ID: NORMAL
RAD ONC ARIA PLAN PRESCRIBED DOSE PER FRACTION: 1.8 GY
RAD ONC ARIA PLAN PRIMARY REFERENCE POINT: NORMAL
RAD ONC ARIA PLAN TOTAL FRACTIONS PRESCRIBED: 30
RAD ONC ARIA PLAN TOTAL PRESCRIBED DOSE: 5400 CGY
RAD ONC ARIA REFERENCE POINT DOSAGE GIVEN TO DATE: 37.8 GY
RAD ONC ARIA REFERENCE POINT ID: NORMAL
RAD ONC ARIA REFERENCE POINT SESSION DOSAGE GIVEN: 1.8 GY
RBC # BLD AUTO: 3.42 10*6/MM3 (ref 4.14–5.8)
WBC NRBC COR # BLD AUTO: 7.58 10*3/MM3 (ref 3.4–10.8)

## 2024-03-04 PROCEDURE — 77386: CPT | Performed by: RADIOLOGY

## 2024-03-04 PROCEDURE — 77427 RADIATION TX MANAGEMENT X5: CPT | Performed by: RADIOLOGY

## 2024-03-04 PROCEDURE — 36415 COLL VENOUS BLD VENIPUNCTURE: CPT

## 2024-03-04 PROCEDURE — 77014 CHG CT GUIDANCE RADIATION THERAPY FLDS PLACEMENT: CPT | Performed by: RADIOLOGY

## 2024-03-04 PROCEDURE — 85025 COMPLETE CBC W/AUTO DIFF WBC: CPT

## 2024-03-04 NOTE — PROGRESS NOTES
Physician Weekly Management Note    Diagnosis:     Diagnosis Plan   1. Primary squamous cell carcinoma of lung, left            RT Details:  fx 21/30 left lung 42/60Gy    Notes on Treatment course, Films, Medical progress:  Kps 80% doing ok, tolerating treatment well, denies pain, denies soa or cough out of normal.  Cont on     Weekly Management:  Medication reviewed?   Yes  New medications given?   No  Problemlist reviewed?   Yes  Problem added?   No  Issues raised requiring referral to support services - task assigned to:  na    Technical aspects reviewed:  Weekly OBI approved?   Yes  Weekly port films approved?   Yes  Change requests noted on port film?   No  Patient setup and plan reviewed?   Yes    Chart Reviewed:  Continue current treatment plan?   Yes  Treatment plan change requested?   No  CBC reviewed?   Yes  Concurrent Chemo?   Yes    Objective     Toxicities:   fatigue      Review of Systems   Constitutional: Negative.    HENT: Negative.     Respiratory: Negative.     Musculoskeletal: Negative.         There were no vitals filed for this visit.        2/28/2024    10:29 AM   Current Status   ECOG score 1       Physical Exam  Constitutional:       Appearance: Normal appearance.   Pulmonary:      Effort: Pulmonary effort is normal. No respiratory distress.      Breath sounds: Normal breath sounds. No wheezing or rales.   Neurological:      Mental Status: He is alert.   Psychiatric:         Mood and Affect: Mood normal.           Problem Summary List    Diagnosis:     Diagnosis Plan   1. Primary squamous cell carcinoma of lung, left          Pathology:   non small cell lung cancer     Past Medical History:   Diagnosis Date    Arthritis     BPH (benign prostatic hyperplasia)     Bruises easily     Bursitis of right shoulder     Cough     Hyperlipidemia     Mass of lower lobe of left lung          Past Surgical History:   Procedure Laterality Date    BRONCHOSCOPY WITH ION ROBOTIC ASSIST N/A 9/6/2023     Procedure: BRONCHOSCOPY WITH BAL;   ION ROBOT AND ENDOBRONCHIAL ULTRASOUND WITH FNA'S;  Surgeon: Rosalio Scott MD;  Location: [facility] ENDOSCOPY;  Service: Robotics - Pulmonary;  Laterality: N/A;  PRE- LEFT LOWER LOBE MASS  POST- SAME    GANGLION CYST EXCISION Bilateral     HEMORRHOIDECTOMY      LOBECTOMY Left 12/13/2023    Procedure: BRONCHOSCOPY, THORACOSCOPY WITH LYSIS OF ADHESIONS (120 MINUTES), LEFT LOWER LOBECTOMY WITH EN BLOC PARTIAL PARIETAL PLEURECTOMY USING DAVINCI ROBOT, MEDIASTINAL LYMPH NODE DISSECTION, INTERCOSTAL NERVE BLOCK;  Surgeon: Rosalio Scott MD;  Location: [facility] MAIN OR;  Service: Robotics - DaVinci;  Laterality: Left;    OTHER SURGICAL HISTORY      SOMETHING REMOVED FROM LEFT CHEST, BENIGN ? LIPOMA         Current Outpatient Medications on File Prior to Visit   Medication Sig Dispense Refill    guaiFENesin (MUCINEX) 600 MG 12 hr tablet Take 2 tablets by mouth 2 (Two) Times a Day. (Patient not taking: Reported on 2/26/2024) 60 tablet 0    levoFLOXacin (Levaquin) 500 MG tablet Take 1 tablet by mouth Daily. (Patient not taking: Reported on 2/26/2024) 7 tablet 0    OLANZapine (zyPREXA) 5 MG tablet Take 1 tablet by mouth Every Night. Take nightly x 4 starting night of chemotherapy. 4 tablet 3    ondansetron (ZOFRAN) 8 MG tablet Take 1 tablet by mouth Every 8 (Eight) Hours As Needed for Nausea or Vomiting. 30 tablet 5    pravastatin (PRAVACHOL) 20 MG tablet Take 1 tablet by mouth Every Night.      tamsulosin (FLOMAX) 0.4 MG capsule 24 hr capsule Take 1 capsule by mouth 2 (Two) Times a Day.       No current facility-administered medications on file prior to visit.       Allergies   Allergen Reactions    Amoxicillin-Pot Clavulanate Hives         Referring Provider:    No referring provider defined for this encounter.    Oncologist:  No primary care provider on file.      Seen and approved by:  Michelle Nuñez MD  03/04/2024

## 2024-03-04 NOTE — PROGRESS NOTES
Patient is here for lab review with RN. CBC reviewed, counts are stable for this patient at this time. Patient has no complaints. Copy of labs given to patient and follow up appointment reviewed. Patient is instructed to call the office with any concerns or new symptoms prior to next visit. Patient verbalized understanding and discharged in stable condition.    Component      Latest Ref Rng 3/4/2024   WBC      3.40 - 10.80 10*3/mm3 7.58    RBC      4.14 - 5.80 10*6/mm3 3.42 (L)    Hemoglobin      13.0 - 17.7 g/dL 8.8 (L)    Hematocrit      37.5 - 51.0 % 27.8 (L)    MCV      79.0 - 97.0 fL 81.3    MCH      26.6 - 33.0 pg 25.7 (L)    MCHC      31.5 - 35.7 g/dL 31.7    RDW      12.3 - 15.4 % 16.3 (H)    RDW-SD      37.0 - 54.0 fl 48.0    MPV      6.0 - 12.0 fL 9.7    Platelets      140 - 450 10*3/mm3 350    Neutrophil Rel %      42.7 - 76.0 % 74.6    Lymphocyte Rel %      19.6 - 45.3 % 14.0 (L)    Monocyte Rel %      5.0 - 12.0 % 3.0 (L)    Eosinophil Rel %      0.3 - 6.2 % 0.3    Basophil Rel %      0.0 - 1.5 % 1.1    Immature Granulocyte Rel %      0.0 - 0.5 % 7.0 (H)    Neutrophils Absolute      1.70 - 7.00 10*3/mm3 5.66    Lymphocytes Absolute      0.70 - 3.10 10*3/mm3 1.06    Monocytes Absolute      0.10 - 0.90 10*3/mm3 0.23    Eosinophils Absolute      0.00 - 0.40 10*3/mm3 0.02    Basophils Absolute      0.00 - 0.20 10*3/mm3 0.08    Immature Grans, Absolute      0.00 - 0.05 10*3/mm3 0.53 (H)    nRBC      0.0 - 0.2 /100 WBC 0.8 (H)       Legend:  (L) Low  (H) High

## 2024-03-05 ENCOUNTER — HOSPITAL ENCOUNTER (OUTPATIENT)
Dept: RADIATION ONCOLOGY | Facility: HOSPITAL | Age: 69
Discharge: HOME | End: 2024-03-05

## 2024-03-05 LAB
RAD ONC ARIA COURSE ID: NORMAL
RAD ONC ARIA COURSE INTENT: NORMAL
RAD ONC ARIA COURSE LAST TREATMENT DATE: NORMAL
RAD ONC ARIA COURSE START DATE: NORMAL
RAD ONC ARIA COURSE TREATMENT ELAPSED DAYS: 29
RAD ONC ARIA FIRST TREATMENT DATE: NORMAL
RAD ONC ARIA PLAN FRACTIONS TREATED TO DATE: 22
RAD ONC ARIA PLAN ID: NORMAL
RAD ONC ARIA PLAN PRESCRIBED DOSE PER FRACTION: 1.8 GY
RAD ONC ARIA PLAN PRIMARY REFERENCE POINT: NORMAL
RAD ONC ARIA PLAN TOTAL FRACTIONS PRESCRIBED: 30
RAD ONC ARIA PLAN TOTAL PRESCRIBED DOSE: 5400 CGY
RAD ONC ARIA REFERENCE POINT DOSAGE GIVEN TO DATE: 39.6 GY
RAD ONC ARIA REFERENCE POINT ID: NORMAL
RAD ONC ARIA REFERENCE POINT SESSION DOSAGE GIVEN: 1.8 GY

## 2024-03-05 PROCEDURE — 77386: CPT | Performed by: RADIOLOGY

## 2024-03-05 PROCEDURE — 77014 CHG CT GUIDANCE RADIATION THERAPY FLDS PLACEMENT: CPT | Performed by: RADIOLOGY

## 2024-03-06 ENCOUNTER — HOSPITAL ENCOUNTER (OUTPATIENT)
Dept: RADIATION ONCOLOGY | Facility: HOSPITAL | Age: 69
Discharge: HOME | End: 2024-03-06

## 2024-03-06 LAB
RAD ONC ARIA COURSE ID: NORMAL
RAD ONC ARIA COURSE INTENT: NORMAL
RAD ONC ARIA COURSE LAST TREATMENT DATE: NORMAL
RAD ONC ARIA COURSE START DATE: NORMAL
RAD ONC ARIA COURSE TREATMENT ELAPSED DAYS: 30
RAD ONC ARIA FIRST TREATMENT DATE: NORMAL
RAD ONC ARIA PLAN FRACTIONS TREATED TO DATE: 23
RAD ONC ARIA PLAN ID: NORMAL
RAD ONC ARIA PLAN PRESCRIBED DOSE PER FRACTION: 1.8 GY
RAD ONC ARIA PLAN PRIMARY REFERENCE POINT: NORMAL
RAD ONC ARIA PLAN TOTAL FRACTIONS PRESCRIBED: 30
RAD ONC ARIA PLAN TOTAL PRESCRIBED DOSE: 5400 CGY
RAD ONC ARIA REFERENCE POINT DOSAGE GIVEN TO DATE: 41.4 GY
RAD ONC ARIA REFERENCE POINT ID: NORMAL
RAD ONC ARIA REFERENCE POINT SESSION DOSAGE GIVEN: 1.8 GY

## 2024-03-06 PROCEDURE — 77386: CPT | Performed by: RADIOLOGY

## 2024-03-06 PROCEDURE — 77014 CHG CT GUIDANCE RADIATION THERAPY FLDS PLACEMENT: CPT | Performed by: RADIOLOGY

## 2024-03-06 PROCEDURE — 77336 RADIATION PHYSICS CONSULT: CPT | Performed by: STUDENT IN AN ORGANIZED HEALTH CARE EDUCATION/TRAINING PROGRAM

## 2024-03-07 ENCOUNTER — HOSPITAL ENCOUNTER (OUTPATIENT)
Dept: RADIATION ONCOLOGY | Facility: HOSPITAL | Age: 69
Discharge: HOME | End: 2024-03-07

## 2024-03-07 LAB
RAD ONC ARIA COURSE ID: NORMAL
RAD ONC ARIA COURSE INTENT: NORMAL
RAD ONC ARIA COURSE LAST TREATMENT DATE: NORMAL
RAD ONC ARIA COURSE START DATE: NORMAL
RAD ONC ARIA COURSE TREATMENT ELAPSED DAYS: 31
RAD ONC ARIA FIRST TREATMENT DATE: NORMAL
RAD ONC ARIA PLAN FRACTIONS TREATED TO DATE: 24
RAD ONC ARIA PLAN ID: NORMAL
RAD ONC ARIA PLAN PRESCRIBED DOSE PER FRACTION: 1.8 GY
RAD ONC ARIA PLAN PRIMARY REFERENCE POINT: NORMAL
RAD ONC ARIA PLAN TOTAL FRACTIONS PRESCRIBED: 30
RAD ONC ARIA PLAN TOTAL PRESCRIBED DOSE: 5400 CGY
RAD ONC ARIA REFERENCE POINT DOSAGE GIVEN TO DATE: 43.2 GY
RAD ONC ARIA REFERENCE POINT ID: NORMAL
RAD ONC ARIA REFERENCE POINT SESSION DOSAGE GIVEN: 1.8 GY

## 2024-03-07 PROCEDURE — 77386: CPT | Performed by: INTERNAL MEDICINE

## 2024-03-07 PROCEDURE — 77014 CHG CT GUIDANCE RADIATION THERAPY FLDS PLACEMENT: CPT | Performed by: INTERNAL MEDICINE

## 2024-03-08 ENCOUNTER — HOSPITAL ENCOUNTER (OUTPATIENT)
Dept: RADIATION ONCOLOGY | Facility: HOSPITAL | Age: 69
Discharge: HOME | End: 2024-03-08

## 2024-03-08 LAB
RAD ONC ARIA COURSE ID: NORMAL
RAD ONC ARIA COURSE INTENT: NORMAL
RAD ONC ARIA COURSE LAST TREATMENT DATE: NORMAL
RAD ONC ARIA COURSE START DATE: NORMAL
RAD ONC ARIA COURSE TREATMENT ELAPSED DAYS: 32
RAD ONC ARIA FIRST TREATMENT DATE: NORMAL
RAD ONC ARIA PLAN FRACTIONS TREATED TO DATE: 25
RAD ONC ARIA PLAN ID: NORMAL
RAD ONC ARIA PLAN PRESCRIBED DOSE PER FRACTION: 1.8 GY
RAD ONC ARIA PLAN PRIMARY REFERENCE POINT: NORMAL
RAD ONC ARIA PLAN TOTAL FRACTIONS PRESCRIBED: 30
RAD ONC ARIA PLAN TOTAL PRESCRIBED DOSE: 5400 CGY
RAD ONC ARIA REFERENCE POINT DOSAGE GIVEN TO DATE: 45 GY
RAD ONC ARIA REFERENCE POINT ID: NORMAL
RAD ONC ARIA REFERENCE POINT SESSION DOSAGE GIVEN: 1.8 GY

## 2024-03-08 PROCEDURE — 77014 CHG CT GUIDANCE RADIATION THERAPY FLDS PLACEMENT: CPT | Performed by: RADIOLOGY

## 2024-03-08 PROCEDURE — 77386: CPT | Performed by: RADIOLOGY

## 2024-03-11 ENCOUNTER — RADIATION ONCOLOGY WEEKLY ASSESSMENT (OUTPATIENT)
Dept: RADIATION ONCOLOGY | Facility: HOSPITAL | Age: 69
End: 2024-03-11
Payer: MEDICARE

## 2024-03-11 ENCOUNTER — CLINICAL SUPPORT (OUTPATIENT)
Dept: ONCOLOGY | Facility: HOSPITAL | Age: 69
End: 2024-03-11
Payer: MEDICARE

## 2024-03-11 ENCOUNTER — HOSPITAL ENCOUNTER (OUTPATIENT)
Dept: RADIATION ONCOLOGY | Facility: HOSPITAL | Age: 69
Discharge: HOME | End: 2024-03-11
Payer: MEDICARE

## 2024-03-11 ENCOUNTER — LAB (OUTPATIENT)
Dept: LAB | Facility: HOSPITAL | Age: 69
End: 2024-03-11
Payer: MEDICARE

## 2024-03-11 VITALS
BODY MASS INDEX: 22.52 KG/M2 | OXYGEN SATURATION: 97 % | HEART RATE: 85 BPM | WEIGHT: 161.4 LBS | SYSTOLIC BLOOD PRESSURE: 142 MMHG | DIASTOLIC BLOOD PRESSURE: 73 MMHG

## 2024-03-11 DIAGNOSIS — Z79.899 HIGH RISK MEDICATION USE: ICD-10-CM

## 2024-03-11 DIAGNOSIS — C34.32 CANCER OF BRONCHUS OF LEFT LOWER LOBE: Primary | ICD-10-CM

## 2024-03-11 LAB
BASOPHILS # BLD AUTO: 0.03 10*3/MM3 (ref 0–0.2)
BASOPHILS NFR BLD AUTO: 0.9 % (ref 0–1.5)
DEPRECATED RDW RBC AUTO: 47.4 FL (ref 37–54)
EOSINOPHIL # BLD AUTO: 0.09 10*3/MM3 (ref 0–0.4)
EOSINOPHIL NFR BLD AUTO: 2.8 % (ref 0.3–6.2)
ERYTHROCYTE [DISTWIDTH] IN BLOOD BY AUTOMATED COUNT: 16.5 % (ref 12.3–15.4)
HCT VFR BLD AUTO: 27.6 % (ref 37.5–51)
HGB BLD-MCNC: 8.7 G/DL (ref 13–17.7)
IMM GRANULOCYTES # BLD AUTO: 0.26 10*3/MM3 (ref 0–0.05)
IMM GRANULOCYTES NFR BLD AUTO: 8.2 % (ref 0–0.5)
LYMPHOCYTES # BLD AUTO: 1.05 10*3/MM3 (ref 0.7–3.1)
LYMPHOCYTES NFR BLD AUTO: 33.1 % (ref 19.6–45.3)
MCH RBC QN AUTO: 25.7 PG (ref 26.6–33)
MCHC RBC AUTO-ENTMCNC: 31.5 G/DL (ref 31.5–35.7)
MCV RBC AUTO: 81.4 FL (ref 79–97)
MONOCYTES # BLD AUTO: 0.61 10*3/MM3 (ref 0.1–0.9)
MONOCYTES NFR BLD AUTO: 19.2 % (ref 5–12)
NEUTROPHILS NFR BLD AUTO: 1.13 10*3/MM3 (ref 1.7–7)
NEUTROPHILS NFR BLD AUTO: 35.8 % (ref 42.7–76)
NRBC BLD AUTO-RTO: 0.6 /100 WBC (ref 0–0.2)
PLATELET # BLD AUTO: 80 10*3/MM3 (ref 140–450)
PMV BLD AUTO: 9.8 FL (ref 6–12)
RAD ONC ARIA COURSE ID: NORMAL
RAD ONC ARIA COURSE INTENT: NORMAL
RAD ONC ARIA COURSE LAST TREATMENT DATE: NORMAL
RAD ONC ARIA COURSE START DATE: NORMAL
RAD ONC ARIA COURSE TREATMENT ELAPSED DAYS: 35
RAD ONC ARIA FIRST TREATMENT DATE: NORMAL
RAD ONC ARIA PLAN FRACTIONS TREATED TO DATE: 26
RAD ONC ARIA PLAN ID: NORMAL
RAD ONC ARIA PLAN PRESCRIBED DOSE PER FRACTION: 1.8 GY
RAD ONC ARIA PLAN PRIMARY REFERENCE POINT: NORMAL
RAD ONC ARIA PLAN TOTAL FRACTIONS PRESCRIBED: 30
RAD ONC ARIA PLAN TOTAL PRESCRIBED DOSE: 5400 CGY
RAD ONC ARIA REFERENCE POINT DOSAGE GIVEN TO DATE: 46.8 GY
RAD ONC ARIA REFERENCE POINT ID: NORMAL
RAD ONC ARIA REFERENCE POINT SESSION DOSAGE GIVEN: 1.8 GY
RBC # BLD AUTO: 3.39 10*6/MM3 (ref 4.14–5.8)
WBC NRBC COR # BLD AUTO: 3.17 10*3/MM3 (ref 3.4–10.8)

## 2024-03-11 PROCEDURE — 36415 COLL VENOUS BLD VENIPUNCTURE: CPT

## 2024-03-11 PROCEDURE — 77014 CHG CT GUIDANCE RADIATION THERAPY FLDS PLACEMENT: CPT | Performed by: STUDENT IN AN ORGANIZED HEALTH CARE EDUCATION/TRAINING PROGRAM

## 2024-03-11 PROCEDURE — 85025 COMPLETE CBC W/AUTO DIFF WBC: CPT

## 2024-03-11 PROCEDURE — 77386: CPT | Performed by: STUDENT IN AN ORGANIZED HEALTH CARE EDUCATION/TRAINING PROGRAM

## 2024-03-11 PROCEDURE — 77427 RADIATION TX MANAGEMENT X5: CPT | Performed by: STUDENT IN AN ORGANIZED HEALTH CARE EDUCATION/TRAINING PROGRAM

## 2024-03-11 NOTE — PROGRESS NOTES
Radiation Oncology  On-Treatment Note      Patient: Sunny Hung    MRN: [id number]    Attending Physician: Inderjit Culver MD     Diagnosis:     ICD-10-CM ICD-9-CM   1. Cancer of bronchus of left lower lobe  C34.32 162.5       Radiation Therapy Visit:  Continue radiation therapy, Dosimetry plan remains acceptable, Films reviewed and remains acceptable, Pain assessed, Pain management planned, Radiation dose schedule reviewed and remains acceptable, Radiation technique remains acceptable, and Symptoms within expected range    Radiation Treatments       Active   Plans   [id number]   Most recent treatment: Dose planned: 180 cGy (fraction 26 on 3/11/2024)   Total: Dose planned: 5,400 cGy (30 fractions)   Elapsed Days: 35      Reference Points   PTV   Most recent treatment: Dose given: 180 cGy (on 3/11/2024)   Total: Dose given: 4,680 cGy   Elapsed Days: 35                      Physical Examination:  Vitals: Blood pressure 142/73, pulse 85, weight 73.2 kg (161 lb 6.4 oz), SpO2 97%.  Pain Score    03/11/24 0955   PainSc: 0-No pain       Capable of only limited selfcare; confined to bed or chair more than 50% of waking hours = 3    We examined the relevant areas: yes  Findings are within the expected range for this stage of treatment: yes  -------------------------------------------------------------------------------------------------------------------    ACTION ITEMS:  Patient tolerating treatment well and as expected for this stage in their treatment and Continue radiation therapy as planned    Estimated Completion Date: 3/15/2024    Follow up after post-chemo imaging to discuss PCI    Inderjit Culver MD  Radiation Oncology

## 2024-03-11 NOTE — PROGRESS NOTES
Patient is here for lab review with RN. CBC reviewed, counts are stable for this patient at this time. Patient reminded of neutropenic and bleeding precautions. Patient complains of folliculitis on scalp. Dr. Dubon advised patient to utilize [employer] anti-dandruff shampoo. Copy of labs given to patient and follow up appointment reviewed. Patient is instructed to call the office with any concerns or new symptoms prior to next visit. Patient verbalized understanding and discharged in stable condition.            Component      Latest Ref Rng 3/11/2024   WBC      3.40 - 10.80 10*3/mm3 3.17 (L)    RBC      4.14 - 5.80 10*6/mm3 3.39 (L)    Hemoglobin      13.0 - 17.7 g/dL 8.7 (L)    Hematocrit      37.5 - 51.0 % 27.6 (L)    MCV      79.0 - 97.0 fL 81.4    MCH      26.6 - 33.0 pg 25.7 (L)    MCHC      31.5 - 35.7 g/dL 31.5    RDW      12.3 - 15.4 % 16.5 (H)    RDW-SD      37.0 - 54.0 fl 47.4    MPV      6.0 - 12.0 fL 9.8    Platelets      140 - 450 10*3/mm3 80 (L)    Neutrophil Rel %      42.7 - 76.0 % 35.8 (L)    Lymphocyte Rel %      19.6 - 45.3 % 33.1    Monocyte Rel %      5.0 - 12.0 % 19.2 (H)    Eosinophil Rel %      0.3 - 6.2 % 2.8    Basophil Rel %      0.0 - 1.5 % 0.9    Immature Granulocyte Rel %      0.0 - 0.5 % 8.2 (H)    Neutrophils Absolute      1.70 - 7.00 10*3/mm3 1.13 (L)    Lymphocytes Absolute      0.70 - 3.10 10*3/mm3 1.05    Monocytes Absolute      0.10 - 0.90 10*3/mm3 0.61    Eosinophils Absolute      0.00 - 0.40 10*3/mm3 0.09    Basophils Absolute      0.00 - 0.20 10*3/mm3 0.03    Immature Grans, Absolute      0.00 - 0.05 10*3/mm3 0.26 (H)    nRBC      0.0 - 0.2 /100 WBC 0.6 (H)       Legend:  (L) Low  (H) High

## 2024-03-12 ENCOUNTER — HOSPITAL ENCOUNTER (OUTPATIENT)
Dept: RADIATION ONCOLOGY | Facility: HOSPITAL | Age: 69
Discharge: HOME | End: 2024-03-12

## 2024-03-12 ENCOUNTER — PATIENT OUTREACH (OUTPATIENT)
Dept: OTHER | Facility: HOSPITAL | Age: 69
End: 2024-03-12
Payer: MEDICARE

## 2024-03-12 LAB
RAD ONC ARIA COURSE ID: NORMAL
RAD ONC ARIA COURSE INTENT: NORMAL
RAD ONC ARIA COURSE LAST TREATMENT DATE: NORMAL
RAD ONC ARIA COURSE START DATE: NORMAL
RAD ONC ARIA COURSE TREATMENT ELAPSED DAYS: 36
RAD ONC ARIA FIRST TREATMENT DATE: NORMAL
RAD ONC ARIA PLAN FRACTIONS TREATED TO DATE: 27
RAD ONC ARIA PLAN ID: NORMAL
RAD ONC ARIA PLAN PRESCRIBED DOSE PER FRACTION: 1.8 GY
RAD ONC ARIA PLAN PRIMARY REFERENCE POINT: NORMAL
RAD ONC ARIA PLAN TOTAL FRACTIONS PRESCRIBED: 30
RAD ONC ARIA PLAN TOTAL PRESCRIBED DOSE: 5400 CGY
RAD ONC ARIA REFERENCE POINT DOSAGE GIVEN TO DATE: 48.6 GY
RAD ONC ARIA REFERENCE POINT ID: NORMAL
RAD ONC ARIA REFERENCE POINT SESSION DOSAGE GIVEN: 1.8 GY

## 2024-03-12 PROCEDURE — 77386: CPT | Performed by: STUDENT IN AN ORGANIZED HEALTH CARE EDUCATION/TRAINING PROGRAM

## 2024-03-12 PROCEDURE — 77014 CHG CT GUIDANCE RADIATION THERAPY FLDS PLACEMENT: CPT | Performed by: STUDENT IN AN ORGANIZED HEALTH CARE EDUCATION/TRAINING PROGRAM

## 2024-03-13 ENCOUNTER — HOSPITAL ENCOUNTER (OUTPATIENT)
Dept: RADIATION ONCOLOGY | Facility: HOSPITAL | Age: 69
Discharge: HOME | End: 2024-03-13

## 2024-03-13 LAB
RAD ONC ARIA COURSE ID: NORMAL
RAD ONC ARIA COURSE INTENT: NORMAL
RAD ONC ARIA COURSE LAST TREATMENT DATE: NORMAL
RAD ONC ARIA COURSE START DATE: NORMAL
RAD ONC ARIA COURSE TREATMENT ELAPSED DAYS: 37
RAD ONC ARIA FIRST TREATMENT DATE: NORMAL
RAD ONC ARIA PLAN FRACTIONS TREATED TO DATE: 28
RAD ONC ARIA PLAN ID: NORMAL
RAD ONC ARIA PLAN PRESCRIBED DOSE PER FRACTION: 1.8 GY
RAD ONC ARIA PLAN PRIMARY REFERENCE POINT: NORMAL
RAD ONC ARIA PLAN TOTAL FRACTIONS PRESCRIBED: 30
RAD ONC ARIA PLAN TOTAL PRESCRIBED DOSE: 5400 CGY
RAD ONC ARIA REFERENCE POINT DOSAGE GIVEN TO DATE: 50.4 GY
RAD ONC ARIA REFERENCE POINT ID: NORMAL
RAD ONC ARIA REFERENCE POINT SESSION DOSAGE GIVEN: 1.8 GY

## 2024-03-13 PROCEDURE — 77386: CPT | Performed by: STUDENT IN AN ORGANIZED HEALTH CARE EDUCATION/TRAINING PROGRAM

## 2024-03-13 PROCEDURE — 77014 CHG CT GUIDANCE RADIATION THERAPY FLDS PLACEMENT: CPT | Performed by: STUDENT IN AN ORGANIZED HEALTH CARE EDUCATION/TRAINING PROGRAM

## 2024-03-13 PROCEDURE — 77336 RADIATION PHYSICS CONSULT: CPT | Performed by: STUDENT IN AN ORGANIZED HEALTH CARE EDUCATION/TRAINING PROGRAM

## 2024-03-14 ENCOUNTER — HOSPITAL ENCOUNTER (OUTPATIENT)
Dept: RADIATION ONCOLOGY | Facility: HOSPITAL | Age: 69
Discharge: HOME | End: 2024-03-14

## 2024-03-14 LAB
RAD ONC ARIA COURSE ID: NORMAL
RAD ONC ARIA COURSE INTENT: NORMAL
RAD ONC ARIA COURSE LAST TREATMENT DATE: NORMAL
RAD ONC ARIA COURSE START DATE: NORMAL
RAD ONC ARIA COURSE TREATMENT ELAPSED DAYS: 38
RAD ONC ARIA FIRST TREATMENT DATE: NORMAL
RAD ONC ARIA PLAN FRACTIONS TREATED TO DATE: 29
RAD ONC ARIA PLAN ID: NORMAL
RAD ONC ARIA PLAN PRESCRIBED DOSE PER FRACTION: 1.8 GY
RAD ONC ARIA PLAN PRIMARY REFERENCE POINT: NORMAL
RAD ONC ARIA PLAN TOTAL FRACTIONS PRESCRIBED: 30
RAD ONC ARIA PLAN TOTAL PRESCRIBED DOSE: 5400 CGY
RAD ONC ARIA REFERENCE POINT DOSAGE GIVEN TO DATE: 52.2 GY
RAD ONC ARIA REFERENCE POINT ID: NORMAL
RAD ONC ARIA REFERENCE POINT SESSION DOSAGE GIVEN: 1.8 GY

## 2024-03-14 PROCEDURE — 77014 CHG CT GUIDANCE RADIATION THERAPY FLDS PLACEMENT: CPT | Performed by: STUDENT IN AN ORGANIZED HEALTH CARE EDUCATION/TRAINING PROGRAM

## 2024-03-14 PROCEDURE — 77386: CPT | Performed by: STUDENT IN AN ORGANIZED HEALTH CARE EDUCATION/TRAINING PROGRAM

## 2024-03-15 ENCOUNTER — HOSPITAL ENCOUNTER (OUTPATIENT)
Dept: RADIATION ONCOLOGY | Facility: HOSPITAL | Age: 69
Discharge: HOME | End: 2024-03-15

## 2024-03-15 ENCOUNTER — TREATMENT (OUTPATIENT)
Dept: RADIATION ONCOLOGY | Facility: HOSPITAL | Age: 69
End: 2024-03-15
Payer: MEDICARE

## 2024-03-15 DIAGNOSIS — C34.32 CANCER OF BRONCHUS OF LEFT LOWER LOBE: Primary | ICD-10-CM

## 2024-03-15 LAB
RAD ONC ARIA COURSE ID: NORMAL
RAD ONC ARIA COURSE INTENT: NORMAL
RAD ONC ARIA COURSE LAST TREATMENT DATE: NORMAL
RAD ONC ARIA COURSE START DATE: NORMAL
RAD ONC ARIA COURSE TREATMENT ELAPSED DAYS: 39
RAD ONC ARIA FIRST TREATMENT DATE: NORMAL
RAD ONC ARIA PLAN FRACTIONS TREATED TO DATE: 30
RAD ONC ARIA PLAN ID: NORMAL
RAD ONC ARIA PLAN PRESCRIBED DOSE PER FRACTION: 1.8 GY
RAD ONC ARIA PLAN PRIMARY REFERENCE POINT: NORMAL
RAD ONC ARIA PLAN TOTAL FRACTIONS PRESCRIBED: 30
RAD ONC ARIA PLAN TOTAL PRESCRIBED DOSE: 5400 CGY
RAD ONC ARIA REFERENCE POINT DOSAGE GIVEN TO DATE: 54 GY
RAD ONC ARIA REFERENCE POINT ID: NORMAL
RAD ONC ARIA REFERENCE POINT SESSION DOSAGE GIVEN: 1.8 GY

## 2024-03-15 PROCEDURE — 77014 CHG CT GUIDANCE RADIATION THERAPY FLDS PLACEMENT: CPT | Performed by: STUDENT IN AN ORGANIZED HEALTH CARE EDUCATION/TRAINING PROGRAM

## 2024-03-15 PROCEDURE — 77386: CPT | Performed by: STUDENT IN AN ORGANIZED HEALTH CARE EDUCATION/TRAINING PROGRAM

## 2024-03-18 ENCOUNTER — INFUSION (OUTPATIENT)
Dept: ONCOLOGY | Facility: HOSPITAL | Age: 69
End: 2024-03-18
Payer: MEDICARE

## 2024-03-18 ENCOUNTER — OFFICE VISIT (OUTPATIENT)
Dept: ONCOLOGY | Facility: CLINIC | Age: 69
End: 2024-03-18
Payer: MEDICARE

## 2024-03-18 VITALS
RESPIRATION RATE: 16 BRPM | HEART RATE: 82 BPM | DIASTOLIC BLOOD PRESSURE: 78 MMHG | HEIGHT: 71 IN | SYSTOLIC BLOOD PRESSURE: 123 MMHG | WEIGHT: 163.2 LBS | TEMPERATURE: 98.2 F | BODY MASS INDEX: 22.85 KG/M2 | OXYGEN SATURATION: 100 %

## 2024-03-18 DIAGNOSIS — C34.92 PRIMARY SQUAMOUS CELL CARCINOMA OF LUNG, LEFT: ICD-10-CM

## 2024-03-18 DIAGNOSIS — D64.81 ANEMIA ASSOCIATED WITH CHEMOTHERAPY: ICD-10-CM

## 2024-03-18 DIAGNOSIS — C34.92 PRIMARY SQUAMOUS CELL CARCINOMA OF LUNG, LEFT: Primary | ICD-10-CM

## 2024-03-18 DIAGNOSIS — Z79.899 HIGH RISK MEDICATION USE: Primary | ICD-10-CM

## 2024-03-18 DIAGNOSIS — Z79.899 HIGH RISK MEDICATION USE: ICD-10-CM

## 2024-03-18 DIAGNOSIS — T45.1X5A ANEMIA ASSOCIATED WITH CHEMOTHERAPY: ICD-10-CM

## 2024-03-18 PROBLEM — Z45.2 FITTING AND ADJUSTMENT OF VASCULAR CATHETER: Status: ACTIVE | Noted: 2024-03-18

## 2024-03-18 LAB
ALBUMIN SERPL-MCNC: 3.6 G/DL (ref 3.5–5.2)
ALBUMIN/GLOB SERPL: 1.1 G/DL
ALP SERPL-CCNC: 72 U/L (ref 39–117)
ALT SERPL W P-5'-P-CCNC: 10 U/L (ref 1–41)
ANION GAP SERPL CALCULATED.3IONS-SCNC: 10 MMOL/L (ref 5–15)
AST SERPL-CCNC: 23 U/L (ref 1–40)
BASOPHILS # BLD AUTO: 0.05 10*3/MM3 (ref 0–0.2)
BASOPHILS NFR BLD AUTO: 0.9 % (ref 0–1.5)
BILIRUB SERPL-MCNC: <0.2 MG/DL (ref 0–1.2)
BUN SERPL-MCNC: 15 MG/DL (ref 8–23)
BUN/CREAT SERPL: 23.4 (ref 7–25)
CALCIUM SPEC-SCNC: 9.2 MG/DL (ref 8.6–10.5)
CHLORIDE SERPL-SCNC: 105 MMOL/L (ref 98–107)
CO2 SERPL-SCNC: 25 MMOL/L (ref 22–29)
CREAT SERPL-MCNC: 0.64 MG/DL (ref 0.76–1.27)
DEPRECATED RDW RBC AUTO: 55.1 FL (ref 37–54)
EGFRCR SERPLBLD CKD-EPI 2021: 103.8 ML/MIN/1.73
EOSINOPHIL # BLD AUTO: 0.18 10*3/MM3 (ref 0–0.4)
EOSINOPHIL NFR BLD AUTO: 3.4 % (ref 0.3–6.2)
ERYTHROCYTE [DISTWIDTH] IN BLOOD BY AUTOMATED COUNT: 18.4 % (ref 12.3–15.4)
GLOBULIN UR ELPH-MCNC: 3.2 GM/DL
GLUCOSE SERPL-MCNC: 90 MG/DL (ref 65–99)
HCT VFR BLD AUTO: 30.6 % (ref 37.5–51)
HGB BLD-MCNC: 9 G/DL (ref 13–17.7)
IMM GRANULOCYTES # BLD AUTO: 0.04 10*3/MM3 (ref 0–0.05)
IMM GRANULOCYTES NFR BLD AUTO: 0.7 % (ref 0–0.5)
LYMPHOCYTES # BLD AUTO: 1.2 10*3/MM3 (ref 0.7–3.1)
LYMPHOCYTES NFR BLD AUTO: 22.4 % (ref 19.6–45.3)
MCH RBC QN AUTO: 24.9 PG (ref 26.6–33)
MCHC RBC AUTO-ENTMCNC: 29.4 G/DL (ref 31.5–35.7)
MCV RBC AUTO: 84.5 FL (ref 79–97)
MONOCYTES # BLD AUTO: 1.42 10*3/MM3 (ref 0.1–0.9)
MONOCYTES NFR BLD AUTO: 26.5 % (ref 5–12)
NEUTROPHILS NFR BLD AUTO: 2.46 10*3/MM3 (ref 1.7–7)
NEUTROPHILS NFR BLD AUTO: 46.1 % (ref 42.7–76)
NRBC BLD AUTO-RTO: 0.6 /100 WBC (ref 0–0.2)
PLATELET # BLD AUTO: 293 10*3/MM3 (ref 140–450)
PMV BLD AUTO: 8.9 FL (ref 6–12)
POTASSIUM SERPL-SCNC: 4.9 MMOL/L (ref 3.5–5.2)
PROT SERPL-MCNC: 6.8 G/DL (ref 6–8.5)
RBC # BLD AUTO: 3.62 10*6/MM3 (ref 4.14–5.8)
SODIUM SERPL-SCNC: 140 MMOL/L (ref 136–145)
WBC NRBC COR # BLD AUTO: 5.35 10*3/MM3 (ref 3.4–10.8)

## 2024-03-18 PROCEDURE — 96413 CHEMO IV INFUSION 1 HR: CPT

## 2024-03-18 PROCEDURE — 85025 COMPLETE CBC W/AUTO DIFF WBC: CPT

## 2024-03-18 PROCEDURE — 25010000002 DEXAMETHASONE SODIUM PHOSPHATE 100 MG/10ML SOLUTION: Performed by: NURSE PRACTITIONER

## 2024-03-18 PROCEDURE — 25810000003 SODIUM CHLORIDE 0.9 % SOLUTION: Performed by: NURSE PRACTITIONER

## 2024-03-18 PROCEDURE — 80053 COMPREHEN METABOLIC PANEL: CPT

## 2024-03-18 PROCEDURE — 99214 OFFICE O/P EST MOD 30 MIN: CPT | Performed by: NURSE PRACTITIONER

## 2024-03-18 PROCEDURE — 96367 TX/PROPH/DG ADDL SEQ IV INF: CPT

## 2024-03-18 PROCEDURE — 25010000002 ETOPOSIDE 500 MG/25ML SOLUTION 25 ML VIAL: Performed by: NURSE PRACTITIONER

## 2024-03-18 PROCEDURE — 1126F AMNT PAIN NOTED NONE PRSNT: CPT | Performed by: NURSE PRACTITIONER

## 2024-03-18 PROCEDURE — 25010000002 PALONOSETRON PER 25 MCG: Performed by: NURSE PRACTITIONER

## 2024-03-18 PROCEDURE — 25810000003 SODIUM CHLORIDE 0.9 % SOLUTION 500 ML FLEX CONT: Performed by: NURSE PRACTITIONER

## 2024-03-18 PROCEDURE — 25010000002 CARBOPLATIN PER 50 MG: Performed by: NURSE PRACTITIONER

## 2024-03-18 PROCEDURE — 96375 TX/PRO/DX INJ NEW DRUG ADDON: CPT

## 2024-03-18 PROCEDURE — 25810000003 SODIUM CHLORIDE 0.9 % SOLUTION 250 ML FLEX CONT: Performed by: NURSE PRACTITIONER

## 2024-03-18 PROCEDURE — 96417 CHEMO IV INFUS EACH ADDL SEQ: CPT

## 2024-03-18 PROCEDURE — 25010000002 FOSAPREPITANT PER 1 MG: Performed by: NURSE PRACTITIONER

## 2024-03-18 RX ORDER — PROCHLORPERAZINE MALEATE 10 MG
10 TABLET ORAL ONCE
Status: CANCELLED | OUTPATIENT
Start: 2024-03-19 | End: 2024-03-19

## 2024-03-18 RX ORDER — PALONOSETRON 0.05 MG/ML
0.25 INJECTION, SOLUTION INTRAVENOUS ONCE
Status: CANCELLED | OUTPATIENT
Start: 2024-03-18

## 2024-03-18 RX ORDER — SODIUM CHLORIDE 9 MG/ML
20 INJECTION, SOLUTION INTRAVENOUS ONCE
Status: CANCELLED | OUTPATIENT
Start: 2024-03-18

## 2024-03-18 RX ORDER — LIDOCAINE 50 MG/G
1 PATCH TOPICAL EVERY 24 HOURS
Qty: 30 PATCH | Refills: 1 | Status: SHIPPED | OUTPATIENT
Start: 2024-03-18

## 2024-03-18 RX ORDER — DIPHENHYDRAMINE HYDROCHLORIDE 50 MG/ML
50 INJECTION INTRAMUSCULAR; INTRAVENOUS AS NEEDED
Status: CANCELLED | OUTPATIENT
Start: 2024-03-18

## 2024-03-18 RX ORDER — PALONOSETRON 0.05 MG/ML
0.25 INJECTION, SOLUTION INTRAVENOUS ONCE
Status: COMPLETED | OUTPATIENT
Start: 2024-03-18 | End: 2024-03-18

## 2024-03-18 RX ORDER — SODIUM CHLORIDE 9 MG/ML
20 INJECTION, SOLUTION INTRAVENOUS ONCE
Status: CANCELLED | OUTPATIENT
Start: 2024-03-20

## 2024-03-18 RX ORDER — FAMOTIDINE 10 MG/ML
20 INJECTION, SOLUTION INTRAVENOUS AS NEEDED
Status: CANCELLED | OUTPATIENT
Start: 2024-03-18

## 2024-03-18 RX ORDER — PROCHLORPERAZINE MALEATE 10 MG
10 TABLET ORAL ONCE
Status: CANCELLED | OUTPATIENT
Start: 2024-03-20 | End: 2024-03-20

## 2024-03-18 RX ORDER — SODIUM CHLORIDE 9 MG/ML
20 INJECTION, SOLUTION INTRAVENOUS ONCE
Status: COMPLETED | OUTPATIENT
Start: 2024-03-18 | End: 2024-03-18

## 2024-03-18 RX ORDER — SODIUM CHLORIDE 9 MG/ML
20 INJECTION, SOLUTION INTRAVENOUS ONCE
Status: CANCELLED | OUTPATIENT
Start: 2024-03-19

## 2024-03-18 RX ADMIN — DEXAMETHASONE SODIUM PHOSPHATE 12 MG: 10 INJECTION, SOLUTION INTRAMUSCULAR; INTRAVENOUS at 09:50

## 2024-03-18 RX ADMIN — SODIUM CHLORIDE 20 ML/HR: 9 INJECTION, SOLUTION INTRAVENOUS at 09:20

## 2024-03-18 RX ADMIN — ETOPOSIDE 190 MG: 20 INJECTION INTRAVENOUS at 10:45

## 2024-03-18 RX ADMIN — PALONOSETRON 0.25 MG: 0.05 INJECTION, SOLUTION INTRAVENOUS at 09:20

## 2024-03-18 RX ADMIN — FOSAPREPITANT 100 ML: 150 INJECTION, POWDER, LYOPHILIZED, FOR SOLUTION INTRAVENOUS at 09:19

## 2024-03-18 RX ADMIN — CARBOPLATIN 710 MG: 10 INJECTION INTRAVENOUS at 10:08

## 2024-03-18 NOTE — PROGRESS NOTES
Subjective     REASON FOR CONSULTATION:  NSCLC-small cell lung cancer  Provide an opinion on any further workup or treatment                             REQUESTING PHYSICIAN:  Sonia    RECORDS OBTAINED:  Records of the patients history including those obtained from the referring provider were reviewed and summarized in detail.      History of Present Illness   The patient returned today for follow-up and cycle 3 of carboplatin/etoposide with concurrent radiation for treatment of small cell cancer seen on his pathology at the time of lung resection.  Patient reports continued fatigue on treatment.  He has noticed some pain in the left chest region in the areas of his previous incision lines.  He states he is unsure if this is those nerves waking up.  He did have a day and increase shortness of breath 3 to 5 days ago.  That has resolved at this point however he is unsure why he was feeling that way.  He denies pain with deep breath.  He denies heart racing.  He states the pain he is having in the left side is more so related to movement.  It does worsen when he coughs at times.  He does not feel like his cough.  It is more than he had been.  He denies any bleeding, chills, fevers.    Oncology history:  This is a pleasant 68-year-old man with hyperlipidemia and previous tobacco abuse who underwent a screening CT of the chest on 7/31/2023 which showed a large mass in the left lower lobe of the lung extending to the hilum.  A diagnostic CT was performed 8/25/2023 showing a large left infrahilar medial mass in the lower lobe.  A PET scan on 9/8/2023 showed a left lower lobe spiculated pleural-based mass 1.3 x 5 cm involving the left hilum and major fissure SUV 23.2.  There were smaller nodular airspace opacities also hypermetabolic.  The mass encases left lower lobe bronchovascular bundle.  No hypermetabolic mediastinal or hilar lymph nodes noted.  There was a 7 mm nodule in the right upper lobe photopenic SUV 2.1.  There  was no supraclavicular uptake or disease below the abdomen other than 2 abnormal foci in the sigmoid colon.  MRI of the brain on 9/11/2023 showed a subtle 2 mm focus inferior left cerebellum too small to characterize but likely a blood vessel.  The patient underwent bronchoscopy on 9/6/2023 with biopsies taken from the left lower lobe mass, lymph node station 7 and lymph node station 4R.  Pathology showed malignant cells favoring poorly differentiated carcinoma/squamous differentiation from the left lower lobe and biopsies from station 7 and 4R were negative for malignant cells.    The patient underwent neoadjuvant chemo-immunotherapy with 3 cycles of carboplatin/Taxol/nivolumab completed 11/1/2023.  After completing treatment he had immune mediated colitis requiring steroid therapy.  Treatment also complicated by peripheral neuropathy.    The patient underwent left lower lobectomy 12/13/2023 complicated by prolonged and left lower lobe pneumonia.  Pathology from surgery showed significant treatment effect but residual small cell carcinoma with positive bronchial and perivascular soft tissue margins by tumor within lymphatics, positive lymph node station 10 L for small cell carcinoma x 2.          Past Medical History:   Diagnosis Date    Arthritis     BPH (benign prostatic hyperplasia)     Bruises easily     Bursitis of right shoulder     Cough     Hyperlipidemia     Mass of lower lobe of left lung         Past Surgical History:   Procedure Laterality Date    BRONCHOSCOPY WITH ION ROBOTIC ASSIST N/A 9/6/2023    Procedure: BRONCHOSCOPY WITH BAL;   ION ROBOT AND ENDOBRONCHIAL ULTRASOUND WITH FNA'S;  Surgeon: Rosalio Scott MD;  Location: [facility] ENDOSCOPY;  Service: Robotics - Pulmonary;  Laterality: N/A;  PRE- LEFT LOWER LOBE MASS  POST- SAME    GANGLION CYST EXCISION Bilateral     HEMORRHOIDECTOMY      LOBECTOMY Left 12/13/2023    Procedure: BRONCHOSCOPY, THORACOSCOPY WITH LYSIS OF ADHESIONS (120 MINUTES), LEFT LOWER  LOBECTOMY WITH EN BLOC PARTIAL PARIETAL PLEURECTOMY USING DAVINCI ROBOT, MEDIASTINAL LYMPH NODE DISSECTION, INTERCOSTAL NERVE BLOCK;  Surgeon: Rosalio Scott MD;  [facility];  Service: Robotics - DaVinci;  Laterality: Left;    OTHER SURGICAL HISTORY      SOMETHING REMOVED FROM LEFT CHEST, BENIGN ? LIPOMA        Current Outpatient Medications on File Prior to Visit   Medication Sig Dispense Refill    OLANZapine (zyPREXA) 5 MG tablet Take 1 tablet by mouth Every Night. Take nightly x 4 starting night of chemotherapy. 4 tablet 3    ondansetron (ZOFRAN) 8 MG tablet Take 1 tablet by mouth Every 8 (Eight) Hours As Needed for Nausea or Vomiting. 30 tablet 5    pravastatin (PRAVACHOL) 20 MG tablet Take 1 tablet by mouth Every Night.      tamsulosin (FLOMAX) 0.4 MG capsule 24 hr capsule Take 1 capsule by mouth 2 (Two) Times a Day.      guaiFENesin (MUCINEX) 600 MG 12 hr tablet Take 2 tablets by mouth 2 (Two) Times a Day. 60 tablet 0    levoFLOXacin (Levaquin) 500 MG tablet Take 1 tablet by mouth Daily. 7 tablet 0     No current facility-administered medications on file prior to visit.        ALLERGIES:    Allergies   Allergen Reactions    Amoxicillin-Pot Clavulanate Hives        Social History     Socioeconomic History    Marital status:      Spouse name: Carmen   Tobacco Use    Smoking status: Former     Current packs/day: 0.00     Average packs/day: 0.5 packs/day for 40.0 years (20.0 ttl pk-yrs)     Types: Cigarettes     Start date:      Quit date:      Years since quittin.2    Smokeless tobacco: Never   Vaping Use    Vaping status: Never Used   Substance and Sexual Activity    Alcohol use: Not Currently     Comment: VERY RARELY, MAYBE 1 BEER    Drug use: Never    Sexual activity: Defer        Family History   Problem Relation Age of Onset    Bone cancer Mother     COPD Father     Brain cancer Sister     Cancer Brother         Malignant tumor of pharynx    Alcohol abuse Brother     Cirrhosis  "Brother     Recurrent abdominal pain Brother     Lung cancer Brother     Malig Hyperthermia Neg Hx         Review of Systems   Constitutional:  Positive for fatigue.   HENT:  Positive for dental problem.    Respiratory:  Positive for cough and shortness of breath.    Cardiovascular:  Positive for chest pain (left lower chest at areas of scarlines).   Gastrointestinal: Negative.    Genitourinary: Negative.    Musculoskeletal: Negative.    Neurological:  Positive for numbness.   Hematological: Negative.    Psychiatric/Behavioral:  Negative for dysphoric mood. The patient is nervous/anxious.            Objective     Vitals:    03/18/24 0817   BP: 123/78   Pulse: 82   Resp: 16   Temp: 98.2 °F (36.8 °C)   TempSrc: Temporal   SpO2: 100%   Weight: 74 kg (163 lb 3.2 oz)   Height: 180.3 cm (70.98\")   PainSc: 0-No pain             3/18/2024     7:51 AM   Current Status   ECOG score 1       Physical Exam    CONSTITUTIONAL: pleasant well-developed adult man  LYMPH: no cervical or supraclavicular lad  CV: RRR, S1S2, no murmur  RESP: cta bilat, no wheezing, no rales, O2 1 L nasal cannula, healed surgical scars  GI: soft, no splenomegaly, +bs, tenderness LUQ and left lower rib line at area of scar  MUSC: no edema, seated in wheelchair  NEURO: alert and oriented x3, normal strength  PSYCH: normal mood anxious affect      RECENT LABS:  Hematology WBC   Date Value Ref Range Status   03/18/2024 5.35 3.40 - 10.80 10*3/mm3 Final     RBC   Date Value Ref Range Status   03/18/2024 3.62 (L) 4.14 - 5.80 10*6/mm3 Final     Hemoglobin   Date Value Ref Range Status   03/18/2024 9.0 (L) 13.0 - 17.7 g/dL Final     Hematocrit   Date Value Ref Range Status   03/18/2024 30.6 (L) 37.5 - 51.0 % Final     Platelets   Date Value Ref Range Status   03/18/2024 293 140 - 450 10*3/mm3 Final        Lab Results   Component Value Date    GLUCOSE 96 02/26/2024    BUN 13 02/26/2024    CREATININE 0.59 (L) 02/26/2024    EGFR 106.3 02/26/2024    BCR 22.0 " 02/26/2024    K 4.2 02/26/2024    CO2 26.7 02/26/2024    CALCIUM 9.2 02/26/2024    ALBUMIN 3.2 (L) 02/26/2024    BILITOT <0.2 02/26/2024    AST 20 02/26/2024    ALT 13 02/26/2024     PET scan 9823  IMPRESSION:  1. Intensely hypermetabolic approximately 13 x 5 cm pleural-based left  lower lobe lung mass involving the left hilum. Smaller nodular opacities  adjacently are hypermetabolic and likely represent metastases. A 7 mm  right upper lobe nodule is photopenic. Conservative surveillance is  recommended. There is no convincing evidence for metastatic disease at  the neck, abdomen, or pelvis.  2. There are 2 hypermetabolic foci at the sigmoid colon need further  evaluation, particularly the 1.4 cm hypermetabolic polyp at the distal  sigmoid colon. Colonoscopy is recommended.  3. Nonspecific heterogeneous prostate activity. PSA follow-up is  recommended.    Assessment & Plan   *[id number] poorly differentiated carcinoma/squamous differentiation left lower lobe of the lung/small cell lung cancer on pathology at surgery  Mass discovered on low-dose CT chest screening 7/31/2023  PET scan 9/8/2023 showed a 1.3 x 5 cm left lower lobe mass involving the hilum with smaller adjacent nodular opacities, max SUV 23.2, photopenic right upper lobe nodule  Bronchoscopy with biopsy from the left lower lobe 9/6/2023 positive for poorly differentiated carcinoma with squamous differentiation; P-L1 TPS 0%  Initiated neoadjuvant treatment with carboplatin/Taxol/nivolumab 9/20/2023 and completed 3 cycles 11/1/2023  Status post left lower lobectomy 12/13/2023 complicated by prolonged and left lower lobe pneumonia.  Pathology from surgery showed significant treatment effect but residual small cell carcinoma with positive bronchial and perivascular soft tissue margins by tumor within lymphatics, positive lymph node station 10 L for small cell carcinoma x 2   2/5/2024-initiated postoperative carboplatin/-16 with concurrent radiation for small  cell lung cancer/positive margin  2/26/2024-C2 carboplatin/-16 with concurrent radiation  3/18/2024 patient returns today having completed radiation last week and due for cycle 3 carboplatin -16 today.  He is having some pain of the left side.  He denies any pain with deep breath.  He had 1 day of worsening shortness of breath open his baseline but that is since resolved.  We discussed using a Lidoderm patches to see if this helps his discomfort.  He will notify us for any worsening symptoms.  Will proceed with treatment today and try to shift his future treatments to [place].    *Anemia secondary to chemotherapy  B12/folic acid normal, ferritin 408, Iron sat 32%  Hemoglobin 9 today    *Immune mediated colitis  Patient treated with high-dose steroids and taper, now off steroid therapy  No recurrence of diarrhea off steroid therapy    *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  Neuropathy symptoms stable on current chemotherapy    *MRI brain 9/11/23-2 mm enhancement left cerebellar hemisphere likely vessel artifact; MRI brain 11/27/2023-no evidence of metastatic disease, small focus of enhancement left inferior cerebellar region unchanged likely benign    *PET uptake 2 foci sigmoid colon; he will eventually need colonoscopy    *Comorbidities-hyperlipidemia      Oncology plan/recommendations:  Proceed with cycle 3 carboplatin/-16 today.  4 cycles anticipated  Weekly CBC nurse review, will shift to Tuesdays in [place]  Return to clinic 3 weeks lab MD and cycle 4 carbo/-16 in [place] with Dr. Jagdish Graysonoderm patches to left lower rib margin

## 2024-03-18 NOTE — PROGRESS NOTES
Radiation Treatment Summary Note      Patient Name: Sunny Hung  : 1955    Attending Provider: Inderjit Culver MD      Diagnosis:     ICD-10-CM ICD-9-CM   1. Cancer of bronchus of left lower lobe  C34.32 162.5       Radiation Start Date: 2024    Radiation Completion Date: 3/15/2024      Prescription:     Site: Left Bronchial Margin  Laterality: Left  Total Dose: 6000cGy  Dose per Fraction: 200cGy  Total Fractions: 30  Daily or BID: Daily  Modality: Photon  Technique: IMRT/VMAT/Rapid Arc  Bolus: No    Final Delivered Dose Deviated From Initially Prescribed Dose: No    Concurrent Chemotherapy: Yes    Patient Tolerated Treatment Without Unexpected Side Effects/Complications: Yes    ECOG: Capable of only limited selfcare; confined to bed or chair more than 50% of waking hours = 3    Pain Management Plan: None Indicated/PRN OTC    Follow-Up Plan: 4-6 weeks    Imaging Ordered for Follow-Up: To be ordered by Dr. Jagdish Culver MD

## 2024-03-19 ENCOUNTER — INFUSION (OUTPATIENT)
Dept: ONCOLOGY | Facility: HOSPITAL | Age: 69
End: 2024-03-19
Payer: MEDICARE

## 2024-03-19 VITALS
TEMPERATURE: 98.6 F | SYSTOLIC BLOOD PRESSURE: 128 MMHG | HEART RATE: 70 BPM | DIASTOLIC BLOOD PRESSURE: 69 MMHG | WEIGHT: 163.6 LBS | RESPIRATION RATE: 16 BRPM | OXYGEN SATURATION: 99 % | BODY MASS INDEX: 22.83 KG/M2

## 2024-03-19 DIAGNOSIS — C34.92 PRIMARY SQUAMOUS CELL CARCINOMA OF LUNG, LEFT: Primary | ICD-10-CM

## 2024-03-19 DIAGNOSIS — Z79.899 HIGH RISK MEDICATION USE: ICD-10-CM

## 2024-03-19 PROCEDURE — 25010000002 ETOPOSIDE 500 MG/25ML SOLUTION 25 ML VIAL: Performed by: NURSE PRACTITIONER

## 2024-03-19 PROCEDURE — 25810000003 SODIUM CHLORIDE 0.9 % SOLUTION 500 ML FLEX CONT: Performed by: NURSE PRACTITIONER

## 2024-03-19 PROCEDURE — 96413 CHEMO IV INFUSION 1 HR: CPT

## 2024-03-19 PROCEDURE — A9270 NON-COVERED ITEM OR SERVICE: HCPCS | Performed by: NURSE PRACTITIONER

## 2024-03-19 PROCEDURE — 63710000001 PROCHLORPERAZINE MALEATE PER 10 MG: Performed by: NURSE PRACTITIONER

## 2024-03-19 PROCEDURE — 25810000003 SODIUM CHLORIDE 0.9 % SOLUTION: Performed by: NURSE PRACTITIONER

## 2024-03-19 RX ORDER — PROCHLORPERAZINE MALEATE 10 MG
10 TABLET ORAL ONCE
Status: COMPLETED | OUTPATIENT
Start: 2024-03-19 | End: 2024-03-19

## 2024-03-19 RX ORDER — SODIUM CHLORIDE 9 MG/ML
20 INJECTION, SOLUTION INTRAVENOUS ONCE
Status: COMPLETED | OUTPATIENT
Start: 2024-03-19 | End: 2024-03-19

## 2024-03-19 RX ADMIN — ETOPOSIDE 190 MG: 20 INJECTION INTRAVENOUS at 10:25

## 2024-03-19 RX ADMIN — SODIUM CHLORIDE 20 ML/HR: 9 INJECTION, SOLUTION INTRAVENOUS at 10:22

## 2024-03-19 RX ADMIN — PROCHLORPERAZINE MALEATE 10 MG: 10 TABLET ORAL at 10:04

## 2024-03-20 ENCOUNTER — INFUSION (OUTPATIENT)
Dept: ONCOLOGY | Facility: HOSPITAL | Age: 69
End: 2024-03-20
Payer: MEDICARE

## 2024-03-20 VITALS
OXYGEN SATURATION: 99 % | SYSTOLIC BLOOD PRESSURE: 132 MMHG | BODY MASS INDEX: 23 KG/M2 | TEMPERATURE: 98.2 F | WEIGHT: 164.8 LBS | HEART RATE: 79 BPM | DIASTOLIC BLOOD PRESSURE: 75 MMHG

## 2024-03-20 DIAGNOSIS — Z79.899 HIGH RISK MEDICATION USE: ICD-10-CM

## 2024-03-20 DIAGNOSIS — C34.92 PRIMARY SQUAMOUS CELL CARCINOMA OF LUNG, LEFT: Primary | ICD-10-CM

## 2024-03-20 PROCEDURE — 25810000003 SODIUM CHLORIDE 0.9 % SOLUTION 500 ML FLEX CONT: Performed by: NURSE PRACTITIONER

## 2024-03-20 PROCEDURE — 96413 CHEMO IV INFUSION 1 HR: CPT

## 2024-03-20 PROCEDURE — 25810000003 SODIUM CHLORIDE 0.9 % SOLUTION: Performed by: NURSE PRACTITIONER

## 2024-03-20 PROCEDURE — 25010000002 ETOPOSIDE 1 GM/50ML SOLUTION 50 ML VIAL: Performed by: NURSE PRACTITIONER

## 2024-03-20 PROCEDURE — 63710000001 PROCHLORPERAZINE MALEATE PER 5 MG: Performed by: NURSE PRACTITIONER

## 2024-03-20 PROCEDURE — A9270 NON-COVERED ITEM OR SERVICE: HCPCS | Performed by: NURSE PRACTITIONER

## 2024-03-20 RX ORDER — PROCHLORPERAZINE MALEATE 5 MG/1
10 TABLET ORAL ONCE
Status: COMPLETED | OUTPATIENT
Start: 2024-03-20 | End: 2024-03-20

## 2024-03-20 RX ORDER — SODIUM CHLORIDE 9 MG/ML
20 INJECTION, SOLUTION INTRAVENOUS ONCE
Status: COMPLETED | OUTPATIENT
Start: 2024-03-20 | End: 2024-03-20

## 2024-03-20 RX ADMIN — PROCHLORPERAZINE MALEATE 10 MG: 5 TABLET ORAL at 08:50

## 2024-03-20 RX ADMIN — SODIUM CHLORIDE 20 ML/HR: 9 INJECTION, SOLUTION INTRAVENOUS at 08:50

## 2024-03-20 RX ADMIN — SODIUM CHLORIDE 190 MG: 0.9 INJECTION, SOLUTION INTRAVENOUS at 09:09

## 2024-03-21 LAB
RAD ONC ARIA COURSE END DATE: NORMAL
RAD ONC ARIA COURSE ID: NORMAL
RAD ONC ARIA COURSE INTENT: NORMAL
RAD ONC ARIA COURSE LAST TREATMENT DATE: NORMAL
RAD ONC ARIA COURSE START DATE: NORMAL
RAD ONC ARIA COURSE TREATMENT ELAPSED DAYS: 39
RAD ONC ARIA FIRST TREATMENT DATE: NORMAL
RAD ONC ARIA PLAN FRACTIONS TREATED TO DATE: 30
RAD ONC ARIA PLAN ID: NORMAL
RAD ONC ARIA PLAN NAME: NORMAL
RAD ONC ARIA PLAN PRESCRIBED DOSE PER FRACTION: 1.8 GY
RAD ONC ARIA PLAN PRIMARY REFERENCE POINT: NORMAL
RAD ONC ARIA PLAN TOTAL FRACTIONS PRESCRIBED: 30
RAD ONC ARIA PLAN TOTAL PRESCRIBED DOSE: 5400 CGY
RAD ONC ARIA REFERENCE POINT DOSAGE GIVEN TO DATE: 54 GY
RAD ONC ARIA REFERENCE POINT ID: NORMAL

## 2024-03-23 ENCOUNTER — APPOINTMENT (OUTPATIENT)
Dept: GENERAL RADIOLOGY | Facility: HOSPITAL | Age: 69
End: 2024-03-23
Payer: MEDICARE

## 2024-03-23 ENCOUNTER — HOSPITAL ENCOUNTER (EMERGENCY)
Facility: HOSPITAL | Age: 69
Discharge: HOME | End: 2024-03-23
Attending: STUDENT IN AN ORGANIZED HEALTH CARE EDUCATION/TRAINING PROGRAM
Payer: MEDICARE

## 2024-03-23 VITALS
HEART RATE: 115 BPM | OXYGEN SATURATION: 94 % | SYSTOLIC BLOOD PRESSURE: 138 MMHG | RESPIRATION RATE: 18 BRPM | WEIGHT: 160 LBS | BODY MASS INDEX: 21.2 KG/M2 | TEMPERATURE: 98.5 F | DIASTOLIC BLOOD PRESSURE: 65 MMHG | HEIGHT: 73 IN

## 2024-03-23 DIAGNOSIS — R50.9 FEVER AND CHILLS: Primary | ICD-10-CM

## 2024-03-23 LAB
ANION GAP SERPL CALCULATED.3IONS-SCNC: 11.2 MMOL/L (ref 5–15)
ANISOCYTOSIS BLD QL: NORMAL
BASOPHILS # BLD AUTO: 0.04 10*3/MM3 (ref 0–0.2)
BASOPHILS NFR BLD AUTO: 0.5 % (ref 0–1.5)
BUN SERPL-MCNC: 21 MG/DL (ref 8–23)
BUN/CREAT SERPL: 30.9 (ref 7–25)
CALCIUM SPEC-SCNC: 8.5 MG/DL (ref 8.6–10.5)
CHLORIDE SERPL-SCNC: 96 MMOL/L (ref 98–107)
CO2 SERPL-SCNC: 24.8 MMOL/L (ref 22–29)
CREAT SERPL-MCNC: 0.68 MG/DL (ref 0.76–1.27)
DEPRECATED RDW RBC AUTO: 54 FL (ref 37–54)
EGFRCR SERPLBLD CKD-EPI 2021: 101.9 ML/MIN/1.73
EOSINOPHIL # BLD AUTO: 0.07 10*3/MM3 (ref 0–0.4)
EOSINOPHIL NFR BLD AUTO: 0.8 % (ref 0.3–6.2)
ERYTHROCYTE [DISTWIDTH] IN BLOOD BY AUTOMATED COUNT: 18.5 % (ref 12.3–15.4)
FLUAV SUBTYP SPEC NAA+PROBE: NOT DETECTED
FLUBV RNA ISLT QL NAA+PROBE: NOT DETECTED
GLUCOSE SERPL-MCNC: 137 MG/DL (ref 65–99)
HCT VFR BLD AUTO: 25.8 % (ref 37.5–51)
HGB BLD-MCNC: 7.9 G/DL (ref 13–17.7)
HYPOCHROMIA BLD QL: NORMAL
IMM GRANULOCYTES # BLD AUTO: 0.11 10*3/MM3 (ref 0–0.05)
IMM GRANULOCYTES NFR BLD AUTO: 1.3 % (ref 0–0.5)
LYMPHOCYTES # BLD AUTO: 0.65 10*3/MM3 (ref 0.7–3.1)
LYMPHOCYTES NFR BLD AUTO: 7.6 % (ref 19.6–45.3)
MCH RBC QN AUTO: 25.2 PG (ref 26.6–33)
MCHC RBC AUTO-ENTMCNC: 30.6 G/DL (ref 31.5–35.7)
MCV RBC AUTO: 82.2 FL (ref 79–97)
MONOCYTES # BLD AUTO: 0.17 10*3/MM3 (ref 0.1–0.9)
MONOCYTES NFR BLD AUTO: 2 % (ref 5–12)
NEUTROPHILS NFR BLD AUTO: 7.46 10*3/MM3 (ref 1.7–7)
NEUTROPHILS NFR BLD AUTO: 87.8 % (ref 42.7–76)
NRBC BLD AUTO-RTO: 0 /100 WBC (ref 0–0.2)
PLATELET # BLD AUTO: 307 10*3/MM3 (ref 140–450)
PMV BLD AUTO: 9 FL (ref 6–12)
POTASSIUM SERPL-SCNC: 3.9 MMOL/L (ref 3.5–5.2)
RBC # BLD AUTO: 3.14 10*6/MM3 (ref 4.14–5.8)
SARS-COV-2 RNA RESP QL NAA+PROBE: NOT DETECTED
SMALL PLATELETS BLD QL SMEAR: ADEQUATE
SODIUM SERPL-SCNC: 132 MMOL/L (ref 136–145)
WBC MORPH BLD: NORMAL
WBC NRBC COR # BLD AUTO: 8.5 10*3/MM3 (ref 3.4–10.8)

## 2024-03-23 PROCEDURE — 85025 COMPLETE CBC W/AUTO DIFF WBC: CPT | Performed by: STUDENT IN AN ORGANIZED HEALTH CARE EDUCATION/TRAINING PROGRAM

## 2024-03-23 PROCEDURE — 71046 X-RAY EXAM CHEST 2 VIEWS: CPT

## 2024-03-23 PROCEDURE — 99283 EMERGENCY DEPT VISIT LOW MDM: CPT

## 2024-03-23 PROCEDURE — 85007 BL SMEAR W/DIFF WBC COUNT: CPT | Performed by: STUDENT IN AN ORGANIZED HEALTH CARE EDUCATION/TRAINING PROGRAM

## 2024-03-23 PROCEDURE — 87636 SARSCOV2 & INF A&B AMP PRB: CPT | Performed by: STUDENT IN AN ORGANIZED HEALTH CARE EDUCATION/TRAINING PROGRAM

## 2024-03-23 PROCEDURE — 80048 BASIC METABOLIC PNL TOTAL CA: CPT | Performed by: STUDENT IN AN ORGANIZED HEALTH CARE EDUCATION/TRAINING PROGRAM

## 2024-03-23 NOTE — ED PROVIDER NOTES
Subjective   History of Present Illness  68-year-old male with history of lung cancer status post lobectomy currently on chemo, last treatment 3/20, who presents with fever at home last night of 103 as well as fever today in setting of some shortness of breath.  No vomiting, no diarrhea.  No abdominal pain.  No rashes.  States he has chronic nocturia on Flomax.  No sick contacts.  No runny nose or sore throat. On 1L daytime, 2L at night or with exertion.      Review of Systems   Constitutional:  Positive for fever.   Respiratory:  Negative for cough.    Gastrointestinal:  Positive for nausea. Negative for abdominal pain, diarrhea and vomiting.   Musculoskeletal:  Positive for myalgias.       Past Medical History:   Diagnosis Date    Arthritis     BPH (benign prostatic hyperplasia)     Bruises easily     Bursitis of right shoulder     Cough     Hyperlipidemia     Mass of lower lobe of left lung        Allergies   Allergen Reactions    Amoxicillin-Pot Clavulanate Hives       Past Surgical History:   Procedure Laterality Date    BRONCHOSCOPY WITH ION ROBOTIC ASSIST N/A 9/6/2023    Procedure: BRONCHOSCOPY WITH BAL;   ION ROBOT AND ENDOBRONCHIAL ULTRASOUND WITH FNA'S;  Surgeon: Rosalio Scott MD;  Location: [facility] ENDOSCOPY;  Service: Robotics - Pulmonary;  Laterality: N/A;  PRE- LEFT LOWER LOBE MASS  POST- SAME    GANGLION CYST EXCISION Bilateral     HEMORRHOIDECTOMY      LOBECTOMY Left 12/13/2023    Procedure: BRONCHOSCOPY, THORACOSCOPY WITH LYSIS OF ADHESIONS (120 MINUTES), LEFT LOWER LOBECTOMY WITH EN BLOC PARTIAL PARIETAL PLEURECTOMY USING Szl.itINCI ROBOT, MEDIASTINAL LYMPH NODE DISSECTION, INTERCOSTAL NERVE BLOCK;  Surgeon: Rosalio Scott MD;  Location: [facility] MAIN OR;  Service: Robotics - DaVinci;  Laterality: Left;    OTHER SURGICAL HISTORY      SOMETHING REMOVED FROM LEFT CHEST, BENIGN ? LIPOMA       Family History   Problem Relation Age of Onset    Bone cancer Mother     COPD Father     Brain cancer Sister     Cancer  Brother         Malignant tumor of pharynx    Alcohol abuse Brother     Cirrhosis Brother     Recurrent abdominal pain Brother     Lung cancer Brother     Malig Hyperthermia Neg Hx        Social History     Socioeconomic History    Marital status:      Spouse name: Carmen   Tobacco Use    Smoking status: Former     Current packs/day: 0.00     Average packs/day: 0.5 packs/day for 40.0 years (20.0 ttl pk-yrs)     Types: Cigarettes     Start date:      Quit date:      Years since quittin.2    Smokeless tobacco: Never   Vaping Use    Vaping status: Never Used   Substance and Sexual Activity    Alcohol use: Not Currently     Comment: VERY RARELY, MAYBE 1 BEER    Drug use: Never    Sexual activity: Defer           Objective   Physical Exam  Vitals and nursing note reviewed.   Constitutional:       General: He is not in acute distress.  Cardiovascular:      Rate and Rhythm: Tachycardia present.   Pulmonary:      Effort: Pulmonary effort is normal.   Abdominal:      Palpations: Abdomen is soft.   Musculoskeletal:      Right lower leg: No tenderness.   Skin:     General: Skin is warm and dry.      Capillary Refill: Capillary refill takes less than 2 seconds.   Neurological:      Mental Status: He is alert.         Procedures           ED Course  ED Course as of 24 1858   Sat Mar 23, 2024   1839 CBC & Differential(!) [HB]      ED Course User Index  [HB] Jake Feldman MD                                             Medical Decision Making  68-year-old male with history pertinent for lung cancer on chemo presenting with fever at home in setting of bodyaches.  Arrives afebrile, mildly tachycardic otherwise vital signs within normal limits on room air.  No leukocytosis or neutropenia.  COVID and flu negative.  Denies any diarrhea to suggest C. difficile.  No rashes.  No abdominal pain.  Chronic nocturia otherwise denies any dysuria or bladder pain.  Suspect viral syndrome.  Tachycardia  improved without intervention. to continue Tylenol ibuprofen at home but strict return precautions if fevers persist or if develops new or concerning symptoms given his immunocompromise state.    Problems Addressed:  Fever and chills: complicated acute illness or injury    Amount and/or Complexity of Data Reviewed  Labs: ordered. Decision-making details documented in ED Course.  Radiology: ordered.        Final diagnoses:   Fever and chills       ED Disposition  ED Disposition       ED Disposition   Discharge    Condition   Stable    Comment   --               Hosea Abernathy MD  [address] [facility] [address] [phone]      As needed    [facility] EMERGENCY DEPARTMENT  [address] [facility] [address] [phone]    If symptoms worsen         Medication List      No changes were made to your prescriptions during this visit.            Jake Feldman MD  03/24/24 0022

## 2024-03-26 ENCOUNTER — LAB (OUTPATIENT)
Dept: LAB | Facility: HOSPITAL | Age: 69
End: 2024-03-26
Payer: MEDICARE

## 2024-03-26 ENCOUNTER — CLINICAL SUPPORT (OUTPATIENT)
Dept: ONCOLOGY | Facility: HOSPITAL | Age: 69
End: 2024-03-26
Payer: MEDICARE

## 2024-03-26 ENCOUNTER — HOSPITAL ENCOUNTER (OUTPATIENT)
Dept: INFUSION THERAPY | Facility: HOSPITAL | Age: 69
Discharge: HOME | End: 2024-03-26
Payer: MEDICARE

## 2024-03-26 VITALS
DIASTOLIC BLOOD PRESSURE: 88 MMHG | OXYGEN SATURATION: 100 % | SYSTOLIC BLOOD PRESSURE: 128 MMHG | RESPIRATION RATE: 16 BRPM | HEART RATE: 72 BPM | TEMPERATURE: 97.7 F

## 2024-03-26 DIAGNOSIS — D64.9 SYMPTOMATIC ANEMIA: ICD-10-CM

## 2024-03-26 DIAGNOSIS — D64.9 SYMPTOMATIC ANEMIA: Primary | ICD-10-CM

## 2024-03-26 DIAGNOSIS — C34.92 PRIMARY SQUAMOUS CELL CARCINOMA OF LUNG, LEFT: ICD-10-CM

## 2024-03-26 LAB
ABO GROUP BLD: NORMAL
BASOPHILS # BLD AUTO: 0.05 10*3/MM3 (ref 0–0.2)
BASOPHILS NFR BLD AUTO: 1 % (ref 0–1.5)
BLD GP AB SCN SERPL QL: NEGATIVE
DEPRECATED RDW RBC AUTO: 53.8 FL (ref 37–54)
EOSINOPHIL # BLD AUTO: 0.03 10*3/MM3 (ref 0–0.4)
EOSINOPHIL NFR BLD AUTO: 0.6 % (ref 0.3–6.2)
ERYTHROCYTE [DISTWIDTH] IN BLOOD BY AUTOMATED COUNT: 18.2 % (ref 12.3–15.4)
HCT VFR BLD AUTO: 26.7 % (ref 37.5–51)
HGB BLD-MCNC: 8.2 G/DL (ref 13–17.7)
IMM GRANULOCYTES # BLD AUTO: 0.09 10*3/MM3 (ref 0–0.05)
IMM GRANULOCYTES NFR BLD AUTO: 1.8 % (ref 0–0.5)
LYMPHOCYTES # BLD AUTO: 0.62 10*3/MM3 (ref 0.7–3.1)
LYMPHOCYTES NFR BLD AUTO: 12.2 % (ref 19.6–45.3)
MCH RBC QN AUTO: 25.2 PG (ref 26.6–33)
MCHC RBC AUTO-ENTMCNC: 30.7 G/DL (ref 31.5–35.7)
MCV RBC AUTO: 81.9 FL (ref 79–97)
MONOCYTES # BLD AUTO: 0.28 10*3/MM3 (ref 0.1–0.9)
MONOCYTES NFR BLD AUTO: 5.5 % (ref 5–12)
NEUTROPHILS NFR BLD AUTO: 4.03 10*3/MM3 (ref 1.7–7)
NEUTROPHILS NFR BLD AUTO: 78.9 % (ref 42.7–76)
NRBC BLD AUTO-RTO: 0 /100 WBC (ref 0–0.2)
PLATELET # BLD AUTO: 215 10*3/MM3 (ref 140–450)
PMV BLD AUTO: 9.6 FL (ref 6–12)
RBC # BLD AUTO: 3.26 10*6/MM3 (ref 4.14–5.8)
RH BLD: POSITIVE
T&S EXPIRATION DATE: NORMAL
WBC NRBC COR # BLD AUTO: 5.1 10*3/MM3 (ref 3.4–10.8)

## 2024-03-26 PROCEDURE — 25810000003 SODIUM CHLORIDE 0.9 % SOLUTION: Performed by: INTERNAL MEDICINE

## 2024-03-26 PROCEDURE — 86850 RBC ANTIBODY SCREEN: CPT | Performed by: INTERNAL MEDICINE

## 2024-03-26 PROCEDURE — 63710000001 ACETAMINOPHEN 325 MG TABLET: Performed by: INTERNAL MEDICINE

## 2024-03-26 PROCEDURE — P9016 RBC LEUKOCYTES REDUCED: HCPCS

## 2024-03-26 PROCEDURE — 85025 COMPLETE CBC W/AUTO DIFF WBC: CPT | Performed by: NURSE PRACTITIONER

## 2024-03-26 PROCEDURE — A9270 NON-COVERED ITEM OR SERVICE: HCPCS | Performed by: INTERNAL MEDICINE

## 2024-03-26 PROCEDURE — 63710000001 DIPHENHYDRAMINE PER 50 MG: Performed by: INTERNAL MEDICINE

## 2024-03-26 PROCEDURE — 86920 COMPATIBILITY TEST SPIN: CPT

## 2024-03-26 PROCEDURE — 36415 COLL VENOUS BLD VENIPUNCTURE: CPT

## 2024-03-26 PROCEDURE — 86901 BLOOD TYPING SEROLOGIC RH(D): CPT | Performed by: INTERNAL MEDICINE

## 2024-03-26 PROCEDURE — 86900 BLOOD TYPING SEROLOGIC ABO: CPT | Performed by: INTERNAL MEDICINE

## 2024-03-26 PROCEDURE — 86900 BLOOD TYPING SEROLOGIC ABO: CPT

## 2024-03-26 PROCEDURE — 36430 TRANSFUSION BLD/BLD COMPNT: CPT

## 2024-03-26 RX ORDER — DIPHENHYDRAMINE HCL 25 MG
25 CAPSULE ORAL ONCE
Status: CANCELLED | OUTPATIENT
Start: 2024-03-26 | End: 2024-03-26

## 2024-03-26 RX ORDER — SODIUM CHLORIDE 9 MG/ML
250 INJECTION, SOLUTION INTRAVENOUS AS NEEDED
Status: CANCELLED | OUTPATIENT
Start: 2024-03-26

## 2024-03-26 RX ORDER — DIPHENHYDRAMINE HCL 25 MG
25 CAPSULE ORAL ONCE
Status: COMPLETED | OUTPATIENT
Start: 2024-03-26 | End: 2024-03-26

## 2024-03-26 RX ORDER — ACETAMINOPHEN 325 MG/1
650 TABLET ORAL ONCE
Status: CANCELLED | OUTPATIENT
Start: 2024-03-26 | End: 2024-03-26

## 2024-03-26 RX ORDER — HYDROCODONE BITARTRATE AND ACETAMINOPHEN 5; 325 MG/1; MG/1
1 TABLET ORAL EVERY 6 HOURS PRN
Qty: 20 TABLET | Refills: 0 | Status: SHIPPED | OUTPATIENT
Start: 2024-03-26

## 2024-03-26 RX ORDER — SODIUM CHLORIDE 9 MG/ML
250 INJECTION, SOLUTION INTRAVENOUS AS NEEDED
Status: DISCONTINUED | OUTPATIENT
Start: 2024-03-26 | End: 2024-03-28 | Stop reason: HOSPADM

## 2024-03-26 RX ORDER — ACETAMINOPHEN 325 MG/1
650 TABLET ORAL ONCE
Status: COMPLETED | OUTPATIENT
Start: 2024-03-26 | End: 2024-03-26

## 2024-03-26 RX ADMIN — SODIUM CHLORIDE 250 ML: 9 INJECTION, SOLUTION INTRAVENOUS at 15:06

## 2024-03-26 RX ADMIN — DIPHENHYDRAMINE HYDROCHLORIDE 25 MG: 25 CAPSULE ORAL at 12:32

## 2024-03-26 RX ADMIN — ACETAMINOPHEN 650 MG: 325 TABLET, FILM COATED ORAL at 12:32

## 2024-03-26 NOTE — PATIENT INSTRUCTIONS
"  Call Dr. Greg Dubon, [facility] at [phone] if you have any problems or concerns.    We know you have a Choice in healthcare and appreciate you using [facility].  Our purpose is to provide you \"Excellent Care\".  We hope that you will always choose us in the future and continue to recommend us to your family and friends.             "

## 2024-03-26 NOTE — PROGRESS NOTES
Patient is here for lab review with RN. CBC reviewed, Hgb low. Patient is symptomatic; fatigued, short of air. 1 unit PRBC ordered. Patient also complains of some musculoskeletal pain in between shoulder blades. Tylenol extra strength helps some but not entirely. Dr. Jagdish brantley sending in Norco 5 mg (20 tablets) that patient can take every 6 hours PRN. Copy of labs given to patient and follow up appointment reviewed. Patient is instructed to call the office with any concerns or new symptoms prior to next visit. Patient verbalized understanding and discharged in stable condition.    Component      Latest Ref Rng 3/26/2024   WBC      3.40 - 10.80 10*3/mm3 5.10    RBC      4.14 - 5.80 10*6/mm3 3.26 (L)    Hemoglobin      13.0 - 17.7 g/dL 8.2 (L)    Hematocrit      37.5 - 51.0 % 26.7 (L)    MCV      79.0 - 97.0 fL 81.9    MCH      26.6 - 33.0 pg 25.2 (L)    MCHC      31.5 - 35.7 g/dL 30.7 (L)    RDW      12.3 - 15.4 % 18.2 (H)    RDW-SD      37.0 - 54.0 fl 53.8    MPV      6.0 - 12.0 fL 9.6    Platelets      140 - 450 10*3/mm3 215    Neutrophil Rel %      42.7 - 76.0 % 78.9 (H)    Lymphocyte Rel %      19.6 - 45.3 % 12.2 (L)    Monocyte Rel %      5.0 - 12.0 % 5.5    Eosinophil Rel %      0.3 - 6.2 % 0.6    Basophil Rel %      0.0 - 1.5 % 1.0    Immature Granulocyte Rel %      0.0 - 0.5 % 1.8 (H)    Neutrophils Absolute      1.70 - 7.00 10*3/mm3 4.03    Lymphocytes Absolute      0.70 - 3.10 10*3/mm3 0.62 (L)    Monocytes Absolute      0.10 - 0.90 10*3/mm3 0.28    Eosinophils Absolute      0.00 - 0.40 10*3/mm3 0.03    Basophils Absolute      0.00 - 0.20 10*3/mm3 0.05    Immature Grans, Absolute      0.00 - 0.05 10*3/mm3 0.09 (H)    nRBC      0.0 - 0.2 /100 WBC 0.0       Legend:  (L) Low  (H) High

## 2024-03-27 LAB
BH BB BLOOD EXPIRATION DATE: NORMAL
BH BB BLOOD TYPE BARCODE: 6200
BH BB DISPENSE STATUS: NORMAL
BH BB PRODUCT CODE: NORMAL
BH BB UNIT NUMBER: NORMAL
CROSSMATCH INTERPRETATION: NORMAL
UNIT  ABO: NORMAL
UNIT  RH: NORMAL

## 2024-04-02 ENCOUNTER — CLINICAL SUPPORT (OUTPATIENT)
Dept: ONCOLOGY | Facility: HOSPITAL | Age: 69
End: 2024-04-02
Payer: MEDICARE

## 2024-04-02 ENCOUNTER — LAB (OUTPATIENT)
Dept: LAB | Facility: HOSPITAL | Age: 69
End: 2024-04-02
Payer: MEDICARE

## 2024-04-02 DIAGNOSIS — C34.92 PRIMARY SQUAMOUS CELL CARCINOMA OF LUNG, LEFT: ICD-10-CM

## 2024-04-02 LAB
BASOPHILS # BLD AUTO: 0.02 10*3/MM3 (ref 0–0.2)
BASOPHILS NFR BLD AUTO: 0.8 % (ref 0–1.5)
DEPRECATED RDW RBC AUTO: 51.7 FL (ref 37–54)
EOSINOPHIL # BLD AUTO: 0.06 10*3/MM3 (ref 0–0.4)
EOSINOPHIL NFR BLD AUTO: 2.3 % (ref 0.3–6.2)
ERYTHROCYTE [DISTWIDTH] IN BLOOD BY AUTOMATED COUNT: 18.2 % (ref 12.3–15.4)
HCT VFR BLD AUTO: 29.8 % (ref 37.5–51)
HGB BLD-MCNC: 9.1 G/DL (ref 13–17.7)
IMM GRANULOCYTES # BLD AUTO: 0.01 10*3/MM3 (ref 0–0.05)
IMM GRANULOCYTES NFR BLD AUTO: 0.4 % (ref 0–0.5)
LYMPHOCYTES # BLD AUTO: 1.15 10*3/MM3 (ref 0.7–3.1)
LYMPHOCYTES NFR BLD AUTO: 43.7 % (ref 19.6–45.3)
MCH RBC QN AUTO: 25.3 PG (ref 26.6–33)
MCHC RBC AUTO-ENTMCNC: 30.5 G/DL (ref 31.5–35.7)
MCV RBC AUTO: 83 FL (ref 79–97)
MONOCYTES # BLD AUTO: 0.66 10*3/MM3 (ref 0.1–0.9)
MONOCYTES NFR BLD AUTO: 25.1 % (ref 5–12)
NEUTROPHILS NFR BLD AUTO: 0.73 10*3/MM3 (ref 1.7–7)
NEUTROPHILS NFR BLD AUTO: 27.7 % (ref 42.7–76)
NRBC BLD AUTO-RTO: 0 /100 WBC (ref 0–0.2)
PLATELET # BLD AUTO: 58 10*3/MM3 (ref 140–450)
PMV BLD AUTO: 10 FL (ref 6–12)
RBC # BLD AUTO: 3.59 10*6/MM3 (ref 4.14–5.8)
WBC NRBC COR # BLD AUTO: 2.63 10*3/MM3 (ref 3.4–10.8)

## 2024-04-02 PROCEDURE — G0463 HOSPITAL OUTPT CLINIC VISIT: HCPCS | Performed by: NURSE PRACTITIONER

## 2024-04-02 PROCEDURE — 85025 COMPLETE CBC W/AUTO DIFF WBC: CPT | Performed by: NURSE PRACTITIONER

## 2024-04-02 NOTE — NURSING NOTE
Pt here for RN review, he is without complaints.  Lab Results   Component Value Date    WBC 2.63 (L) 04/02/2024    HGB 9.1 (L) 04/02/2024    HCT 29.8 (L) 04/02/2024    MCV 83.0 04/02/2024    PLT 58 (L) 04/02/2024   ANC 0.73    Labs reviewed with Shannan ZAPATA, no additional orders received.  Pt educated on bleeding and infection precautions, he v/u

## 2024-04-03 NOTE — PROGRESS NOTES
Subjective     REASON FOR CONSULTATION:  NSCLC-small cell lung cancer  Provide an opinion on any further workup or treatment                             REQUESTING PHYSICIAN:  Sonia    RECORDS OBTAINED:  Records of the patients history including those obtained from the referring provider were reviewed and summarized in detail.      History of Present Illness   The patient returned today for follow-up and cycle 4 of carboplatin/etoposide with concurrent radiation for treatment of small cell cancer seen on his pathology at the time of lung resection.  The patient completed radiation therapy on 3/15/2024.  The patient doing reasonably well today.  He has mild to moderate fatigue and dyspnea on exertion.  Weight is improving after completing radiation.  He denies odynophagia or dysphagia.  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    Oncology history:  This is a pleasant 68-year-old man with hyperlipidemia and previous tobacco abuse who underwent a screening CT of the chest on 7/31/2023 which showed a large mass in the left lower lobe of the lung extending to the hilum.  A diagnostic CT was performed 8/25/2023 showing a large left infrahilar medial mass in the lower lobe.  A PET scan on 9/8/2023 showed a left lower lobe spiculated pleural-based mass 1.3 x 5 cm involving the left hilum and major fissure SUV 23.2.  There were smaller nodular airspace opacities also hypermetabolic.  The mass encases left lower lobe bronchovascular bundle.  No hypermetabolic mediastinal or hilar lymph nodes noted.  There was a 7 mm nodule in the right upper lobe photopenic SUV 2.1.  There was no supraclavicular uptake or disease below the abdomen other than 2 abnormal foci in the sigmoid colon.  MRI of the brain on 9/11/2023 showed a subtle 2 mm focus inferior left cerebellum too small to characterize but likely a blood vessel.  The patient underwent bronchoscopy on 9/6/2023 with biopsies taken from the left lower lobe  mass, lymph node station 7 and lymph node station 4R.  Pathology showed malignant cells favoring poorly differentiated carcinoma/squamous differentiation from the left lower lobe and biopsies from station 7 and 4R were negative for malignant cells.    The patient underwent neoadjuvant chemo-immunotherapy with 3 cycles of carboplatin/Taxol/nivolumab completed 11/1/2023.  After completing treatment he had immune mediated colitis requiring steroid therapy.  Treatment also complicated by peripheral neuropathy.    The patient underwent left lower lobectomy 12/13/2023 complicated by prolonged and left lower lobe pneumonia.  Pathology from surgery showed significant treatment effect but residual small cell carcinoma with positive bronchial and perivascular soft tissue margins by tumor within lymphatics, positive lymph node station 10 L for small cell carcinoma x 2.          Past Medical History:   Diagnosis Date    Arthritis     BPH (benign prostatic hyperplasia)     Bruises easily     Bursitis of right shoulder     Cough     Hyperlipidemia     Mass of lower lobe of left lung         Past Surgical History:   Procedure Laterality Date    BRONCHOSCOPY WITH ION ROBOTIC ASSIST N/A 9/6/2023    Procedure: BRONCHOSCOPY WITH BAL;   ION ROBOT AND ENDOBRONCHIAL ULTRASOUND WITH FNA'S;  Surgeon: Rosalio Scott MD;  Location: [facility] ENDOSCOPY;  Service: Robotics - Pulmonary;  Laterality: N/A;  PRE- LEFT LOWER LOBE MASS  POST- SAME    GANGLION CYST EXCISION Bilateral     HEMORRHOIDECTOMY      LOBECTOMY Left 12/13/2023    Procedure: BRONCHOSCOPY, THORACOSCOPY WITH LYSIS OF ADHESIONS (120 MINUTES), LEFT LOWER LOBECTOMY WITH EN BLOC PARTIAL PARIETAL PLEURECTOMY USING DAVINCI ROBOT, MEDIASTINAL LYMPH NODE DISSECTION, INTERCOSTAL NERVE BLOCK;  Surgeon: Rosalio Scott MD;  Location: [facility] OR;  Service: Robotics - DaVinci;  Laterality: Left;    OTHER SURGICAL HISTORY      SOMETHING REMOVED FROM LEFT CHEST, BENIGN ? LIPOMA        Current  Outpatient Medications on File Prior to Visit   Medication Sig Dispense Refill    lidocaine (LIDODERM) 5 % Place 1 patch on the skin as directed by provider Daily. Remove & Discard patch within 12 hours or as directed by MD 30 patch 1    O2 (OXYGEN) Inhale 1 L/min 1 (One) Time. One lpm when pt is up    2lpm o2 via n/c prn hs      OLANZapine (zyPREXA) 5 MG tablet Take 1 tablet by mouth Every Night. Take nightly x 4 starting night of chemotherapy. 4 tablet 3    ondansetron (ZOFRAN) 8 MG tablet Take 1 tablet by mouth Every 8 (Eight) Hours As Needed for Nausea or Vomiting. 30 tablet 5    pravastatin (PRAVACHOL) 20 MG tablet Take 1 tablet by mouth Every Night.      tamsulosin (FLOMAX) 0.4 MG capsule 24 hr capsule Take 1 capsule by mouth 2 (Two) Times a Day.      HYDROcodone-acetaminophen (NORCO) 5-325 MG per tablet Take 1 tablet by mouth Every 6 (Six) Hours As Needed for Moderate Pain. 20 tablet 0     No current facility-administered medications on file prior to visit.        ALLERGIES:    Allergies   Allergen Reactions    Amoxicillin-Pot Clavulanate Hives        Social History     Socioeconomic History    Marital status:      Spouse name: Carmen   Tobacco Use    Smoking status: Former     Current packs/day: 0.00     Average packs/day: 0.5 packs/day for 40.0 years (20.0 ttl pk-yrs)     Types: Cigarettes     Start date:      Quit date:      Years since quittin.2    Smokeless tobacco: Never   Vaping Use    Vaping status: Never Used   Substance and Sexual Activity    Alcohol use: Not Currently     Comment: VERY RARELY, MAYBE 1 BEER    Drug use: Never    Sexual activity: Defer        Family History   Problem Relation Age of Onset    Bone cancer Mother     COPD Father     Brain cancer Sister     Cancer Brother         Malignant tumor of pharynx    Alcohol abuse Brother     Cirrhosis Brother     Recurrent abdominal pain Brother     Lung cancer Brother     Malig Hyperthermia Neg Hx         Review of Systems  "  Constitutional:  Positive for fatigue.   HENT:  Positive for dental problem.    Respiratory:  Positive for shortness of breath. Negative for cough.    Cardiovascular: Negative.    Gastrointestinal: Negative.    Genitourinary: Negative.    Musculoskeletal: Negative.    Neurological:  Positive for numbness.   Hematological: Negative.    Psychiatric/Behavioral:  Negative for dysphoric mood. The patient is nervous/anxious.    ROS unchanged-4/8/2024      Objective     Vitals:    04/09/24 0953   BP: 118/74   Pulse: 100   Resp: 16   Temp: 98.7 °F (37.1 °C)   TempSrc: Temporal   SpO2: 94%   Weight: 74.9 kg (165 lb 3.2 oz)   Height: 185.4 cm (72.99\")   PainSc: 0-No pain               4/9/2024     9:54 AM   Current Status   ECOG score 0       Physical Exam    CONSTITUTIONAL: pleasant well-developed adult man  LYMPH: no cervical or supraclavicular lad  CV: RRR, S1S2, no murmur  RESP: cta bilat, no wheezing, no ralesv  GI: soft, non-tender, no splenomegaly, +bs  MUSC: no edema, normal gait  NEURO: alert and oriented x3, normal strength  PSYCH: normal mood anxious affect       RECENT LABS:  Hematology WBC   Date Value Ref Range Status   04/09/2024 6.58 3.40 - 10.80 10*3/mm3 Final     RBC   Date Value Ref Range Status   04/09/2024 3.84 (L) 4.14 - 5.80 10*6/mm3 Final     Hemoglobin   Date Value Ref Range Status   04/09/2024 9.7 (L) 13.0 - 17.7 g/dL Final     Hematocrit   Date Value Ref Range Status   04/09/2024 33.1 (L) 37.5 - 51.0 % Final     Platelets   Date Value Ref Range Status   04/09/2024 232 140 - 450 10*3/mm3 Final        Lab Results   Component Value Date    GLUCOSE 135 (H) 04/09/2024    BUN 12 04/09/2024    CREATININE 0.74 (L) 04/09/2024    EGFR 99.3 04/09/2024    BCR 16.2 04/09/2024    K 3.9 04/09/2024    CO2 23.8 04/09/2024    CALCIUM 9.5 04/09/2024    ALBUMIN 3.9 04/09/2024    BILITOT <0.2 04/09/2024    AST 12 04/09/2024    ALT 11 04/09/2024     PET scan 9823  IMPRESSION:  1. Intensely hypermetabolic approximately " 13 x 5 cm pleural-based left  lower lobe lung mass involving the left hilum. Smaller nodular opacities  adjacently are hypermetabolic and likely represent metastases. A 7 mm  right upper lobe nodule is photopenic. Conservative surveillance is  recommended. There is no convincing evidence for metastatic disease at  the neck, abdomen, or pelvis.  2. There are 2 hypermetabolic foci at the sigmoid colon need further  evaluation, particularly the 1.4 cm hypermetabolic polyp at the distal  sigmoid colon. Colonoscopy is recommended.  3. Nonspecific heterogeneous prostate activity. PSA follow-up is  recommended.    Assessment & Plan   *[id number] poorly differentiated carcinoma/squamous differentiation left lower lobe of the lung/small cell lung cancer on pathology at surgery  Mass discovered on low-dose CT chest screening 7/31/2023  PET scan 9/8/2023 showed a 1.3 x 5 cm left lower lobe mass involving the hilum with smaller adjacent nodular opacities, max SUV 23.2, photopenic right upper lobe nodule  Bronchoscopy with biopsy from the left lower lobe 9/6/2023 positive for poorly differentiated carcinoma with squamous differentiation; P-L1 TPS 0%  Initiated neoadjuvant treatment with carboplatin/Taxol/nivolumab 9/20/2023 and completed 3 cycles 11/1/2023  Status post left lower lobectomy 12/13/2023 complicated by prolonged and left lower lobe pneumonia.  Pathology from surgery showed significant treatment effect but residual small cell carcinoma with positive bronchial and perivascular soft tissue margins by tumor within lymphatics, positive lymph node station 10 L for small cell carcinoma x 2   2/5/2024-initiated postoperative carboplatin/-16 with concurrent radiation for small cell lung cancer/positive margin  2/26/2024-C2 carboplatin/-16 with concurrent radiation; completed radiation therapy 3/15/2024    *Anemia secondary to chemotherapy  B12/folic acid normal, ferritin 408, Iron sat 32%  Hemoglobin 9.7 today    *Immune  mediated colitis  Patient treated with high-dose steroids and taper, now off steroid therapy  No recurrence of diarrhea off steroid therapy    *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  Neuropathy symptoms stable on current chemotherapy    *MRI brain 9/11/23-2 mm enhancement left cerebellar hemisphere likely vessel artifact; MRI brain 11/27/2023-no evidence of metastatic disease, small focus of enhancement left inferior cerebellar region unchanged likely benign    *PET uptake 2 foci sigmoid colon; he will eventually need colonoscopy    *Comorbidities-hyperlipidemia      Oncology plan/recommendations:  Proceed with cycle 4 carboplatin/-16   Follow-up 2 weeks CBC CMP CT chest scan review

## 2024-04-09 ENCOUNTER — INFUSION (OUTPATIENT)
Dept: ONCOLOGY | Facility: HOSPITAL | Age: 69
End: 2024-04-09
Payer: MEDICARE

## 2024-04-09 ENCOUNTER — APPOINTMENT (OUTPATIENT)
Dept: ONCOLOGY | Facility: HOSPITAL | Age: 69
End: 2024-04-09
Payer: MEDICARE

## 2024-04-09 ENCOUNTER — OFFICE VISIT (OUTPATIENT)
Dept: ONCOLOGY | Facility: CLINIC | Age: 69
End: 2024-04-09
Payer: MEDICARE

## 2024-04-09 VITALS
HEART RATE: 100 BPM | OXYGEN SATURATION: 94 % | TEMPERATURE: 98.7 F | BODY MASS INDEX: 21.89 KG/M2 | DIASTOLIC BLOOD PRESSURE: 74 MMHG | HEIGHT: 73 IN | SYSTOLIC BLOOD PRESSURE: 118 MMHG | RESPIRATION RATE: 16 BRPM | WEIGHT: 165.2 LBS

## 2024-04-09 DIAGNOSIS — C34.92 PRIMARY SQUAMOUS CELL CARCINOMA OF LUNG, LEFT: Primary | ICD-10-CM

## 2024-04-09 DIAGNOSIS — Z79.899 HIGH RISK MEDICATION USE: ICD-10-CM

## 2024-04-09 DIAGNOSIS — C34.92 PRIMARY SQUAMOUS CELL CARCINOMA OF LUNG, LEFT: ICD-10-CM

## 2024-04-09 DIAGNOSIS — D64.81 ANEMIA ASSOCIATED WITH CHEMOTHERAPY: ICD-10-CM

## 2024-04-09 DIAGNOSIS — C34.32 CANCER OF BRONCHUS OF LEFT LOWER LOBE: Primary | ICD-10-CM

## 2024-04-09 DIAGNOSIS — T45.1X5A ANEMIA ASSOCIATED WITH CHEMOTHERAPY: ICD-10-CM

## 2024-04-09 LAB
ALBUMIN SERPL-MCNC: 3.9 G/DL (ref 3.5–5.2)
ALBUMIN/GLOB SERPL: 1 G/DL
ALP SERPL-CCNC: 79 U/L (ref 39–117)
ALT SERPL W P-5'-P-CCNC: 11 U/L (ref 1–41)
ANION GAP SERPL CALCULATED.3IONS-SCNC: 13.2 MMOL/L (ref 5–15)
AST SERPL-CCNC: 12 U/L (ref 1–40)
BASOPHILS # BLD AUTO: 0.04 10*3/MM3 (ref 0–0.2)
BASOPHILS NFR BLD AUTO: 0.6 % (ref 0–1.5)
BILIRUB SERPL-MCNC: <0.2 MG/DL (ref 0–1.2)
BUN SERPL-MCNC: 12 MG/DL (ref 8–23)
BUN/CREAT SERPL: 16.2 (ref 7–25)
CALCIUM SPEC-SCNC: 9.5 MG/DL (ref 8.6–10.5)
CHLORIDE SERPL-SCNC: 102 MMOL/L (ref 98–107)
CO2 SERPL-SCNC: 23.8 MMOL/L (ref 22–29)
CREAT SERPL-MCNC: 0.74 MG/DL (ref 0.76–1.27)
DEPRECATED RDW RBC AUTO: 60.7 FL (ref 37–54)
EGFRCR SERPLBLD CKD-EPI 2021: 99.3 ML/MIN/1.73
EOSINOPHIL # BLD AUTO: 0.07 10*3/MM3 (ref 0–0.4)
EOSINOPHIL NFR BLD AUTO: 1.1 % (ref 0.3–6.2)
ERYTHROCYTE [DISTWIDTH] IN BLOOD BY AUTOMATED COUNT: 19.1 % (ref 12.3–15.4)
GLOBULIN UR ELPH-MCNC: 3.9 GM/DL
GLUCOSE SERPL-MCNC: 135 MG/DL (ref 65–99)
HCT VFR BLD AUTO: 33.1 % (ref 37.5–51)
HGB BLD-MCNC: 9.7 G/DL (ref 13–17.7)
IMM GRANULOCYTES # BLD AUTO: 0.03 10*3/MM3 (ref 0–0.05)
IMM GRANULOCYTES NFR BLD AUTO: 0.5 % (ref 0–0.5)
LYMPHOCYTES # BLD AUTO: 1.38 10*3/MM3 (ref 0.7–3.1)
LYMPHOCYTES NFR BLD AUTO: 21 % (ref 19.6–45.3)
MCH RBC QN AUTO: 25.3 PG (ref 26.6–33)
MCHC RBC AUTO-ENTMCNC: 29.3 G/DL (ref 31.5–35.7)
MCV RBC AUTO: 86.2 FL (ref 79–97)
MONOCYTES # BLD AUTO: 1.13 10*3/MM3 (ref 0.1–0.9)
MONOCYTES NFR BLD AUTO: 17.2 % (ref 5–12)
NEUTROPHILS NFR BLD AUTO: 3.93 10*3/MM3 (ref 1.7–7)
NEUTROPHILS NFR BLD AUTO: 59.6 % (ref 42.7–76)
PLATELET # BLD AUTO: 232 10*3/MM3 (ref 140–450)
PMV BLD AUTO: 8.8 FL (ref 6–12)
POTASSIUM SERPL-SCNC: 3.9 MMOL/L (ref 3.5–5.2)
PROT SERPL-MCNC: 7.8 G/DL (ref 6–8.5)
RBC # BLD AUTO: 3.84 10*6/MM3 (ref 4.14–5.8)
SODIUM SERPL-SCNC: 139 MMOL/L (ref 136–145)
WBC NRBC COR # BLD AUTO: 6.58 10*3/MM3 (ref 3.4–10.8)

## 2024-04-09 PROCEDURE — 25810000003 SODIUM CHLORIDE 0.9 % SOLUTION 250 ML FLEX CONT: Performed by: INTERNAL MEDICINE

## 2024-04-09 PROCEDURE — 25810000003 SODIUM CHLORIDE 0.9 % SOLUTION: Performed by: INTERNAL MEDICINE

## 2024-04-09 PROCEDURE — 96417 CHEMO IV INFUS EACH ADDL SEQ: CPT

## 2024-04-09 PROCEDURE — 25010000002 PALONOSETRON 0.25 MG/5ML SOLUTION PREFILLED SYRINGE

## 2024-04-09 PROCEDURE — 25010000002 ETOPOSIDE 500 MG/25ML SOLUTION 25 ML VIAL: Performed by: INTERNAL MEDICINE

## 2024-04-09 PROCEDURE — G2211 COMPLEX E/M VISIT ADD ON: HCPCS | Performed by: INTERNAL MEDICINE

## 2024-04-09 PROCEDURE — 25010000002 CARBOPLATIN PER 50 MG: Performed by: INTERNAL MEDICINE

## 2024-04-09 PROCEDURE — 85025 COMPLETE CBC W/AUTO DIFF WBC: CPT | Performed by: NURSE PRACTITIONER

## 2024-04-09 PROCEDURE — 25010000002 FOSAPREPITANT PER 1 MG: Performed by: INTERNAL MEDICINE

## 2024-04-09 PROCEDURE — 25010000002 DEXAMETHASONE SODIUM PHOSPHATE 100 MG/10ML SOLUTION: Performed by: INTERNAL MEDICINE

## 2024-04-09 PROCEDURE — 25810000003 SODIUM CHLORIDE 0.9 % SOLUTION 500 ML FLEX CONT: Performed by: INTERNAL MEDICINE

## 2024-04-09 PROCEDURE — 96375 TX/PRO/DX INJ NEW DRUG ADDON: CPT

## 2024-04-09 PROCEDURE — 96413 CHEMO IV INFUSION 1 HR: CPT

## 2024-04-09 PROCEDURE — 80053 COMPREHEN METABOLIC PANEL: CPT | Performed by: NURSE PRACTITIONER

## 2024-04-09 PROCEDURE — 1126F AMNT PAIN NOTED NONE PRSNT: CPT | Performed by: INTERNAL MEDICINE

## 2024-04-09 PROCEDURE — 96365 THER/PROPH/DIAG IV INF INIT: CPT

## 2024-04-09 PROCEDURE — 99214 OFFICE O/P EST MOD 30 MIN: CPT | Performed by: INTERNAL MEDICINE

## 2024-04-09 RX ORDER — PROCHLORPERAZINE MALEATE 10 MG
10 TABLET ORAL ONCE
Status: CANCELLED | OUTPATIENT
Start: 2024-04-10 | End: 2024-04-10

## 2024-04-09 RX ORDER — SODIUM CHLORIDE 9 MG/ML
20 INJECTION, SOLUTION INTRAVENOUS ONCE
Status: CANCELLED | OUTPATIENT
Start: 2024-04-09

## 2024-04-09 RX ORDER — PALONOSETRON 0.05 MG/ML
INJECTION, SOLUTION INTRAVENOUS
Status: COMPLETED
Start: 2024-04-09 | End: 2024-04-09

## 2024-04-09 RX ORDER — SODIUM CHLORIDE 9 MG/ML
20 INJECTION, SOLUTION INTRAVENOUS ONCE
Status: COMPLETED | OUTPATIENT
Start: 2024-04-09 | End: 2024-04-09

## 2024-04-09 RX ORDER — PALONOSETRON 0.05 MG/ML
0.25 INJECTION, SOLUTION INTRAVENOUS ONCE
Status: COMPLETED | OUTPATIENT
Start: 2024-04-09 | End: 2024-04-09

## 2024-04-09 RX ORDER — DIPHENHYDRAMINE HYDROCHLORIDE 50 MG/ML
50 INJECTION INTRAMUSCULAR; INTRAVENOUS AS NEEDED
Status: CANCELLED | OUTPATIENT
Start: 2024-04-09

## 2024-04-09 RX ORDER — FAMOTIDINE 10 MG/ML
20 INJECTION, SOLUTION INTRAVENOUS AS NEEDED
Status: CANCELLED | OUTPATIENT
Start: 2024-04-09

## 2024-04-09 RX ORDER — SODIUM CHLORIDE 9 MG/ML
20 INJECTION, SOLUTION INTRAVENOUS ONCE
Status: CANCELLED | OUTPATIENT
Start: 2024-04-11

## 2024-04-09 RX ORDER — SODIUM CHLORIDE 9 MG/ML
20 INJECTION, SOLUTION INTRAVENOUS ONCE
Status: CANCELLED | OUTPATIENT
Start: 2024-04-10

## 2024-04-09 RX ORDER — PROCHLORPERAZINE MALEATE 10 MG
10 TABLET ORAL ONCE
Status: CANCELLED | OUTPATIENT
Start: 2024-04-11 | End: 2024-04-11

## 2024-04-09 RX ORDER — PALONOSETRON 0.05 MG/ML
0.25 INJECTION, SOLUTION INTRAVENOUS ONCE
Status: CANCELLED | OUTPATIENT
Start: 2024-04-09

## 2024-04-09 RX ADMIN — FOSAPREPITANT DIMEGLUMINE 100 ML: 150 INJECTION, POWDER, LYOPHILIZED, FOR SOLUTION INTRAVENOUS at 11:10

## 2024-04-09 RX ADMIN — ETOPOSIDE 190 MG: 20 INJECTION, SOLUTION INTRAVENOUS at 13:11

## 2024-04-09 RX ADMIN — SODIUM CHLORIDE 20 ML/HR: 9 INJECTION, SOLUTION INTRAVENOUS at 11:10

## 2024-04-09 RX ADMIN — PALONOSETRON 0.25 MG: 0.05 INJECTION, SOLUTION INTRAVENOUS at 11:10

## 2024-04-09 RX ADMIN — DEXAMETHASONE SODIUM PHOSPHATE 12 MG: 10 INJECTION, SOLUTION INTRAMUSCULAR; INTRAVENOUS at 11:46

## 2024-04-09 RX ADMIN — PALONOSETRON 0.25 MG: 0.25 INJECTION, SOLUTION INTRAVENOUS at 11:10

## 2024-04-09 RX ADMIN — CARBOPLATIN 640 MG: 10 INJECTION, SOLUTION INTRAVENOUS at 12:08

## 2024-04-10 ENCOUNTER — INFUSION (OUTPATIENT)
Dept: ONCOLOGY | Facility: HOSPITAL | Age: 69
End: 2024-04-10
Payer: MEDICARE

## 2024-04-10 VITALS — DIASTOLIC BLOOD PRESSURE: 71 MMHG | TEMPERATURE: 98.9 F | HEART RATE: 75 BPM | SYSTOLIC BLOOD PRESSURE: 127 MMHG

## 2024-04-10 DIAGNOSIS — C34.92 PRIMARY SQUAMOUS CELL CARCINOMA OF LUNG, LEFT: Primary | ICD-10-CM

## 2024-04-10 DIAGNOSIS — Z79.899 HIGH RISK MEDICATION USE: ICD-10-CM

## 2024-04-10 PROCEDURE — 63710000001 PROCHLORPERAZINE MALEATE PER 5 MG: Performed by: INTERNAL MEDICINE

## 2024-04-10 PROCEDURE — 96413 CHEMO IV INFUSION 1 HR: CPT

## 2024-04-10 PROCEDURE — 25810000003 SODIUM CHLORIDE 0.9 % SOLUTION 500 ML FLEX CONT: Performed by: INTERNAL MEDICINE

## 2024-04-10 PROCEDURE — 25010000002 ETOPOSIDE 500 MG/25ML SOLUTION 25 ML VIAL: Performed by: INTERNAL MEDICINE

## 2024-04-10 PROCEDURE — A9270 NON-COVERED ITEM OR SERVICE: HCPCS | Performed by: INTERNAL MEDICINE

## 2024-04-10 PROCEDURE — 25810000003 SODIUM CHLORIDE 0.9 % SOLUTION: Performed by: INTERNAL MEDICINE

## 2024-04-10 RX ORDER — SODIUM CHLORIDE 9 MG/ML
20 INJECTION, SOLUTION INTRAVENOUS ONCE
Status: COMPLETED | OUTPATIENT
Start: 2024-04-10 | End: 2024-04-10

## 2024-04-10 RX ORDER — PROCHLORPERAZINE MALEATE 5 MG/1
10 TABLET ORAL ONCE
Status: COMPLETED | OUTPATIENT
Start: 2024-04-10 | End: 2024-04-10

## 2024-04-10 RX ADMIN — ETOPOSIDE 190 MG: 20 INJECTION, SOLUTION INTRAVENOUS at 13:57

## 2024-04-10 RX ADMIN — PROCHLORPERAZINE MALEATE 10 MG: 5 TABLET ORAL at 13:40

## 2024-04-10 RX ADMIN — SODIUM CHLORIDE 20 ML/HR: 9 INJECTION, SOLUTION INTRAVENOUS at 13:40

## 2024-04-11 ENCOUNTER — HOSPITAL ENCOUNTER (OUTPATIENT)
Dept: CT IMAGING | Facility: HOSPITAL | Age: 69
Discharge: HOME | End: 2024-04-11
Payer: MEDICARE

## 2024-04-11 ENCOUNTER — INFUSION (OUTPATIENT)
Dept: ONCOLOGY | Facility: HOSPITAL | Age: 69
End: 2024-04-11
Payer: MEDICARE

## 2024-04-11 VITALS
HEART RATE: 73 BPM | WEIGHT: 168.2 LBS | DIASTOLIC BLOOD PRESSURE: 61 MMHG | OXYGEN SATURATION: 98 % | BODY MASS INDEX: 22.2 KG/M2 | SYSTOLIC BLOOD PRESSURE: 108 MMHG | TEMPERATURE: 97.7 F

## 2024-04-11 DIAGNOSIS — C34.92 PRIMARY SQUAMOUS CELL CARCINOMA OF LUNG, LEFT: Primary | ICD-10-CM

## 2024-04-11 DIAGNOSIS — C34.92 PRIMARY SQUAMOUS CELL CARCINOMA OF LUNG, LEFT: ICD-10-CM

## 2024-04-11 DIAGNOSIS — Z79.899 HIGH RISK MEDICATION USE: ICD-10-CM

## 2024-04-11 PROCEDURE — A9270 NON-COVERED ITEM OR SERVICE: HCPCS | Performed by: INTERNAL MEDICINE

## 2024-04-11 PROCEDURE — 25810000003 SODIUM CHLORIDE 0.9 % SOLUTION: Performed by: INTERNAL MEDICINE

## 2024-04-11 PROCEDURE — 25010000002 ETOPOSIDE 100 MG/5ML SOLUTION 5 ML VIAL: Performed by: INTERNAL MEDICINE

## 2024-04-11 PROCEDURE — 71250 CT THORAX DX C-: CPT

## 2024-04-11 PROCEDURE — 25810000003 SODIUM CHLORIDE 0.9 % SOLUTION 500 ML FLEX CONT: Performed by: INTERNAL MEDICINE

## 2024-04-11 PROCEDURE — 63710000001 PROCHLORPERAZINE MALEATE PER 5 MG: Performed by: INTERNAL MEDICINE

## 2024-04-11 PROCEDURE — 96413 CHEMO IV INFUSION 1 HR: CPT

## 2024-04-11 RX ORDER — PROCHLORPERAZINE MALEATE 5 MG/1
10 TABLET ORAL ONCE
Status: COMPLETED | OUTPATIENT
Start: 2024-04-11 | End: 2024-04-11

## 2024-04-11 RX ORDER — SODIUM CHLORIDE 9 MG/ML
20 INJECTION, SOLUTION INTRAVENOUS ONCE
Status: COMPLETED | OUTPATIENT
Start: 2024-04-11 | End: 2024-04-11

## 2024-04-11 RX ADMIN — PROCHLORPERAZINE MALEATE 10 MG: 5 TABLET ORAL at 11:55

## 2024-04-11 RX ADMIN — SODIUM CHLORIDE 20 ML/HR: 9 INJECTION, SOLUTION INTRAVENOUS at 11:54

## 2024-04-11 RX ADMIN — ETOPOSIDE 190 MG: 20 INJECTION INTRAVENOUS at 12:30

## 2024-04-12 ENCOUNTER — TELEPHONE (OUTPATIENT)
Dept: SURGERY | Facility: CLINIC | Age: 69
End: 2024-04-12
Payer: MEDICARE

## 2024-04-17 NOTE — PROGRESS NOTES
Subjective     REASON FOR CONSULTATION:  NSCLC-small cell lung cancer  Provide an opinion on any further workup or treatment                             REQUESTING PHYSICIAN:  Sonia    RECORDS OBTAINED:  Records of the patients history including those obtained from the referring provider were reviewed and summarized in detail.      History of Present Illness   The patient returns today having completed his fourth cycle of chemotherapy 2 weeks ago.  He complains of dyspnea.  Last week he was having low-grade fever and cough productive of yellow sputum for which she was prescribed Levaquin.  Those symptoms have improved.  He denies fever currently.    Oncology history:  This is a pleasant 68-year-old man with hyperlipidemia and previous tobacco abuse who underwent a screening CT of the chest on 7/31/2023 which showed a large mass in the left lower lobe of the lung extending to the hilum.  A diagnostic CT was performed 8/25/2023 showing a large left infrahilar medial mass in the lower lobe.  A PET scan on 9/8/2023 showed a left lower lobe spiculated pleural-based mass 1.3 x 5 cm involving the left hilum and major fissure SUV 23.2.  There were smaller nodular airspace opacities also hypermetabolic.  The mass encases left lower lobe bronchovascular bundle.  No hypermetabolic mediastinal or hilar lymph nodes noted.  There was a 7 mm nodule in the right upper lobe photopenic SUV 2.1.  There was no supraclavicular uptake or disease below the abdomen other than 2 abnormal foci in the sigmoid colon.  MRI of the brain on 9/11/2023 showed a subtle 2 mm focus inferior left cerebellum too small to characterize but likely a blood vessel.  The patient underwent bronchoscopy on 9/6/2023 with biopsies taken from the left lower lobe mass, lymph node station 7 and lymph node station 4R.  Pathology showed malignant cells favoring poorly differentiated carcinoma/squamous differentiation from the left lower lobe and biopsies from station  7 and 4R were negative for malignant cells.    The patient underwent neoadjuvant chemo-immunotherapy with 3 cycles of carboplatin/Taxol/nivolumab completed 11/1/2023.  After completing treatment he had immune mediated colitis requiring steroid therapy.  Treatment also complicated by peripheral neuropathy.    The patient underwent left lower lobectomy 12/13/2023 complicated by prolonged and left lower lobe pneumonia.  Pathology from surgery showed significant treatment effect but residual small cell carcinoma with positive bronchial and perivascular soft tissue margins by tumor within lymphatics, positive lymph node station 10 L for small cell carcinoma x 2.      The patient was treated with concurrent radiation and 4 cycles of carboplatin/etoposide completed 4/11/2024.  4    Past Medical History:   Diagnosis Date    Arthritis     BPH (benign prostatic hyperplasia)     Bruises easily     Bursitis of right shoulder     Cough     Hyperlipidemia     Mass of lower lobe of left lung         Past Surgical History:   Procedure Laterality Date    BRONCHOSCOPY WITH ION ROBOTIC ASSIST N/A 9/6/2023    Procedure: BRONCHOSCOPY WITH BAL;   ION ROBOT AND ENDOBRONCHIAL ULTRASOUND WITH FNA'S;  Surgeon: Rosalio Scott MD;  Location: [facility] ENDOSCOPY;  Service: Robotics - Pulmonary;  Laterality: N/A;  PRE- LEFT LOWER LOBE MASS  POST- SAME    GANGLION CYST EXCISION Bilateral     HEMORRHOIDECTOMY      LOBECTOMY Left 12/13/2023    Procedure: BRONCHOSCOPY, THORACOSCOPY WITH LYSIS OF ADHESIONS (120 MINUTES), LEFT LOWER LOBECTOMY WITH EN BLOC PARTIAL PARIETAL PLEURECTOMY USING DAVINCI ROBOT, MEDIASTINAL LYMPH NODE DISSECTION, INTERCOSTAL NERVE BLOCK;  Surgeon: Rosalio Scott MD;  Location: [facility] OR;  Service: Robotics - DaVinci;  Laterality: Left;    OTHER SURGICAL HISTORY      SOMETHING REMOVED FROM LEFT CHEST, BENIGN ? LIPOMA        Current Outpatient Medications on File Prior to Visit   Medication Sig Dispense Refill    levoFLOXacin  (Levaquin) 500 MG tablet Take 1 tablet by mouth Daily for 7 days. 7 tablet 0    lidocaine (LIDODERM) 5 % Place 1 patch on the skin as directed by provider Daily. Remove & Discard patch within 12 hours or as directed by MD 30 patch 1    O2 (OXYGEN) Inhale 1 L/min 1 (One) Time. One lpm when pt is up    2lpm o2 via n/c prn hs      ondansetron (ZOFRAN) 8 MG tablet Take 1 tablet by mouth Every 8 (Eight) Hours As Needed for Nausea or Vomiting. 30 tablet 5    pravastatin (PRAVACHOL) 20 MG tablet Take 1 tablet by mouth Every Night.      tamsulosin (FLOMAX) 0.4 MG capsule 24 hr capsule Take 1 capsule by mouth 2 (Two) Times a Day.      [DISCONTINUED] OLANZapine (zyPREXA) 5 MG tablet Take 1 tablet by mouth Every Night. Take nightly x 4 starting night of chemotherapy. 4 tablet 3     No current facility-administered medications on file prior to visit.        ALLERGIES:    Allergies   Allergen Reactions    Amoxicillin-Pot Clavulanate Hives        Social History     Socioeconomic History    Marital status:      Spouse name: Carmen   Tobacco Use    Smoking status: Former     Current packs/day: 0.00     Average packs/day: 0.5 packs/day for 40.0 years (20.0 ttl pk-yrs)     Types: Cigarettes     Start date:      Quit date:      Years since quittin.3    Smokeless tobacco: Never   Vaping Use    Vaping status: Never Used   Substance and Sexual Activity    Alcohol use: Not Currently     Comment: VERY RARELY, MAYBE 1 BEER    Drug use: Never    Sexual activity: Defer        Family History   Problem Relation Age of Onset    Bone cancer Mother     COPD Father     Brain cancer Sister     Cancer Brother         Malignant tumor of pharynx    Alcohol abuse Brother     Cirrhosis Brother     Recurrent abdominal pain Brother     Lung cancer Brother     Malig Hyperthermia Neg Hx         Review of Systems   Constitutional:  Positive for fatigue.   HENT:  Positive for dental problem.    Respiratory:  Positive for shortness of  "breath. Negative for cough.    Cardiovascular: Negative.    Gastrointestinal: Negative.    Genitourinary: Negative.    Musculoskeletal: Negative.    Neurological:  Positive for numbness.   Hematological: Negative.    Psychiatric/Behavioral:  Negative for dysphoric mood. The patient is nervous/anxious.    ROS unchanged-4/23/24      Objective     Vitals:    04/23/24 1531   BP: 125/71   Pulse: 77   Resp: 16   Temp: 98.4 °F (36.9 °C)   TempSrc: Infrared   SpO2: 100%  Comment: w/oxy   Weight: 75.6 kg (166 lb 9.6 oz)   Height: 185.4 cm (72.99\")   PainSc: 0-No pain                 4/23/2024     3:31 PM   Current Status   ECOG score 0       Physical Exam    CONSTITUTIONAL: pleasant well-developed adult man  LYMPH: no cervical or supraclavicular lad  CV: RRR, S1S2, no murmur  RESP: cta bilat, no wheezing, no ralesv  GI: soft, non-tender, no splenomegaly, +bs  MUSC: no edema, normal gait  NEURO: alert and oriented x3, normal strength  PSYCH: normal mood anxious affect  Skin: Pallor    RECENT LABS:  Hematology WBC   Date Value Ref Range Status   04/23/2024 2.37 (L) 3.40 - 10.80 10*3/mm3 Final     RBC   Date Value Ref Range Status   04/23/2024 2.85 (L) 4.14 - 5.80 10*6/mm3 Final     Hemoglobin   Date Value Ref Range Status   04/23/2024 7.4 (L) 13.0 - 17.7 g/dL Final     Hematocrit   Date Value Ref Range Status   04/23/2024 24.9 (L) 37.5 - 51.0 % Final     Platelets   Date Value Ref Range Status   04/23/2024 54 (L) 140 - 450 10*3/mm3 Final        Lab Results   Component Value Date    GLUCOSE 107 (H) 04/23/2024    BUN 14 04/23/2024    CREATININE 0.73 (L) 04/23/2024    EGFR 99.7 04/23/2024    BCR 19.2 04/23/2024    K 3.5 04/23/2024    CO2 27.4 04/23/2024    CALCIUM 9.2 04/23/2024    ALBUMIN 3.7 04/23/2024    BILITOT <0.2 04/23/2024    AST 9 04/23/2024    ALT 8 04/23/2024     PET scan 9823  IMPRESSION:  1. Intensely hypermetabolic approximately 13 x 5 cm pleural-based left  lower lobe lung mass involving the left hilum. Smaller " nodular opacities  adjacently are hypermetabolic and likely represent metastases. A 7 mm  right upper lobe nodule is photopenic. Conservative surveillance is  recommended. There is no convincing evidence for metastatic disease at  the neck, abdomen, or pelvis.  2. There are 2 hypermetabolic foci at the sigmoid colon need further  evaluation, particularly the 1.4 cm hypermetabolic polyp at the distal  sigmoid colon. Colonoscopy is recommended.  3. Nonspecific heterogeneous prostate activity. PSA follow-up is  recommended.    Assessment & Plan   *T4N0M0 poorly differentiated carcinoma/squamous differentiation left lower lobe of the lung/small cell lung cancer on pathology at surgery  Mass discovered on low-dose CT chest screening 7/31/2023  PET scan 9/8/2023 showed a 1.3 x 5 cm left lower lobe mass involving the hilum with smaller adjacent nodular opacities, max SUV 23.2, photopenic right upper lobe nodule  Bronchoscopy with biopsy from the left lower lobe 9/6/2023 positive for poorly differentiated carcinoma with squamous differentiation; P-L1 TPS 0%  Initiated neoadjuvant treatment with carboplatin/Taxol/nivolumab 9/20/2023 and completed 3 cycles 11/1/2023  Status post left lower lobectomy 12/13/2023 complicated by prolonged and left lower lobe pneumonia.  Pathology from surgery showed significant treatment effect but residual small cell carcinoma with positive bronchial and perivascular soft tissue margins by tumor within lymphatics, positive lymph node station 10 L for small cell carcinoma x 2   2/5/2024-initiated postoperative carboplatin/-16 with concurrent radiation for small cell lung cancer/positive margin  2/26/2024-C2 carboplatin/-16 with concurrent radiation; completed radiation therapy 3/15/2024; 4 cycles carboplatin/-16 completed 4/11/2024    *Anemia secondary to chemotherapy  B12/folic acid normal, ferritin 408, Iron sat 32%  Hemoglobin 7.4 today    *Immune mediated colitis  Patient treated with  high-dose steroids and taper, now off steroid therapy  No recurrence of diarrhea off steroid therapy    *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  Neuropathy symptoms stable on current chemotherapy    *MRI brain 9/11/23-2 mm enhancement left cerebellar hemisphere likely vessel artifact; MRI brain 11/27/2023-no evidence of metastatic disease, small focus of enhancement left inferior cerebellar region unchanged likely benign    *PET uptake 2 foci sigmoid colon; he will eventually need colonoscopy    *Comorbidities-hyperlipidemia      Oncology plan/recommendations:  Transfuse 2 units PRBCs  Extend Levaquin for another 5 days for neutropenia  CBC nurse review 1 week  CT chest MRI brain July  Needs referral for screening colonoscopy next visit

## 2024-04-18 ENCOUNTER — TELEPHONE (OUTPATIENT)
Dept: ONCOLOGY | Facility: CLINIC | Age: 69
End: 2024-04-18
Payer: MEDICARE

## 2024-04-18 ENCOUNTER — OFFICE VISIT (OUTPATIENT)
Dept: SURGERY | Facility: CLINIC | Age: 69
End: 2024-04-18
Payer: MEDICARE

## 2024-04-18 VITALS
HEART RATE: 108 BPM | OXYGEN SATURATION: 98 % | DIASTOLIC BLOOD PRESSURE: 66 MMHG | SYSTOLIC BLOOD PRESSURE: 120 MMHG | BODY MASS INDEX: 22.29 KG/M2 | WEIGHT: 168.2 LBS | HEIGHT: 73 IN

## 2024-04-18 DIAGNOSIS — C34.92 PRIMARY SQUAMOUS CELL CARCINOMA OF LUNG, LEFT: Primary | ICD-10-CM

## 2024-04-18 PROCEDURE — 99214 OFFICE O/P EST MOD 30 MIN: CPT | Performed by: SURGERY

## 2024-04-18 RX ORDER — LEVOFLOXACIN 500 MG/1
500 TABLET, FILM COATED ORAL DAILY
Qty: 7 TABLET | Refills: 0 | Status: SHIPPED | OUTPATIENT
Start: 2024-04-18 | End: 2024-04-25

## 2024-04-18 NOTE — TELEPHONE ENCOUNTER
Spoke with pt's daughter who reports that pt was up coughing a lot last night. Saw thoracic surgery today and they evaluated sats in office on oxygen with walking and recommended that he remain on 2 liters and utilize O2 with activity and at night. Daughter states he currently has a temp of 99.2 and on the way home from the appointment today he had a cough productive of yellowish-green sputum. She is concerned he will develop a higher fever this evening and she feels that he is weaker than his baseline. She voiced these concerns to surgeon as well. Will send message to Dr. Blackwell and his RN to see if any further intervention is needed.

## 2024-04-18 NOTE — TELEPHONE ENCOUNTER
Patient's daughter verbalized understanding of Dr. Dubon's orders to send in Levaquin 500 mg PO daily x 7 days. Encouraged her to keep us updated on his progress.

## 2024-04-18 NOTE — TELEPHONE ENCOUNTER
Provider: Jagdish  Caller: Rochelle  Relationship to Patient: child  Call Back Phone Number: [phone]  Reason for Call: He has a low grade fever, weak in his knees, and SOB

## 2024-04-23 ENCOUNTER — OFFICE VISIT (OUTPATIENT)
Dept: ONCOLOGY | Facility: CLINIC | Age: 69
End: 2024-04-23
Payer: MEDICARE

## 2024-04-23 ENCOUNTER — LAB (OUTPATIENT)
Dept: LAB | Facility: HOSPITAL | Age: 69
End: 2024-04-23
Payer: MEDICARE

## 2024-04-23 VITALS
HEIGHT: 73 IN | OXYGEN SATURATION: 100 % | SYSTOLIC BLOOD PRESSURE: 125 MMHG | RESPIRATION RATE: 16 BRPM | TEMPERATURE: 98.4 F | DIASTOLIC BLOOD PRESSURE: 71 MMHG | WEIGHT: 166.6 LBS | BODY MASS INDEX: 22.08 KG/M2 | HEART RATE: 77 BPM

## 2024-04-23 DIAGNOSIS — C34.92 PRIMARY SQUAMOUS CELL CARCINOMA OF LUNG, LEFT: ICD-10-CM

## 2024-04-23 DIAGNOSIS — T45.1X5A ANEMIA ASSOCIATED WITH CHEMOTHERAPY: ICD-10-CM

## 2024-04-23 DIAGNOSIS — D64.81 ANEMIA ASSOCIATED WITH CHEMOTHERAPY: ICD-10-CM

## 2024-04-23 DIAGNOSIS — C34.32 CANCER OF BRONCHUS OF LEFT LOWER LOBE: ICD-10-CM

## 2024-04-23 DIAGNOSIS — C34.32 CANCER OF BRONCHUS OF LEFT LOWER LOBE: Primary | ICD-10-CM

## 2024-04-23 LAB
ABO GROUP BLD: NORMAL
ALBUMIN SERPL-MCNC: 3.7 G/DL (ref 3.5–5.2)
ALBUMIN/GLOB SERPL: 1.2 G/DL
ALP SERPL-CCNC: 68 U/L (ref 39–117)
ALT SERPL W P-5'-P-CCNC: 8 U/L (ref 1–41)
ANION GAP SERPL CALCULATED.3IONS-SCNC: 7.6 MMOL/L (ref 5–15)
AST SERPL-CCNC: 9 U/L (ref 1–40)
BASOPHILS # BLD AUTO: 0.01 10*3/MM3 (ref 0–0.2)
BASOPHILS NFR BLD AUTO: 0.4 % (ref 0–1.5)
BILIRUB SERPL-MCNC: <0.2 MG/DL (ref 0–1.2)
BLD GP AB SCN SERPL QL: NEGATIVE
BUN SERPL-MCNC: 14 MG/DL (ref 8–23)
BUN/CREAT SERPL: 19.2 (ref 7–25)
CALCIUM SPEC-SCNC: 9.2 MG/DL (ref 8.6–10.5)
CHLORIDE SERPL-SCNC: 98 MMOL/L (ref 98–107)
CO2 SERPL-SCNC: 27.4 MMOL/L (ref 22–29)
CREAT SERPL-MCNC: 0.73 MG/DL (ref 0.76–1.27)
DEPRECATED RDW RBC AUTO: 60 FL (ref 37–54)
EGFRCR SERPLBLD CKD-EPI 2021: 99.7 ML/MIN/1.73
EOSINOPHIL # BLD AUTO: 0.12 10*3/MM3 (ref 0–0.4)
EOSINOPHIL NFR BLD AUTO: 5.1 % (ref 0.3–6.2)
ERYTHROCYTE [DISTWIDTH] IN BLOOD BY AUTOMATED COUNT: 18.9 % (ref 12.3–15.4)
GLOBULIN UR ELPH-MCNC: 3.2 GM/DL
GLUCOSE SERPL-MCNC: 107 MG/DL (ref 65–99)
HCT VFR BLD AUTO: 24.9 % (ref 37.5–51)
HGB BLD-MCNC: 7.4 G/DL (ref 13–17.7)
IMM GRANULOCYTES # BLD AUTO: 0 10*3/MM3 (ref 0–0.05)
IMM GRANULOCYTES NFR BLD AUTO: 0 % (ref 0–0.5)
LYMPHOCYTES # BLD AUTO: 0.96 10*3/MM3 (ref 0.7–3.1)
LYMPHOCYTES NFR BLD AUTO: 40.5 % (ref 19.6–45.3)
MCH RBC QN AUTO: 26 PG (ref 26.6–33)
MCHC RBC AUTO-ENTMCNC: 29.7 G/DL (ref 31.5–35.7)
MCV RBC AUTO: 87.4 FL (ref 79–97)
MONOCYTES # BLD AUTO: 0.45 10*3/MM3 (ref 0.1–0.9)
MONOCYTES NFR BLD AUTO: 19 % (ref 5–12)
NEUTROPHILS NFR BLD AUTO: 0.83 10*3/MM3 (ref 1.7–7)
NEUTROPHILS NFR BLD AUTO: 35 % (ref 42.7–76)
PLATELET # BLD AUTO: 54 10*3/MM3 (ref 140–450)
PMV BLD AUTO: 10.4 FL (ref 6–12)
POTASSIUM SERPL-SCNC: 3.5 MMOL/L (ref 3.5–5.2)
PROT SERPL-MCNC: 6.9 G/DL (ref 6–8.5)
RBC # BLD AUTO: 2.85 10*6/MM3 (ref 4.14–5.8)
RH BLD: POSITIVE
SODIUM SERPL-SCNC: 133 MMOL/L (ref 136–145)
T&S EXPIRATION DATE: NORMAL
WBC NRBC COR # BLD AUTO: 2.37 10*3/MM3 (ref 3.4–10.8)

## 2024-04-23 PROCEDURE — 86850 RBC ANTIBODY SCREEN: CPT | Performed by: INTERNAL MEDICINE

## 2024-04-23 PROCEDURE — 80053 COMPREHEN METABOLIC PANEL: CPT | Performed by: INTERNAL MEDICINE

## 2024-04-23 PROCEDURE — 36415 COLL VENOUS BLD VENIPUNCTURE: CPT

## 2024-04-23 PROCEDURE — 86923 COMPATIBILITY TEST ELECTRIC: CPT

## 2024-04-23 PROCEDURE — G2211 COMPLEX E/M VISIT ADD ON: HCPCS | Performed by: INTERNAL MEDICINE

## 2024-04-23 PROCEDURE — 85025 COMPLETE CBC W/AUTO DIFF WBC: CPT | Performed by: INTERNAL MEDICINE

## 2024-04-23 PROCEDURE — 86901 BLOOD TYPING SEROLOGIC RH(D): CPT | Performed by: INTERNAL MEDICINE

## 2024-04-23 PROCEDURE — 99214 OFFICE O/P EST MOD 30 MIN: CPT | Performed by: INTERNAL MEDICINE

## 2024-04-23 PROCEDURE — 1126F AMNT PAIN NOTED NONE PRSNT: CPT | Performed by: INTERNAL MEDICINE

## 2024-04-23 PROCEDURE — 86900 BLOOD TYPING SEROLOGIC ABO: CPT | Performed by: INTERNAL MEDICINE

## 2024-04-23 RX ORDER — DIPHENHYDRAMINE HCL 25 MG
25 CAPSULE ORAL ONCE
Status: CANCELLED | OUTPATIENT
Start: 2024-04-23 | End: 2024-04-23

## 2024-04-23 RX ORDER — LEVOFLOXACIN 500 MG/1
500 TABLET, FILM COATED ORAL DAILY
Qty: 5 TABLET | Refills: 0 | Status: SHIPPED | OUTPATIENT
Start: 2024-04-23 | End: 2024-04-28

## 2024-04-23 RX ORDER — ACETAMINOPHEN 325 MG/1
650 TABLET ORAL ONCE
Status: CANCELLED | OUTPATIENT
Start: 2024-04-23 | End: 2024-04-23

## 2024-04-23 RX ORDER — SODIUM CHLORIDE 9 MG/ML
250 INJECTION, SOLUTION INTRAVENOUS AS NEEDED
Status: CANCELLED | OUTPATIENT
Start: 2024-04-23

## 2024-04-24 ENCOUNTER — HOSPITAL ENCOUNTER (OUTPATIENT)
Dept: INFUSION THERAPY | Facility: HOSPITAL | Age: 69
Discharge: HOME | End: 2024-04-24
Admitting: INTERNAL MEDICINE
Payer: MEDICARE

## 2024-04-24 VITALS
TEMPERATURE: 98.3 F | RESPIRATION RATE: 16 BRPM | HEART RATE: 70 BPM | OXYGEN SATURATION: 100 % | DIASTOLIC BLOOD PRESSURE: 68 MMHG | SYSTOLIC BLOOD PRESSURE: 119 MMHG

## 2024-04-24 DIAGNOSIS — T45.1X5A ANEMIA ASSOCIATED WITH CHEMOTHERAPY: ICD-10-CM

## 2024-04-24 DIAGNOSIS — D64.81 ANEMIA ASSOCIATED WITH CHEMOTHERAPY: ICD-10-CM

## 2024-04-24 DIAGNOSIS — D64.9 SYMPTOMATIC ANEMIA: Primary | ICD-10-CM

## 2024-04-24 PROCEDURE — 86900 BLOOD TYPING SEROLOGIC ABO: CPT

## 2024-04-24 PROCEDURE — 36430 TRANSFUSION BLD/BLD COMPNT: CPT

## 2024-04-24 PROCEDURE — 25810000003 SODIUM CHLORIDE 0.9 % SOLUTION

## 2024-04-24 PROCEDURE — 63710000001 DIPHENHYDRAMINE PER 50 MG: Performed by: INTERNAL MEDICINE

## 2024-04-24 PROCEDURE — A9270 NON-COVERED ITEM OR SERVICE: HCPCS | Performed by: INTERNAL MEDICINE

## 2024-04-24 PROCEDURE — P9016 RBC LEUKOCYTES REDUCED: HCPCS

## 2024-04-24 RX ORDER — SODIUM CHLORIDE 9 MG/ML
250 INJECTION, SOLUTION INTRAVENOUS AS NEEDED
Status: DISCONTINUED | OUTPATIENT
Start: 2024-04-24 | End: 2024-04-27 | Stop reason: HOSPADM

## 2024-04-24 RX ORDER — ACETAMINOPHEN 325 MG/1
650 TABLET ORAL ONCE
Status: DISCONTINUED | OUTPATIENT
Start: 2024-04-24 | End: 2024-04-27 | Stop reason: HOSPADM

## 2024-04-24 RX ORDER — DIPHENHYDRAMINE HCL 25 MG
25 CAPSULE ORAL ONCE
Status: COMPLETED | OUTPATIENT
Start: 2024-04-24 | End: 2024-04-24

## 2024-04-24 RX ORDER — SODIUM CHLORIDE 9 MG/ML
INJECTION, SOLUTION INTRAVENOUS
Status: COMPLETED
Start: 2024-04-24 | End: 2024-04-24

## 2024-04-24 RX ADMIN — SODIUM CHLORIDE 250 ML: 9 INJECTION, SOLUTION INTRAVENOUS at 12:27

## 2024-04-24 RX ADMIN — DIPHENHYDRAMINE HYDROCHLORIDE 25 MG: 25 CAPSULE ORAL at 09:25

## 2024-04-24 NOTE — NURSING NOTE
Patient arrived to [facility] at 0900.  History and medications reviewed with patient.  Premeds as ordered and 2 units PRBC administered without complications.  AVS discussed and copy given to patient.  Patient discharged at 1548 in stable condition without complaint.

## 2024-04-24 NOTE — PATIENT INSTRUCTIONS
"  Call Dr. Greg Dubon, [facility] at [phone] if you have any problems or concerns.    We know you have a Choice in healthcare and appreciate you using [facility].  Our purpose is to provide you \"Excellent Care\".  We hope that you will always choose us in the future and continue to recommend us to your family and friends.             "

## 2024-04-25 LAB
BH BB BLOOD EXPIRATION DATE: NORMAL
BH BB BLOOD TYPE BARCODE: 5100
BH BB BLOOD TYPE BARCODE: 6200
BH BB BLOOD TYPE BARCODE: 6200
BH BB DISPENSE STATUS: NORMAL
BH BB PRODUCT CODE: NORMAL
BH BB UNIT NUMBER: NORMAL
CROSSMATCH INTERPRETATION: NORMAL
UNIT  ABO: NORMAL
UNIT  RH: NORMAL

## 2024-04-29 ENCOUNTER — PATIENT OUTREACH (OUTPATIENT)
Dept: OTHER | Facility: HOSPITAL | Age: 69
End: 2024-04-29
Payer: MEDICARE

## 2024-04-29 NOTE — PROGRESS NOTES
Reviewed chart. Patient with T4N0M0 poorly differentiated carcinoma/squamous differentiation left lower lobe of the lung/small cell lung cancer, s/p left lower lobectomy 12/13/2023; completed radiation therapy 3/15/2024. Rec'd cycle 4 carboplatin/-16 4/9. CT scan 4/11, saw  4/18, next CT scan 7/17. Saw  4/23, was anemic, required blood transfusion. Labs tomorrow, MRI 7/24/24. Sees Dr. Scott 7/25 and Dr. Dubon 7/30.    Called patient. S/w his wife; he is resting.  We discussed his recent appts and low blood counts. He is still wearing oxygen although not all the time. The patient's wife states he is eating ok.     The patient's wife denies any questions/concerns or ongoing resource needs. I will call after his appts in July; encouraged patient/wife to call as needed.

## 2024-04-30 ENCOUNTER — LAB (OUTPATIENT)
Dept: LAB | Facility: HOSPITAL | Age: 69
End: 2024-04-30
Payer: MEDICARE

## 2024-04-30 ENCOUNTER — CLINICAL SUPPORT (OUTPATIENT)
Dept: ONCOLOGY | Facility: HOSPITAL | Age: 69
End: 2024-04-30
Payer: MEDICARE

## 2024-04-30 DIAGNOSIS — D64.81 ANEMIA ASSOCIATED WITH CHEMOTHERAPY: ICD-10-CM

## 2024-04-30 DIAGNOSIS — T45.1X5A ANEMIA ASSOCIATED WITH CHEMOTHERAPY: ICD-10-CM

## 2024-04-30 LAB
BASOPHILS # BLD AUTO: 0.06 10*3/MM3 (ref 0–0.2)
BASOPHILS NFR BLD AUTO: 1.3 % (ref 0–1.5)
DEPRECATED RDW RBC AUTO: 56.1 FL (ref 37–54)
EOSINOPHIL # BLD AUTO: 0.17 10*3/MM3 (ref 0–0.4)
EOSINOPHIL NFR BLD AUTO: 3.8 % (ref 0.3–6.2)
ERYTHROCYTE [DISTWIDTH] IN BLOOD BY AUTOMATED COUNT: 18.2 % (ref 12.3–15.4)
HCT VFR BLD AUTO: 37 % (ref 37.5–51)
HGB BLD-MCNC: 11.7 G/DL (ref 13–17.7)
IMM GRANULOCYTES # BLD AUTO: 0.02 10*3/MM3 (ref 0–0.05)
IMM GRANULOCYTES NFR BLD AUTO: 0.4 % (ref 0–0.5)
LYMPHOCYTES # BLD AUTO: 1.42 10*3/MM3 (ref 0.7–3.1)
LYMPHOCYTES NFR BLD AUTO: 31.9 % (ref 19.6–45.3)
MCH RBC QN AUTO: 27.1 PG (ref 26.6–33)
MCHC RBC AUTO-ENTMCNC: 31.6 G/DL (ref 31.5–35.7)
MCV RBC AUTO: 85.6 FL (ref 79–97)
MONOCYTES # BLD AUTO: 1.22 10*3/MM3 (ref 0.1–0.9)
MONOCYTES NFR BLD AUTO: 27.4 % (ref 5–12)
NEUTROPHILS NFR BLD AUTO: 1.56 10*3/MM3 (ref 1.7–7)
NEUTROPHILS NFR BLD AUTO: 35.2 % (ref 42.7–76)
NRBC BLD AUTO-RTO: 0 /100 WBC (ref 0–0.2)
PLATELET # BLD AUTO: 163 10*3/MM3 (ref 140–450)
PMV BLD AUTO: 10.2 FL (ref 6–12)
RBC # BLD AUTO: 4.32 10*6/MM3 (ref 4.14–5.8)
WBC NRBC COR # BLD AUTO: 4.45 10*3/MM3 (ref 3.4–10.8)

## 2024-04-30 PROCEDURE — 85025 COMPLETE CBC W/AUTO DIFF WBC: CPT | Performed by: INTERNAL MEDICINE

## 2024-04-30 PROCEDURE — G0463 HOSPITAL OUTPT CLINIC VISIT: HCPCS | Performed by: INTERNAL MEDICINE

## 2024-04-30 PROCEDURE — 36415 COLL VENOUS BLD VENIPUNCTURE: CPT

## 2024-04-30 NOTE — NURSING NOTE
Lab results discussed with patient and daughter.  Hgb increased to 11.7 after two units of blood.  Wbc 4.45 and anc  1.56  Platelet count 163  .  Pt  to call for any issues or problems. Pt V/U.

## 2024-05-20 ENCOUNTER — TELEPHONE (OUTPATIENT)
Dept: ONCOLOGY | Facility: CLINIC | Age: 69
End: 2024-05-20
Payer: MEDICARE

## 2024-05-20 DIAGNOSIS — C34.32 CANCER OF BRONCHUS OF LEFT LOWER LOBE: Primary | ICD-10-CM

## 2024-05-20 DIAGNOSIS — R05.1 ACUTE COUGH: ICD-10-CM

## 2024-05-20 NOTE — TELEPHONE ENCOUNTER
Called the daughter to see if she would be willing to have the patient come in at 9 for a chest xray at [place] as these are walk in and then have labs in [facility] office at 10am and see Coni at 10:20am. The daughter stated this would be fine and scheduling was made aware. She v/u.

## 2024-05-20 NOTE — TELEPHONE ENCOUNTER
Provider: Dr. Dubon  Caller: Rochelle  Relationship to Patient: Daughter  Call Back Phone Number: [phone]  Reason for Call: Rochelle has cough, shortness of breath, yellow/green phlegm     .

## 2024-05-21 ENCOUNTER — LAB (OUTPATIENT)
Dept: LAB | Facility: HOSPITAL | Age: 69
End: 2024-05-21
Payer: MEDICARE

## 2024-05-21 ENCOUNTER — OFFICE VISIT (OUTPATIENT)
Dept: ONCOLOGY | Facility: CLINIC | Age: 69
End: 2024-05-21
Payer: MEDICARE

## 2024-05-21 ENCOUNTER — HOSPITAL ENCOUNTER (OUTPATIENT)
Dept: GENERAL RADIOLOGY | Facility: HOSPITAL | Age: 69
Discharge: HOME | End: 2024-05-21
Payer: MEDICARE

## 2024-05-21 VITALS
OXYGEN SATURATION: 97 % | HEART RATE: 77 BPM | BODY MASS INDEX: 22.29 KG/M2 | RESPIRATION RATE: 16 BRPM | DIASTOLIC BLOOD PRESSURE: 76 MMHG | HEIGHT: 73 IN | TEMPERATURE: 98.7 F | WEIGHT: 168.2 LBS | SYSTOLIC BLOOD PRESSURE: 133 MMHG

## 2024-05-21 DIAGNOSIS — C34.92 PRIMARY SQUAMOUS CELL CARCINOMA OF LUNG, LEFT: ICD-10-CM

## 2024-05-21 DIAGNOSIS — C34.32 CANCER OF BRONCHUS OF LEFT LOWER LOBE: ICD-10-CM

## 2024-05-21 DIAGNOSIS — R05.1 ACUTE COUGH: ICD-10-CM

## 2024-05-21 DIAGNOSIS — C34.32 CANCER OF BRONCHUS OF LEFT LOWER LOBE: Primary | ICD-10-CM

## 2024-05-21 LAB
ALBUMIN SERPL-MCNC: 4.2 G/DL (ref 3.5–5.2)
ALBUMIN/GLOB SERPL: 1.3 G/DL
ALP SERPL-CCNC: 65 U/L (ref 39–117)
ALT SERPL W P-5'-P-CCNC: 10 U/L (ref 1–41)
ANION GAP SERPL CALCULATED.3IONS-SCNC: 10.2 MMOL/L (ref 5–15)
AST SERPL-CCNC: 18 U/L (ref 1–40)
BASOPHILS # BLD AUTO: 0.04 10*3/MM3 (ref 0–0.2)
BASOPHILS NFR BLD AUTO: 0.6 % (ref 0–1.5)
BILIRUB SERPL-MCNC: 0.3 MG/DL (ref 0–1.2)
BUN SERPL-MCNC: 12 MG/DL (ref 8–23)
BUN/CREAT SERPL: 17.1 (ref 7–25)
CALCIUM SPEC-SCNC: 9.2 MG/DL (ref 8.6–10.5)
CHLORIDE SERPL-SCNC: 101 MMOL/L (ref 98–107)
CO2 SERPL-SCNC: 25.8 MMOL/L (ref 22–29)
CREAT SERPL-MCNC: 0.7 MG/DL (ref 0.76–1.27)
DEPRECATED RDW RBC AUTO: 55 FL (ref 37–54)
EGFRCR SERPLBLD CKD-EPI 2021: 101 ML/MIN/1.73
EOSINOPHIL # BLD AUTO: 0.47 10*3/MM3 (ref 0–0.4)
EOSINOPHIL NFR BLD AUTO: 7.1 % (ref 0.3–6.2)
ERYTHROCYTE [DISTWIDTH] IN BLOOD BY AUTOMATED COUNT: 16.5 % (ref 12.3–15.4)
GLOBULIN UR ELPH-MCNC: 3.2 GM/DL
GLUCOSE SERPL-MCNC: 129 MG/DL (ref 65–99)
HCT VFR BLD AUTO: 37.9 % (ref 37.5–51)
HGB BLD-MCNC: 11.6 G/DL (ref 13–17.7)
IMM GRANULOCYTES # BLD AUTO: 0.01 10*3/MM3 (ref 0–0.05)
IMM GRANULOCYTES NFR BLD AUTO: 0.2 % (ref 0–0.5)
LYMPHOCYTES # BLD AUTO: 1.27 10*3/MM3 (ref 0.7–3.1)
LYMPHOCYTES NFR BLD AUTO: 19.1 % (ref 19.6–45.3)
MCH RBC QN AUTO: 27.2 PG (ref 26.6–33)
MCHC RBC AUTO-ENTMCNC: 30.6 G/DL (ref 31.5–35.7)
MCV RBC AUTO: 89 FL (ref 79–97)
MONOCYTES # BLD AUTO: 0.66 10*3/MM3 (ref 0.1–0.9)
MONOCYTES NFR BLD AUTO: 9.9 % (ref 5–12)
NEUTROPHILS NFR BLD AUTO: 4.2 10*3/MM3 (ref 1.7–7)
NEUTROPHILS NFR BLD AUTO: 63.1 % (ref 42.7–76)
PLATELET # BLD AUTO: 262 10*3/MM3 (ref 140–450)
PMV BLD AUTO: 9.2 FL (ref 6–12)
POTASSIUM SERPL-SCNC: 3.8 MMOL/L (ref 3.5–5.2)
PROT SERPL-MCNC: 7.4 G/DL (ref 6–8.5)
RBC # BLD AUTO: 4.26 10*6/MM3 (ref 4.14–5.8)
SODIUM SERPL-SCNC: 137 MMOL/L (ref 136–145)
WBC NRBC COR # BLD AUTO: 6.65 10*3/MM3 (ref 3.4–10.8)

## 2024-05-21 PROCEDURE — 80053 COMPREHEN METABOLIC PANEL: CPT | Performed by: NURSE PRACTITIONER

## 2024-05-21 PROCEDURE — 71046 X-RAY EXAM CHEST 2 VIEWS: CPT

## 2024-05-21 PROCEDURE — 85025 COMPLETE CBC W/AUTO DIFF WBC: CPT | Performed by: NURSE PRACTITIONER

## 2024-05-21 PROCEDURE — 36415 COLL VENOUS BLD VENIPUNCTURE: CPT

## 2024-05-21 RX ORDER — METHYLPREDNISOLONE 4 MG/1
TABLET ORAL
Qty: 21 TABLET | Refills: 0 | Status: SHIPPED | OUTPATIENT
Start: 2024-05-21

## 2024-05-21 RX ORDER — ALBUTEROL SULFATE 90 UG/1
2 AEROSOL, METERED RESPIRATORY (INHALATION) EVERY 4 HOURS PRN
Qty: 18 G | Refills: 1 | Status: SHIPPED | OUTPATIENT
Start: 2024-05-21

## 2024-05-21 RX ORDER — AZITHROMYCIN 250 MG/1
TABLET, FILM COATED ORAL
Qty: 6 TABLET | Refills: 0 | Status: SHIPPED | OUTPATIENT
Start: 2024-05-21

## 2024-05-21 NOTE — PROGRESS NOTES
Subjective     REASON FOR CONSULTATION:  NSCLC-small cell lung cancer  Provide an opinion on any further workup or treatment                             REQUESTING PHYSICIAN:  Sonia    RECORDS OBTAINED:  Records of the patients history including those obtained from the referring provider were reviewed and summarized in detail.      History of Present Illness   Patient comes in today for triage visit having completed his fourth cycle of carboplatin and etoposide on 4/11/2024.  Mid April patient had a cough, low-grade fever and felt weak at which time he was prescribed Levaquin for 7 days.  He was seen in the office on 4/23/2024 with hemoglobin down to 7.4, platelets 54,000, ANC 0.83.  Levaquin was extended for additional 5 days he was also ordered 2 unit PRBC transfusion.  Patient's daughter messaged into the office yesterday reporting patient with yellow to green sputum and continued shortness of breath and cough.  He remains on oxygen.  Patient is becoming short of breath with walking within the home.  He is also having difficulty leaving the due to not having portable oxygen except for one on wheels which is hard for him to maneuver.    Oncology history:  This is a pleasant 68-year-old man with hyperlipidemia and previous tobacco abuse who underwent a screening CT of the chest on 7/31/2023 which showed a large mass in the left lower lobe of the lung extending to the hilum.  A diagnostic CT was performed 8/25/2023 showing a large left infrahilar medial mass in the lower lobe.  A PET scan on 9/8/2023 showed a left lower lobe spiculated pleural-based mass 1.3 x 5 cm involving the left hilum and major fissure SUV 23.2.  There were smaller nodular airspace opacities also hypermetabolic.  The mass encases left lower lobe bronchovascular bundle.  No hypermetabolic mediastinal or hilar lymph nodes noted.  There was a 7 mm nodule in the right upper lobe photopenic SUV 2.1.  There was no supraclavicular uptake or disease  below the abdomen other than 2 abnormal foci in the sigmoid colon.  MRI of the brain on 9/11/2023 showed a subtle 2 mm focus inferior left cerebellum too small to characterize but likely a blood vessel.  The patient underwent bronchoscopy on 9/6/2023 with biopsies taken from the left lower lobe mass, lymph node station 7 and lymph node station 4R.  Pathology showed malignant cells favoring poorly differentiated carcinoma/squamous differentiation from the left lower lobe and biopsies from station 7 and 4R were negative for malignant cells.    The patient underwent neoadjuvant chemo-immunotherapy with 3 cycles of carboplatin/Taxol/nivolumab completed 11/1/2023.  After completing treatment he had immune mediated colitis requiring steroid therapy.  Treatment also complicated by peripheral neuropathy.    The patient underwent left lower lobectomy 12/13/2023 complicated by prolonged and left lower lobe pneumonia.  Pathology from surgery showed significant treatment effect but residual small cell carcinoma with positive bronchial and perivascular soft tissue margins by tumor within lymphatics, positive lymph node station 10 L for small cell carcinoma x 2.      The patient was treated with concurrent radiation and 4 cycles of carboplatin/etoposide completed 4/11/2024.  4    Past Medical History:   Diagnosis Date    Arthritis     BPH (benign prostatic hyperplasia)     Bruises easily     Bursitis of right shoulder     Cough     Hyperlipidemia     Mass of lower lobe of left lung         Past Surgical History:   Procedure Laterality Date    BRONCHOSCOPY WITH ION ROBOTIC ASSIST N/A 9/6/2023    Procedure: BRONCHOSCOPY WITH BAL;   ION ROBOT AND ENDOBRONCHIAL ULTRASOUND WITH FNA'S;  Surgeon: Rosalio Scott MD;  Location: [facility] ENDOSCOPY;  Service: Robotics - Pulmonary;  Laterality: N/A;  PRE- LEFT LOWER LOBE MASS  POST- SAME    GANGLION CYST EXCISION Bilateral     HEMORRHOIDECTOMY      LOBECTOMY Left 12/13/2023    Procedure:  BRONCHOSCOPY, THORACOSCOPY WITH LYSIS OF ADHESIONS (120 MINUTES), LEFT LOWER LOBECTOMY WITH EN BLOC PARTIAL PARIETAL PLEURECTOMY USING DAVINCI ROBOT, MEDIASTINAL LYMPH NODE DISSECTION, INTERCOSTAL NERVE BLOCK;  Surgeon: Rosalio Scott MD;  Location: [facility];  Service: Robotics - DaVinci;  Laterality: Left;    OTHER SURGICAL HISTORY      SOMETHING REMOVED FROM LEFT CHEST, BENIGN ? LIPOMA        Current Outpatient Medications on File Prior to Visit   Medication Sig Dispense Refill    lidocaine (LIDODERM) 5 % Place 1 patch on the skin as directed by provider Daily. Remove & Discard patch within 12 hours or as directed by MD 30 patch 1    O2 (OXYGEN) Inhale 1 L/min 1 (One) Time. One lpm when pt is up    2lpm o2 via n/c prn hs      ondansetron (ZOFRAN) 8 MG tablet Take 1 tablet by mouth Every 8 (Eight) Hours As Needed for Nausea or Vomiting. 30 tablet 5    pravastatin (PRAVACHOL) 20 MG tablet Take 1 tablet by mouth Every Night.      tamsulosin (FLOMAX) 0.4 MG capsule 24 hr capsule Take 1 capsule by mouth 2 (Two) Times a Day.       No current facility-administered medications on file prior to visit.        ALLERGIES:    Allergies   Allergen Reactions    Amoxicillin-Pot Clavulanate Hives        Social History     Socioeconomic History    Marital status:      Spouse name: Carmen   Tobacco Use    Smoking status: Former     Current packs/day: 0.00     Average packs/day: 0.5 packs/day for 40.0 years (20.0 ttl pk-yrs)     Types: Cigarettes     Start date:      Quit date: 2016     Years since quittin.3    Smokeless tobacco: Never   Vaping Use    Vaping status: Never Used   Substance and Sexual Activity    Alcohol use: Not Currently     Comment: VERY RARELY, MAYBE 1 BEER    Drug use: Never    Sexual activity: Defer        Family History   Problem Relation Age of Onset    Bone cancer Mother     COPD Father     Brain cancer Sister     Cancer Brother         Malignant tumor of pharynx    Alcohol abuse Brother   "   Cirrhosis Brother     Recurrent abdominal pain Brother     Lung cancer Brother     Malig Hyperthermia Neg Hx         Review of Systems   Constitutional:  Positive for fatigue.   HENT:  Positive for dental problem.    Respiratory:  Positive for cough and shortness of breath.    Cardiovascular: Negative.    Gastrointestinal: Negative.    Genitourinary: Negative.    Musculoskeletal: Negative.    Neurological:  Positive for numbness.   Hematological: Negative.    Psychiatric/Behavioral:  Negative for dysphoric mood. The patient is nervous/anxious.          Objective     Vitals:    05/21/24 0930   BP: 133/76   Pulse: 77   Resp: 16   Temp: 98.7 °F (37.1 °C)   TempSrc: Infrared   SpO2: 97%   Weight: 76.3 kg (168 lb 3.2 oz)   Height: 185.4 cm (72.99\")   PainSc: 0-No pain                 5/21/2024     9:37 AM   Current Status   ECOG score 1       Physical Exam    CONSTITUTIONAL: pleasant well-developed adult man  LYMPH: no cervical or supraclavicular lad  CV: RRR, S1S2, no murmur  RESP: Lungs diminished bilaterally with wheezing throughout  GI: soft, non-tender, no splenomegaly, +bs  MUSC: no edema, normal gait  NEURO: alert and oriented x3, normal strength  PSYCH: normal mood anxious affect  Skin: Pallor    RECENT LABS:  Hematology WBC   Date Value Ref Range Status   05/21/2024 6.65 3.40 - 10.80 10*3/mm3 Final     RBC   Date Value Ref Range Status   05/21/2024 4.26 4.14 - 5.80 10*6/mm3 Final     Hemoglobin   Date Value Ref Range Status   05/21/2024 11.6 (L) 13.0 - 17.7 g/dL Final     Hematocrit   Date Value Ref Range Status   05/21/2024 37.9 37.5 - 51.0 % Final     Platelets   Date Value Ref Range Status   05/21/2024 262 140 - 450 10*3/mm3 Final        Lab Results   Component Value Date    GLUCOSE 129 (H) 05/21/2024    BUN 12 05/21/2024    CREATININE 0.70 (L) 05/21/2024    EGFR 101.0 05/21/2024    BCR 17.1 05/21/2024    K 3.8 05/21/2024    CO2 25.8 05/21/2024    CALCIUM 9.2 05/21/2024    ALBUMIN 4.2 05/21/2024    BILITOT " 0.3 05/21/2024    AST 18 05/21/2024    ALT 10 05/21/2024     PET scan 9823  IMPRESSION:  1. Intensely hypermetabolic approximately 13 x 5 cm pleural-based left  lower lobe lung mass involving the left hilum. Smaller nodular opacities  adjacently are hypermetabolic and likely represent metastases. A 7 mm  right upper lobe nodule is photopenic. Conservative surveillance is  recommended. There is no convincing evidence for metastatic disease at  the neck, abdomen, or pelvis.  2. There are 2 hypermetabolic foci at the sigmoid colon need further  evaluation, particularly the 1.4 cm hypermetabolic polyp at the distal  sigmoid colon. Colonoscopy is recommended.  3. Nonspecific heterogeneous prostate activity. PSA follow-up is  recommended.    Assessment & Plan   *T4N0M0 poorly differentiated carcinoma/squamous differentiation left lower lobe of the lung/small cell lung cancer on pathology at surgery  Mass discovered on low-dose CT chest screening 7/31/2023  PET scan 9/8/2023 showed a 1.3 x 5 cm left lower lobe mass involving the hilum with smaller adjacent nodular opacities, max SUV 23.2, photopenic right upper lobe nodule  Bronchoscopy with biopsy from the left lower lobe 9/6/2023 positive for poorly differentiated carcinoma with squamous differentiation; P-L1 TPS 0%  Initiated neoadjuvant treatment with carboplatin/Taxol/nivolumab 9/20/2023 and completed 3 cycles 11/1/2023  Status post left lower lobectomy 12/13/2023 complicated by prolonged and left lower lobe pneumonia.  Pathology from surgery showed significant treatment effect but residual small cell carcinoma with positive bronchial and perivascular soft tissue margins by tumor within lymphatics, positive lymph node station 10 L for small cell carcinoma x 2   2/5/2024-initiated postoperative carboplatin/-16 with concurrent radiation for small cell lung cancer/positive margin  2/26/2024-C2 carboplatin/-16 with concurrent radiation; completed radiation therapy  3/15/2024; 4 cycles carboplatin/-16 completed 4/11/2024    *Anemia secondary to chemotherapy  B12/folic acid normal, ferritin 408, Iron sat 32%  Hemoglobin 7.4 4/23/2024, patient transfused 2 units  5/21/2024 hemoglobin 11.6    *Immune mediated colitis  Patient treated with high-dose steroids and taper, now off steroid therapy  No recurrence of diarrhea off steroid therapy    *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  Neuropathy symptoms stable on current chemotherapy    *MRI brain 9/11/23-2 mm enhancement left cerebellar hemisphere likely vessel artifact; MRI brain 11/27/2023-no evidence of metastatic disease, small focus of enhancement left inferior cerebellar region unchanged likely benign    *PET uptake 2 foci sigmoid colon; he will eventually need colonoscopy    *Comorbidities-hyperlipidemia    *Cough and shortness of breath  Treated with Levaquin 4/18/2024 with improving symptoms  Seen 4/23/2024 with neutropenia so Levaquin extended additional 5 days  5/21/2024: Daughter called in yesterday reporting patient still with green to yellow mucus, cough, and shortness of breath.  Chest x-ray performed this morning  Showed chronic changes without any acute process.  On exam however he has wheezing throughout which is not typical for him.  We discussed need to treat with steroid Dosepak, albuterol inhaler, and Z-Eamon.  Patient and family agreeable.  Patient taught how to utilize the inhaler.  They are asked to call for any worsening symptoms or new onset fevers.    Oncology plan/recommendations:  Medrol Dosepak  Albuterol inhaler every 4 hours as needed for wheezing, suggested using it at least 3 times a day for the next 3 days then decreasing to twice daily if symptoms are improving.  Z-Eamon  Patient will follow-up in 2 weeks to make sure symptoms have improved and call in the interim for any worsening or lack of improvement  Patient encouraged to do deep breathing to make sure he is moving around.  CT chest MRI  brain July  Needs referral for screening colonoscopy next visit

## 2024-07-17 ENCOUNTER — HOSPITAL ENCOUNTER (OUTPATIENT)
Dept: CT IMAGING | Facility: HOSPITAL | Age: 69
Discharge: HOME | End: 2024-07-17
Admitting: SURGERY
Payer: MEDICARE

## 2024-07-17 ENCOUNTER — OFFICE VISIT (OUTPATIENT)
Dept: CARDIOLOGY | Facility: CLINIC | Age: 69
End: 2024-07-17
Payer: MEDICARE

## 2024-07-17 VITALS
SYSTOLIC BLOOD PRESSURE: 144 MMHG | DIASTOLIC BLOOD PRESSURE: 62 MMHG | WEIGHT: 170 LBS | BODY MASS INDEX: 22.53 KG/M2 | HEIGHT: 73 IN | HEART RATE: 64 BPM | RESPIRATION RATE: 20 BRPM | OXYGEN SATURATION: 97 %

## 2024-07-17 DIAGNOSIS — C34.32 CANCER OF BRONCHUS OF LEFT LOWER LOBE: ICD-10-CM

## 2024-07-17 DIAGNOSIS — C34.92 PRIMARY SQUAMOUS CELL CARCINOMA OF LUNG, LEFT: ICD-10-CM

## 2024-07-17 DIAGNOSIS — E78.5 HYPERLIPIDEMIA LDL GOAL <100: Primary | ICD-10-CM

## 2024-07-17 DIAGNOSIS — I47.19 ATRIAL TACHYCARDIA: ICD-10-CM

## 2024-07-17 PROCEDURE — 71250 CT THORAX DX C-: CPT

## 2024-07-17 NOTE — PROGRESS NOTES
[facility]    Subjective:     Encounter Date:07/17/24      Patient ID: Sunny Hung is a 68 y.o. male.    Chief Complaint:   Chief Complaint   Patient presents with    Follow-up     6 mths      History of Present Illness    Mr. Hung is a pleasant 68-year-old gentleman past medical history T4 N0 M0 poorly differentiated carcinoma/squamous differentiation of left lower lobe of the lung, status post neoadjuvant treatment with carboplatin/Taxol/nivolumab, left lower lobectomy December 13, 2023, presents for further evaluation of SVT that was noted postoperatively.  I evaluated the patient on December 15 for intermittent SVT episodes.  He had 2 chest tubes in place at the time 1 of which was abutting the lateral wall of his left ventricle.  He was having short, mostly asymptomatic SVT episodes that were never captured on ECG.  There appear to be P wave morphologies consistent with atrial tachycardia during the spells.    For this, he was started on metoprolol was continued this during his hospitalization.  It was stopped 15 days after his hospitalization.  He has not had no further evidence of any palpitations or any symptoms from his heart since that spell.  He has been in sinus rhythm since that time.  He is currently in remission and he underwent immunotherapy for his cancer.  Follows with Dr. Dubon.  He has follow-up with him scheduled with labs in later this month    The following portions of the patient's history were reviewed and updated as appropriate: allergies, current medications, past family history, past medical history, past social history, past surgical history and problem list.    Past Medical History:   Diagnosis Date    Arthritis     BPH (benign prostatic hyperplasia)     Bruises easily     Bursitis of right shoulder     Cough     Hyperlipidemia     Mass of lower lobe of left lung        Past Surgical History:   Procedure Laterality Date    BRONCHOSCOPY WITH ION ROBOTIC  "ASSIST N/A 9/6/2023    Procedure: BRONCHOSCOPY WITH BAL;   ION ROBOT AND ENDOBRONCHIAL ULTRASOUND WITH FNA'S;  Surgeon: Rosalio Scott MD;  [facility];  Service: Robotics - Pulmonary;  Laterality: N/A;  PRE- LEFT LOWER LOBE MASS  POST- SAME    GANGLION CYST EXCISION Bilateral     HEMORRHOIDECTOMY      LOBECTOMY Left 12/13/2023    Procedure: BRONCHOSCOPY, THORACOSCOPY WITH LYSIS OF ADHESIONS (120 MINUTES), LEFT LOWER LOBECTOMY WITH EN BLOC PARTIAL PARIETAL PLEURECTOMY USING DAVINCI ROBOT, MEDIASTINAL LYMPH NODE DISSECTION, INTERCOSTAL NERVE BLOCK;  Surgeon: Rosalio Scott MD;  Location: [facility] MAIN OR;  Service: Robotics - DaVinci;  Laterality: Left;    OTHER SURGICAL HISTORY      SOMETHING REMOVED FROM LEFT CHEST, BENIGN ? LIPOMA           ECG 12 Lead    Date/Time: 7/17/2024 2:13 PM  Performed by: Giovany Delacruz MD    Authorized by: Giovany Delacruz MD  Comparison: compared with previous ECG from 12/15/2023  Similar to previous ECG  Rhythm: sinus rhythm  Rate: normal  Conduction: conduction normal  ST Segments: ST segments normal  T Waves: T waves normal  QRS axis: normal  Other findings: left ventricular hypertrophy             Objective:     Vitals:    07/17/24 1303   BP: 144/62   BP Location: Left arm   Patient Position: Sitting   Cuff Size: Adult   Pulse: 64   Resp: 20   SpO2: 97%   Weight: 77.1 kg (170 lb)   Height: 185.4 cm (73\")         Constitutional:       Appearance: Healthy appearance. Not in distress.   Neck:      Vascular: JVD normal.   Pulmonary:      Effort: Pulmonary effort is normal.      Breath sounds: Normal air entry.      Comments:    Cardiovascular:      PMI at left midclavicular line. Normal rate. Occasional ectopic beats. Regular rhythm. Normal S2.       Murmurs: There is no murmur.      Comments: Regular rate and rhythm.  Pulses:     Intact distal pulses.   Edema:     Peripheral edema absent.   Skin:     General: Skin is warm and dry.   Neurological:      General: No focal deficit " present.      Mental Status: Alert, oriented to person, place, and time and oriented to person, place and time.   Psychiatric:         Mood and Affect: Mood and affect normal.         Lab Review:       BUN   Date Value Ref Range Status   05/21/2024 12 8 - 23 mg/dL Final     Creatinine   Date Value Ref Range Status   05/21/2024 0.70 (L) 0.76 - 1.27 mg/dL Final   11/28/2023 0.60 0.60 - 1.30 mg/dL Final     Comment:     Serial Number: [id number]:  [id number]     Potassium   Date Value Ref Range Status   05/21/2024 3.8 3.5 - 5.2 mmol/L Final     ALT (SGPT)   Date Value Ref Range Status   05/21/2024 10 1 - 41 U/L Final     AST (SGOT)   Date Value Ref Range Status   05/21/2024 18 1 - 40 U/L Final     I directly reviewed and interpreted the patient's CT noncontrasted chest from today.  There are some mild to moderate coronary artery calcifications in the LAD, circumflex, and the heart is slightly shifted leftward given his lobectomy.    Performed        Assessment:          Diagnosis Plan   1. Hyperlipidemia LDL goal <100  Lipid Panel               Plan:         SVT, probably atrial tachycardia: Short-lived episode while hospitalized that responded to metoprolol.  Probably transient related to postoperative changes from his lobectomy.  Possibly also chest tube induced.  Clinically, no recurrence.  In sinus rhythm clinically today.  No need for the further metoprolol.  I encourage patient to call our office if he has any new questions or concerns, or any new symptoms to suggest that SVT has returned.  Poorly differentiated squamous cell carcinoma: Follows with oncology and thoracic surgery.  Status post lobectomy and immunotherapy.  In remission per his report   He underwent a surveillance CT today  Hyperlipidemia.  He does have some coronary calcifications on his CT chest.  His goal LDL needs be less than 100 at least.  He is on pravastatin.  Will see where his cholesterol falls.  I have ordered labs for him to be  checked  He has labs with his oncologist.     RTC 1 year.  RTC 6 months.  Currently he is without complaint.  No further SVT episodes.  If he has any new symptoms to suggest any arrhythmia has returned, I encourage patient to call our office by suspect this was a transient episode related chest tube and his surgery.    Giovany Delacruz MD  [facility]  07/17/24  13:09 EST       Current Outpatient Medications:     albuterol sulfate  (90 Base) MCG/ACT inhaler, Inhale 2 puffs Every 4 (Four) Hours As Needed for Wheezing., Disp: 18 g, Rfl: 1    O2 (OXYGEN), Inhale 1 L/min 1 (One) Time. One lpm when pt is up    2lpm o2 via n/c prn hs, Disp: , Rfl:     ondansetron (ZOFRAN) 8 MG tablet, Take 1 tablet by mouth Every 8 (Eight) Hours As Needed for Nausea or Vomiting., Disp: 30 tablet, Rfl: 5    pravastatin (PRAVACHOL) 20 MG tablet, Take 1 tablet by mouth Every Night., Disp: , Rfl:     tamsulosin (FLOMAX) 0.4 MG capsule 24 hr capsule, Take 1 capsule by mouth 2 (Two) Times a Day., Disp: , Rfl:          Return in about 1 year (around 7/17/2025).      Part of this note may be an electronic transcription/translation of spoken language to printed text using the Dragon Dictation System.

## 2024-07-24 ENCOUNTER — HOSPITAL ENCOUNTER (OUTPATIENT)
Dept: MRI IMAGING | Facility: HOSPITAL | Age: 69
Discharge: HOME | End: 2024-07-24
Admitting: INTERNAL MEDICINE
Payer: MEDICARE

## 2024-07-24 DIAGNOSIS — C34.32 CANCER OF BRONCHUS OF LEFT LOWER LOBE: ICD-10-CM

## 2024-07-24 LAB — CREAT BLDA-MCNC: 0.8 MG/DL (ref 0.6–1.3)

## 2024-07-24 PROCEDURE — 0 GADOBENATE DIMEGLUMINE 529 MG/ML SOLUTION: Performed by: INTERNAL MEDICINE

## 2024-07-24 PROCEDURE — A9577 INJ MULTIHANCE: HCPCS | Performed by: INTERNAL MEDICINE

## 2024-07-24 PROCEDURE — 70553 MRI BRAIN STEM W/O & W/DYE: CPT

## 2024-07-24 PROCEDURE — 82565 ASSAY OF CREATININE: CPT

## 2024-07-24 RX ADMIN — GADOBENATE DIMEGLUMINE 15 ML: 529 INJECTION, SOLUTION INTRAVENOUS at 12:59

## 2024-07-25 ENCOUNTER — OFFICE VISIT (OUTPATIENT)
Dept: SURGERY | Facility: CLINIC | Age: 69
End: 2024-07-25
Payer: MEDICARE

## 2024-07-25 VITALS
HEART RATE: 102 BPM | HEIGHT: 73 IN | BODY MASS INDEX: 22.5 KG/M2 | SYSTOLIC BLOOD PRESSURE: 116 MMHG | DIASTOLIC BLOOD PRESSURE: 70 MMHG | OXYGEN SATURATION: 97 % | WEIGHT: 169.8 LBS

## 2024-07-25 DIAGNOSIS — C34.92 PRIMARY SQUAMOUS CELL CARCINOMA OF LUNG, LEFT: Primary | ICD-10-CM

## 2024-07-25 PROCEDURE — 99214 OFFICE O/P EST MOD 30 MIN: CPT | Performed by: SURGERY

## 2024-07-29 NOTE — PROGRESS NOTES
Subjective     REASON FOR CONSULTATION:  NSCLC-small cell lung cancer  Provide an opinion on any further workup or treatment                             REQUESTING PHYSICIAN:  Sonia    RECORDS OBTAINED:  Records of the patients history including those obtained from the referring provider were reviewed and summarized in detail.      History of Present Illness   The patient returns today having completed chemotherapy and radiation for small cell lung cancer.  He is doing quite a bit better than last visit with improved stamina.  He continues to have shortness of breath with exertion requiring O2 intermittently.  He has nonproductive cough.  Skin shows COPD/emphysema changes for which she has never been treated.    Oncology history:  This is a pleasant 68-year-old man with hyperlipidemia and previous tobacco abuse who underwent a screening CT of the chest on 7/31/2023 which showed a large mass in the left lower lobe of the lung extending to the hilum.  A diagnostic CT was performed 8/25/2023 showing a large left infrahilar medial mass in the lower lobe.  A PET scan on 9/8/2023 showed a left lower lobe spiculated pleural-based mass 1.3 x 5 cm involving the left hilum and major fissure SUV 23.2.  There were smaller nodular airspace opacities also hypermetabolic.  The mass encases left lower lobe bronchovascular bundle.  No hypermetabolic mediastinal or hilar lymph nodes noted.  There was a 7 mm nodule in the right upper lobe photopenic SUV 2.1.  There was no supraclavicular uptake or disease below the abdomen other than 2 abnormal foci in the sigmoid colon.  MRI of the brain on 9/11/2023 showed a subtle 2 mm focus inferior left cerebellum too small to characterize but likely a blood vessel.  The patient underwent bronchoscopy on 9/6/2023 with biopsies taken from the left lower lobe mass, lymph node station 7 and lymph node station 4R.  Pathology showed malignant cells favoring poorly differentiated carcinoma/squamous  differentiation from the left lower lobe and biopsies from station 7 and 4R were negative for malignant cells.    The patient underwent neoadjuvant chemo-immunotherapy with 3 cycles of carboplatin/Taxol/nivolumab completed 11/1/2023.  After completing treatment he had immune mediated colitis requiring steroid therapy.  Treatment also complicated by peripheral neuropathy.    The patient underwent left lower lobectomy 12/13/2023 complicated by prolonged and left lower lobe pneumonia.  Pathology from surgery showed significant treatment effect but residual small cell carcinoma with positive bronchial and perivascular soft tissue margins by tumor within lymphatics, positive lymph node station 10 L for small cell carcinoma x 2.      The patient was treated with concurrent radiation and 4 cycles of carboplatin/etoposide completed 4/11/2024.  4    Past Medical History:   Diagnosis Date    Arthritis     BPH (benign prostatic hyperplasia)     Bruises easily     Bursitis of right shoulder     Cough     Hyperlipidemia     Mass of lower lobe of left lung         Past Surgical History:   Procedure Laterality Date    BRONCHOSCOPY WITH ION ROBOTIC ASSIST N/A 9/6/2023    Procedure: BRONCHOSCOPY WITH BAL;   ION ROBOT AND ENDOBRONCHIAL ULTRASOUND WITH FNA'S;  Surgeon: Rosalio Scott MD;  Location: [facility] ENDOSCOPY;  Service: Robotics - Pulmonary;  Laterality: N/A;  PRE- LEFT LOWER LOBE MASS  POST- SAME    GANGLION CYST EXCISION Bilateral     HEMORRHOIDECTOMY      LOBECTOMY Left 12/13/2023    Procedure: BRONCHOSCOPY, THORACOSCOPY WITH LYSIS OF ADHESIONS (120 MINUTES), LEFT LOWER LOBECTOMY WITH EN BLOC PARTIAL PARIETAL PLEURECTOMY USING DAVINCI ROBOT, MEDIASTINAL LYMPH NODE DISSECTION, INTERCOSTAL NERVE BLOCK;  Surgeon: Rosalio Scott MD;  Location: [facility] OR;  Service: Robotics - DaVinci;  Laterality: Left;    OTHER SURGICAL HISTORY      SOMETHING REMOVED FROM LEFT CHEST, BENIGN ? LIPOMA        Current Outpatient Medications on File  Prior to Visit   Medication Sig Dispense Refill    O2 (OXYGEN) Inhale 1 L/min 1 (One) Time. One lpm when pt is up    2lpm o2 via n/c prn hs      pravastatin (PRAVACHOL) 20 MG tablet Take 1 tablet by mouth Every Night.      tamsulosin (FLOMAX) 0.4 MG capsule 24 hr capsule Take 1 capsule by mouth 2 (Two) Times a Day.      albuterol sulfate  (90 Base) MCG/ACT inhaler Inhale 2 puffs Every 4 (Four) Hours As Needed for Wheezing. 18 g 1    ondansetron (ZOFRAN) 8 MG tablet Take 1 tablet by mouth Every 8 (Eight) Hours As Needed for Nausea or Vomiting. 30 tablet 5     No current facility-administered medications on file prior to visit.        ALLERGIES:    Allergies   Allergen Reactions    Amoxicillin-Pot Clavulanate Hives        Social History     Socioeconomic History    Marital status:      Spouse name: Carmen   Tobacco Use    Smoking status: Former     Current packs/day: 0.00     Average packs/day: 0.5 packs/day for 40.0 years (20.0 ttl pk-yrs)     Types: Cigarettes     Start date:      Quit date:      Years since quittin.5    Smokeless tobacco: Never   Vaping Use    Vaping status: Never Used   Substance and Sexual Activity    Alcohol use: Not Currently     Comment: VERY RARELY, MAYBE 1 BEER    Drug use: Never    Sexual activity: Defer        Family History   Problem Relation Age of Onset    Bone cancer Mother     COPD Father     Brain cancer Sister     Cancer Brother         Malignant tumor of pharynx    Alcohol abuse Brother     Cirrhosis Brother     Recurrent abdominal pain Brother     Lung cancer Brother     Malig Hyperthermia Neg Hx         Review of Systems   Constitutional:  Negative for fatigue.   HENT:  Positive for dental problem.    Respiratory:  Positive for shortness of breath. Negative for cough.    Cardiovascular: Negative.    Gastrointestinal: Negative.    Genitourinary: Negative.    Musculoskeletal: Negative.    Neurological:  Positive for numbness.   Hematological: Negative.   "  Psychiatric/Behavioral:  Negative for dysphoric mood. The patient is not nervous/anxious.    ROS unchanged-4/23/24      Objective     Vitals:    07/30/24 1506   BP: 147/94   Pulse: 98   Resp: 18   Temp: 98.7 °F (37.1 °C)   TempSrc: Infrared   SpO2: 96%   Weight: 77.4 kg (170 lb 9.6 oz)   Height: 185.4 cm (72.99\")   PainSc: 0-No pain                   7/30/2024     3:31 PM   Current Status   ECOG score 1       Physical Exam    CONSTITUTIONAL: pleasant well-developed adult man  LYMPH: no cervical or supraclavicular lad  CV: RRR, S1S2, no murmur  RESP: cta bilat, faint scattered wheezing, no ralesv  GI: soft, non-tender, no splenomegaly, +bs  MUSC: no edema, normal gait  NEURO: alert and oriented x3, normal strength  PSYCH: normal mood anxious affect  Skin: Improved pallor    RECENT LABS:  Hematology WBC   Date Value Ref Range Status   07/30/2024 7.25 3.40 - 10.80 10*3/mm3 Final     RBC   Date Value Ref Range Status   07/30/2024 4.53 4.14 - 5.80 10*6/mm3 Final     Hemoglobin   Date Value Ref Range Status   07/30/2024 12.6 (L) 13.0 - 17.7 g/dL Final     Hematocrit   Date Value Ref Range Status   07/30/2024 39.4 37.5 - 51.0 % Final     Platelets   Date Value Ref Range Status   07/30/2024 199 140 - 450 10*3/mm3 Final        Lab Results   Component Value Date    GLUCOSE 120 (H) 07/30/2024    BUN 23 07/30/2024    CREATININE 0.73 (L) 07/30/2024    EGFR 99.7 07/30/2024    BCR 31.5 (H) 07/30/2024    K 3.7 07/30/2024    CO2 23.8 07/30/2024    CALCIUM 9.0 07/30/2024    ALBUMIN 4.2 05/21/2024    BILITOT 0.3 05/21/2024    AST 18 05/21/2024    ALT 10 05/21/2024     PET scan 9823  IMPRESSION:  1. Intensely hypermetabolic approximately 13 x 5 cm pleural-based left  lower lobe lung mass involving the left hilum. Smaller nodular opacities  adjacently are hypermetabolic and likely represent metastases. A 7 mm  right upper lobe nodule is photopenic. Conservative surveillance is  recommended. There is no convincing evidence for " metastatic disease at  the neck, abdomen, or pelvis.  2. There are 2 hypermetabolic foci at the sigmoid colon need further  evaluation, particularly the 1.4 cm hypermetabolic polyp at the distal  sigmoid colon. Colonoscopy is recommended.  3. Nonspecific heterogeneous prostate activity. PSA follow-up is  recommended.    CT Chest Without Contrast Diagnostic (07/17/2024 12:52)  IMPRESSION:  1.There is evidence for interval decrease in size of the ill-defined abnormal attenuation involving the posterior aspect of the mid residual left lung extending into the left hilum. There is also improvement in aeration throughout the residual left lower   lung with improvement in multifocal areas of abnormal attenuation. These findings suggest an appropriate response to interval therapy.  2.Stable abnormal attenuation associated with the left hilum.  3.Otherwise stable CT of the chest.    MRI Brain With & Without Contrast (07/24/2024 13:16)  Impression:  1. Mild generalized parenchymal volume loss and changes of chronic small vessel ischemic disease.  2. No acute intracranial abnormality.  3. No pathologic intracranial contrast enhancement to suggest intracranial metastatic disease.    Assessment & Plan   *T4N0M0 poorly differentiated carcinoma/squamous differentiation left lower lobe of the lung/small cell lung cancer on pathology at surgery  Mass discovered on low-dose CT chest screening 7/31/2023  PET scan 9/8/2023 showed a 1.3 x 5 cm left lower lobe mass involving the hilum with smaller adjacent nodular opacities, max SUV 23.2, photopenic right upper lobe nodule  Bronchoscopy with biopsy from the left lower lobe 9/6/2023 positive for poorly differentiated carcinoma with squamous differentiation; P-L1 TPS 0%  Initiated neoadjuvant treatment with carboplatin/Taxol/nivolumab 9/20/2023 and completed 3 cycles 11/1/2023  Status post left lower lobectomy 12/13/2023 complicated by prolonged and left lower lobe pneumonia.  Pathology  from surgery showed significant treatment effect but residual small cell carcinoma with positive bronchial and perivascular soft tissue margins by tumor within lymphatics, positive lymph node station 10 L for small cell carcinoma x 2   2/5/2024-initiated postoperative carboplatin/-16 with concurrent radiation for small cell lung cancer/positive margin  2/26/2024-C2 carboplatin/-16 with concurrent radiation; completed radiation therapy 3/15/2024; 4 cycles carboplatin/-16 completed 4/11/2024  CT chest and MRI brain 7/24/2024-[facility]    *Anemia secondary to chemotherapy  B12/folic acid normal, ferritin 408, Iron sat 32%  Hemoglobin improved 12.6 today    *Immune mediated colitis  Patient treated with high-dose steroids and taper, now off steroid therapy  No recurrence of diarrhea off steroid therapy    *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  Neuropathy symptoms stable on current chemotherapy    *MRI brain 9/11/23-2 mm enhancement left cerebellar hemisphere likely vessel artifact; MRI brain 11/27/2023-no evidence of metastatic disease, small focus of enhancement left inferior cerebellar region unchanged likely benign; MRI brain 7/24/2024-no evidence of disease    *PET uptake 2 foci sigmoid colon; he will eventually need colonoscopy    *Comorbidities-hyperlipidemia      Oncology plan/recommendations:  Follow-up November after repeat CT chest  Refer to GI for colonoscopy evaluation of PET findings  Pulmonary medicine consult for COPD management

## 2024-07-30 ENCOUNTER — OFFICE VISIT (OUTPATIENT)
Dept: ONCOLOGY | Facility: CLINIC | Age: 69
End: 2024-07-30
Payer: MEDICARE

## 2024-07-30 ENCOUNTER — LAB (OUTPATIENT)
Dept: LAB | Facility: HOSPITAL | Age: 69
End: 2024-07-30
Payer: MEDICARE

## 2024-07-30 VITALS
SYSTOLIC BLOOD PRESSURE: 147 MMHG | BODY MASS INDEX: 22.61 KG/M2 | TEMPERATURE: 98.7 F | DIASTOLIC BLOOD PRESSURE: 94 MMHG | HEART RATE: 98 BPM | OXYGEN SATURATION: 96 % | HEIGHT: 73 IN | RESPIRATION RATE: 18 BRPM | WEIGHT: 170.6 LBS

## 2024-07-30 DIAGNOSIS — C34.32 CANCER OF BRONCHUS OF LEFT LOWER LOBE: Primary | ICD-10-CM

## 2024-07-30 DIAGNOSIS — J43.9 PULMONARY EMPHYSEMA, UNSPECIFIED EMPHYSEMA TYPE: ICD-10-CM

## 2024-07-30 DIAGNOSIS — C34.32 CANCER OF BRONCHUS OF LEFT LOWER LOBE: ICD-10-CM

## 2024-07-30 LAB
ANION GAP SERPL CALCULATED.3IONS-SCNC: 11.2 MMOL/L (ref 5–15)
BASOPHILS # BLD AUTO: 0.03 10*3/MM3 (ref 0–0.2)
BASOPHILS NFR BLD AUTO: 0.4 % (ref 0–1.5)
BUN SERPL-MCNC: 23 MG/DL (ref 8–23)
BUN/CREAT SERPL: 31.5 (ref 7–25)
CALCIUM SPEC-SCNC: 9 MG/DL (ref 8.6–10.5)
CHLORIDE SERPL-SCNC: 103 MMOL/L (ref 98–107)
CO2 SERPL-SCNC: 23.8 MMOL/L (ref 22–29)
CREAT SERPL-MCNC: 0.73 MG/DL (ref 0.76–1.27)
DEPRECATED RDW RBC AUTO: 48.8 FL (ref 37–54)
EGFRCR SERPLBLD CKD-EPI 2021: 99.7 ML/MIN/1.73
EOSINOPHIL # BLD AUTO: 0.18 10*3/MM3 (ref 0–0.4)
EOSINOPHIL NFR BLD AUTO: 2.5 % (ref 0.3–6.2)
ERYTHROCYTE [DISTWIDTH] IN BLOOD BY AUTOMATED COUNT: 15.4 % (ref 12.3–15.4)
GLUCOSE SERPL-MCNC: 120 MG/DL (ref 65–99)
HCT VFR BLD AUTO: 39.4 % (ref 37.5–51)
HGB BLD-MCNC: 12.6 G/DL (ref 13–17.7)
IMM GRANULOCYTES # BLD AUTO: 0.03 10*3/MM3 (ref 0–0.05)
IMM GRANULOCYTES NFR BLD AUTO: 0.4 % (ref 0–0.5)
LYMPHOCYTES # BLD AUTO: 1.18 10*3/MM3 (ref 0.7–3.1)
LYMPHOCYTES NFR BLD AUTO: 16.3 % (ref 19.6–45.3)
MCH RBC QN AUTO: 27.8 PG (ref 26.6–33)
MCHC RBC AUTO-ENTMCNC: 32 G/DL (ref 31.5–35.7)
MCV RBC AUTO: 87 FL (ref 79–97)
MONOCYTES # BLD AUTO: 0.73 10*3/MM3 (ref 0.1–0.9)
MONOCYTES NFR BLD AUTO: 10.1 % (ref 5–12)
NEUTROPHILS NFR BLD AUTO: 5.1 10*3/MM3 (ref 1.7–7)
NEUTROPHILS NFR BLD AUTO: 70.3 % (ref 42.7–76)
NRBC BLD AUTO-RTO: 0 /100 WBC (ref 0–0.2)
PLATELET # BLD AUTO: 199 10*3/MM3 (ref 140–450)
PMV BLD AUTO: 8.8 FL (ref 6–12)
POTASSIUM SERPL-SCNC: 3.7 MMOL/L (ref 3.5–5.2)
RBC # BLD AUTO: 4.53 10*6/MM3 (ref 4.14–5.8)
SODIUM SERPL-SCNC: 138 MMOL/L (ref 136–145)
WBC NRBC COR # BLD AUTO: 7.25 10*3/MM3 (ref 3.4–10.8)

## 2024-07-30 PROCEDURE — 85025 COMPLETE CBC W/AUTO DIFF WBC: CPT | Performed by: INTERNAL MEDICINE

## 2024-07-30 PROCEDURE — 36415 COLL VENOUS BLD VENIPUNCTURE: CPT

## 2024-07-30 PROCEDURE — 80048 BASIC METABOLIC PNL TOTAL CA: CPT | Performed by: INTERNAL MEDICINE

## 2024-07-31 ENCOUNTER — TELEPHONE (OUTPATIENT)
Dept: ONCOLOGY | Facility: CLINIC | Age: 69
End: 2024-07-31
Payer: MEDICARE

## 2024-07-31 ENCOUNTER — PATIENT OUTREACH (OUTPATIENT)
Dept: OTHER | Facility: HOSPITAL | Age: 69
End: 2024-07-31
Payer: MEDICARE

## 2024-07-31 DIAGNOSIS — Z12.11 ENCOUNTER FOR SCREENING COLONOSCOPY: Primary | ICD-10-CM

## 2024-07-31 NOTE — PROGRESS NOTES
Reviewed chart. Patient with T4N0M0 poorly differentiated carcinoma/squamous differentiation left lower lobe of the lung/small cell lung cancer, s/p neoadjuvant treatment with 3 cycles carboplatin/Taxol/Nivolumab 9/20/2023-11/1/2023, s/p left lower lobectomy 12/13/2023; concurrent radiation and 4 cycles of carboplatin/etoposide completed 2/5/24-4/11/2024  CT scan 4/11, saw  4/18.  CT chest 7/17/24 and MRI brain 7/24/2024-[facility] . Saw Dr. Scott 7/25 and Dr. Dubon 7/30.  Dr. Dubon referred to GI for colonoscopy evaluation of PET findings and pulmonary medicine consult for COPD management. CT chest 10/6, sees Dr. Scott 10/24, Dr. Dubon 11/5.     Called patient. S/w his wife.  Referred to Dr. Bueno for COPD management (they already called and scheduled appt). The patient's appetite is good.  He is wearing oxygen only as needed (usually used it when out of house).     They deny questions, concerns or resource needs.  I will continue to follow; encouraged patient/wife to call as needed. Wife verbalized understanding.

## 2024-07-31 NOTE — TELEPHONE ENCOUNTER
----- Message from Greg Dubon sent at 7/30/2024  4:11 PM EDT -----  I'm sorry I forgot to d/w him he needs a colonoscopy-previous PET showed some uptake in colong prob benign but needs to be checked.  Can you call him or daughter tomorrow to advise and send to DAVID [email or web address] if agreeable.

## 2024-08-02 ENCOUNTER — LAB (OUTPATIENT)
Dept: LAB | Facility: HOSPITAL | Age: 69
End: 2024-08-02
Payer: MEDICARE

## 2024-08-02 DIAGNOSIS — C34.32 CANCER OF BRONCHUS OF LEFT LOWER LOBE: ICD-10-CM

## 2024-08-02 DIAGNOSIS — E78.5 HYPERLIPIDEMIA LDL GOAL <100: ICD-10-CM

## 2024-08-02 LAB
BASOPHILS # BLD AUTO: 0.02 10*3/MM3 (ref 0–0.2)
BASOPHILS NFR BLD AUTO: 0.3 % (ref 0–1.5)
DEPRECATED RDW RBC AUTO: 47 FL (ref 37–54)
EOSINOPHIL # BLD AUTO: 0.13 10*3/MM3 (ref 0–0.4)
EOSINOPHIL NFR BLD AUTO: 1.8 % (ref 0.3–6.2)
ERYTHROCYTE [DISTWIDTH] IN BLOOD BY AUTOMATED COUNT: 15.2 % (ref 12.3–15.4)
HCT VFR BLD AUTO: 40.8 % (ref 37.5–51)
HGB BLD-MCNC: 13.1 G/DL (ref 13–17.7)
IMM GRANULOCYTES # BLD AUTO: 0.02 10*3/MM3 (ref 0–0.05)
IMM GRANULOCYTES NFR BLD AUTO: 0.3 % (ref 0–0.5)
LYMPHOCYTES # BLD AUTO: 1.25 10*3/MM3 (ref 0.7–3.1)
LYMPHOCYTES NFR BLD AUTO: 17.8 % (ref 19.6–45.3)
MCH RBC QN AUTO: 27.5 PG (ref 26.6–33)
MCHC RBC AUTO-ENTMCNC: 32.1 G/DL (ref 31.5–35.7)
MCV RBC AUTO: 85.5 FL (ref 79–97)
MONOCYTES # BLD AUTO: 0.72 10*3/MM3 (ref 0.1–0.9)
MONOCYTES NFR BLD AUTO: 10.2 % (ref 5–12)
NEUTROPHILS NFR BLD AUTO: 4.89 10*3/MM3 (ref 1.7–7)
NEUTROPHILS NFR BLD AUTO: 69.6 % (ref 42.7–76)
NRBC BLD AUTO-RTO: 0 /100 WBC (ref 0–0.2)
PLATELET # BLD AUTO: 211 10*3/MM3 (ref 140–450)
PMV BLD AUTO: 9.3 FL (ref 6–12)
RBC # BLD AUTO: 4.77 10*6/MM3 (ref 4.14–5.8)
WBC NRBC COR # BLD AUTO: 7.03 10*3/MM3 (ref 3.4–10.8)

## 2024-08-02 PROCEDURE — 36415 COLL VENOUS BLD VENIPUNCTURE: CPT

## 2024-08-02 PROCEDURE — 85025 COMPLETE CBC W/AUTO DIFF WBC: CPT

## 2024-08-02 PROCEDURE — 80061 LIPID PANEL: CPT

## 2024-08-05 ENCOUNTER — TELEPHONE (OUTPATIENT)
Dept: CARDIOLOGY | Facility: CLINIC | Age: 69
End: 2024-08-05
Payer: MEDICARE

## 2024-08-05 LAB
CHOLEST SERPL-MCNC: 179 MG/DL (ref 0–200)
HDLC SERPL-MCNC: 53 MG/DL (ref 40–60)
LDLC SERPL CALC-MCNC: 115 MG/DL (ref 0–100)
LDLC/HDLC SERPL: 2.16 {RATIO}
TRIGL SERPL-MCNC: 57 MG/DL (ref 0–150)
VLDLC SERPL-MCNC: 11 MG/DL (ref 5–40)

## 2024-08-05 NOTE — TELEPHONE ENCOUNTER
Discussed lipid panel resutls. Wants to work on dietary modifications before increasing pravastatin. Will recheck a lipid panel in about 3 months.

## 2024-08-13 NOTE — PROGRESS NOTES
THORACIC SURGERY CLINIC CONSULT NOTE    REASON FOR CONSULT:  Left lower lobe poorly differentiated non-small cell lung cancer underwent neoadjuvant chemoimmunotherapy s/p robot-assisted left lower lobectomy, chest wall resection, mediastinal lymph node dissection.  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  Only 10% viable small cell cancer remaining involving the bronchial margin.     REFERRING PROVIDER: Hosea Abernathy MD     Subjective   HISTORY OF PRESENTING ILLNESS:   Sunny Hung is a 68 y.o. male who has significant medical problems as mentioned in the medical chart.     History of Present Illness  He underwent a screening CT of the chest on 7/31/2023 which showed a large mass in the left lower lobe of the lung extending to the hilum.  A diagnostic CT was performed 8/25/2023 showing a large left infrahilar medial mass in the lower lobe.  A PET scan on 9/8/2023 showed a left lower lobe spiculated pleural-based mass 1.3 x 5 cm involving the left hilum and major fissure SUV 23.2.  There were smaller nodular airspace opacities also hypermetabolic.  The mass encases left lower lobe bronchovascular bundle.  No hypermetabolic mediastinal or hilar lymph nodes noted.  There was a 7 mm nodule in the right upper lobe photopenic SUV 2.1.  There was no supraclavicular uptake or disease below the abdomen other than 2 abnormal foci in the sigmoid colon.  MRI of the brain on 9/11/2023 showed a subtle 2 mm focus inferior left cerebellum too small to characterize but likely a blood vessel.  The patient underwent bronchoscopy on 9/6/2023 with biopsies taken from the left lower lobe mass, lymph node station 7 and lymph node station 4R.  Pathology showed malignant cells favoring poorly differentiated carcinoma/squamous differentiation from the left lower lobe and biopsies from station 7 and 4R were negative for malignant cells.     The patient completed 3 cycles of neoadjuvant therapy with  carboplatin/Taxol/nivolumab 11/1/2023.  He tolerated treatment well without much complications.  His case was discussed in multidisciplinary tumor board conference and the consensus recommendation was to proceed with surgical resection.  Restaging PET/CT was done on 12/5/2023 which reported significant decrease in size of the left lower lobe mass with decreased SUV of 3.6, previously noted 23.2.  There was irregular large airspace consolidation at the posterior aspect of the left lower lobe with a maximal SUV of 8.7 suspected to represent pneumonitis with possible superimposed infection.  There was no evidence of hypermetabolic mediastinal or hilar lymphadenopathy or distant metastases.  He also had transthoracic echo on 12/5/2023 which reported LVEF of 60%.     Prior to surgical resection, he denied any difficulty breathing or pain. He coughed intermittently. He denied angina, history of myocardial infarction or stroke. He could walk a block without dyspnea. He did not smoke. He was not on supplemental oxygen at home.     As per my assessment, he had stage II-B non-small cell lung cancer who underwent neoadjuvant chemoimmunotherapy based on Checkmate 816 trial.  There was significant treatment response based on the PET CT scan.  In Checkmate 816 trial, there were 25% of the patient who had complete pathological response after neoadjuvant chemoimmunotherapy.  The standard of care for stage II-B resectable lung cancer is neoadjuvant chemoimmunotherapy followed by lobectomy.  Therefore, I recommended robot-assisted thoracoscopic left lower lobectomy, mediastinal lymph node dissection.  His lung function has been borderline which does not correlate to his excellent functional status.  He was independent in his activities of daily living and was not dependent on supplemental oxygen.  We performed a 6-minute walk test in the clinic which he completed without any respiratory distress.  His oxygen saturation remained above  95% throughout the test.  He was motivated and determined to pursue surgical cure and I supported his wishes.      On 12/13/2023, he underwent Robot assisted Thoracoscopic Left Lower Lobectomy with en bloc resection of parietal pleura.  The entire lung was adherent to the chest wall.  I performed lysis of adhesion for 120 minutes.  The tissue planes were extremely friable that led to significant air leak.  He was on 80 mg prednisone per day for colitis at the time of the operation.  The left lower lobe mass was adherent to the chest wall invading the parietal pleura but did not extend to the extrathoracic space.  I placed 2 chest tubes as I was expecting large prolonged air leak.  He tolerated procedure well.  He was extubated at the end of the procedure.  His hospital course was significant for prolonged air leak.  The prednisone was slowly tapered and eventually discontinued.  He developed urinary retention requiring Prater catheter placement.  His pain was well-controlled with pain medication.  He ambulated without much difficulty.  He was tolerating regular diet.  He was requiring supplemental oxygen.  Chest tube was transitioned to waterseal and eventually 1 chest tube was connected to mini atrium and he was discharged home with mini atrium.  A CT scan was obtained prior to discharge which showed consolidation changes in the left upper lobe concerning for pneumonia.  He did not have leukocytosis but in context of neoadjuvant chemoimmunotherapy, immunosuppression from prednisone, I started him on empiric antibiotics to treat hospital-acquired pneumonia.  He was discharged home with antibiotics as well.      The final pathology revealed residual small cell carcinoma with background of neoadjuvant therapy effect.  Visceral pleural invasion was not an 5.  The bronchial and perivascular soft tissue margins were involved with invasive carcinoma and tumor within lymphatics.  There were 2 peribronchial lymph nodes which  were negative for carcinoma.  The total size of the viable tumor was 0.5 cm.  This made up to 10% of viable tumor present.  10 lymph nodes were retrieved and 2 of the hilar lymph nodes were positive.  The pathologist reported that the residual tumor likely represented the previously and biopsy population of small cell carcinoma that did not respond to neoadjuvant therapy.  His case was discussed in our multidisciplinary tumor board conference.  The small cell cancer was an unexpected finding.  The consensus recommendation was to proceed with adjuvant systemic treatment and radiation treatment to the resection margin.     He made slow recovery.  The chest tube was removed after the airleak resolved.  He was initiated on postoperative carboplatin/-16 with concurrent radiation to the bronchial margin on 2/5/2024.  He also completed his course of immunotherapy.  Interval CT scan of the chest for cancer surveillance on 7/17/2024 did not reveal any evidence of recurrence.    He came to clinic for follow-up visit.  He has been off oxygen therapy for the past week and has been monitoring his oxygen levels. A brain MRI has been ordered by Dr. Dubon.    Past Medical History:   Diagnosis Date    Arthritis     BPH (benign prostatic hyperplasia)     Bruises easily     Bursitis of right shoulder     Cough     Hyperlipidemia     Mass of lower lobe of left lung        Past Surgical History:   Procedure Laterality Date    BRONCHOSCOPY WITH ION ROBOTIC ASSIST N/A 9/6/2023    Procedure: BRONCHOSCOPY WITH BAL;   ION ROBOT AND ENDOBRONCHIAL ULTRASOUND WITH FNA'S;  Surgeon: Rosalio Scott MD;  Location: [facility] ENDOSCOPY;  Service: Robotics - Pulmonary;  Laterality: N/A;  PRE- LEFT LOWER LOBE MASS  POST- SAME    GANGLION CYST EXCISION Bilateral     HEMORRHOIDECTOMY      LOBECTOMY Left 12/13/2023    Procedure: BRONCHOSCOPY, THORACOSCOPY WITH LYSIS OF ADHESIONS (120 MINUTES), LEFT LOWER LOBECTOMY WITH EN BLOC PARTIAL PARIETAL PLEURECTOMY  USING DAVINCI ROBOT, MEDIASTINAL LYMPH NODE DISSECTION, INTERCOSTAL NERVE BLOCK;  Surgeon: Rosalio Scott MD;  Location: [facility] MAIN OR;  Service: Robotics - DaVinci;  Laterality: Left;    OTHER SURGICAL HISTORY      SOMETHING REMOVED FROM LEFT CHEST, BENIGN ? LIPOMA       Family History   Problem Relation Age of Onset    Bone cancer Mother     COPD Father     Brain cancer Sister     Cancer Brother         Malignant tumor of pharynx    Alcohol abuse Brother     Cirrhosis Brother     Recurrent abdominal pain Brother     Lung cancer Brother     Malig Hyperthermia Neg Hx        Social History     Socioeconomic History    Marital status:      Spouse name: Carmen   Tobacco Use    Smoking status: Former     Current packs/day: 0.00     Average packs/day: 0.5 packs/day for 40.0 years (20.0 ttl pk-yrs)     Types: Cigarettes     Start date:      Quit date:      Years since quittin.6    Smokeless tobacco: Never   Vaping Use    Vaping status: Never Used   Substance and Sexual Activity    Alcohol use: Not Currently     Comment: VERY RARELY, MAYBE 1 BEER    Drug use: Never    Sexual activity: Defer         Current Outpatient Medications:     O2 (OXYGEN), Inhale 1 L/min 1 (One) Time. One lpm when pt is up    2lpm o2 via n/c prn hs, Disp: , Rfl:     pravastatin (PRAVACHOL) 20 MG tablet, Take 1 tablet by mouth Every Night., Disp: , Rfl:     tamsulosin (FLOMAX) 0.4 MG capsule 24 hr capsule, Take 1 capsule by mouth 2 (Two) Times a Day., Disp: , Rfl:     albuterol sulfate  (90 Base) MCG/ACT inhaler, Inhale 2 puffs Every 4 (Four) Hours As Needed for Wheezing., Disp: 18 g, Rfl: 1    ondansetron (ZOFRAN) 8 MG tablet, Take 1 tablet by mouth Every 8 (Eight) Hours As Needed for Nausea or Vomiting., Disp: 30 tablet, Rfl: 5     Allergies   Allergen Reactions    Amoxicillin-Pot Clavulanate Hives             Objective    OBJECTIVE:     VITAL SIGNS:  /70 (BP Location: Left arm, Patient Position: Sitting, Cuff Size:  "Adult)   Pulse 102   Ht 185.4 cm (72.99\")   Wt 77 kg (169 lb 12.8 oz)   SpO2 97%   BMI 22.41 kg/m²     PHYSICAL EXAM:  Normal appearance.   Head is normocephalic.   Nose appears normal.   No obvious deformity of the mouth and throat.  Conjunctivae normal.   Heart rate and rhythm is normal.  Pulmonary effort is normal.   Moving all 4 extremities.  Extremities warm.  No focal deficit present.   He is alert and oriented to person, place, and time.     LAB RESULTS:  I have reviewed all the available laboratory results in the chart.    RESULTS REVIEW:  I have reviewed the patient's all relevant laboratory and imaging findings.     Results  Imaging  CT scan shows no signs of recurrence.    ASSESSMENT & PLAN:  Sunny Hung is a 68 y.o. male with significant medical conditions as mentioned above presented to my clinic.    Diagnosis: Left lower lobe poorly differentiated non-small cell lung cancer underwent neoadjuvant chemoimmunotherapy s/p robot-assisted left lower lobectomy, chest wall resection, mediastinal lymph node dissection.  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  Only 10% viable small cell cancer remaining involving the bronchial margin.    Assessment & Plan  The CT scan of the chest reported no evidence of recurrence. A CT scan is scheduled in 3 months.     I discussed the patients findings and my recommendations with the patient. The patient was given adequate time to ask questions and all questions were answered to patient satisfaction.     Rosalio Scott MD  Thoracic Surgeon  [facility]        Dictated utilizing Dragon dictation    I spent 30 minutes caring for Sunny on this date of service. This time includes time spent by me in the following activities:preparing for the visit, reviewing tests, obtaining and/or reviewing a separately obtained history, performing a medically appropriate examination and/or evaluation , counseling and educating the " patient/family/caregiver, ordering medications, tests, or procedures, referring and communicating with other health care professionals , documenting information in the medical record, independently interpreting results and communicating that information with the patient/family/caregiver, and care coordination and more than half the time was spent in direct face to face evaluation and decision making.     Patient or patient representative verbalized consent for the use of Ambient Listening during the visit with  Rosalio Scott MD for chart documentation. 8/13/2024  18:54 EDT

## 2024-08-28 ENCOUNTER — TELEPHONE (OUTPATIENT)
Dept: CARDIOLOGY | Facility: CLINIC | Age: 69
End: 2024-08-28
Payer: MEDICARE

## 2024-09-08 ENCOUNTER — APPOINTMENT (OUTPATIENT)
Dept: GENERAL RADIOLOGY | Facility: HOSPITAL | Age: 69
End: 2024-09-08
Payer: MEDICARE

## 2024-09-08 ENCOUNTER — HOSPITAL ENCOUNTER (EMERGENCY)
Facility: HOSPITAL | Age: 69
Discharge: HOME | End: 2024-09-08
Attending: STUDENT IN AN ORGANIZED HEALTH CARE EDUCATION/TRAINING PROGRAM | Admitting: STUDENT IN AN ORGANIZED HEALTH CARE EDUCATION/TRAINING PROGRAM
Payer: MEDICARE

## 2024-09-08 VITALS
SYSTOLIC BLOOD PRESSURE: 129 MMHG | WEIGHT: 170 LBS | OXYGEN SATURATION: 95 % | DIASTOLIC BLOOD PRESSURE: 76 MMHG | HEART RATE: 92 BPM | HEIGHT: 71 IN | RESPIRATION RATE: 18 BRPM | TEMPERATURE: 98.4 F | BODY MASS INDEX: 23.8 KG/M2

## 2024-09-08 DIAGNOSIS — N39.0 URINARY TRACT INFECTION WITH HEMATURIA, SITE UNSPECIFIED: ICD-10-CM

## 2024-09-08 DIAGNOSIS — J06.9 UPPER RESPIRATORY TRACT INFECTION, UNSPECIFIED TYPE: Primary | ICD-10-CM

## 2024-09-08 DIAGNOSIS — R31.9 URINARY TRACT INFECTION WITH HEMATURIA, SITE UNSPECIFIED: ICD-10-CM

## 2024-09-08 LAB
ALBUMIN SERPL-MCNC: 4 G/DL (ref 3.5–5.2)
ALBUMIN/GLOB SERPL: 1.1 G/DL
ALP SERPL-CCNC: 61 U/L (ref 39–117)
ALT SERPL W P-5'-P-CCNC: 12 U/L (ref 1–41)
ANION GAP SERPL CALCULATED.3IONS-SCNC: 12.8 MMOL/L (ref 5–15)
AST SERPL-CCNC: 15 U/L (ref 1–40)
BACTERIA UR QL AUTO: ABNORMAL /HPF
BASOPHILS # BLD AUTO: 0.04 10*3/MM3 (ref 0–0.2)
BASOPHILS NFR BLD AUTO: 0.3 % (ref 0–1.5)
BILIRUB SERPL-MCNC: 0.5 MG/DL (ref 0–1.2)
BILIRUB UR QL STRIP: NEGATIVE
BUN SERPL-MCNC: 16 MG/DL (ref 8–23)
BUN/CREAT SERPL: 19 (ref 7–25)
CALCIUM SPEC-SCNC: 9.3 MG/DL (ref 8.6–10.5)
CHLORIDE SERPL-SCNC: 97 MMOL/L (ref 98–107)
CLARITY UR: CLEAR
CO2 SERPL-SCNC: 24.2 MMOL/L (ref 22–29)
COLOR UR: YELLOW
CREAT SERPL-MCNC: 0.84 MG/DL (ref 0.76–1.27)
D-LACTATE SERPL-SCNC: 1.4 MMOL/L (ref 0.5–2)
DEPRECATED RDW RBC AUTO: 48.7 FL (ref 37–54)
EGFRCR SERPLBLD CKD-EPI 2021: 95 ML/MIN/1.73
EOSINOPHIL # BLD AUTO: 0.06 10*3/MM3 (ref 0–0.4)
EOSINOPHIL NFR BLD AUTO: 0.5 % (ref 0.3–6.2)
ERYTHROCYTE [DISTWIDTH] IN BLOOD BY AUTOMATED COUNT: 15.6 % (ref 12.3–15.4)
GLOBULIN UR ELPH-MCNC: 3.5 GM/DL
GLUCOSE SERPL-MCNC: 96 MG/DL (ref 65–99)
GLUCOSE UR STRIP-MCNC: ABNORMAL MG/DL
HCT VFR BLD AUTO: 39.9 % (ref 37.5–51)
HGB BLD-MCNC: 12.4 G/DL (ref 13–17.7)
HGB UR QL STRIP.AUTO: ABNORMAL
HOLD SPECIMEN: NORMAL
HOLD SPECIMEN: NORMAL
HYALINE CASTS UR QL AUTO: ABNORMAL /LPF
IMM GRANULOCYTES # BLD AUTO: 0.06 10*3/MM3 (ref 0–0.05)
IMM GRANULOCYTES NFR BLD AUTO: 0.5 % (ref 0–0.5)
KETONES UR QL STRIP: ABNORMAL
LEUKOCYTE ESTERASE UR QL STRIP.AUTO: ABNORMAL
LYMPHOCYTES # BLD AUTO: 1.35 10*3/MM3 (ref 0.7–3.1)
LYMPHOCYTES NFR BLD AUTO: 10.4 % (ref 19.6–45.3)
MCH RBC QN AUTO: 26.6 PG (ref 26.6–33)
MCHC RBC AUTO-ENTMCNC: 31.1 G/DL (ref 31.5–35.7)
MCV RBC AUTO: 85.6 FL (ref 79–97)
MONOCYTES # BLD AUTO: 1.14 10*3/MM3 (ref 0.1–0.9)
MONOCYTES NFR BLD AUTO: 8.8 % (ref 5–12)
MUCOUS THREADS URNS QL MICRO: ABNORMAL /HPF
NEUTROPHILS NFR BLD AUTO: 10.36 10*3/MM3 (ref 1.7–7)
NEUTROPHILS NFR BLD AUTO: 79.5 % (ref 42.7–76)
NITRITE UR QL STRIP: NEGATIVE
NRBC BLD AUTO-RTO: 0 /100 WBC (ref 0–0.2)
NT-PROBNP SERPL-MCNC: 157 PG/ML (ref 0–900)
PH UR STRIP.AUTO: 7 [PH] (ref 4.5–8)
PLATELET # BLD AUTO: 205 10*3/MM3 (ref 140–450)
PMV BLD AUTO: 9 FL (ref 6–12)
POTASSIUM SERPL-SCNC: 4.9 MMOL/L (ref 3.5–5.2)
PROCALCITONIN SERPL-MCNC: 0.06 NG/ML (ref 0–0.25)
PROT SERPL-MCNC: 7.5 G/DL (ref 6–8.5)
PROT UR QL STRIP: ABNORMAL
RBC # BLD AUTO: 4.66 10*6/MM3 (ref 4.14–5.8)
RBC # UR STRIP: ABNORMAL /HPF
REF LAB TEST METHOD: ABNORMAL
SODIUM SERPL-SCNC: 134 MMOL/L (ref 136–145)
SP GR UR STRIP: 1.02 (ref 1–1.03)
SQUAMOUS #/AREA URNS HPF: ABNORMAL /HPF
T4 FREE SERPL-MCNC: 1.12 NG/DL (ref 0.93–1.7)
TROPONIN T SERPL HS-MCNC: 14 NG/L
TSH SERPL DL<=0.05 MIU/L-ACNC: 1.78 UIU/ML (ref 0.27–4.2)
UROBILINOGEN UR QL STRIP: ABNORMAL
WBC # UR STRIP: ABNORMAL /HPF
WBC NRBC COR # BLD AUTO: 13.01 10*3/MM3 (ref 3.4–10.8)
WHOLE BLOOD HOLD COAG: NORMAL
WHOLE BLOOD HOLD SPECIMEN: NORMAL

## 2024-09-08 PROCEDURE — 84439 ASSAY OF FREE THYROXINE: CPT | Performed by: STUDENT IN AN ORGANIZED HEALTH CARE EDUCATION/TRAINING PROGRAM

## 2024-09-08 PROCEDURE — 51798 US URINE CAPACITY MEASURE: CPT

## 2024-09-08 PROCEDURE — 84443 ASSAY THYROID STIM HORMONE: CPT | Performed by: STUDENT IN AN ORGANIZED HEALTH CARE EDUCATION/TRAINING PROGRAM

## 2024-09-08 PROCEDURE — 80053 COMPREHEN METABOLIC PANEL: CPT | Performed by: STUDENT IN AN ORGANIZED HEALTH CARE EDUCATION/TRAINING PROGRAM

## 2024-09-08 PROCEDURE — 83880 ASSAY OF NATRIURETIC PEPTIDE: CPT | Performed by: STUDENT IN AN ORGANIZED HEALTH CARE EDUCATION/TRAINING PROGRAM

## 2024-09-08 PROCEDURE — 84145 PROCALCITONIN (PCT): CPT | Performed by: STUDENT IN AN ORGANIZED HEALTH CARE EDUCATION/TRAINING PROGRAM

## 2024-09-08 PROCEDURE — 81001 URINALYSIS AUTO W/SCOPE: CPT | Performed by: STUDENT IN AN ORGANIZED HEALTH CARE EDUCATION/TRAINING PROGRAM

## 2024-09-08 PROCEDURE — 93005 ELECTROCARDIOGRAM TRACING: CPT | Performed by: STUDENT IN AN ORGANIZED HEALTH CARE EDUCATION/TRAINING PROGRAM

## 2024-09-08 PROCEDURE — 71045 X-RAY EXAM CHEST 1 VIEW: CPT

## 2024-09-08 PROCEDURE — 84484 ASSAY OF TROPONIN QUANT: CPT | Performed by: STUDENT IN AN ORGANIZED HEALTH CARE EDUCATION/TRAINING PROGRAM

## 2024-09-08 PROCEDURE — 99284 EMERGENCY DEPT VISIT MOD MDM: CPT | Performed by: STUDENT IN AN ORGANIZED HEALTH CARE EDUCATION/TRAINING PROGRAM

## 2024-09-08 PROCEDURE — 85025 COMPLETE CBC W/AUTO DIFF WBC: CPT | Performed by: STUDENT IN AN ORGANIZED HEALTH CARE EDUCATION/TRAINING PROGRAM

## 2024-09-08 PROCEDURE — 87147 CULTURE TYPE IMMUNOLOGIC: CPT | Performed by: STUDENT IN AN ORGANIZED HEALTH CARE EDUCATION/TRAINING PROGRAM

## 2024-09-08 PROCEDURE — 87086 URINE CULTURE/COLONY COUNT: CPT | Performed by: STUDENT IN AN ORGANIZED HEALTH CARE EDUCATION/TRAINING PROGRAM

## 2024-09-08 PROCEDURE — 83605 ASSAY OF LACTIC ACID: CPT | Performed by: STUDENT IN AN ORGANIZED HEALTH CARE EDUCATION/TRAINING PROGRAM

## 2024-09-08 RX ORDER — CEFUROXIME AXETIL 250 MG/1
500 TABLET ORAL ONCE
Status: COMPLETED | OUTPATIENT
Start: 2024-09-08 | End: 2024-09-08

## 2024-09-08 RX ORDER — FLUTICASONE PROPIONATE 50 MCG
1 SPRAY, SUSPENSION (ML) NASAL DAILY
Qty: 9.9 ML | Refills: 0 | Status: SHIPPED | OUTPATIENT
Start: 2024-09-08

## 2024-09-08 RX ORDER — CEFUROXIME AXETIL 250 MG/1
TABLET ORAL
Status: COMPLETED
Start: 2024-09-08 | End: 2024-09-08

## 2024-09-08 RX ORDER — CEFUROXIME AXETIL 500 MG/1
500 TABLET ORAL 2 TIMES DAILY
Qty: 20 TABLET | Refills: 0 | Status: SHIPPED | OUTPATIENT
Start: 2024-09-08 | End: 2024-09-18

## 2024-09-08 RX ADMIN — CEFUROXIME AXETIL 500 MG: 250 TABLET ORAL at 17:56

## 2024-09-08 RX ADMIN — CEFUROXIME AXETIL 500 MG: 250 TABLET, FILM COATED ORAL at 17:56

## 2024-09-08 NOTE — ED PROVIDER NOTES
Subjective   History of Present Illness    69-year-old male with past medical history of small cell lung cancer  s/p left lower lobectomy and chemo/radiation (completed April 2024), COPD/emphysema, hyperlipidemia, BPH  Presenting for increased fatigue, low-grade temp.  Patient reports non-productive cough which is chronic.  Unclear if cough is changed from baseline.  Patient states it is same but daughter states that she feels like it is more increased.  Patient has chronic shortness of breath as well which may be slightly worse than baseline.  Does report some congestion and postnasal drip.  Denies any chest pain or leg swelling.  Patient does endorse suprapubic discomfort and dysuria.  Reports urinary frequency but states this has been an ongoing issue for which he takes Flomax.  Denies any nausea, vomiting, diarrhea.    Review of Systems    Past Medical History:   Diagnosis Date    Arthritis     BPH (benign prostatic hyperplasia)     Bruises easily     Bursitis of right shoulder     Cough     Hyperlipidemia     Mass of lower lobe of left lung        Allergies   Allergen Reactions    Amoxicillin-Pot Clavulanate Hives       Past Surgical History:   Procedure Laterality Date    BRONCHOSCOPY WITH ION ROBOTIC ASSIST N/A 9/6/2023    Procedure: BRONCHOSCOPY WITH BAL;   ION ROBOT AND ENDOBRONCHIAL ULTRASOUND WITH FNA'S;  Surgeon: Rosalio Scott MD;  Location: [facility] ENDOSCOPY;  Service: Robotics - Pulmonary;  Laterality: N/A;  PRE- LEFT LOWER LOBE MASS  POST- SAME    GANGLION CYST EXCISION Bilateral     HEMORRHOIDECTOMY      LOBECTOMY Left 12/13/2023    Procedure: BRONCHOSCOPY, THORACOSCOPY WITH LYSIS OF ADHESIONS (120 MINUTES), LEFT LOWER LOBECTOMY WITH EN BLOC PARTIAL PARIETAL PLEURECTOMY USING DAVINCI ROBOT, MEDIASTINAL LYMPH NODE DISSECTION, INTERCOSTAL NERVE BLOCK;  Surgeon: Rosalio Scott MD;  Location: [facility] MAIN OR;  Service: Robotics - DaVinci;  Laterality: Left;    OTHER SURGICAL HISTORY      SOMETHING REMOVED  FROM LEFT CHEST, BENIGN ? LIPOMA       Family History   Problem Relation Age of Onset    Bone cancer Mother     COPD Father     Brain cancer Sister     Cancer Brother         Malignant tumor of pharynx    Alcohol abuse Brother     Cirrhosis Brother     Recurrent abdominal pain Brother     Lung cancer Brother     Malig Hyperthermia Neg Hx        Social History     Socioeconomic History    Marital status:      Spouse name: Carmen   Tobacco Use    Smoking status: Former     Current packs/day: 0.00     Average packs/day: 0.5 packs/day for 40.0 years (20.0 ttl pk-yrs)     Types: Cigarettes     Start date:      Quit date:      Years since quittin.6    Smokeless tobacco: Never   Vaping Use    Vaping status: Never Used   Substance and Sexual Activity    Alcohol use: Not Currently     Comment: VERY RARELY, MAYBE 1 BEER    Drug use: Never    Sexual activity: Defer           Objective   Physical Exam  Vitals and nursing note reviewed.   Constitutional:       General: He is not in acute distress.  HENT:      Head: Normocephalic.      Nose: Congestion present.      Mouth/Throat:      Mouth: Mucous membranes are moist.   Cardiovascular:      Rate and Rhythm: Normal rate and regular rhythm.      Pulses: Normal pulses.   Pulmonary:      Effort: Pulmonary effort is normal. No respiratory distress.      Breath sounds: Normal breath sounds.   Abdominal:      General: Abdomen is flat. There is no distension.      Palpations: Abdomen is soft.      Tenderness: There is abdominal tenderness (mild suprapubic).   Musculoskeletal:         General: Normal range of motion.      Cervical back: Normal range of motion and neck supple.   Skin:     General: Skin is warm and dry.      Capillary Refill: Capillary refill takes less than 2 seconds.   Neurological:      Mental Status: He is alert.         Procedures           ED Course                                             Medical Decision Making  Problems Addressed:  Upper  respiratory tract infection, unspecified type: complicated acute illness or injury  Urinary tract infection with hematuria, site unspecified: complicated acute illness or injury    Amount and/or Complexity of Data Reviewed  Labs: ordered.  Radiology: ordered.  ECG/medicine tests: ordered.    Risk  Prescription drug management.        Chest x-ray negative for acute pathology.  Lower suspicion for pneumonia.  Does have upper respiratory symptoms.  Will prescribe Flonase.  Has Mucinex at home.  Instructed to take this with a large glass of water.    Lower suspicion for ACS or CHF.    Patient does not have signs of sepsis    Urinalysis with questionable UTI.  Given patient's symptoms will err on side of caution and go ahead and treat.  Given first dose of antibiotics in the emergency department.  Prescription for Ceftin.  Doubt pyelonephritis.    Lower suspicion for intestinal infection such as diverticulitis or colitis.    Final diagnoses:   Upper respiratory tract infection, unspecified type   Urinary tract infection with hematuria, site unspecified       ED Disposition  ED Disposition       ED Disposition   Discharge    Condition   Stable    Comment   --               Hosea Abernathy MD  [address] [facility] [address] [phone]          your urologist               Medication List        New Prescriptions      fluticasone 50 MCG/ACT nasal spray  Commonly known as: FLONASE  1 spray into the nostril(s) as directed by provider Daily.               Where to Get Your Medications        These medications were sent to [email or web address] [id number] - [place] - [address] - [phone]  - [phone] [address] [facility] [address]      Phone: [phone]   fluticasone 50 MCG/ACT nasal spray            Radha Bo MD  09/08/24 [id number]

## 2024-09-09 LAB
QT INTERVAL: 311 MS
QTC INTERVAL: 374 MS

## 2024-09-10 ENCOUNTER — HOSPITAL ENCOUNTER (EMERGENCY)
Facility: HOSPITAL | Age: 69
Discharge: HOME | End: 2024-09-10
Attending: EMERGENCY MEDICINE | Admitting: EMERGENCY MEDICINE
Payer: MEDICARE

## 2024-09-10 VITALS
HEART RATE: 95 BPM | OXYGEN SATURATION: 95 % | TEMPERATURE: 98 F | HEIGHT: 71 IN | RESPIRATION RATE: 20 BRPM | BODY MASS INDEX: 23.8 KG/M2 | SYSTOLIC BLOOD PRESSURE: 129 MMHG | DIASTOLIC BLOOD PRESSURE: 94 MMHG | WEIGHT: 169.97 LBS

## 2024-09-10 DIAGNOSIS — R33.9 URINARY RETENTION: Primary | ICD-10-CM

## 2024-09-10 LAB
BACTERIA SPEC AEROBE CULT: ABNORMAL
BACTERIA UR QL AUTO: NORMAL /HPF
BILIRUB UR QL STRIP: ABNORMAL
CLARITY UR: CLEAR
COLOR UR: ABNORMAL
GLUCOSE UR STRIP-MCNC: ABNORMAL MG/DL
HGB UR QL STRIP.AUTO: ABNORMAL
HYALINE CASTS UR QL AUTO: NORMAL /LPF
KETONES UR QL STRIP: ABNORMAL
LEUKOCYTE ESTERASE UR QL STRIP.AUTO: ABNORMAL
NITRITE UR QL STRIP: POSITIVE
PH UR STRIP.AUTO: <=5 [PH] (ref 4.5–8)
PROT UR QL STRIP: ABNORMAL
RBC # UR STRIP: NORMAL /HPF
REF LAB TEST METHOD: NORMAL
SP GR UR STRIP: 1.02 (ref 1–1.03)
SQUAMOUS #/AREA URNS HPF: NORMAL /HPF
UROBILINOGEN UR QL STRIP: ABNORMAL
WBC # UR STRIP: NORMAL /HPF

## 2024-09-10 PROCEDURE — 51798 US URINE CAPACITY MEASURE: CPT

## 2024-09-10 PROCEDURE — 87591 N.GONORRHOEAE DNA AMP PROB: CPT | Performed by: EMERGENCY MEDICINE

## 2024-09-10 PROCEDURE — 51702 INSERT TEMP BLADDER CATH: CPT

## 2024-09-10 PROCEDURE — 99283 EMERGENCY DEPT VISIT LOW MDM: CPT | Performed by: EMERGENCY MEDICINE

## 2024-09-10 PROCEDURE — 81001 URINALYSIS AUTO W/SCOPE: CPT | Performed by: EMERGENCY MEDICINE

## 2024-09-10 PROCEDURE — 87491 CHLMYD TRACH DNA AMP PROBE: CPT | Performed by: EMERGENCY MEDICINE

## 2024-09-10 NOTE — DISCHARGE INSTRUCTIONS
Return to EMD for increased pain, fever, vomiting or difficulty breathing. Drink plenty of fluids. No heavy lifting/exertion x 1 week.  Follow up with your PCM with the next week.  Continue taking antibiotics as previously prescribed.  Follow-up with urology within the week to assist with this issue.

## 2024-09-10 NOTE — ED NOTES
16 Fr coude catheter inserted at this time, sterile technique maintained. Returned approx 1000ml of clear orange urine.

## 2024-09-10 NOTE — ED PROVIDER NOTES
Subjective   History of Present Illness  69-year-old male presents complaining of urinary retention since yesterday.  Patient states he was started on Keflex earlier in the week for a UTI and since then he has had diminished urine output and is now complaining of lower abdominal discomfort.  Patient states no fevers chills shakes no nausea vomiting and states having no previous occurrence of this type of issue in regards to urinary retention.  Patient states that he has only been dribbling urine and states his last for urination was probably more than a day or day and a half ago.        Review of Systems   Genitourinary:  Positive for difficulty urinating.   All other systems reviewed and are negative.      Past Medical History:   Diagnosis Date    Arthritis     BPH (benign prostatic hyperplasia)     Bruises easily     Bursitis of right shoulder     Cough     Hyperlipidemia     Mass of lower lobe of left lung        Allergies   Allergen Reactions    Amoxicillin-Pot Clavulanate Hives       Past Surgical History:   Procedure Laterality Date    BRONCHOSCOPY WITH ION ROBOTIC ASSIST N/A 9/6/2023    Procedure: BRONCHOSCOPY WITH BAL;   ION ROBOT AND ENDOBRONCHIAL ULTRASOUND WITH FNA'S;  Surgeon: Rosalio Scott MD;  Location: [facility] ENDOSCOPY;  Service: Robotics - Pulmonary;  Laterality: N/A;  PRE- LEFT LOWER LOBE MASS  POST- SAME    GANGLION CYST EXCISION Bilateral     HEMORRHOIDECTOMY      LOBECTOMY Left 12/13/2023    Procedure: BRONCHOSCOPY, THORACOSCOPY WITH LYSIS OF ADHESIONS (120 MINUTES), LEFT LOWER LOBECTOMY WITH EN BLOC PARTIAL PARIETAL PLEURECTOMY USING [employer] ROBOT, MEDIASTINAL LYMPH NODE DISSECTION, INTERCOSTAL NERVE BLOCK;  Surgeon: Rosalio Scott MD;  Location: [facility] OR;  Service: Robotics - DaVinci;  Laterality: Left;    OTHER SURGICAL HISTORY      SOMETHING REMOVED FROM LEFT CHEST, BENIGN ? LIPOMA       Family History   Problem Relation Age of Onset    Bone cancer Mother     COPD Father     Brain cancer  Sister     Cancer Brother         Malignant tumor of pharynx    Alcohol abuse Brother     Cirrhosis Brother     Recurrent abdominal pain Brother     Lung cancer Brother     Malig Hyperthermia Neg Hx        Social History     Socioeconomic History    Marital status:      Spouse name: Carmen   Tobacco Use    Smoking status: Former     Current packs/day: 0.00     Average packs/day: 0.5 packs/day for 40.0 years (20.0 ttl pk-yrs)     Types: Cigarettes     Start date:      Quit date: 2016     Years since quittin.6    Smokeless tobacco: Never   Vaping Use    Vaping status: Never Used   Substance and Sexual Activity    Alcohol use: Not Currently     Comment: VERY RARELY, MAYBE 1 BEER    Drug use: Never    Sexual activity: Defer           Objective   Physical Exam  Vitals and nursing note reviewed.   Constitutional:       Appearance: Normal appearance.   HENT:      Head: Normocephalic and atraumatic.      Right Ear: External ear normal.      Left Ear: External ear normal.      Nose: Nose normal.      Mouth/Throat:      Mouth: Mucous membranes are moist.      Pharynx: Oropharynx is clear.   Eyes:      Extraocular Movements: Extraocular movements intact.      Pupils: Pupils are equal, round, and reactive to light.   Cardiovascular:      Rate and Rhythm: Normal rate and regular rhythm.      Pulses: Normal pulses.      Heart sounds: Normal heart sounds.   Pulmonary:      Effort: Pulmonary effort is normal.      Breath sounds: Normal breath sounds.   Abdominal:      General: Abdomen is flat.      Tenderness: There is no abdominal tenderness.      Comments: Positive suprapubic tenderness and tension noted   Neurological:      General: No focal deficit present.      Mental Status: He is alert.   Psychiatric:         Mood and Affect: Mood normal.         Behavior: Behavior normal.         Thought Content: Thought content normal.         Judgment: Judgment normal.         Procedures           ED Course                                              Medical Decision Making  69-year-old male presents with urinary retention x 1 day.  Patient likely with urinary retention related to recent urinary tract infection and patient is okay with a Prater being placed.  Patient for continuation of antibiotics and also to follow-up with urology.        Final diagnoses:   None       ED Disposition  ED Disposition       None            No follow-up provider specified.       Medication List      No changes were made to your prescriptions during this visit.            [facility], Todd,   09/10/24 9095     Donor Site Anesthesia Type: same as repair anesthesia

## 2024-09-12 LAB
C TRACH RRNA SPEC QL NAA+PROBE: NEGATIVE
N GONORRHOEA RRNA SPEC QL NAA+PROBE: NEGATIVE

## 2024-09-16 ENCOUNTER — TELEPHONE (OUTPATIENT)
Dept: GASTROENTEROLOGY | Facility: CLINIC | Age: 69
End: 2024-09-16
Payer: MEDICARE

## 2024-10-01 ENCOUNTER — TELEPHONE (OUTPATIENT)
Dept: GASTROENTEROLOGY | Facility: CLINIC | Age: 69
End: 2024-10-01
Payer: MEDICARE

## 2024-10-01 NOTE — TELEPHONE ENCOUNTER
FAST TRACK - REFERRAL  1ST COLONOSCOPY  SCREENING  NO FAMILY HISTORY CRC/P  SCHEDULE AT [place]

## 2024-10-03 ENCOUNTER — TRANSCRIBE ORDERS (OUTPATIENT)
Dept: PULMONOLOGY | Facility: HOSPITAL | Age: 69
End: 2024-10-03
Payer: MEDICARE

## 2024-10-03 DIAGNOSIS — J44.9 CHRONIC OBSTRUCTIVE PULMONARY DISEASE, UNSPECIFIED COPD TYPE: Primary | ICD-10-CM

## 2024-10-11 DIAGNOSIS — Z12.11 ENCOUNTER FOR SCREENING FOR MALIGNANT NEOPLASM OF COLON: Primary | ICD-10-CM

## 2024-10-16 ENCOUNTER — LAB (OUTPATIENT)
Dept: LAB | Facility: HOSPITAL | Age: 69
End: 2024-10-16
Payer: MEDICARE

## 2024-10-16 ENCOUNTER — TRANSCRIBE ORDERS (OUTPATIENT)
Dept: ADMINISTRATIVE | Facility: HOSPITAL | Age: 69
End: 2024-10-16
Payer: MEDICARE

## 2024-10-16 ENCOUNTER — HOSPITAL ENCOUNTER (OUTPATIENT)
Dept: CT IMAGING | Facility: HOSPITAL | Age: 69
Discharge: HOME | End: 2024-10-16
Payer: MEDICARE

## 2024-10-16 DIAGNOSIS — N40.0 ENLARGED PROSTATE: Primary | ICD-10-CM

## 2024-10-16 DIAGNOSIS — N40.0 ENLARGED PROSTATE: ICD-10-CM

## 2024-10-16 DIAGNOSIS — C34.92 PRIMARY SQUAMOUS CELL CARCINOMA OF LUNG, LEFT: ICD-10-CM

## 2024-10-16 PROCEDURE — 84154 ASSAY OF PSA FREE: CPT

## 2024-10-16 PROCEDURE — 84153 ASSAY OF PSA TOTAL: CPT

## 2024-10-16 PROCEDURE — 36415 COLL VENOUS BLD VENIPUNCTURE: CPT

## 2024-10-16 PROCEDURE — 71250 CT THORAX DX C-: CPT

## 2024-10-17 LAB
PSA FREE MFR SERPL: 29.5 %
PSA FREE SERPL-MCNC: 1.18 NG/ML
PSA SERPL-MCNC: 4 NG/ML (ref 0–4)

## 2024-10-24 ENCOUNTER — PATIENT OUTREACH (OUTPATIENT)
Dept: OTHER | Facility: HOSPITAL | Age: 69
End: 2024-10-24
Payer: MEDICARE

## 2024-10-24 ENCOUNTER — OFFICE VISIT (OUTPATIENT)
Dept: SURGERY | Facility: CLINIC | Age: 69
End: 2024-10-24
Payer: MEDICARE

## 2024-10-24 VITALS
SYSTOLIC BLOOD PRESSURE: 129 MMHG | HEIGHT: 71 IN | DIASTOLIC BLOOD PRESSURE: 85 MMHG | OXYGEN SATURATION: 97 % | WEIGHT: 173.2 LBS | BODY MASS INDEX: 24.25 KG/M2 | HEART RATE: 91 BPM | RESPIRATION RATE: 16 BRPM

## 2024-10-24 DIAGNOSIS — C34.92 PRIMARY SQUAMOUS CELL CARCINOMA OF LUNG, LEFT: Primary | ICD-10-CM

## 2024-10-24 PROCEDURE — 99214 OFFICE O/P EST MOD 30 MIN: CPT | Performed by: SURGERY

## 2024-10-24 NOTE — PROGRESS NOTES
Patient with T4N0M0 poorly differentiated carcinoma/squamous differentiation left lower lobe of the lung/small cell lung cancer, s/p neoadjuvant treatment with 3 cycles carboplatin/Taxol/Nivolumab 9/20/2023-11/1/2023, s/p left lower lobectomy 12/13/2023; concurrent radiation and 4 cycles of carboplatin/etoposide completed 2/5/24-4/11/2024. Saw Dr. Scott today. Sees Jagdish 11/5 and Katerina 5/1/24 with CT prior    Tct patient, s/w his wife. We dicussed his recent CT scan and appt with Dr. Scott. He will have his next CT scan in 6 mths.     The patient is eating well and gaining weight. He has not had any treatment since April.    The patient's wife denies any questions/concerns or ongoing resource needs.     Since he is stable with no active cancer treatment and no ongoing resource needs, I will close his case to navigation although reminded them that the patient can access the services in the Cancer [facility] even with his navigation case being closed. Encouraged patient/family to call in the future if the need arises. Patient's wife verbalized understanding.

## 2024-10-29 ENCOUNTER — TRANSCRIBE ORDERS (OUTPATIENT)
Dept: ADMINISTRATIVE | Facility: HOSPITAL | Age: 69
End: 2024-10-29
Payer: MEDICARE

## 2024-10-29 DIAGNOSIS — N28.1 CYST, KIDNEY, ACQUIRED: Primary | ICD-10-CM

## 2024-10-30 NOTE — PROGRESS NOTES
Subjective     REASON FOR CONSULTATION:  NSCLC-small cell lung cancer  Provide an opinion on any further workup or treatment                             REQUESTING PHYSICIAN:  Sonia    RECORDS OBTAINED:  Records of the patients history including those obtained from the referring provider were reviewed and summarized in detail.      History of Present Illness   The patient returns today having completed chemotherapy and radiation for small cell lung cancer.  He is doing quite a bit better than last visit with improved stamina.  He continues to have shortness of breath but is no longer requiring oxygen other than sleep.  He denies pain.  He is gaining weight.    Oncology history:  This is a pleasant 68-year-old man with hyperlipidemia and previous tobacco abuse who underwent a screening CT of the chest on 7/31/2023 which showed a large mass in the left lower lobe of the lung extending to the hilum.  A diagnostic CT was performed 8/25/2023 showing a large left infrahilar medial mass in the lower lobe.  A PET scan on 9/8/2023 showed a left lower lobe spiculated pleural-based mass 1.3 x 5 cm involving the left hilum and major fissure SUV 23.2.  There were smaller nodular airspace opacities also hypermetabolic.  The mass encases left lower lobe bronchovascular bundle.  No hypermetabolic mediastinal or hilar lymph nodes noted.  There was a 7 mm nodule in the right upper lobe photopenic SUV 2.1.  There was no supraclavicular uptake or disease below the abdomen other than 2 abnormal foci in the sigmoid colon.  MRI of the brain on 9/11/2023 showed a subtle 2 mm focus inferior left cerebellum too small to characterize but likely a blood vessel.  The patient underwent bronchoscopy on 9/6/2023 with biopsies taken from the left lower lobe mass, lymph node station 7 and lymph node station 4R.  Pathology showed malignant cells favoring poorly differentiated carcinoma/squamous differentiation from the left lower lobe and biopsies  from station 7 and 4R were negative for malignant cells.    The patient underwent neoadjuvant chemo-immunotherapy with 3 cycles of carboplatin/Taxol/nivolumab completed 11/1/2023.  After completing treatment he had immune mediated colitis requiring steroid therapy.  Treatment also complicated by peripheral neuropathy.    The patient underwent left lower lobectomy 12/13/2023 complicated by prolonged and left lower lobe pneumonia.  Pathology from surgery showed significant treatment effect but residual small cell carcinoma with positive bronchial and perivascular soft tissue margins by tumor within lymphatics, positive lymph node station 10 L for small cell carcinoma x 2.      The patient was treated with concurrent radiation and 4 cycles of carboplatin/etoposide completed 4/11/2024.  4    Past Medical History:   Diagnosis Date    Arthritis     BPH (benign prostatic hyperplasia)     Bruises easily     Bursitis of right shoulder     Cough     Hyperlipidemia     Mass of lower lobe of left lung         Past Surgical History:   Procedure Laterality Date    BRONCHOSCOPY WITH ION ROBOTIC ASSIST N/A 9/6/2023    Procedure: BRONCHOSCOPY WITH BAL;   ION ROBOT AND ENDOBRONCHIAL ULTRASOUND WITH FNA'S;  Surgeon: Rosalio Scott MD;  Location: [facility] ENDOSCOPY;  Service: Robotics - Pulmonary;  Laterality: N/A;  PRE- LEFT LOWER LOBE MASS  POST- SAME    GANGLION CYST EXCISION Bilateral     HEMORRHOIDECTOMY      LOBECTOMY Left 12/13/2023    Procedure: BRONCHOSCOPY, THORACOSCOPY WITH LYSIS OF ADHESIONS (120 MINUTES), LEFT LOWER LOBECTOMY WITH EN BLOC PARTIAL PARIETAL PLEURECTOMY USING DAVINCI ROBOT, MEDIASTINAL LYMPH NODE DISSECTION, INTERCOSTAL NERVE BLOCK;  Surgeon: Rosalio Scott MD;  Location: [facility] OR;  Service: Robotics - DaVinci;  Laterality: Left;    OTHER SURGICAL HISTORY      SOMETHING REMOVED FROM LEFT CHEST, BENIGN ? LIPOMA        Current Outpatient Medications on File Prior to Visit   Medication Sig Dispense Refill     albuterol sulfate  (90 Base) MCG/ACT inhaler Inhale 2 puffs Every 4 (Four) Hours As Needed for Wheezing. 18 g 1    pravastatin (PRAVACHOL) 20 MG tablet Take 1 tablet by mouth Every Night.      tamsulosin (FLOMAX) 0.4 MG capsule 24 hr capsule Take 1 capsule by mouth 2 (Two) Times a Day.      [DISCONTINUED] fluticasone (FLONASE) 50 MCG/ACT nasal spray 1 spray into the nostril(s) as directed by provider Daily. 9.9 mL 0    [DISCONTINUED] ondansetron (ZOFRAN) 8 MG tablet Take 1 tablet by mouth Every 8 (Eight) Hours As Needed for Nausea or Vomiting. 30 tablet 5     No current facility-administered medications on file prior to visit.        ALLERGIES:    Allergies   Allergen Reactions    Amoxicillin-Pot Clavulanate Hives        Social History     Socioeconomic History    Marital status:      Spouse name: Carmen   Tobacco Use    Smoking status: Former     Current packs/day: 0.00     Average packs/day: 0.5 packs/day for 40.0 years (20.0 ttl pk-yrs)     Types: Cigarettes     Start date:      Quit date:      Years since quittin.8    Smokeless tobacco: Never   Vaping Use    Vaping status: Never Used   Substance and Sexual Activity    Alcohol use: Not Currently     Comment: VERY RARELY, MAYBE 1 BEER    Drug use: Never    Sexual activity: Defer        Family History   Problem Relation Age of Onset    Bone cancer Mother     COPD Father     Brain cancer Sister     Cancer Brother         Malignant tumor of pharynx    Alcohol abuse Brother     Cirrhosis Brother     Recurrent abdominal pain Brother     Lung cancer Brother     Malig Hyperthermia Neg Hx         Review of Systems   Constitutional:  Negative for fatigue.   HENT:  Positive for dental problem.    Respiratory:  Positive for shortness of breath. Negative for cough.    Cardiovascular: Negative.    Gastrointestinal: Negative.    Genitourinary: Negative.    Musculoskeletal: Negative.    Neurological:  Positive for numbness.   Hematological: Negative.   "  Psychiatric/Behavioral:  Negative for dysphoric mood. The patient is not nervous/anxious.    ROS unchanged-11/5/2024      Objective     Vitals:    11/05/24 1523   BP: 140/80   Pulse: 75   Resp: 16   Temp: 98.7 °F (37.1 °C)   TempSrc: Infrared   SpO2: 96%   Weight: 79.4 kg (175 lb)   Height: 180.3 cm (70.98\")   PainSc: 0-No pain                     11/5/2024     3:38 PM   Current Status   ECOG score 1       Physical Exam    CONSTITUTIONAL: pleasant well-developed adult man  LYMPH: no cervical or supraclavicular lad  CV: RRR, S1S2, no murmur  RESP: cta bilat, faint scattered wheezing, no ralesv  GI: soft, non-tender, no splenomegaly, +bs  MUSC: no edema, normal gait  NEURO: alert and oriented x3, normal strength  PSYCH: normal mood anxious affect  Skin: Improved pallor and alopecia    RECENT LABS:  Hematology WBC   Date Value Ref Range Status   11/05/2024 6.59 3.40 - 10.80 10*3/mm3 Final     RBC   Date Value Ref Range Status   11/05/2024 4.41 4.14 - 5.80 10*6/mm3 Final     Hemoglobin   Date Value Ref Range Status   11/05/2024 12.0 (L) 13.0 - 17.7 g/dL Final     Hematocrit   Date Value Ref Range Status   11/05/2024 38.8 37.5 - 51.0 % Final     Platelets   Date Value Ref Range Status   11/05/2024 251 140 - 450 10*3/mm3 Final        Lab Results   Component Value Date    GLUCOSE 96 09/08/2024    BUN 16 09/08/2024    CREATININE 0.84 09/08/2024    EGFR 95.0 09/08/2024    BCR 19.0 09/08/2024    K 4.9 09/08/2024    CO2 24.2 09/08/2024    CALCIUM 9.3 09/08/2024    ALBUMIN 4.0 09/08/2024    BILITOT 0.5 09/08/2024    AST 15 09/08/2024    ALT 12 09/08/2024     PET scan 9823  IMPRESSION:  1. Intensely hypermetabolic approximately 13 x 5 cm pleural-based left  lower lobe lung mass involving the left hilum. Smaller nodular opacities  adjacently are hypermetabolic and likely represent metastases. A 7 mm  right upper lobe nodule is photopenic. Conservative surveillance is  recommended. There is no convincing evidence for metastatic " disease at  the neck, abdomen, or pelvis.  2. There are 2 hypermetabolic foci at the sigmoid colon need further  evaluation, particularly the 1.4 cm hypermetabolic polyp at the distal  sigmoid colon. Colonoscopy is recommended.  3. Nonspecific heterogeneous prostate activity. PSA follow-up is  recommended.    CT Chest Without Contrast Diagnostic (07/17/2024 12:52)  IMPRESSION:  1.There is evidence for interval decrease in size of the ill-defined abnormal attenuation involving the posterior aspect of the mid residual left lung extending into the left hilum. There is also improvement in aeration throughout the residual left lower   lung with improvement in multifocal areas of abnormal attenuation. These findings suggest an appropriate response to interval therapy.  2.Stable abnormal attenuation associated with the left hilum.  3.Otherwise stable CT of the chest.    MRI Brain With & Without Contrast (07/24/2024 13:16)  Impression:  1. Mild generalized parenchymal volume loss and changes of chronic small vessel ischemic disease.  2. No acute intracranial abnormality.  3. No pathologic intracranial contrast enhancement to suggest intracranial metastatic disease.    Assessment & Plan   *T4N0M0 poorly differentiated carcinoma/squamous differentiation left lower lobe of the lung/small cell lung cancer on pathology at surgery  Mass discovered on low-dose CT chest screening 7/31/2023  PET scan 9/8/2023 showed a 1.3 x 5 cm left lower lobe mass involving the hilum with smaller adjacent nodular opacities, max SUV 23.2, photopenic right upper lobe nodule  Bronchoscopy with biopsy from the left lower lobe 9/6/2023 positive for poorly differentiated carcinoma with squamous differentiation; P-L1 TPS 0%  Initiated neoadjuvant treatment with carboplatin/Taxol/nivolumab 9/20/2023 and completed 3 cycles 11/1/2023  Status post left lower lobectomy 12/13/2023 complicated by prolonged and left lower lobe pneumonia.  Pathology from surgery  showed significant treatment effect but residual small cell carcinoma with positive bronchial and perivascular soft tissue margins by tumor within lymphatics, positive lymph node station 10 L for small cell carcinoma x 2   2/5/2024-initiated postoperative carboplatin/-16 with concurrent radiation for small cell lung cancer/positive margin  2/26/2024-C2 carboplatin/-16 with concurrent radiation; completed radiation therapy 3/15/2024; 4 cycles carboplatin/-16 completed 4/11/2024  CT chest and MRI brain 7/24/2024-[facility]  CT chest 10/16/2024-stable scarring and volume loss in the left hemothorax, no suspicious pulmonary nodules or lymphadenopathy    *Anemia secondary to chemotherapy  B12/folic acid normal, ferritin 408, Iron sat 32%  Hemoglobin improved 12.0 today    *Immune mediated colitis  Patient treated with high-dose steroids and taper, now off steroid therapy  No recurrence of diarrhea off steroid therapy    *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  Neuropathy symptoms stable on current chemotherapy    *MRI brain 9/11/23-2 mm enhancement left cerebellar hemisphere likely vessel artifact; MRI brain 11/27/2023-no evidence of metastatic disease, small focus of enhancement left inferior cerebellar region unchanged likely benign; MRI brain 7/24/2024-no evidence of disease    *PET uptake 2 foci sigmoid colon; he is scheduled for a colonoscopy in January    *Comorbidities-hyperlipidemia      Oncology plan/recommendations:  Since the patient has had small cell malignancy we will perform a screening MRI brain in January  CT scans ordered by thoracic 6 months  3-month MD visit CBC CMP

## 2024-11-01 PROBLEM — Z12.11 ENCOUNTER FOR SCREENING FOR MALIGNANT NEOPLASM OF COLON: Status: ACTIVE | Noted: 2024-10-11

## 2024-11-05 ENCOUNTER — LAB (OUTPATIENT)
Dept: LAB | Facility: HOSPITAL | Age: 69
End: 2024-11-05
Payer: MEDICARE

## 2024-11-05 ENCOUNTER — OFFICE VISIT (OUTPATIENT)
Dept: ONCOLOGY | Facility: CLINIC | Age: 69
End: 2024-11-05
Payer: MEDICARE

## 2024-11-05 VITALS
OXYGEN SATURATION: 96 % | WEIGHT: 175 LBS | DIASTOLIC BLOOD PRESSURE: 80 MMHG | HEIGHT: 71 IN | TEMPERATURE: 98.7 F | HEART RATE: 75 BPM | BODY MASS INDEX: 24.5 KG/M2 | RESPIRATION RATE: 16 BRPM | SYSTOLIC BLOOD PRESSURE: 140 MMHG

## 2024-11-05 DIAGNOSIS — C34.92 PRIMARY SQUAMOUS CELL CARCINOMA OF LUNG, LEFT: ICD-10-CM

## 2024-11-05 DIAGNOSIS — C34.32 CANCER OF BRONCHUS OF LEFT LOWER LOBE: ICD-10-CM

## 2024-11-05 DIAGNOSIS — C34.92 PRIMARY SQUAMOUS CELL CARCINOMA OF LUNG, LEFT: Primary | ICD-10-CM

## 2024-11-05 DIAGNOSIS — E78.2 MIXED HYPERLIPIDEMIA: ICD-10-CM

## 2024-11-05 LAB
ALBUMIN SERPL-MCNC: 4.1 G/DL (ref 3.5–5.2)
ALBUMIN/GLOB SERPL: 1.4 G/DL
ALP SERPL-CCNC: 47 U/L (ref 39–117)
ALT SERPL W P-5'-P-CCNC: 8 U/L (ref 1–41)
ANION GAP SERPL CALCULATED.3IONS-SCNC: 11.2 MMOL/L (ref 5–15)
AST SERPL-CCNC: 14 U/L (ref 1–40)
BASOPHILS # BLD AUTO: 0.05 10*3/MM3 (ref 0–0.2)
BASOPHILS NFR BLD AUTO: 0.8 % (ref 0–1.5)
BILIRUB SERPL-MCNC: 0.2 MG/DL (ref 0–1.2)
BUN SERPL-MCNC: 17 MG/DL (ref 8–23)
BUN/CREAT SERPL: 19.8 (ref 7–25)
CALCIUM SPEC-SCNC: 9.2 MG/DL (ref 8.6–10.5)
CHLORIDE SERPL-SCNC: 106 MMOL/L (ref 98–107)
CO2 SERPL-SCNC: 24.8 MMOL/L (ref 22–29)
CREAT SERPL-MCNC: 0.86 MG/DL (ref 0.76–1.27)
DEPRECATED RDW RBC AUTO: 53.1 FL (ref 37–54)
EGFRCR SERPLBLD CKD-EPI 2021: 94.3 ML/MIN/1.73
EOSINOPHIL # BLD AUTO: 0.19 10*3/MM3 (ref 0–0.4)
EOSINOPHIL NFR BLD AUTO: 2.9 % (ref 0.3–6.2)
ERYTHROCYTE [DISTWIDTH] IN BLOOD BY AUTOMATED COUNT: 16.5 % (ref 12.3–15.4)
GLOBULIN UR ELPH-MCNC: 3 GM/DL
GLUCOSE SERPL-MCNC: 101 MG/DL (ref 65–99)
HCT VFR BLD AUTO: 38.8 % (ref 37.5–51)
HGB BLD-MCNC: 12 G/DL (ref 13–17.7)
IMM GRANULOCYTES # BLD AUTO: 0.01 10*3/MM3 (ref 0–0.05)
IMM GRANULOCYTES NFR BLD AUTO: 0.2 % (ref 0–0.5)
LYMPHOCYTES # BLD AUTO: 1.69 10*3/MM3 (ref 0.7–3.1)
LYMPHOCYTES NFR BLD AUTO: 25.6 % (ref 19.6–45.3)
MCH RBC QN AUTO: 27.2 PG (ref 26.6–33)
MCHC RBC AUTO-ENTMCNC: 30.9 G/DL (ref 31.5–35.7)
MCV RBC AUTO: 88 FL (ref 79–97)
MONOCYTES # BLD AUTO: 0.68 10*3/MM3 (ref 0.1–0.9)
MONOCYTES NFR BLD AUTO: 10.3 % (ref 5–12)
NEUTROPHILS NFR BLD AUTO: 3.97 10*3/MM3 (ref 1.7–7)
NEUTROPHILS NFR BLD AUTO: 60.2 % (ref 42.7–76)
NRBC BLD AUTO-RTO: 0 /100 WBC (ref 0–0.2)
PLATELET # BLD AUTO: 251 10*3/MM3 (ref 140–450)
PMV BLD AUTO: 9.9 FL (ref 6–12)
POTASSIUM SERPL-SCNC: 3.9 MMOL/L (ref 3.5–5.2)
PROT SERPL-MCNC: 7.1 G/DL (ref 6–8.5)
RBC # BLD AUTO: 4.41 10*6/MM3 (ref 4.14–5.8)
SODIUM SERPL-SCNC: 142 MMOL/L (ref 136–145)
WBC NRBC COR # BLD AUTO: 6.59 10*3/MM3 (ref 3.4–10.8)

## 2024-11-05 PROCEDURE — 80061 LIPID PANEL: CPT | Performed by: INTERNAL MEDICINE

## 2024-11-05 PROCEDURE — 36415 COLL VENOUS BLD VENIPUNCTURE: CPT

## 2024-11-05 PROCEDURE — 80053 COMPREHEN METABOLIC PANEL: CPT | Performed by: INTERNAL MEDICINE

## 2024-11-05 PROCEDURE — 85025 COMPLETE CBC W/AUTO DIFF WBC: CPT | Performed by: INTERNAL MEDICINE

## 2024-11-06 DIAGNOSIS — E78.2 MIXED HYPERLIPIDEMIA: Primary | ICD-10-CM

## 2024-11-06 LAB
CHOLEST SERPL-MCNC: 176 MG/DL (ref 0–200)
HDLC SERPL-MCNC: 49 MG/DL (ref 40–60)
LDLC SERPL CALC-MCNC: 110 MG/DL (ref 0–100)
LDLC/HDLC SERPL: 2.22 {RATIO}
TRIGL SERPL-MCNC: 90 MG/DL (ref 0–150)
VLDLC SERPL-MCNC: 17 MG/DL (ref 5–40)

## 2024-11-13 ENCOUNTER — HOSPITAL ENCOUNTER (OUTPATIENT)
Dept: PULMONOLOGY | Facility: HOSPITAL | Age: 69
Discharge: HOME | End: 2024-11-13
Admitting: HOSPITALIST
Payer: MEDICARE

## 2024-11-13 DIAGNOSIS — J44.9 CHRONIC OBSTRUCTIVE PULMONARY DISEASE, UNSPECIFIED COPD TYPE: ICD-10-CM

## 2024-11-13 LAB
BDY SITE: ABNORMAL
HGB BLDA-MCNC: 12.9 G/DL (ref 14–18)
Lab: ABNORMAL

## 2024-11-13 PROCEDURE — 82820 HEMOGLOBIN-OXYGEN AFFINITY: CPT

## 2024-11-13 PROCEDURE — 94726 PLETHYSMOGRAPHY LUNG VOLUMES: CPT

## 2024-11-13 PROCEDURE — 94729 DIFFUSING CAPACITY: CPT

## 2024-11-13 PROCEDURE — 94060 EVALUATION OF WHEEZING: CPT

## 2024-11-13 RX ORDER — ALBUTEROL SULFATE 0.83 MG/ML
2.5 SOLUTION RESPIRATORY (INHALATION) ONCE
Status: COMPLETED | OUTPATIENT
Start: 2024-11-13 | End: 2024-11-13

## 2024-11-13 RX ADMIN — ALBUTEROL SULFATE 2.5 MG: 2.5 SOLUTION RESPIRATORY (INHALATION) at 08:19

## 2024-11-17 NOTE — PROGRESS NOTES
THORACIC SURGERY CLINIC CONSULT NOTE    REASON FOR CONSULT: Left lower lobe poorly differentiated non-small cell lung cancer underwent neoadjuvant chemoimmunotherapy s/p robot-assisted left lower lobectomy, chest wall resection, mediastinal lymph node dissection.  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  Only 10% viable small cell cancer remaining involving the bronchial margin.     REFERRING PROVIDER: Hosea Abernathy MD     Subjective   HISTORY OF PRESENTING ILLNESS:   Sunny Hung is a 69 y.o. male who has significant medical problems as mentioned in the medical chart.     History of Present Illness  He underwent a screening CT of the chest on 7/31/2023 which showed a large mass in the left lower lobe of the lung extending to the hilum.  A diagnostic CT was performed 8/25/2023 showing a large left infrahilar medial mass in the lower lobe.  A PET scan on 9/8/2023 showed a left lower lobe spiculated pleural-based mass 1.3 x 5 cm involving the left hilum and major fissure SUV 23.2.  There were smaller nodular airspace opacities also hypermetabolic.  The mass encases left lower lobe bronchovascular bundle.  No hypermetabolic mediastinal or hilar lymph nodes noted.  There was a 7 mm nodule in the right upper lobe photopenic SUV 2.1.  There was no supraclavicular uptake or disease below the abdomen other than 2 abnormal foci in the sigmoid colon.  MRI of the brain on 9/11/2023 showed a subtle 2 mm focus inferior left cerebellum too small to characterize but likely a blood vessel.  The patient underwent bronchoscopy on 9/6/2023 with biopsies taken from the left lower lobe mass, lymph node station 7 and lymph node station 4R.  Pathology showed malignant cells favoring poorly differentiated carcinoma/squamous differentiation from the left lower lobe and biopsies from station 7 and 4R were negative for malignant cells.     The patient completed 3 cycles of neoadjuvant therapy with  carboplatin/Taxol/nivolumab 11/1/2023.  He tolerated treatment well without much complications.  His case was discussed in multidisciplinary tumor board conference and the consensus recommendation was to proceed with surgical resection.  Restaging PET/CT was done on 12/5/2023 which reported significant decrease in size of the left lower lobe mass with decreased SUV of 3.6, previously noted 23.2.  There was irregular large airspace consolidation at the posterior aspect of the left lower lobe with a maximal SUV of 8.7 suspected to represent pneumonitis with possible superimposed infection.  There was no evidence of hypermetabolic mediastinal or hilar lymphadenopathy or distant metastases.  He also had transthoracic echo on 12/5/2023 which reported LVEF of 60%.     Prior to surgical resection, he denied any difficulty breathing or pain. He coughed intermittently. He denied angina, history of myocardial infarction or stroke. He could walk a block without dyspnea. He did not smoke. He was not on supplemental oxygen at home.     As per my assessment, he had stage II-B non-small cell lung cancer who underwent neoadjuvant chemoimmunotherapy based on Checkmate 816 trial.  There was significant treatment response based on the PET CT scan.  In Checkmate 816 trial, there were 25% of the patient who had complete pathological response after neoadjuvant chemoimmunotherapy.  The standard of care for stage II-B resectable lung cancer is neoadjuvant chemoimmunotherapy followed by lobectomy.  Therefore, I recommended robot-assisted thoracoscopic left lower lobectomy, mediastinal lymph node dissection.  His lung function has been borderline which does not correlate to his excellent functional status.  He was independent in his activities of daily living and was not dependent on supplemental oxygen.  We performed a 6-minute walk test in the clinic which he completed without any respiratory distress.  His oxygen saturation remained above  95% throughout the test.  He was motivated and determined to pursue surgical cure and I supported his wishes.      On 12/13/2023, he underwent Robot assisted Thoracoscopic Left Lower Lobectomy with en bloc resection of parietal pleura.  The entire lung was adherent to the chest wall.  I performed lysis of adhesion for 120 minutes.  The tissue planes were extremely friable that led to significant air leak.  He was on 80 mg prednisone per day for colitis at the time of the operation.  The left lower lobe mass was adherent to the chest wall invading the parietal pleura but did not extend to the extrathoracic space.  I placed 2 chest tubes as I was expecting large prolonged air leak.  He tolerated procedure well.  He was extubated at the end of the procedure.  His hospital course was significant for prolonged air leak.  The prednisone was slowly tapered and eventually discontinued.  He developed urinary retention requiring Prater catheter placement.  His pain was well-controlled with pain medication.  He ambulated without much difficulty.  He was tolerating regular diet.  He was requiring supplemental oxygen.  Chest tube was transitioned to waterseal and eventually 1 chest tube was connected to mini atrium and he was discharged home with mini atrium.  A CT scan was obtained prior to discharge which showed consolidation changes in the left upper lobe concerning for pneumonia.  He did not have leukocytosis but in context of neoadjuvant chemoimmunotherapy, immunosuppression from prednisone, I started him on empiric antibiotics to treat hospital-acquired pneumonia.  He was discharged home with antibiotics as well.      The final pathology revealed residual small cell carcinoma with background of neoadjuvant therapy effect.  Visceral pleural invasion was not an 5.  The bronchial and perivascular soft tissue margins were involved with invasive carcinoma and tumor within lymphatics.  There were 2 peribronchial lymph nodes which  were negative for carcinoma.  The total size of the viable tumor was 0.5 cm.  This made up to 10% of viable tumor present.  10 lymph nodes were retrieved and 2 of the hilar lymph nodes were positive.  The pathologist reported that the residual tumor likely represented the previously and biopsy population of small cell carcinoma that did not respond to neoadjuvant therapy.  His case was discussed in our multidisciplinary tumor board conference.  The small cell cancer was an unexpected finding.  The consensus recommendation was to proceed with adjuvant systemic treatment and radiation treatment to the resection margin.     He made slow recovery.  The chest tube was removed after the airleak resolved.  He was initiated on postoperative carboplatin/-16 with concurrent radiation to the bronchial margin on 2/5/2024.  He also completed his course of immunotherapy.  Interval CT scan of the chest for cancer surveillance on 7/17/2024 and 10/16/2024 did not reveal any evidence of recurrence.    He came to clinic for follow-up visit.  He reports occasional difficulty in breathing, but has not required oxygen at night for some time. He carries oxygen with him as a precautionary measure. His weight is currently 173 pounds. He has an upcoming appointment with Dr. Dubon on 11/05/2024. His last chemotherapy session was in 04/2024 or 05/2024, and he underwent surgery on 12/13/2023. He is scheduled for a lung test with his pulmonologist, Dr. Bueno, who has prescribed him an inhaler. He occasionally experiences coughing.    SOCIAL HISTORY  The patient denies smoking.    Past Medical History:   Diagnosis Date    Arthritis     BPH (benign prostatic hyperplasia)     Bruises easily     Bursitis of right shoulder     Cough     Hyperlipidemia     Mass of lower lobe of left lung        Past Surgical History:   Procedure Laterality Date    BRONCHOSCOPY WITH ION ROBOTIC ASSIST N/A 9/6/2023    Procedure: BRONCHOSCOPY WITH BAL;   ION ROBOT  AND ENDOBRONCHIAL ULTRASOUND WITH FNA'S;  Surgeon: Rosalio Scott MD;  Location:  [facility];  Service: Robotics - Pulmonary;  Laterality: N/A;  PRE- LEFT LOWER LOBE MASS  POST- SAME    GANGLION CYST EXCISION Bilateral     HEMORRHOIDECTOMY      LOBECTOMY Left 2023    Procedure: BRONCHOSCOPY, THORACOSCOPY WITH LYSIS OF ADHESIONS (120 MINUTES), LEFT LOWER LOBECTOMY WITH EN BLOC PARTIAL PARIETAL PLEURECTOMY USING DAVINCI ROBOT, MEDIASTINAL LYMPH NODE DISSECTION, INTERCOSTAL NERVE BLOCK;  Surgeon: Rosalio Scott MD;  [facility];  Service: Robotics - DaVinci;  Laterality: Left;    OTHER SURGICAL HISTORY      SOMETHING REMOVED FROM LEFT CHEST, BENIGN ? LIPOMA       Family History   Problem Relation Age of Onset    Bone cancer Mother     COPD Father     Brain cancer Sister     Cancer Brother         Malignant tumor of pharynx    Alcohol abuse Brother     Cirrhosis Brother     Recurrent abdominal pain Brother     Lung cancer Brother     Malig Hyperthermia Neg Hx        Social History     Socioeconomic History    Marital status:      Spouse name: Carmen   Tobacco Use    Smoking status: Former     Current packs/day: 0.00     Average packs/day: 0.5 packs/day for 40.0 years (20.0 ttl pk-yrs)     Types: Cigarettes     Start date:      Quit date: 2016     Years since quittin.8    Smokeless tobacco: Never   Vaping Use    Vaping status: Never Used   Substance and Sexual Activity    Alcohol use: Not Currently     Comment: VERY RARELY, MAYBE 1 BEER    Drug use: Never    Sexual activity: Defer         Current Outpatient Medications:     pravastatin (PRAVACHOL) 20 MG tablet, Take 1 tablet by mouth Every Night., Disp: , Rfl:     tamsulosin (FLOMAX) 0.4 MG capsule 24 hr capsule, Take 1 capsule by mouth 2 (Two) Times a Day., Disp: , Rfl:     albuterol sulfate  (90 Base) MCG/ACT inhaler, Inhale 2 puffs Every 4 (Four) Hours As Needed for Wheezing., Disp: 18 g, Rfl: 1     Allergies   Allergen  "Reactions    Amoxicillin-Pot Clavulanate Hives             Objective    OBJECTIVE:     VITAL SIGNS:  /85 (BP Location: Left arm, Patient Position: Sitting, Cuff Size: Adult)   Pulse 91   Resp 16   Ht 180.3 cm (70.98\")   Wt 78.6 kg (173 lb 3.2 oz)   SpO2 97%   BMI 24.17 kg/m²     PHYSICAL EXAM:  Normal appearance.   Head is normocephalic.   Nose appears normal.   No obvious deformity of the mouth and throat.  Conjunctivae normal.   Heart rate and rhythm is normal.  Pulmonary effort is normal.   Moving all 4 extremities.  Extremities warm.  No focal deficit present.   Alert and oriented to person, place, and time.     RESULTS REVIEW:  I have reviewed the patient's all relevant laboratory and imaging findings.     Assessment & Plan    ASSESSMENT & PLAN:  Sunny Hung is a 69 y.o. male with significant medical conditions as mentioned above presented to my clinic.    Assessment & Plan  1. Lung Cancer.  His recent scan on 10/16/2024 showed no concerning findings. He completed his last chemotherapy treatment in April or May 2024 and had surgery on 12/13/2023. He is currently not on any treatment. A CT scan of the chest will be scheduled in 6 months to monitor for recurrence. He was advised to maintain a healthy lifestyle and abstain from smoking. He has an appointment with Dr. Dubon on 11/05/2024.    2. Lung Function Monitoring.  He is scheduled for a lung function test with his pulmonologist, Dr. Bueno. He has been prescribed an inhaler, which has helped clear his lungs and reduce coughing.    Follow-up  Return in 6 months for follow-up.    I discussed the patients findings and my recommendations with the patient/family/caregiver. The patient/family/caregiver was given adequate time to ask questions and all questions were answered to patient satisfaction. Thank you for this consult and allowing us to participate in the care of your patient.      Rosalio Scott MD  Thoracic Surgeon  [facility] " [facility]        Dictated utilizing Dragon dictation    I spent 30 minutes caring for Sunny on this date of service. This time includes time spent by me in the following activities:preparing for the visit, reviewing tests, obtaining and/or reviewing a separately obtained history, performing a medically appropriate examination and/or evaluation, counseling and educating the patient/family/caregiver, ordering medications, tests, or procedures, referring and communicating with other health care professionals , documenting information in the medical record, independently interpreting results and communicating that information with the patient/family/caregiver, and care coordination and more than half the time was spent in direct face to face evaluation and decision making.     Patient or patient representative verbalized consent for the use of Ambient Listening during the visit with  Rosalio Scott MD for chart documentation. 11/17/2024  17:11 EST

## 2025-01-03 ENCOUNTER — TELEPHONE (OUTPATIENT)
Dept: ONCOLOGY | Facility: CLINIC | Age: 70
End: 2025-01-03
Payer: MEDICARE

## 2025-01-03 NOTE — TELEPHONE ENCOUNTER
I called Dennise, she said that he has a productive cough for about a week. It started out as a wet cough and now it is more dry but still productive. Patient stating that the patient was having left side pain where his lung was removed started hurting yesterday and the right side started hurting today. Sunny said that the pain constant but dull until he coughs and then it hurts more, pain is about a 6/7 out of 10 when coughing. Sunny said that he has taken tylenol for this but hasn't helped to much. He said that he is still coughing up phloem and its light green. Sunny states no other symptoms outside from being tired but this is die to not sleeping because of the coughing. States no fevers.

## 2025-01-03 NOTE — TELEPHONE ENCOUNTER
I spoke to Madelyn ZAPATA and she states that the patient should go to their PCP/UC to get checked out for the cough and left/right side chest pains. Pt v/u

## 2025-01-16 ENCOUNTER — HOSPITAL ENCOUNTER (EMERGENCY)
Facility: HOSPITAL | Age: 70
Discharge: HOME | End: 2025-01-16
Attending: EMERGENCY MEDICINE
Payer: MEDICARE

## 2025-01-16 ENCOUNTER — APPOINTMENT (OUTPATIENT)
Dept: GENERAL RADIOLOGY | Facility: HOSPITAL | Age: 70
End: 2025-01-16
Payer: MEDICARE

## 2025-01-16 VITALS
SYSTOLIC BLOOD PRESSURE: 148 MMHG | TEMPERATURE: 98 F | DIASTOLIC BLOOD PRESSURE: 74 MMHG | RESPIRATION RATE: 20 BRPM | BODY MASS INDEX: 23.7 KG/M2 | OXYGEN SATURATION: 96 % | HEART RATE: 99 BPM | HEIGHT: 72 IN | WEIGHT: 175 LBS

## 2025-01-16 DIAGNOSIS — J06.9 UPPER RESPIRATORY TRACT INFECTION, UNSPECIFIED TYPE: Primary | ICD-10-CM

## 2025-01-16 PROCEDURE — 99283 EMERGENCY DEPT VISIT LOW MDM: CPT | Performed by: EMERGENCY MEDICINE

## 2025-01-16 PROCEDURE — 71045 X-RAY EXAM CHEST 1 VIEW: CPT

## 2025-01-16 RX ORDER — METHYLPREDNISOLONE 4 MG/1
TABLET ORAL
Qty: 21 TABLET | Refills: 0 | Status: SHIPPED | OUTPATIENT
Start: 2025-01-16

## 2025-01-16 RX ORDER — DOXYCYCLINE 100 MG/1
100 CAPSULE ORAL ONCE
Status: COMPLETED | OUTPATIENT
Start: 2025-01-16 | End: 2025-01-16

## 2025-01-16 RX ORDER — DOXYCYCLINE 100 MG/1
100 CAPSULE ORAL 2 TIMES DAILY
Qty: 14 CAPSULE | Refills: 0 | Status: SHIPPED | OUTPATIENT
Start: 2025-01-16 | End: 2025-01-23

## 2025-01-16 RX ADMIN — DOXYCYCLINE 100 MG: 100 CAPSULE ORAL at 12:43

## 2025-01-16 NOTE — ED PROVIDER NOTES
Subjective   History of Present Illness  Is a 69-year-old male who presents to the emergency department for cough x 2 weeks.  Patient initially seen at urgent care where he tested negative for COVID and flu, and was told he had bronchitis.  Took a Z-Eamon without any improvement.  Symptoms are persisting.  Feels like he has mucus in his throat.  Occasionally has some wheezing.  Denies shortness of breath, chest pain, sore throat, ear pain, sinus pain, nausea, vomiting, diarrhea, constipation.  History of lung cancer with surgery and COPD.  Prior smoker.  Uses albuterol inhaler daily.        Review of Systems   Constitutional:  Negative for appetite change, chills, diaphoresis, fatigue and fever.   HENT:  Positive for postnasal drip. Negative for congestion, ear discharge, ear pain, rhinorrhea, sinus pressure, sinus pain, sore throat, trouble swallowing and voice change.    Eyes:  Negative for pain and visual disturbance.   Respiratory:  Positive for cough and wheezing. Negative for choking, chest tightness and shortness of breath.    Cardiovascular:  Negative for chest pain and palpitations.   Gastrointestinal:  Negative for abdominal pain, nausea and vomiting.   Genitourinary:  Negative for difficulty urinating, dysuria and flank pain.   Musculoskeletal:  Negative for gait problem and myalgias.   Skin:  Negative for rash and wound.   Neurological:  Negative for dizziness, syncope and headaches.   Psychiatric/Behavioral:  Negative for confusion and suicidal ideas. The patient is not nervous/anxious.        Past Medical History:   Diagnosis Date    Arthritis     BPH (benign prostatic hyperplasia)     Bruises easily     Bursitis of right shoulder     Cough     Hyperlipidemia     Mass of lower lobe of left lung        Allergies   Allergen Reactions    Amoxicillin-Pot Clavulanate Hives       Past Surgical History:   Procedure Laterality Date    BRONCHOSCOPY WITH ION ROBOTIC ASSIST N/A 9/6/2023    Procedure: BRONCHOSCOPY  WITH BAL;   ION ROBOT AND ENDOBRONCHIAL ULTRASOUND WITH FNA'S;  Surgeon: Rosalio Scott MD;  Location: [facility];  Service: Robotics - Pulmonary;  Laterality: N/A;  PRE- LEFT LOWER LOBE MASS  POST- SAME    GANGLION CYST EXCISION Bilateral     HEMORRHOIDECTOMY      LOBECTOMY Left 2023    Procedure: BRONCHOSCOPY, THORACOSCOPY WITH LYSIS OF ADHESIONS (120 MINUTES), LEFT LOWER LOBECTOMY WITH EN BLOC PARTIAL PARIETAL PLEURECTOMY USING DAVINCI ROBOT, MEDIASTINAL LYMPH NODE DISSECTION, INTERCOSTAL NERVE BLOCK;  Surgeon: Rosalio Scott MD;  Location: [facility] MAIN OR;  Service: Robotics - DaVinci;  Laterality: Left;    OTHER SURGICAL HISTORY      SOMETHING REMOVED FROM LEFT CHEST, BENIGN ? LIPOMA       Family History   Problem Relation Age of Onset    Bone cancer Mother     COPD Father     Brain cancer Sister     Cancer Brother         Malignant tumor of pharynx    Alcohol abuse Brother     Cirrhosis Brother     Recurrent abdominal pain Brother     Lung cancer Brother     Malig Hyperthermia Neg Hx        Social History     Socioeconomic History    Marital status:      Spouse name: Carmen   Tobacco Use    Smoking status: Former     Current packs/day: 0.00     Average packs/day: 0.5 packs/day for 40.0 years (20.0 ttl pk-yrs)     Types: Cigarettes     Start date:      Quit date: 2016     Years since quittin.0     Passive exposure: Past    Smokeless tobacco: Former   Vaping Use    Vaping status: Never Used   Substance and Sexual Activity    Alcohol use: Not Currently     Comment: VERY RARELY, MAYBE 1 BEER    Drug use: Never    Sexual activity: Not Currently           Objective   Physical Exam  Constitutional:       General: He is not in acute distress.     Appearance: Normal appearance. He is normal weight. He is not ill-appearing, toxic-appearing or diaphoretic.      Comments: Chronically ill-appearing.   HENT:      Right Ear: Tympanic membrane, ear canal and external ear normal.      Ears:       Comments: Unable to visualize left TM due to cerumen.  Denies pain.     Mouth/Throat:      Mouth: Mucous membranes are moist.      Pharynx: Oropharynx is clear. No oropharyngeal exudate or posterior oropharyngeal erythema.      Comments: Patent airway.  No drooling, stridor, wheezing.  No tonsillitis.  No exudate.  Eyes:      Extraocular Movements: Extraocular movements intact.      Conjunctiva/sclera: Conjunctivae normal.      Pupils: Pupils are equal, round, and reactive to light.   Cardiovascular:      Rate and Rhythm: Normal rate and regular rhythm.   Pulmonary:      Effort: Pulmonary effort is normal. No respiratory distress.      Breath sounds: Normal breath sounds. No wheezing or rales.      Comments: No acute cough.  Decreased lung sounds left lower base.  Skin:     General: Skin is warm.   Neurological:      General: No focal deficit present.      Mental Status: He is alert.   Psychiatric:         Mood and Affect: Mood normal.         Behavior: Behavior normal.         Procedures           ED Course  ED Course as of 01/16/25 1314   Thu Jan 16, 2025   1215 XR CHEST 1 VW     Date of Exam: 1/16/2025 10:36 AM EST     Indication: cough     Comparison: 9/8/2024 1549 hours, CT chest 10/16/2024     FINDINGS:  Chronic changes are again noted throughout the left hemithorax. No new consolidations or pleural effusions are observed. The cardiac silhouette and mediastinum are stable. No acute osseous abnormalities are identified.     IMPRESSION:  Chronic changes are noted which appear stable. There is no definitive evidence for acute cardiopulmonary process.   [AS]      ED Course User Index  [AS] Rochelle Reyes, BAR                                                       Medical Decision Making  Patient is a 69-year-old male who presents to the emergency department for cough ongoing several weeks.  Persisting but not worsening.  No fever.  Patient overall appears well, no acute distress, nontoxic.  Vital signs are WNL.   Exam is largely unremarkable.  Discussed further treatment with steroids and additional round of antibiotics.  Recommend over-the-counter medications as well.  Patient instructed to monitor symptoms closely, and discussed when to return to the emergency department.  Answered all questions.  Patient verbalized understanding and was agreeable to plan and discharge.    My differential diagnosis for cough includes but is not limited to:  Upper respiratory infection, bronchitis, pneumonia, COPD exacerbation, cough variant asthma, cardiac asthma, coronary artery disease, congestive heart failure, bacterial, viral or lung infections, lung cancer, aspiration pneumonitis, aspiration of foreign body and Covid -19           Problems Addressed:  Upper respiratory tract infection, unspecified type: complicated acute illness or injury    Amount and/or Complexity of Data Reviewed  Radiology: ordered.    Risk  Prescription drug management.        Final diagnoses:   Upper respiratory tract infection, unspecified type       ED Disposition  ED Disposition       ED Disposition   Discharge    Condition   Stable    Comment   --               Hosea Abernathy MD  [address] [facility] [id number] [phone]               Medication List        New Prescriptions      doxycycline 100 MG capsule  Commonly known as: MONODOX  Take 1 capsule by mouth 2 (Two) Times a Day for 7 days.     methylPREDNISolone 4 MG dose pack  Commonly known as: MEDROL  6 tabs PO x1 day, 5 tabs PO x1 day, and continue decrease of 1 tablet/day.  6 days total treatment.               Where to Get Your Medications        [id number] - [place] - [address] - [phone]  - [phone]  [address] [place] [address]      Phone: [phone]   doxycycline 100 MG capsule  methylPREDNISolone 4 MG dose pack            Rochelle Reyes PA-C  01/16/25 1631

## 2025-01-16 NOTE — DISCHARGE INSTRUCTIONS
Take the prescribed antibiotic medicine you are given as directed until it is gone.  First dose given in ER.  Next dose due tonight. Not taking all the medicine can make future infections hard to treat.  Take steroid pack as indicated until completion.  Continue over-the-counter and prescribed cough and cold medications as needed.  Continue to monitor symptoms closely, and return to emergency department for worsening symptoms or other medical emergencies.  Recommended follow-up with PCP.  Refer to the attached instructions for further information.

## 2025-01-29 ENCOUNTER — HOSPITAL ENCOUNTER (OUTPATIENT)
Dept: MRI IMAGING | Facility: HOSPITAL | Age: 70
Discharge: HOME | End: 2025-01-29
Admitting: INTERNAL MEDICINE
Payer: MEDICARE

## 2025-01-29 DIAGNOSIS — C34.32 CANCER OF BRONCHUS OF LEFT LOWER LOBE: ICD-10-CM

## 2025-01-29 DIAGNOSIS — C34.92 PRIMARY SQUAMOUS CELL CARCINOMA OF LUNG, LEFT: ICD-10-CM

## 2025-01-29 PROCEDURE — 70553 MRI BRAIN STEM W/O & W/DYE: CPT

## 2025-01-29 PROCEDURE — A9577 INJ MULTIHANCE: HCPCS | Performed by: INTERNAL MEDICINE

## 2025-01-29 PROCEDURE — 25510000002 GADOBENATE DIMEGLUMINE 529 MG/ML SOLUTION: Performed by: INTERNAL MEDICINE

## 2025-01-29 RX ADMIN — GADOBENATE DIMEGLUMINE 15 ML: 529 INJECTION, SOLUTION INTRAVENOUS at 16:10

## 2025-02-03 ENCOUNTER — TELEPHONE (OUTPATIENT)
Dept: ONCOLOGY | Facility: CLINIC | Age: 70
End: 2025-02-03

## 2025-02-03 NOTE — TELEPHONE ENCOUNTER
Caller: HERSON MORALES    Relationship to patient: Emergency Contact    Best call back number: [phone]    Chief complaint: RESCHEDULE     Type of visit: LAB AND FOLLOW UP    Requested date: 2-12     If rescheduling, when is the original appointment: 2-5     Additional notes:PLEASE ADVISE

## 2025-02-05 NOTE — PROGRESS NOTES
Subjective     REASON FOR CONSULTATION:  NSCLC-small cell lung cancer  Provide an opinion on any further workup or treatment                             REQUESTING PHYSICIAN:  Sonia    RECORDS OBTAINED:  Records of the patients history including those obtained from the referring provider were reviewed and summarized in detail.      History of Present Illness   The patient returned today for follow-up of non-small cell and small cell lung cancers.  He reports recent increase in productive cough shortness of breath and chest pain despite taking 2 rounds of antibiotics recently.  Shortness of breath waxes and wanes.  He denies fevers or chills.    Oncology history:  This is a pleasant 68-year-old man with hyperlipidemia and previous tobacco abuse who underwent a screening CT of the chest on 7/31/2023 which showed a large mass in the left lower lobe of the lung extending to the hilum.  A diagnostic CT was performed 8/25/2023 showing a large left infrahilar medial mass in the lower lobe.  A PET scan on 9/8/2023 showed a left lower lobe spiculated pleural-based mass 1.3 x 5 cm involving the left hilum and major fissure SUV 23.2.  There were smaller nodular airspace opacities also hypermetabolic.  The mass encases left lower lobe bronchovascular bundle.  No hypermetabolic mediastinal or hilar lymph nodes noted.  There was a 7 mm nodule in the right upper lobe photopenic SUV 2.1.  There was no supraclavicular uptake or disease below the abdomen other than 2 abnormal foci in the sigmoid colon.  MRI of the brain on 9/11/2023 showed a subtle 2 mm focus inferior left cerebellum too small to characterize but likely a blood vessel.  The patient underwent bronchoscopy on 9/6/2023 with biopsies taken from the left lower lobe mass, lymph node station 7 and lymph node station 4R.  Pathology showed malignant cells favoring poorly differentiated carcinoma/squamous differentiation from the left lower lobe and biopsies from station 7 and  4R were negative for malignant cells.    The patient underwent neoadjuvant chemo-immunotherapy with 3 cycles of carboplatin/Taxol/nivolumab completed 11/1/2023.  After completing treatment he had immune mediated colitis requiring steroid therapy.  Treatment also complicated by peripheral neuropathy.    The patient underwent left lower lobectomy 12/13/2023 complicated by prolonged and left lower lobe pneumonia.  Pathology from surgery showed significant treatment effect but residual small cell carcinoma with positive bronchial and perivascular soft tissue margins by tumor within lymphatics, positive lymph node station 10 L for small cell carcinoma x 2.      The patient was treated with concurrent radiation and 4 cycles of carboplatin/etoposide completed 4/11/2024.  4    Past Medical History:   Diagnosis Date    Arthritis     BPH (benign prostatic hyperplasia)     Bruises easily     Bursitis of right shoulder     Cough     Hyperlipidemia     Mass of lower lobe of left lung         Past Surgical History:   Procedure Laterality Date    BRONCHOSCOPY WITH ION ROBOTIC ASSIST N/A 9/6/2023    Procedure: BRONCHOSCOPY WITH BAL;   ION ROBOT AND ENDOBRONCHIAL ULTRASOUND WITH FNA'S;  Surgeon: Rosalio Scott MD;  Location: [facility] ENDOSCOPY;  Service: Robotics - Pulmonary;  Laterality: N/A;  PRE- LEFT LOWER LOBE MASS  POST- SAME    GANGLION CYST EXCISION Bilateral     HEMORRHOIDECTOMY      LOBECTOMY Left 12/13/2023    Procedure: BRONCHOSCOPY, THORACOSCOPY WITH LYSIS OF ADHESIONS (120 MINUTES), LEFT LOWER LOBECTOMY WITH EN BLOC PARTIAL PARIETAL PLEURECTOMY USING DAVINCI ROBOT, MEDIASTINAL LYMPH NODE DISSECTION, INTERCOSTAL NERVE BLOCK;  Surgeon: Rosalio Scott MD;  Location: [facility] OR;  Service: Robotics - DaVinci;  Laterality: Left;    OTHER SURGICAL HISTORY      SOMETHING REMOVED FROM LEFT CHEST, BENIGN ? LIPOMA        Current Outpatient Medications on File Prior to Visit   Medication Sig Dispense Refill    albuterol sulfate HFA  108 (90 Base) MCG/ACT inhaler Inhale 2 puffs Every 4 (Four) Hours As Needed for Wheezing. 18 g 1    pravastatin (PRAVACHOL) 20 MG tablet Take 1 tablet by mouth Daily.      tamsulosin (FLOMAX) 0.4 MG capsule 24 hr capsule Take 1 capsule by mouth 2 (Two) Times a Day.      azithromycin (Zithromax) 250 MG tablet Take 2 tablets the first day, then 1 tablet daily for 4 days. 6 tablet 0    methylPREDNISolone (MEDROL) 4 MG dose pack 6 tabs PO x1 day, 5 tabs PO x1 day, and continue decrease of 1 tablet/day.  6 days total treatment. 21 tablet 0     No current facility-administered medications on file prior to visit.        ALLERGIES:    Allergies   Allergen Reactions    Amoxicillin-Pot Clavulanate Hives        Social History     Socioeconomic History    Marital status:      Spouse name: Carmen   Tobacco Use    Smoking status: Former     Current packs/day: 0.00     Average packs/day: 0.5 packs/day for 40.0 years (20.0 ttl pk-yrs)     Types: Cigarettes     Start date:      Quit date:      Years since quittin.1     Passive exposure: Past    Smokeless tobacco: Former   Vaping Use    Vaping status: Never Used   Substance and Sexual Activity    Alcohol use: Not Currently     Comment: VERY RARELY, MAYBE 1 BEER    Drug use: Never    Sexual activity: Not Currently        Family History   Problem Relation Age of Onset    Bone cancer Mother     COPD Father     Brain cancer Sister     Cancer Brother         Malignant tumor of pharynx    Alcohol abuse Brother     Cirrhosis Brother     Recurrent abdominal pain Brother     Lung cancer Brother     Malig Hyperthermia Neg Hx         Review of Systems   Constitutional:  Negative for fatigue.   HENT:  Positive for dental problem.    Respiratory:  Positive for cough and shortness of breath.    Cardiovascular:  Positive for chest pain.   Gastrointestinal: Negative.    Genitourinary: Negative.    Musculoskeletal: Negative.    Neurological:  Positive for numbness.  "  Hematological: Negative.    Psychiatric/Behavioral:  Negative for dysphoric mood. The patient is not nervous/anxious.    ROS unchanged-11/5/2024      Objective     Vitals:    02/11/25 1219   BP: 147/81   Pulse: 81   Resp: 16   Temp: 99.5 °F (37.5 °C)   TempSrc: Infrared   SpO2: 96%   Weight: 79.7 kg (175 lb 12.8 oz)   Height: 182.9 cm (72.01\")   PainSc: 0-No pain                       2/11/2025    12:25 PM   Current Status   ECOG score 1       Physical Exam    CONSTITUTIONAL: pleasant well-developed adult man  LYMPH: no cervical or supraclavicular lad  CV: RRR, S1S2, no murmur  RESP: cta bilat, wheezing/rales on the left diminished breath sounds right base  GI: soft, non-tender, no splenomegaly, +bs  MUSC: no edema, normal gait  NEURO: alert and oriented x3, normal strength  PSYCH: normal mood anxious affect  Skin: Improved pallor and alopecia    RECENT LABS:  Hematology WBC   Date Value Ref Range Status   02/11/2025 8.37 3.40 - 10.80 10*3/mm3 Final     RBC   Date Value Ref Range Status   02/11/2025 4.92 4.14 - 5.80 10*6/mm3 Final     Hemoglobin   Date Value Ref Range Status   02/11/2025 13.4 13.0 - 17.7 g/dL Final     Hematocrit   Date Value Ref Range Status   02/11/2025 42.6 37.5 - 51.0 % Final     Platelets   Date Value Ref Range Status   02/11/2025 262 140 - 450 10*3/mm3 Final        Lab Results   Component Value Date    GLUCOSE 120 (H) 02/11/2025    BUN 19 02/11/2025    CREATININE 0.83 02/11/2025    EGFR 95.3 02/11/2025    BCR 22.9 02/11/2025    K 3.8 02/11/2025    CO2 26.1 02/11/2025    CALCIUM 9.6 02/11/2025    ALBUMIN 4.4 02/11/2025    BILITOT 0.4 02/11/2025    AST 17 02/11/2025    ALT 13 02/11/2025     PET scan [id number]  IMPRESSION:  1. Intensely hypermetabolic approximately 13 x 5 cm pleural-based left  lower lobe lung mass involving the left hilum. Smaller nodular opacities  adjacently are hypermetabolic and likely represent metastases. A 7 mm  right upper lobe nodule is photopenic. Conservative " surveillance is  recommended. There is no convincing evidence for metastatic disease at  the neck, abdomen, or pelvis.  2. There are 2 hypermetabolic foci at the sigmoid colon need further  evaluation, particularly the 1.4 cm hypermetabolic polyp at the distal  sigmoid colon. Colonoscopy is recommended.  3. Nonspecific heterogeneous prostate activity. PSA follow-up is  recommended.    CT Chest Without Contrast Diagnostic (07/17/2024 12:52)  IMPRESSION:  1.There is evidence for interval decrease in size of the ill-defined abnormal attenuation involving the posterior aspect of the mid residual left lung extending into the left hilum. There is also improvement in aeration throughout the residual left lower   lung with improvement in multifocal areas of abnormal attenuation. These findings suggest an appropriate response to interval therapy.  2.Stable abnormal attenuation associated with the left hilum.  3.Otherwise stable CT of the chest.    MRI Brain With & Without Contrast (07/24/2024 13:16)  Impression:  1. Mild generalized parenchymal volume loss and changes of chronic small vessel ischemic disease.  2. No acute intracranial abnormality.  3. No pathologic intracranial contrast enhancement to suggest intracranial metastatic disease.    MRI Brain With & Without Contrast (01/29/2025 16:35)   Impression:  No evidence of intracranial metastatic disease.    Assessment & Plan   *T4N0M0 poorly differentiated carcinoma/squamous differentiation left lower lobe of the lung/small cell lung cancer on pathology at surgery  Mass discovered on low-dose CT chest screening 7/31/2023  PET scan 9/8/2023 showed a 1.3 x 5 cm left lower lobe mass involving the hilum with smaller adjacent nodular opacities, max SUV 23.2, photopenic right upper lobe nodule  Bronchoscopy with biopsy from the left lower lobe 9/6/2023 positive for poorly differentiated carcinoma with squamous differentiation; P-L1 TPS 0%  Initiated neoadjuvant treatment with  carboplatin/Taxol/nivolumab 9/20/2023 and completed 3 cycles 11/1/2023  Status post left lower lobectomy 12/13/2023 complicated by prolonged and left lower lobe pneumonia.  Pathology from surgery showed significant treatment effect but residual small cell carcinoma with positive bronchial and perivascular soft tissue margins by tumor within lymphatics, positive lymph node station 10 L for small cell carcinoma x 2   2/5/2024-initiated postoperative carboplatin/-16 with concurrent radiation for small cell lung cancer/positive margin  2/26/2024-C2 carboplatin/-16 with concurrent radiation; completed radiation therapy 3/15/2024; 4 cycles carboplatin/-16 completed 4/11/2024  CT chest and MRI brain 7/24/2024-[facility]  CT chest 10/16/2024-stable scarring and volume loss in the left hemothorax, no suspicious pulmonary nodules or lymphadenopathy    *Anemia secondary to chemotherapy  B12/folic acid normal, ferritin 408, Iron sat 32%  Hemoglobin improved 13.4 today    *Immune mediated colitis  Patient treated with high-dose steroids and taper, now off steroid therapy  No recurrence of diarrhea off steroid therapy    *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  Neuropathy symptoms stable on current chemotherapy    *MRI brain 9/11/23-2 mm enhancement left cerebellar hemisphere likely vessel artifact; MRI brain 11/27/2023-no evidence of metastatic disease, small focus of enhancement left inferior cerebellar region unchanged likely benign; MRI brain 7/24/2024-no evidence of disease; MRI brain 1/29/25 negative    *PET uptake 2 foci sigmoid colon; he is scheduled for a colonoscopy in January    *Comorbidities-hyperlipidemia      Oncology plan/recommendations:  CT chest to evaluate soa/cough/cp

## 2025-02-11 ENCOUNTER — LAB (OUTPATIENT)
Dept: LAB | Facility: HOSPITAL | Age: 70
End: 2025-02-11
Payer: MEDICARE

## 2025-02-11 ENCOUNTER — TELEPHONE (OUTPATIENT)
Dept: ONCOLOGY | Facility: CLINIC | Age: 70
End: 2025-02-11
Payer: MEDICARE

## 2025-02-11 ENCOUNTER — HOSPITAL ENCOUNTER (OUTPATIENT)
Dept: CT IMAGING | Facility: HOSPITAL | Age: 70
Discharge: HOME | End: 2025-02-11
Payer: MEDICARE

## 2025-02-11 ENCOUNTER — OFFICE VISIT (OUTPATIENT)
Dept: ONCOLOGY | Facility: CLINIC | Age: 70
End: 2025-02-11
Payer: MEDICARE

## 2025-02-11 VITALS
DIASTOLIC BLOOD PRESSURE: 81 MMHG | BODY MASS INDEX: 23.81 KG/M2 | SYSTOLIC BLOOD PRESSURE: 147 MMHG | TEMPERATURE: 99.5 F | HEART RATE: 81 BPM | RESPIRATION RATE: 16 BRPM | OXYGEN SATURATION: 96 % | WEIGHT: 175.8 LBS | HEIGHT: 72 IN

## 2025-02-11 DIAGNOSIS — R07.1 CHEST PAIN ON BREATHING: ICD-10-CM

## 2025-02-11 DIAGNOSIS — C34.32 CANCER OF BRONCHUS OF LEFT LOWER LOBE: ICD-10-CM

## 2025-02-11 DIAGNOSIS — C34.92 PRIMARY SQUAMOUS CELL CARCINOMA OF LUNG, LEFT: ICD-10-CM

## 2025-02-11 DIAGNOSIS — T45.1X5A ANEMIA ASSOCIATED WITH CHEMOTHERAPY: ICD-10-CM

## 2025-02-11 DIAGNOSIS — R06.2 WHEEZING: ICD-10-CM

## 2025-02-11 DIAGNOSIS — E27.8 RIGHT ADRENAL MASS: Primary | ICD-10-CM

## 2025-02-11 DIAGNOSIS — C34.92 PRIMARY SQUAMOUS CELL CARCINOMA OF LUNG, LEFT: Primary | ICD-10-CM

## 2025-02-11 DIAGNOSIS — D64.81 ANEMIA ASSOCIATED WITH CHEMOTHERAPY: ICD-10-CM

## 2025-02-11 LAB
ALBUMIN SERPL-MCNC: 4.4 G/DL (ref 3.5–5.2)
ALBUMIN/GLOB SERPL: 1.4 G/DL
ALP SERPL-CCNC: 56 U/L (ref 39–117)
ALT SERPL W P-5'-P-CCNC: 13 U/L (ref 1–41)
ANION GAP SERPL CALCULATED.3IONS-SCNC: 10.9 MMOL/L (ref 5–15)
AST SERPL-CCNC: 17 U/L (ref 1–40)
BASOPHILS # BLD AUTO: 0.03 10*3/MM3 (ref 0–0.2)
BASOPHILS NFR BLD AUTO: 0.4 % (ref 0–1.5)
BILIRUB SERPL-MCNC: 0.4 MG/DL (ref 0–1.2)
BUN SERPL-MCNC: 19 MG/DL (ref 8–23)
BUN/CREAT SERPL: 22.9 (ref 7–25)
CALCIUM SPEC-SCNC: 9.6 MG/DL (ref 8.6–10.5)
CHLORIDE SERPL-SCNC: 99 MMOL/L (ref 98–107)
CO2 SERPL-SCNC: 26.1 MMOL/L (ref 22–29)
CREAT SERPL-MCNC: 0.83 MG/DL (ref 0.76–1.27)
DEPRECATED RDW RBC AUTO: 50.2 FL (ref 37–54)
EGFRCR SERPLBLD CKD-EPI 2021: 95.3 ML/MIN/1.73
EOSINOPHIL # BLD AUTO: 0.09 10*3/MM3 (ref 0–0.4)
EOSINOPHIL NFR BLD AUTO: 1.1 % (ref 0.3–6.2)
ERYTHROCYTE [DISTWIDTH] IN BLOOD BY AUTOMATED COUNT: 15.9 % (ref 12.3–15.4)
GLOBULIN UR ELPH-MCNC: 3.2 GM/DL
GLUCOSE SERPL-MCNC: 120 MG/DL (ref 65–99)
HCT VFR BLD AUTO: 42.6 % (ref 37.5–51)
HGB BLD-MCNC: 13.4 G/DL (ref 13–17.7)
IMM GRANULOCYTES # BLD AUTO: 0.04 10*3/MM3 (ref 0–0.05)
IMM GRANULOCYTES NFR BLD AUTO: 0.5 % (ref 0–0.5)
LYMPHOCYTES # BLD AUTO: 1.4 10*3/MM3 (ref 0.7–3.1)
LYMPHOCYTES NFR BLD AUTO: 16.7 % (ref 19.6–45.3)
MCH RBC QN AUTO: 27.2 PG (ref 26.6–33)
MCHC RBC AUTO-ENTMCNC: 31.5 G/DL (ref 31.5–35.7)
MCV RBC AUTO: 86.6 FL (ref 79–97)
MONOCYTES # BLD AUTO: 0.6 10*3/MM3 (ref 0.1–0.9)
MONOCYTES NFR BLD AUTO: 7.2 % (ref 5–12)
NEUTROPHILS NFR BLD AUTO: 6.21 10*3/MM3 (ref 1.7–7)
NEUTROPHILS NFR BLD AUTO: 74.1 % (ref 42.7–76)
NRBC BLD AUTO-RTO: 0 /100 WBC (ref 0–0.2)
PLATELET # BLD AUTO: 262 10*3/MM3 (ref 140–450)
PMV BLD AUTO: 9 FL (ref 6–12)
POTASSIUM SERPL-SCNC: 3.8 MMOL/L (ref 3.5–5.2)
PROT SERPL-MCNC: 7.6 G/DL (ref 6–8.5)
RBC # BLD AUTO: 4.92 10*6/MM3 (ref 4.14–5.8)
SODIUM SERPL-SCNC: 136 MMOL/L (ref 136–145)
WBC NRBC COR # BLD AUTO: 8.37 10*3/MM3 (ref 3.4–10.8)

## 2025-02-11 PROCEDURE — 85025 COMPLETE CBC W/AUTO DIFF WBC: CPT | Performed by: INTERNAL MEDICINE

## 2025-02-11 PROCEDURE — 80053 COMPREHEN METABOLIC PANEL: CPT | Performed by: INTERNAL MEDICINE

## 2025-02-11 PROCEDURE — 71260 CT THORAX DX C+: CPT

## 2025-02-11 PROCEDURE — 36415 COLL VENOUS BLD VENIPUNCTURE: CPT

## 2025-02-11 PROCEDURE — 25510000001 IOPAMIDOL PER 1 ML: Performed by: INTERNAL MEDICINE

## 2025-02-11 RX ORDER — LEVOFLOXACIN 500 MG/1
500 TABLET, FILM COATED ORAL DAILY
Qty: 7 TABLET | Refills: 0 | Status: SHIPPED | OUTPATIENT
Start: 2025-02-11 | End: 2025-02-18

## 2025-02-11 RX ORDER — IOPAMIDOL 755 MG/ML
100 INJECTION, SOLUTION INTRAVASCULAR
Status: COMPLETED | OUTPATIENT
Start: 2025-02-11 | End: 2025-02-11

## 2025-02-11 RX ADMIN — IOPAMIDOL 100 ML: 755 INJECTION, SOLUTION INTRAVENOUS at 14:31

## 2025-02-11 NOTE — TELEPHONE ENCOUNTER
----- Message from Greg Dubon sent at 2/11/2025  3:26 PM EST -----  PIP his lungs look stable no pneumonia etc but there is a nodule on his left adrenal gland and he needs a PET please arrange.

## 2025-02-17 ENCOUNTER — TELEPHONE (OUTPATIENT)
Dept: ONCOLOGY | Facility: CLINIC | Age: 70
End: 2025-02-17
Payer: MEDICARE

## 2025-02-17 NOTE — TELEPHONE ENCOUNTER
Caller: HERSON MORALES (NO  VERBAL)    Relationship to patient: Emergency Contact    Best call back number:     [phone]     Chief complaint: THEY WOULD LIKE TO R/S THE PET SCAN DUE TO UPCOMING WEATHER.     Type of visit: PET SCAN    Requested date: 02/24 - 02/26     If rescheduling, when is the original appointment: 02/19     Additional notes:WAS SCHEDULED IN OFFICE.

## 2025-02-24 ENCOUNTER — HOSPITAL ENCOUNTER (OUTPATIENT)
Dept: PET IMAGING | Facility: HOSPITAL | Age: 70
Discharge: HOME | End: 2025-02-24
Payer: MEDICARE

## 2025-02-24 DIAGNOSIS — C34.92 PRIMARY SQUAMOUS CELL CARCINOMA OF LUNG, LEFT: ICD-10-CM

## 2025-02-24 DIAGNOSIS — E27.8 RIGHT ADRENAL MASS: ICD-10-CM

## 2025-02-24 DIAGNOSIS — C34.32 CANCER OF BRONCHUS OF LEFT LOWER LOBE: ICD-10-CM

## 2025-02-24 LAB — GLUCOSE BLDC GLUCOMTR-MCNC: 80 MG/DL (ref 70–130)

## 2025-02-24 PROCEDURE — 82948 REAGENT STRIP/BLOOD GLUCOSE: CPT

## 2025-02-24 PROCEDURE — 78815 PET IMAGE W/CT SKULL-THIGH: CPT

## 2025-02-24 PROCEDURE — A9552 F18 FDG: HCPCS | Performed by: INTERNAL MEDICINE

## 2025-02-24 PROCEDURE — 34310000005 FLUDEOXYGLUCOSE F18 SOLUTION: Performed by: INTERNAL MEDICINE

## 2025-02-24 RX ADMIN — FLUDEOXYGLUCOSE F 18 1 DOSE: 200 INJECTION, SOLUTION INTRAVENOUS at 09:07

## 2025-02-27 ENCOUNTER — PREP FOR SURGERY (OUTPATIENT)
Dept: OTHER | Facility: HOSPITAL | Age: 70
End: 2025-02-27
Payer: MEDICARE

## 2025-02-27 ENCOUNTER — ANESTHESIA EVENT (OUTPATIENT)
Dept: PERIOP | Facility: HOSPITAL | Age: 70
End: 2025-02-27
Payer: MEDICARE

## 2025-02-27 ENCOUNTER — TELEPHONE (OUTPATIENT)
Dept: ONCOLOGY | Facility: CLINIC | Age: 70
End: 2025-02-27
Payer: MEDICARE

## 2025-02-27 DIAGNOSIS — C34.92 PRIMARY SQUAMOUS CELL CARCINOMA OF LUNG, LEFT: Primary | ICD-10-CM

## 2025-02-27 RX ORDER — ACETAMINOPHEN 500 MG
500 TABLET ORAL EVERY 6 HOURS PRN
COMMUNITY

## 2025-02-27 NOTE — TELEPHONE ENCOUNTER
Patient's daughter verbalized understanding of Dr. Dubon's message. Provided her with my direct contact number so she can let us know when he gets scheduled for EBUS.

## 2025-02-27 NOTE — TELEPHONE ENCOUNTER
----- Message from Greg Dubon sent at 2/27/2025  8:12 AM EST -----  Rogelio PIP his PET shows some abnormality in the LNs chest and on the left adrenal gland.  Dr. Scott is going to contact him about bronchoscopy and biopsy of chest LNs.  Please watch for procedure and see me few days later for results and discussion.

## 2025-02-28 ENCOUNTER — ANESTHESIA (OUTPATIENT)
Dept: PERIOP | Facility: HOSPITAL | Age: 70
End: 2025-02-28
Payer: MEDICARE

## 2025-02-28 ENCOUNTER — HOSPITAL ENCOUNTER (OUTPATIENT)
Facility: HOSPITAL | Age: 70
Setting detail: HOSPITAL OUTPATIENT SURGERY
Discharge: HOME | End: 2025-02-28
Attending: INTERNAL MEDICINE | Admitting: INTERNAL MEDICINE
Payer: MEDICARE

## 2025-02-28 VITALS
BODY MASS INDEX: 23.43 KG/M2 | SYSTOLIC BLOOD PRESSURE: 131 MMHG | HEART RATE: 68 BPM | OXYGEN SATURATION: 100 % | RESPIRATION RATE: 16 BRPM | WEIGHT: 172.8 LBS | DIASTOLIC BLOOD PRESSURE: 88 MMHG | TEMPERATURE: 98 F

## 2025-02-28 DIAGNOSIS — Z12.11 ENCOUNTER FOR SCREENING FOR MALIGNANT NEOPLASM OF COLON: ICD-10-CM

## 2025-02-28 PROCEDURE — 45380 COLONOSCOPY AND BIOPSY: CPT | Performed by: INTERNAL MEDICINE

## 2025-02-28 PROCEDURE — 25010000002 LIDOCAINE 2% SOLUTION: Performed by: NURSE ANESTHETIST, CERTIFIED REGISTERED

## 2025-02-28 PROCEDURE — 25810000003 LACTATED RINGERS PER 1000 ML: Performed by: NURSE ANESTHETIST, CERTIFIED REGISTERED

## 2025-02-28 PROCEDURE — 45385 COLONOSCOPY W/LESION REMOVAL: CPT | Performed by: INTERNAL MEDICINE

## 2025-02-28 PROCEDURE — 88305 TISSUE EXAM BY PATHOLOGIST: CPT | Performed by: INTERNAL MEDICINE

## 2025-02-28 PROCEDURE — 25010000002 PROPOFOL 200 MG/20ML EMULSION: Performed by: NURSE ANESTHETIST, CERTIFIED REGISTERED

## 2025-02-28 DEVICE — DEV CLIP ENDO RESOLUTION360 CONTRL ROT 235CM: Type: IMPLANTABLE DEVICE | Site: SIGMOID COLON | Status: FUNCTIONAL

## 2025-02-28 RX ORDER — LIDOCAINE HYDROCHLORIDE 20 MG/ML
INJECTION, SOLUTION INFILTRATION; PERINEURAL AS NEEDED
Status: DISCONTINUED | OUTPATIENT
Start: 2025-02-28 | End: 2025-02-28 | Stop reason: SURG

## 2025-02-28 RX ORDER — SODIUM CHLORIDE 9 MG/ML
40 INJECTION, SOLUTION INTRAVENOUS AS NEEDED
Status: DISCONTINUED | OUTPATIENT
Start: 2025-02-28 | End: 2025-02-28 | Stop reason: HOSPADM

## 2025-02-28 RX ORDER — SODIUM CHLORIDE, SODIUM LACTATE, POTASSIUM CHLORIDE, CALCIUM CHLORIDE 600; 310; 30; 20 MG/100ML; MG/100ML; MG/100ML; MG/100ML
INJECTION, SOLUTION INTRAVENOUS CONTINUOUS PRN
Status: DISCONTINUED | OUTPATIENT
Start: 2025-02-28 | End: 2025-02-28 | Stop reason: SURG

## 2025-02-28 RX ORDER — SODIUM CHLORIDE 0.9 % (FLUSH) 0.9 %
10 SYRINGE (ML) INJECTION AS NEEDED
Status: DISCONTINUED | OUTPATIENT
Start: 2025-02-28 | End: 2025-02-28 | Stop reason: HOSPADM

## 2025-02-28 RX ORDER — LIDOCAINE HYDROCHLORIDE 10 MG/ML
0.5 INJECTION, SOLUTION EPIDURAL; INFILTRATION; INTRACAUDAL; PERINEURAL ONCE AS NEEDED
Status: DISCONTINUED | OUTPATIENT
Start: 2025-02-28 | End: 2025-02-28 | Stop reason: HOSPADM

## 2025-02-28 RX ORDER — SODIUM CHLORIDE, SODIUM LACTATE, POTASSIUM CHLORIDE, CALCIUM CHLORIDE 600; 310; 30; 20 MG/100ML; MG/100ML; MG/100ML; MG/100ML
9 INJECTION, SOLUTION INTRAVENOUS CONTINUOUS
Status: DISCONTINUED | OUTPATIENT
Start: 2025-02-28 | End: 2025-02-28 | Stop reason: HOSPADM

## 2025-02-28 RX ORDER — SODIUM CHLORIDE 0.9 % (FLUSH) 0.9 %
10 SYRINGE (ML) INJECTION EVERY 12 HOURS SCHEDULED
Status: DISCONTINUED | OUTPATIENT
Start: 2025-02-28 | End: 2025-02-28 | Stop reason: HOSPADM

## 2025-02-28 RX ORDER — SODIUM CHLORIDE, SODIUM LACTATE, POTASSIUM CHLORIDE, CALCIUM CHLORIDE 600; 310; 30; 20 MG/100ML; MG/100ML; MG/100ML; MG/100ML
100 INJECTION, SOLUTION INTRAVENOUS ONCE
Status: DISCONTINUED | OUTPATIENT
Start: 2025-02-28 | End: 2025-02-28 | Stop reason: HOSPADM

## 2025-02-28 RX ORDER — ONDANSETRON 2 MG/ML
4 INJECTION INTRAMUSCULAR; INTRAVENOUS ONCE AS NEEDED
Status: DISCONTINUED | OUTPATIENT
Start: 2025-02-28 | End: 2025-02-28 | Stop reason: HOSPADM

## 2025-02-28 RX ORDER — PROPOFOL 10 MG/ML
INJECTION, EMULSION INTRAVENOUS AS NEEDED
Status: DISCONTINUED | OUTPATIENT
Start: 2025-02-28 | End: 2025-02-28 | Stop reason: SURG

## 2025-02-28 RX ADMIN — PROPOFOL 30 MG: 10 INJECTION, EMULSION INTRAVENOUS at 08:16

## 2025-02-28 RX ADMIN — PROPOFOL 20 MG: 10 INJECTION, EMULSION INTRAVENOUS at 08:20

## 2025-02-28 RX ADMIN — PROPOFOL 20 MG: 10 INJECTION, EMULSION INTRAVENOUS at 08:34

## 2025-02-28 RX ADMIN — PROPOFOL 50 MG: 10 INJECTION, EMULSION INTRAVENOUS at 08:14

## 2025-02-28 RX ADMIN — PROPOFOL 20 MG: 10 INJECTION, EMULSION INTRAVENOUS at 08:38

## 2025-02-28 RX ADMIN — PROPOFOL 20 MG: 10 INJECTION, EMULSION INTRAVENOUS at 08:32

## 2025-02-28 RX ADMIN — PROPOFOL 20 MG: 10 INJECTION, EMULSION INTRAVENOUS at 08:22

## 2025-02-28 RX ADMIN — LIDOCAINE HYDROCHLORIDE 50 MG: 20 INJECTION, SOLUTION INFILTRATION; PERINEURAL at 08:13

## 2025-02-28 RX ADMIN — PROPOFOL 20 MG: 10 INJECTION, EMULSION INTRAVENOUS at 08:30

## 2025-02-28 RX ADMIN — PROPOFOL 20 MG: 10 INJECTION, EMULSION INTRAVENOUS at 08:26

## 2025-02-28 RX ADMIN — PROPOFOL 20 MG: 10 INJECTION, EMULSION INTRAVENOUS at 08:28

## 2025-02-28 RX ADMIN — PROPOFOL 20 MG: 10 INJECTION, EMULSION INTRAVENOUS at 08:18

## 2025-02-28 RX ADMIN — SODIUM CHLORIDE, POTASSIUM CHLORIDE, SODIUM LACTATE AND CALCIUM CHLORIDE: 600; 310; 30; 20 INJECTION, SOLUTION INTRAVENOUS at 08:09

## 2025-02-28 RX ADMIN — PROPOFOL 20 MG: 10 INJECTION, EMULSION INTRAVENOUS at 08:24

## 2025-02-28 RX ADMIN — PROPOFOL 20 MG: 10 INJECTION, EMULSION INTRAVENOUS at 08:36

## 2025-02-28 NOTE — ANESTHESIA POSTPROCEDURE EVALUATION
Patient: Sunny Hung    Procedure Summary       Date: 02/28/25 Room / Location: [facility] ENDOSCOPY 1 /  [facility] OR    Anesthesia Start: 0809 Anesthesia Stop: 0849    Procedure: COLONOSCOPY WITH POLYPECTOMY, clip Diagnosis:       Encounter for screening for malignant neoplasm of colon      Diverticulosis      Colon polyps      (Encounter for screening for malignant neoplasm of colon [Z12.11])    Surgeons: Clem Lord MD Provider: Caryl Sifuentes CRNA    Anesthesia Type: MAC ASA Status: 3            Anesthesia Type: MAC    Vitals  Vitals Value Taken Time   /81 02/28/25 0910   Temp 98 °F (36.7 °C) 02/28/25 0859   Pulse 61 02/28/25 0921   Resp 16 02/28/25 0910   SpO2 99 % 02/28/25 0921   Vitals shown include unfiled device data.        Post Anesthesia Care and Evaluation    Patient location during evaluation: PHASE II  Patient participation: complete - patient participated  Level of consciousness: awake and alert  Pain score: 0  Pain management: adequate    Airway patency: patent  Anesthetic complications: No anesthetic complications  PONV Status: none  Cardiovascular status: acceptable  Respiratory status: acceptable  Hydration status: acceptable

## 2025-02-28 NOTE — ANESTHESIA PREPROCEDURE EVALUATION
Anesthesia Evaluation     Patient summary reviewed and Nursing notes reviewed   no history of anesthetic complications:   NPO Solid Status: > 8 hours  NPO Liquid Status: > 8 hours           Airway   Mallampati: II  TM distance: >3 FB  Neck ROM: full  Dental    (+) poor dentition        Pulmonary    (+) a smoker Former, lung cancer, COPD moderate,home oxygen (prn), recent URI (3 weeks ago and recieved antibiotics), wheezes (bilat)  Cardiovascular - normal exam  Exercise tolerance: poor (<4 METS)    ECG reviewed  Rhythm: regular  Rate: normal    (+) hyperlipidemia    ROS comment: ·  Left ventricular systolic function is normal. Left ventricular ejection fraction appears to be 56 - 60%.  ·  Left ventricular diastolic function was normal.  ·  Estimated right ventricular systolic pressure from tricuspid regurgitation is normal (<35 mmHg).  HEART RATE=89  bpm  RR Zgxctily=611  ms  WY Irfvgqfe=948  ms  P Horizontal Axis=22  deg  P Front Axis=108  deg  QRSD Interval=81  ms  QT Mnghjzsv=663  ms  JTtG=434  ms  QRS Axis=44  deg  T Wave Axis=99  deg  - ABNORMAL ECG -  Sinus rhythm  Atrial premature complex  Abnormal T, consider ischemia, lateral leads  c/w prior ecg, lateral t wave inversion now seen  Electronically Signed By: Leidy Lua ([facility]) 2024-09-09 09:29:16  Date and Time of Study:2024-09-08 15:37:4      Neuro/Psych  (+) numbness (Fingers and toes after chemo)  GI/Hepatic/Renal/Endo    (-)  obesity    Musculoskeletal     Abdominal    Substance History - negative use     OB/GYN          Other   arthritis (right shoulder),   history of cancer (Left Lung) remission                  Anesthesia Plan    ASA 3     MAC     intravenous induction     Anesthetic plan, risks, benefits, and alternatives have been provided, discussed and informed consent has been obtained with: patient.  Pre-procedure education provided  Use of blood products discussed with  Consented to blood products.    Plan discussed with CRNA.      CODE  STATUS:

## 2025-02-28 NOTE — BRIEF OP NOTE
COLONOSCOPY WITH POLYPECTOMY  Progress Note    Sunny Hung  2/28/2025    Pre-op Diagnosis:   Encounter for screening for malignant neoplasm of colon [Z12.11]       Post-Op Diagnosis Codes:     * Encounter for screening for malignant neoplasm of colon [Z12.11]     * Diverticulosis [K57.90]     * Colon polyps [K63.5]    Procedure(s):      Procedure(s):  COLONOSCOPY WITH POLYPECTOMY, clip              Surgeon(s):  Clem Lord MD    Anesthesia: Monitored Anesthesia Care    Staff:   Circulator: Juana Parish RN  Scrub Person: Brian Reis       Estimated Blood Loss: none    Urine Voided: * No values recorded between 2/28/2025  8:08 AM and 2/28/2025  8:42 AM *    Specimens:                Specimens       ID Source Type Tests Collected By Collected At Frozen?    A Large Intestine, Right / Ascending Colon Polyp TISSUE PATHOLOGY EXAM   Clem Lord MD 2/28/25 0823     Description: Ascending polyp x 1    B Large Intestine, Left / Descending Colon Polyp TISSUE PATHOLOGY EXAM   Clem Lord MD 2/28/25 0826     Description: Descending polyp x 1    C Large Intestine, Sigmoid Colon Polyp TISSUE PATHOLOGY EXAM   Clem Lord MD 2/28/25 0830     Description: Sigmoid polyp x 1- clip at 20cm, hot snare    D Large Intestine, Rectum Polyp TISSUE PATHOLOGY EXAM   Clem Lord MD 2/28/25 0835     Description: Rectal polyp x 1- clip, hot snare              Drains:   [REMOVED] Chest Tube 1 Left Midaxillary (Removed)       [REMOVED] Chest Tube 2 Left Midaxillary (Removed)       [REMOVED] Urethral Catheter Straight-tip 16 Fr. (Removed)       [REMOVED] Urethral Catheter Straight-tip (Removed)       [REMOVED] Urethral Catheter Coude 16 Fr. (Removed)       Findings: Colon to Cecum Good prep  Pan-Colonic Diverticulosis  Polyps-4-Hot Snare x 2 , Biopsy      Complications: None          Clem Lord MD     Date: 2/28/2025  Time: 08:44 EST

## 2025-02-28 NOTE — H&P
Patient Care Team:  Hosea Abernathy MD as PCP - General (Family Medicine)  Rosalio Scott MD as Referring Physician (Thoracic Surgery)  Greg Dubon MD as Consulting Physician (Hematology and Oncology)  Inderjit Culver MD as Consulting Physician (Radiation Oncology)    CHIEF COMPLAINT: Screening CRC    HISTORY OF PRESENT ILLNESS:  First exam    Past Medical History:   Diagnosis Date    Arthritis     BPH (benign prostatic hyperplasia)     Bruises easily     Bursitis of right shoulder     COPD (chronic obstructive pulmonary disease)     on home O2 as needed    Cough     Hyperlipidemia     Lung cancer     2023    Mass of lower lobe of left lung     Neuropathy     FINGERS AND TOES     Past Surgical History:   Procedure Laterality Date    BRONCHOSCOPY WITH ION ROBOTIC ASSIST N/A 9/6/2023    Procedure: BRONCHOSCOPY WITH BAL;   ION ROBOT AND ENDOBRONCHIAL ULTRASOUND WITH FNA'S;  Surgeon: Rosalio Scott MD;  Location: [facility] ENDOSCOPY;  Service: Robotics - Pulmonary;  Laterality: N/A;  PRE- LEFT LOWER LOBE MASS  POST- SAME    GANGLION CYST EXCISION Bilateral     HEMORRHOIDECTOMY      LOBECTOMY Left 12/13/2023    Procedure: BRONCHOSCOPY, THORACOSCOPY WITH LYSIS OF ADHESIONS (120 MINUTES), LEFT LOWER LOBECTOMY WITH EN BLOC PARTIAL PARIETAL PLEURECTOMY USING DAVINCI ROBOT, MEDIASTINAL LYMPH NODE DISSECTION, INTERCOSTAL NERVE BLOCK;  Surgeon: Rosalio Scott MD;  Location: [facility] MAIN OR;  Service: Robotics - DaVinci;  Laterality: Left;    OTHER SURGICAL HISTORY      SOMETHING REMOVED FROM LEFT CHEST, BENIGN ? LIPOMA     Family History   Problem Relation Age of Onset    Bone cancer Mother     COPD Father     Brain cancer Sister     Cancer Brother         Malignant tumor of pharynx    Alcohol abuse Brother     Cirrhosis Brother     Recurrent abdominal pain Brother     Lung cancer Brother     Malig Hyperthermia Neg Hx      Social History     Tobacco Use    Smoking status: Former     Current packs/day: 0.00     Average packs/day:  0.5 packs/day for 40.0 years (20.0 ttl pk-yrs)     Types: Cigarettes     Start date:      Quit date: 2016     Years since quittin.1     Passive exposure: Past    Smokeless tobacco: Never   Vaping Use    Vaping status: Never Used   Substance Use Topics    Alcohol use: Not Currently     Comment: VERY RARELY, MAYBE 1 BEER    Drug use: Never     Medications Prior to Admission   Medication Sig Dispense Refill Last Dose/Taking    acetaminophen (TYLENOL) 500 MG tablet Take 1 tablet by mouth Every 6 (Six) Hours As Needed for Mild Pain.   2025 at  9:00 PM    albuterol sulfate  (90 Base) MCG/ACT inhaler Inhale 2 puffs Every 4 (Four) Hours As Needed for Wheezing. 18 g 1 2025 at  6:00 AM    pravastatin (PRAVACHOL) 20 MG tablet Take 1 tablet by mouth Daily.   2025 Evening    tamsulosin (FLOMAX) 0.4 MG capsule 24 hr capsule Take 1 capsule by mouth 2 (Two) Times a Day.   2025 Evening     Allergies:  Amoxicillin-pot clavulanate    REVIEW OF SYSTEMS:  Please see the above history of present illness for pertinent positives and negatives.  The remainder of the patient's systems have been reviewed and are negative.     Vital Signs  Temp:  [98 °F (36.7 °C)] 98 °F (36.7 °C)  Heart Rate:  [82] 82  Resp:  [20] 20  BP: (137)/(93) 137/93    Flowsheet Rows      Flowsheet Row First Filed Value   Admission Height --   Admission Weight 78.4 kg (172 lb 12.8 oz) Documented at 2025 0721             Physical Exam:  Physical Exam   Constitutional: Patient appears well-developed and well-nourished and in no acute distress   HEENT:   Head: Normocephalic and atraumatic.   Eyes:  Pupils are equal, round, and reactive to light. EOM are intact. Sclerae are anicteric and non-injected.  Mouth and Throat: Patient has moist mucous membranes. Oropharynx is clear of any erythema or exudate.     Neck: Neck supple. No JVD present. No thyromegaly present. No lymphadenopathy present.  Cardiovascular: Regular rate, regular  rhythm, S1 normal and S2 normal.  Exam reveals no gallop and no friction rub.  No murmur heard.  Pulmonary/Chest: Lungs are clear to auscultation bilaterally. No respiratory distress. No wheezes. No rhonchi. No rales.   Abdominal: Soft. Bowel sounds are normal. No distension and no mass. There is no hepatosplenomegaly. There is no tenderness.   Musculoskeletal: Normal Muscle tone  Extremities: No edema. Pulses are palpable in all 4 extremities.  Neurological: Patient is alert and oriented to person, place, and time. Cranial nerves II-XII are grossly intact with no focal deficits.  Skin: Skin is warm. No rash noted. Nails show no clubbing.  No cyanosis or erythema.    Debilities/Disabilities Identified: None  Emotional Behavior: Appropriate     Results Review:   I reviewed the patient's new clinical results.    Lab Results (most recent)       None            Imaging Results (Most Recent)       None          reviewed    ECG/EMG Results (most recent)       None          reviewed    Assessment & Plan   Screening CRC/  colonoscopy      I discussed the patient's findings and my recommendations with patient.     Clem Lord MD  02/28/25  08:06 EST    Time: 10 min prior to procedure.

## 2025-02-28 NOTE — TELEPHONE ENCOUNTER
Carmen (wife) says patient is scheduled for bronchoscopy on March 7 at 7:30 am and follows up with Dr. Scott for results on March 13 at 9:15 am.

## 2025-03-03 LAB
CYTO UR: NORMAL
LAB AP CASE REPORT: NORMAL
PATH REPORT.FINAL DX SPEC: NORMAL
PATH REPORT.GROSS SPEC: NORMAL

## 2025-03-04 ENCOUNTER — TELEPHONE (OUTPATIENT)
Dept: ONCOLOGY | Facility: CLINIC | Age: 70
End: 2025-03-04
Payer: MEDICARE

## 2025-03-04 NOTE — TELEPHONE ENCOUNTER
Caller: HERSON MORALES - NOT ON  VERBAL    Relationship to patient: Emergency Contact - WIFE    Best call back number:[phone]    Chief complaint: CANC. - R/S     Type of visit: LAB & F/U 1    Requested date: SAME DAYJUST 1 OR AFTER AS THEY WILL BE  [place] FOR ANOTHER APPT.     When is the original appointment: 3/13/25

## 2025-03-07 ENCOUNTER — ANESTHESIA (OUTPATIENT)
Dept: GASTROENTEROLOGY | Facility: HOSPITAL | Age: 70
End: 2025-03-07
Payer: MEDICARE

## 2025-03-07 ENCOUNTER — HOSPITAL ENCOUNTER (OUTPATIENT)
Facility: HOSPITAL | Age: 70
Setting detail: HOSPITAL OUTPATIENT SURGERY
Discharge: HOME | End: 2025-03-07
Attending: SURGERY | Admitting: SURGERY
Payer: MEDICARE

## 2025-03-07 ENCOUNTER — ANESTHESIA EVENT (OUTPATIENT)
Dept: GASTROENTEROLOGY | Facility: HOSPITAL | Age: 70
End: 2025-03-07
Payer: MEDICARE

## 2025-03-07 VITALS
HEIGHT: 72 IN | SYSTOLIC BLOOD PRESSURE: 161 MMHG | OXYGEN SATURATION: 95 % | BODY MASS INDEX: 23.7 KG/M2 | WEIGHT: 175 LBS | DIASTOLIC BLOOD PRESSURE: 89 MMHG | HEART RATE: 94 BPM | RESPIRATION RATE: 22 BRPM

## 2025-03-07 DIAGNOSIS — C34.92 PRIMARY SQUAMOUS CELL CARCINOMA OF LUNG, LEFT: ICD-10-CM

## 2025-03-07 PROCEDURE — 25010000002 LIDOCAINE 2% SOLUTION: Performed by: NURSE ANESTHETIST, CERTIFIED REGISTERED

## 2025-03-07 PROCEDURE — 25810000003 LACTATED RINGERS PER 1000 ML: Performed by: SURGERY

## 2025-03-07 PROCEDURE — 88305 TISSUE EXAM BY PATHOLOGIST: CPT | Performed by: SURGERY

## 2025-03-07 PROCEDURE — 25010000002 DEXAMETHASONE PER 1 MG: Performed by: NURSE ANESTHETIST, CERTIFIED REGISTERED

## 2025-03-07 PROCEDURE — 88342 IMHCHEM/IMCYTCHM 1ST ANTB: CPT | Performed by: SURGERY

## 2025-03-07 PROCEDURE — 88341 IMHCHEM/IMCYTCHM EA ADD ANTB: CPT | Performed by: SURGERY

## 2025-03-07 PROCEDURE — S0260 H&P FOR SURGERY: HCPCS | Performed by: SURGERY

## 2025-03-07 PROCEDURE — 25010000002 ONDANSETRON PER 1 MG: Performed by: NURSE ANESTHETIST, CERTIFIED REGISTERED

## 2025-03-07 PROCEDURE — 25010000002 SUGAMMADEX 200 MG/2ML SOLUTION: Performed by: NURSE ANESTHETIST, CERTIFIED REGISTERED

## 2025-03-07 PROCEDURE — 88360 TUMOR IMMUNOHISTOCHEM/MANUAL: CPT | Performed by: SURGERY

## 2025-03-07 PROCEDURE — 88173 CYTOPATH EVAL FNA REPORT: CPT | Performed by: SURGERY

## 2025-03-07 PROCEDURE — 88172 CYTP DX EVAL FNA 1ST EA SITE: CPT | Performed by: SURGERY

## 2025-03-07 PROCEDURE — 25810000003 LACTATED RINGERS PER 1000 ML: Performed by: NURSE ANESTHETIST, CERTIFIED REGISTERED

## 2025-03-07 PROCEDURE — 31629 BRONCHOSCOPY/NEEDLE BX EACH: CPT | Performed by: SURGERY

## 2025-03-07 PROCEDURE — 25010000002 PROPOFOL 200 MG/20ML EMULSION: Performed by: NURSE ANESTHETIST, CERTIFIED REGISTERED

## 2025-03-07 RX ORDER — PROPOFOL 10 MG/ML
INJECTION, EMULSION INTRAVENOUS AS NEEDED
Status: DISCONTINUED | OUTPATIENT
Start: 2025-03-07 | End: 2025-03-07 | Stop reason: SURG

## 2025-03-07 RX ORDER — SODIUM CHLORIDE, SODIUM LACTATE, POTASSIUM CHLORIDE, CALCIUM CHLORIDE 600; 310; 30; 20 MG/100ML; MG/100ML; MG/100ML; MG/100ML
1000 INJECTION, SOLUTION INTRAVENOUS CONTINUOUS
Status: DISCONTINUED | OUTPATIENT
Start: 2025-03-07 | End: 2025-03-07 | Stop reason: HOSPADM

## 2025-03-07 RX ORDER — LIDOCAINE HYDROCHLORIDE 20 MG/ML
INJECTION, SOLUTION INFILTRATION; PERINEURAL AS NEEDED
Status: DISCONTINUED | OUTPATIENT
Start: 2025-03-07 | End: 2025-03-07 | Stop reason: SURG

## 2025-03-07 RX ORDER — SODIUM CHLORIDE 0.9 % (FLUSH) 0.9 %
10 SYRINGE (ML) INJECTION AS NEEDED
Status: DISCONTINUED | OUTPATIENT
Start: 2025-03-07 | End: 2025-03-07 | Stop reason: HOSPADM

## 2025-03-07 RX ORDER — ROCURONIUM BROMIDE 10 MG/ML
INJECTION, SOLUTION INTRAVENOUS AS NEEDED
Status: DISCONTINUED | OUTPATIENT
Start: 2025-03-07 | End: 2025-03-07 | Stop reason: SURG

## 2025-03-07 RX ORDER — ONDANSETRON 2 MG/ML
INJECTION INTRAMUSCULAR; INTRAVENOUS AS NEEDED
Status: DISCONTINUED | OUTPATIENT
Start: 2025-03-07 | End: 2025-03-07 | Stop reason: SURG

## 2025-03-07 RX ORDER — SODIUM CHLORIDE, SODIUM LACTATE, POTASSIUM CHLORIDE, CALCIUM CHLORIDE 600; 310; 30; 20 MG/100ML; MG/100ML; MG/100ML; MG/100ML
INJECTION, SOLUTION INTRAVENOUS CONTINUOUS PRN
Status: DISCONTINUED | OUTPATIENT
Start: 2025-03-07 | End: 2025-03-07 | Stop reason: SURG

## 2025-03-07 RX ORDER — DEXAMETHASONE SODIUM PHOSPHATE 4 MG/ML
INJECTION, SOLUTION INTRA-ARTICULAR; INTRALESIONAL; INTRAMUSCULAR; INTRAVENOUS; SOFT TISSUE AS NEEDED
Status: DISCONTINUED | OUTPATIENT
Start: 2025-03-07 | End: 2025-03-07 | Stop reason: SURG

## 2025-03-07 RX ADMIN — ROCURONIUM BROMIDE 40 MG: 10 INJECTION INTRAVENOUS at 09:49

## 2025-03-07 RX ADMIN — SUGAMMADEX 100 MG: 100 INJECTION, SOLUTION INTRAVENOUS at 10:23

## 2025-03-07 RX ADMIN — PROPOFOL 100 MG: 10 INJECTION, EMULSION INTRAVENOUS at 09:49

## 2025-03-07 RX ADMIN — SODIUM CHLORIDE, POTASSIUM CHLORIDE, SODIUM LACTATE AND CALCIUM CHLORIDE 1000 ML: 600; 310; 30; 20 INJECTION, SOLUTION INTRAVENOUS at 08:37

## 2025-03-07 RX ADMIN — SODIUM CHLORIDE, POTASSIUM CHLORIDE, SODIUM LACTATE AND CALCIUM CHLORIDE: 600; 310; 30; 20 INJECTION, SOLUTION INTRAVENOUS at 09:38

## 2025-03-07 RX ADMIN — SUGAMMADEX 200 MG: 100 INJECTION, SOLUTION INTRAVENOUS at 10:15

## 2025-03-07 RX ADMIN — LIDOCAINE HYDROCHLORIDE 80 MG: 20 INJECTION, SOLUTION INFILTRATION; PERINEURAL at 09:49

## 2025-03-07 RX ADMIN — DEXAMETHASONE SODIUM PHOSPHATE 4 MG: 4 INJECTION, SOLUTION INTRA-ARTICULAR; INTRALESIONAL; INTRAMUSCULAR; INTRAVENOUS; SOFT TISSUE at 09:55

## 2025-03-07 RX ADMIN — PROPOFOL 160 MCG/KG/MIN: 10 INJECTION, EMULSION INTRAVENOUS at 09:50

## 2025-03-07 RX ADMIN — ONDANSETRON 4 MG: 2 INJECTION, SOLUTION INTRAMUSCULAR; INTRAVENOUS at 09:55

## 2025-03-07 NOTE — H&P
THORACIC SURGERY CLINIC CONSULT NOTE    REASON FOR CONSULT: Left lower lobe poorly differentiated non-small cell lung cancer underwent neoadjuvant chemoimmunotherapy s/p robot-assisted left lower lobectomy, chest wall resection, mediastinal lymph node dissection.  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  Only 10% viable small cell cancer remaining involving the bronchial margin.     REFERRING PROVIDER: Hosea Abernathy MD     Subjective   HISTORY OF PRESENTING ILLNESS:   Sunny Hung is a 69 y.o. male who has significant medical problems as mentioned in the medical chart.     History of Present Illness  He underwent a screening CT of the chest on 7/31/2023 which showed a large mass in the left lower lobe of the lung extending to the hilum.  A diagnostic CT was performed 8/25/2023 showing a large left infrahilar medial mass in the lower lobe.  A PET scan on 9/8/2023 showed a left lower lobe spiculated pleural-based mass 1.3 x 5 cm involving the left hilum and major fissure SUV 23.2.  There were smaller nodular airspace opacities also hypermetabolic.  The mass encases left lower lobe bronchovascular bundle.  No hypermetabolic mediastinal or hilar lymph nodes noted.  There was a 7 mm nodule in the right upper lobe photopenic SUV 2.1.  There was no supraclavicular uptake or disease below the abdomen other than 2 abnormal foci in the sigmoid colon.  MRI of the brain on 9/11/2023 showed a subtle 2 mm focus inferior left cerebellum too small to characterize but likely a blood vessel.  The patient underwent bronchoscopy on 9/6/2023 with biopsies taken from the left lower lobe mass, lymph node station 7 and lymph node station 4R.  Pathology showed malignant cells favoring poorly differentiated carcinoma/squamous differentiation from the left lower lobe and biopsies from station 7 and 4R were negative for malignant cells.     The patient completed 3 cycles of neoadjuvant therapy with  carboplatin/Taxol/nivolumab 11/1/2023.  He tolerated treatment well without much complications.  His case was discussed in multidisciplinary tumor board conference and the consensus recommendation was to proceed with surgical resection.  Restaging PET/CT was done on 12/5/2023 which reported significant decrease in size of the left lower lobe mass with decreased SUV of 3.6, previously noted 23.2.  There was irregular large airspace consolidation at the posterior aspect of the left lower lobe with a maximal SUV of 8.7 suspected to represent pneumonitis with possible superimposed infection.  There was no evidence of hypermetabolic mediastinal or hilar lymphadenopathy or distant metastases.  He also had transthoracic echo on 12/5/2023 which reported LVEF of 60%.     Prior to surgical resection, he denied any difficulty breathing or pain. He coughed intermittently. He denied angina, history of myocardial infarction or stroke. He could walk a block without dyspnea. He did not smoke. He was not on supplemental oxygen at home.     As per my assessment, he had stage II-B non-small cell lung cancer who underwent neoadjuvant chemoimmunotherapy based on Checkmate 816 trial.  There was significant treatment response based on the PET CT scan.  In Checkmate 816 trial, there were 25% of the patient who had complete pathological response after neoadjuvant chemoimmunotherapy.  The standard of care for stage II-B resectable lung cancer is neoadjuvant chemoimmunotherapy followed by lobectomy.  Therefore, I recommended robot-assisted thoracoscopic left lower lobectomy, mediastinal lymph node dissection.  His lung function has been borderline which does not correlate to his excellent functional status.  He was independent in his activities of daily living and was not dependent on supplemental oxygen.  We performed a 6-minute walk test in the clinic which he completed without any respiratory distress.  His oxygen saturation remained above  95% throughout the test.  He was motivated and determined to pursue surgical cure and I supported his wishes.      On 12/13/2023, he underwent Robot assisted Thoracoscopic Left Lower Lobectomy with en bloc resection of parietal pleura.  The entire lung was adherent to the chest wall.  I performed lysis of adhesion for 120 minutes.  The tissue planes were extremely friable that led to significant air leak.  He was on 80 mg prednisone per day for colitis at the time of the operation.  The left lower lobe mass was adherent to the chest wall invading the parietal pleura but did not extend to the extrathoracic space.  I placed 2 chest tubes as I was expecting large prolonged air leak.  He tolerated procedure well.  He was extubated at the end of the procedure.  His hospital course was significant for prolonged air leak.  The prednisone was slowly tapered and eventually discontinued.  He developed urinary retention requiring Prater catheter placement.  His pain was well-controlled with pain medication.  He ambulated without much difficulty.  He was tolerating regular diet.  He was requiring supplemental oxygen.  Chest tube was transitioned to waterseal and eventually 1 chest tube was connected to mini atrium and he was discharged home with mini atrium.  A CT scan was obtained prior to discharge which showed consolidation changes in the left upper lobe concerning for pneumonia.  He did not have leukocytosis but in context of neoadjuvant chemoimmunotherapy, immunosuppression from prednisone, I started him on empiric antibiotics to treat hospital-acquired pneumonia.  He was discharged home with antibiotics as well.      The final pathology revealed residual small cell carcinoma with background of neoadjuvant therapy effect.  Visceral pleural invasion was not an 5.  The bronchial and perivascular soft tissue margins were involved with invasive carcinoma and tumor within lymphatics.  There were 2 peribronchial lymph nodes which  were negative for carcinoma.  The total size of the viable tumor was 0.5 cm.  This made up to 10% of viable tumor present.  10 lymph nodes were retrieved and 2 of the hilar lymph nodes were positive.  The pathologist reported that the residual tumor likely represented the previously and biopsy population of small cell carcinoma that did not respond to neoadjuvant therapy.  His case was discussed in our multidisciplinary tumor board conference.  The small cell cancer was an unexpected finding.  The consensus recommendation was to proceed with adjuvant systemic treatment and radiation treatment to the resection margin.     He made slow recovery.  The chest tube was removed after the airleak resolved.  He was initiated on postoperative carboplatin/-16 with concurrent radiation to the bronchial margin on 2/5/2024.  He also completed his course of immunotherapy.  Interval CT scan of the chest for cancer surveillance on 7/17/2024 and 10/16/2024 did not reveal any evidence of recurrence.    CT scan of the chest on 2/11/2025 reported persistent left perihilar airspace disease and posterior pleural thickening favored to be secondary to treatment related changes.  Multiple nodular opacities within the anterior left upper lobe decrease in size.  There was interval enlargement of left adrenal gland.  Subsequent PET CT scan on 2/20/2025 reported intensely avid mediastinal opacity as well as left adrenal nodule which was new or increasing in size from prior CT scan.  He also had focal intense uptake within the sigmoid colon.  He underwent colonoscopy with Dr. Lord.    He was referred to me for sampling of mediastinal lymph node.    Past Medical History:   Diagnosis Date    Arthritis     BPH (benign prostatic hyperplasia)     Bruises easily     Bursitis of right shoulder     COPD (chronic obstructive pulmonary disease)     on home O2 as needed    Cough     Hyperlipidemia     Lung cancer     2023    Mass of lower lobe of  left lung     Neuropathy     FINGERS AND TOES       Past Surgical History:   Procedure Laterality Date    BRONCHOSCOPY WITH ION ROBOTIC ASSIST N/A 2023    Procedure: BRONCHOSCOPY WITH BAL;   ION ROBOT AND ENDOBRONCHIAL ULTRASOUND WITH FNA'S;  Surgeon: Rosalio Scott MD;  Location: [facility] ENDOSCOPY;  Service: Robotics - Pulmonary;  Laterality: N/A;  PRE- LEFT LOWER LOBE MASS  POST- SAME    COLONOSCOPY W/ POLYPECTOMY N/A 2025    Procedure: COLONOSCOPY WITH POLYPECTOMY, clip;  Surgeon: Clem Lord MD;  Location: [facility] OR;  Service: Gastroenterology;  Laterality: N/A;  Diverticulosis; Ascending polyp x 1; Descending polyp x 1; Sigmoid poyp x 1- clip at 20cm, hot snare; Rectal polyp x 1- clip, hot snare    GANGLION CYST EXCISION Bilateral     HEMORRHOIDECTOMY      LOBECTOMY Left 2023    Procedure: BRONCHOSCOPY, THORACOSCOPY WITH LYSIS OF ADHESIONS (120 MINUTES), LEFT LOWER LOBECTOMY WITH EN BLOC PARTIAL PARIETAL PLEURECTOMY USING DAVINCI ROBOT, MEDIASTINAL LYMPH NODE DISSECTION, INTERCOSTAL NERVE BLOCK;  Surgeon: Rosalio Scott MD;  Location: [facility] MAIN OR;  Service: Robotics - DaVinci;  Laterality: Left;    OTHER SURGICAL HISTORY      SOMETHING REMOVED FROM LEFT CHEST, BENIGN ? LIPOMA       Family History   Problem Relation Age of Onset    Bone cancer Mother     COPD Father     Brain cancer Sister     Cancer Brother         Malignant tumor of pharynx    Alcohol abuse Brother     Cirrhosis Brother     Recurrent abdominal pain Brother     Lung cancer Brother     Malig Hyperthermia Neg Hx        Social History     Socioeconomic History    Marital status:      Spouse name: Carmen   Tobacco Use    Smoking status: Former     Current packs/day: 0.00     Average packs/day: 0.5 packs/day for 40.0 years (20.0 ttl pk-yrs)     Types: Cigarettes     Start date:      Quit date: 2016     Years since quittin.1     Passive exposure: Past    Smokeless tobacco: Never   Vaping Use    Vaping  status: Never Used   Substance and Sexual Activity    Alcohol use: Not Currently     Comment: VERY RARELY, MAYBE 1 BEER    Drug use: Never    Sexual activity: Defer       No current facility-administered medications for this encounter.     Allergies   Allergen Reactions    Amoxicillin-Pot Clavulanate Hives             Objective    OBJECTIVE:     VITAL SIGNS:  There were no vitals taken for this visit.    PHYSICAL EXAM:  Normal appearance.   Head is normocephalic.   Nose appears normal.   No obvious deformity of the mouth and throat.  Conjunctivae normal.   Heart rate and rhythm is normal.  Pulmonary effort is normal.   Moving all 4 extremities.  Extremities warm.  No focal deficit present.   Alert and oriented to person, place, and time.     RESULTS REVIEW:  I have reviewed the patient's all relevant laboratory and imaging findings.     Assessment & Plan    ASSESSMENT & PLAN:  Sunny Hung is a 69 y.o. male with significant medical conditions as mentioned above presented to my clinic.    Assessment & Plan  1. Left lower lobe poorly differentiated non-small cell lung cancer underwent neoadjuvant chemoimmunotherapy s/p robot-assisted left lower lobectomy, chest wall resection, mediastinal lymph node dissection.  Mediastinal lymphadenopathy and enlargement of adrenal nodule concerning for metastatic disease.  I recommended endobronchial ultrasound for mediastinal lymph node assessment.  There is diffuse opacities in the left lung and I believe there is not one good target to sample from the lung.  The left paratracheal lymph node can be accessed with endobronchial ultrasound for sampling.    I discussed the patients findings and my recommendations with the patient/family/caregiver. The patient/family/caregiver was given adequate time to ask questions and all questions were answered to patient satisfaction. Thank you for this consult and allowing us to participate in the care of your patient.      Rosalio Scott,  MD  Thoracic Surgeon  [facility]        Dictated utilizing Dragon dictation

## 2025-03-07 NOTE — ANESTHESIA POSTPROCEDURE EVALUATION
Patient: Sunny Hung    Procedure Summary       Date: 03/07/25 Room / Location:  [facility]    Anesthesia Start: 0938 Anesthesia Stop: 1032    Procedure: BRONCHOSCOPY WITH ENDOBRONCHIAL ULTRASOUND WITH FNA (Bronchus) Diagnosis:       Primary squamous cell carcinoma of lung, left      (Primary squamous cell carcinoma of lung, left [C34.92])    Surgeons: Rosalio Scott MD Provider: Nikki Anguiano MD    Anesthesia Type: general ASA Status: 3            Anesthesia Type: general    Vitals  Vitals Value Taken Time   /89 03/07/25 1107   Temp     Pulse 94 03/07/25 1107   Resp 22 03/07/25 1107   SpO2 95 % 03/07/25 1107           Post Anesthesia Care and Evaluation    Patient location during evaluation: bedside  Patient participation: complete - patient participated  Level of consciousness: awake  Pain management: adequate    Airway patency: patent  Anesthetic complications: No anesthetic complications    Cardiovascular status: acceptable  Respiratory status: acceptable  Hydration status: acceptable

## 2025-03-07 NOTE — DISCHARGE INSTRUCTIONS
Endoscopic ultrasound with fine needle aspiration    Samples (biopsies) of the lymph nodes are taken and sent for diagnostic testing.    Final results of your biopsy take up to 5 business days to process; your endoscopic physician will contact you with your results.    EBUS is recommended in the following instances:  To diagnose different types of lung disorders (such as sarcoidosis or tuberculosis)  To diagnose or 'stage' cancer   To investigate enlarged lymph  nodes in the chest    Due to effects of sedation, do not drive or operate heavy machinery for 24 hours.    Avoid heavy lifting (>10 lbs.) or strenuous activity for 48 hours.    Continue to avoid non-steroidal anti-inflammatory medications (NSAIDS) for 5-7 days after the procedure unless told otherwise by your endoscopic and primary care physicians.    Some patients have a temporary sore throat after the procedure; this is a normal finding and over-the-counter lozenges help soothe symptoms.    You may resume normal diet once you are discharged.     Avoid red-colored foods for 24 hours.     You may resume your blood thinner medications (if applicable) as directed by your endoscopic and primary care physicians.    It is normal to have a very small amount of blood-tinged sputum immediately after the procedure. This should resolve within 3-4 days.    Although complications are rare, there is a small risk of pain, collapsed lung, bleeding and infection. Contact your endoscopic physician if you have these signs or symptoms (Dr. Scott):  Temperature greater than 102°F  Worsening pain not relieved by medications  Go to your nearest emergency room is you experience:  Shaking, chills or a temperature over 102°F.    New, sudden difficulty breathing.    New pain when taking a deep breath.    New, sudden chest pain.  Worsening cough that produces large amounts of blood.

## 2025-03-07 NOTE — OP NOTE
Operative Note     Date of procedure: 3/7/2025     Patient name: Sunny Hung  MRN: [id number]    Pre OP diagnosis:    Primary squamous cell carcinoma of lung, left       Post OP diagnosis:    Primary squamous cell carcinoma of lung, left      Procedure performed:   Flexible bronchoscopy.  Endobronchial ultrasound for mediastinal lymph node assessment.  Fine-needle aspiration of level level 4L lymph node.     Indications:   Sunny Hung is a 69 y.o. male who has significant medical problems as mentioned in the medical chart     He underwent a screening CT of the chest on 7/31/2023 which showed a large mass in the left lower lobe of the lung extending to the hilum.  A diagnostic CT was performed 8/25/2023 showing a large left infrahilar medial mass in the lower lobe.  A PET scan on 9/8/2023 showed a left lower lobe spiculated pleural-based mass 1.3 x 5 cm involving the left hilum and major fissure SUV 23.2.  There were smaller nodular airspace opacities also hypermetabolic.  The mass encases left lower lobe bronchovascular bundle.  No hypermetabolic mediastinal or hilar lymph nodes noted.  There was a 7 mm nodule in the right upper lobe photopenic SUV 2.1.  There was no supraclavicular uptake or disease below the abdomen other than 2 abnormal foci in the sigmoid colon.  MRI of the brain on 9/11/2023 showed a subtle 2 mm focus inferior left cerebellum too small to characterize but likely a blood vessel.  The patient underwent bronchoscopy on 9/6/2023 with biopsies taken from the left lower lobe mass, lymph node station 7 and lymph node station 4R.  Pathology showed malignant cells favoring poorly differentiated carcinoma/squamous differentiation from the left lower lobe and biopsies from station 7 and 4R were negative for malignant cells.     The patient completed 3 cycles of neoadjuvant therapy with carboplatin/Taxol/nivolumab 11/1/2023.  He tolerated treatment well without much complications.  His case was  discussed in multidisciplinary tumor board conference and the consensus recommendation was to proceed with surgical resection.  Restaging PET/CT was done on 12/5/2023 which reported significant decrease in size of the left lower lobe mass with decreased SUV of 3.6, previously noted 23.2.  There was irregular large airspace consolidation at the posterior aspect of the left lower lobe with a maximal SUV of 8.7 suspected to represent pneumonitis with possible superimposed infection.  There was no evidence of hypermetabolic mediastinal or hilar lymphadenopathy or distant metastases.  He also had transthoracic echo on 12/5/2023 which reported LVEF of 60%.     Prior to surgical resection, he denied any difficulty breathing or pain. He coughed intermittently. He denied angina, history of myocardial infarction or stroke. He could walk a block without dyspnea. He did not smoke. He was not on supplemental oxygen at home.     As per my assessment, he had stage II-B non-small cell lung cancer who underwent neoadjuvant chemoimmunotherapy based on Checkmate 816 trial.  There was significant treatment response based on the PET CT scan.  In Checkmate 816 trial, there were 25% of the patient who had complete pathological response after neoadjuvant chemoimmunotherapy.  The standard of care for stage II-B resectable lung cancer is neoadjuvant chemoimmunotherapy followed by lobectomy.  Therefore, I recommended robot-assisted thoracoscopic left lower lobectomy, mediastinal lymph node dissection.  His lung function has been borderline which does not correlate to his excellent functional status.  He was independent in his activities of daily living and was not dependent on supplemental oxygen.  We performed a 6-minute walk test in the clinic which he completed without any respiratory distress.  His oxygen saturation remained above 95% throughout the test.  He was motivated and determined to pursue surgical cure and I supported his wishes.       On 12/13/2023, he underwent Robot assisted Thoracoscopic Left Lower Lobectomy with en bloc resection of parietal pleura.  The entire lung was adherent to the chest wall.  I performed lysis of adhesion for 120 minutes.  The tissue planes were extremely friable that led to significant air leak.  He was on 80 mg prednisone per day for colitis at the time of the operation.  The left lower lobe mass was adherent to the chest wall invading the parietal pleura but did not extend to the extrathoracic space.  I placed 2 chest tubes as I was expecting large prolonged air leak.  He tolerated procedure well.  He was extubated at the end of the procedure.  His hospital course was significant for prolonged air leak.  The prednisone was slowly tapered and eventually discontinued.  He developed urinary retention requiring Prater catheter placement.  His pain was well-controlled with pain medication.  He ambulated without much difficulty.  He was tolerating regular diet.  He was requiring supplemental oxygen.  Chest tube was transitioned to waterseal and eventually 1 chest tube was connected to mini atrium and he was discharged home with mini atrium.  A CT scan was obtained prior to discharge which showed consolidation changes in the left upper lobe concerning for pneumonia.  He did not have leukocytosis but in context of neoadjuvant chemoimmunotherapy, immunosuppression from prednisone, I started him on empiric antibiotics to treat hospital-acquired pneumonia.  He was discharged home with antibiotics as well.      The final pathology revealed residual small cell carcinoma with background of neoadjuvant therapy effect.  Visceral pleural invasion was not an 5.  The bronchial and perivascular soft tissue margins were involved with invasive carcinoma and tumor within lymphatics.  There were 2 peribronchial lymph nodes which were negative for carcinoma.  The total size of the viable tumor was 0.5 cm.  This made up to 10% of viable  tumor present.  10 lymph nodes were retrieved and 2 of the hilar lymph nodes were positive.  The pathologist reported that the residual tumor likely represented the previously and biopsy population of small cell carcinoma that did not respond to neoadjuvant therapy.  His case was discussed in our multidisciplinary tumor board conference.  The small cell cancer was an unexpected finding.  The consensus recommendation was to proceed with adjuvant systemic treatment and radiation treatment to the resection margin.     He made slow recovery.  The chest tube was removed after the airleak resolved.  He was initiated on postoperative carboplatin/-16 with concurrent radiation to the bronchial margin on 2/5/2024.  He also completed his course of immunotherapy.  Interval CT scan of the chest for cancer surveillance on 7/17/2024 and 10/16/2024 did not reveal any evidence of recurrence.     CT scan of the chest on 2/11/2025 reported persistent left perihilar airspace disease and posterior pleural thickening favored to be secondary to treatment related changes.  Multiple nodular opacities within the anterior left upper lobe decrease in size.  There was interval enlargement of left adrenal gland.  Subsequent PET CT scan on 2/20/2025 reported intensely avid mediastinal opacity as well as left adrenal nodule which was new or increasing in size from prior CT scan.  He also had focal intense uptake within the sigmoid colon.  He underwent colonoscopy with Dr. Lord.     He was referred to me for sampling of mediastinal lymph node.       Surgeon: Rosalio Scott MD     Assistants: There was no qualified assistant available for this procedure.      Anesthesia: General endotracheal anesthesia    ASA Class: 3    Procedure Details   On 3/7/2025, the patient was brought to the operating room and placed in the supine position on the operating room table. Following an uneventful induction of general anesthesia, patient was intubated with  a single endotracheal tube without incident.  Antibiotic for surgical prophylaxis was not indicated due to the nature of the procedure.  Prior to beginning the operation, a time-out was conducted with all members of surgical team present. The patient was identified as Sunny Hung, the procedure and the correct site were verified.     I began by performing flexible bronchoscopy.  A flexible adult bronchoscope was advanced through the endotracheal tube.  A complete examination of the distal trachea and bilateral mainstem and lobar bronchi and all segmental bronchial orifices was performed.  The patient has normal endobronchial anatomy.  All the tracheobronchial tree had moderate mucus secretion.  There was no blood, endoluminal lesions or other abnormal findings.  The bronchoscope was removed.    The flexible bronchoscope with the Olympus endobronchial ultrasound was inserted.  There were at least 2 lymph nodes at the level 4L station that measured approximately 10 to 12 mm in size.  Using 21-gauge needle endobronchial Olympus needle, transbronchial FNA was performed.  Multiple passes with the needle were performed. The Olympus endobronchial ultrasound was removed and adult bronchoscope was inserted. There was no pooling of blood into the bronchial tree.  All tracheobronchial tree were cleared from secretions.    The patient was awakened from anesthesia, was extubated without incident, and was transported to the Post Anesthesia Care Unit in stable condition.    Findings:  Two level 4L lymph nodes were identified measuring approximately 10 to 12 mm in size.  Using 21-gauge needle, fine-needle aspiration of the lymph nodes were performed.  The pathologist confirmed presence of malignant cells in the lymph node.  No evidence of significant active bleeding at the end of the procedure.    Estimated Blood Loss:  Minimal           Drains: None                 Specimens:   ID Type Source Tests Collected by Time   A :  STATION #4L FNA Lymph Node Mediastinum FINE NEEDLE ASPIRATION Rosalio Scott MD 3/7/2025 0958              Implants: None           Complications: None           Disposition: PACU - hemodynamically stable.           Condition: Stable     Rosalio Scott MD   Thoracic Surgeon  [facility]

## 2025-03-07 NOTE — ANESTHESIA PREPROCEDURE EVALUATION
Anesthesia Evaluation                  Airway   Mallampati: III  TM distance: >3 FB  Neck ROM: limited  Dental      Comment: Many missing teeth    Pulmonary    (+) lung cancer, COPD,home oxygen    ROS comment: New spot on PET scan showed up after treatment for previous lung cancer   New cough.  Home O2 prn  Cardiovascular     (+) hyperlipidemia      Neuro/Psych  GI/Hepatic/Renal/Endo      Musculoskeletal     Abdominal    Substance History      OB/GYN          Other   arthritis,   history of cancer                Anesthesia Plan    ASA 3     general     intravenous induction     Anesthetic plan, risks, benefits, and alternatives have been provided, discussed and informed consent has been obtained with: patient.    CODE STATUS:

## 2025-03-07 NOTE — ANESTHESIA PROCEDURE NOTES
Airway  Date/Time: 3/7/2025 9:49 AM  End Time:3/7/2025 9:50 AM  Airway not difficult (used caban due to extremely poor dentition)    General Information and Staff    Patient location: endo.  Anesthesiologist: Nikki Anguiano MD  CRNA/CAA: Radha Callaway CRNA    Indications and Patient Condition  Indications for airway management: airway protection    Preoxygenated: yes  MILS maintained throughout  Mask difficulty assessment: 1 - vent by mask    Final Airway Details  Final airway type: endotracheal airway      Successful airway: ETT  Cuffed: yes   Successful intubation technique: video laryngoscopy  Facilitating devices/methods: intubating stylet  Endotracheal tube insertion site: oral  Blade: Caban  Blade size: 4  ETT size (mm): 8.0  Cormack-Lehane Classification: grade I - full view of glottis  Placement verified by: chest auscultation and capnometry   Cuff volume (mL): 7  Measured from: lips  ETT/EBT  to lips (cm): 22  Number of attempts at approach: 1  Assessment: lips, teeth, and gum same as pre-op and atraumatic intubation

## 2025-03-10 NOTE — PROGRESS NOTES
"  Subjective     REASON FOR CONSULTATION:  NSCLC-small cell lung cancer  Provide an opinion on any further workup or treatment                             REQUESTING PHYSICIAN:  Sonia    RECORDS OBTAINED:  Records of the patients history including those obtained from the referring provider were reviewed and summarized in detail.      History of Present Illness   The patient returned today for follow-up of non-small cell and small cell lung cancers.  The patient has recurrent disease in the mediastinum and adrenal gland with recent FNA of the mediastinum by bronchoscopy showing small cell lung cancer.               Review of Systems   Constitutional:  Negative for fatigue.   HENT:  Positive for dental problem.    Respiratory:  Positive for cough and shortness of breath.    Cardiovascular:  Positive for chest pain.   Gastrointestinal: Negative.    Genitourinary: Negative.    Musculoskeletal: Negative.    Neurological:  Positive for numbness.   Hematological: Negative.    Psychiatric/Behavioral:  Negative for dysphoric mood. The patient is not nervous/anxious.          Objective     Vitals:    03/13/25 1102   BP: 139/84   Pulse: 74   Resp: 16   Temp: 98.4 °F (36.9 °C)   TempSrc: Infrared   SpO2: 96%   Weight: 80.7 kg (178 lb)   Height: 182.9 cm (72.01\")   PainSc: 0-No pain               3/13/2025    11:21 AM   Current Status   ECOG score 1       Physical Exam    CONSTITUTIONAL: pleasant well-developed adult man  LYMPH: no cervical or supraclavicular lad  CV: RRR, S1S2, no murmur  RESP: cta bilat, wheezing/rales on the left diminished breath sounds right base  GI: soft, non-tender, no splenomegaly, +bs  MUSC: no edema, normal gait  NEURO: alert and oriented x3, normal strength  PSYCH: normal mood anxious affect      RECENT LABS:  Hematology WBC   Date Value Ref Range Status   02/11/2025 8.37 3.40 - 10.80 10*3/mm3 Final     RBC   Date Value Ref Range Status   02/11/2025 4.92 4.14 - 5.80 10*6/mm3 Final     Hemoglobin   Date " Value Ref Range Status   02/11/2025 13.4 13.0 - 17.7 g/dL Final     Hematocrit   Date Value Ref Range Status   02/11/2025 42.6 37.5 - 51.0 % Final     Platelets   Date Value Ref Range Status   02/11/2025 262 140 - 450 10*3/mm3 Final        Lab Results   Component Value Date    GLUCOSE 120 (H) 02/11/2025    BUN 19 02/11/2025    CREATININE 0.83 02/11/2025    EGFR 95.3 02/11/2025    BCR 22.9 02/11/2025    K 3.8 02/11/2025    CO2 26.1 02/11/2025    CALCIUM 9.6 02/11/2025    ALBUMIN 4.4 02/11/2025    BILITOT 0.4 02/11/2025    AST 17 02/11/2025    ALT 13 02/11/2025     PET scan 9823  IMPRESSION:  1. Intensely hypermetabolic approximately 13 x 5 cm pleural-based left  lower lobe lung mass involving the left hilum. Smaller nodular opacities  adjacently are hypermetabolic and likely represent metastases. A 7 mm  right upper lobe nodule is photopenic. Conservative surveillance is  recommended. There is no convincing evidence for metastatic disease at  the neck, abdomen, or pelvis.  2. There are 2 hypermetabolic foci at the sigmoid colon need further  evaluation, particularly the 1.4 cm hypermetabolic polyp at the distal  sigmoid colon. Colonoscopy is recommended.  3. Nonspecific heterogeneous prostate activity. PSA follow-up is  recommended.    CT Chest Without Contrast Diagnostic (07/17/2024 12:52)  IMPRESSION:  1.There is evidence for interval decrease in size of the ill-defined abnormal attenuation involving the posterior aspect of the mid residual left lung extending into the left hilum. There is also improvement in aeration throughout the residual left lower   lung with improvement in multifocal areas of abnormal attenuation. These findings suggest an appropriate response to interval therapy.  2.Stable abnormal attenuation associated with the left hilum.  3.Otherwise stable CT of the chest.    MRI Brain With & Without Contrast (07/24/2024 13:16)  Impression:  1. Mild generalized parenchymal volume loss and changes of  chronic small vessel ischemic disease.  2. No acute intracranial abnormality.  3. No pathologic intracranial contrast enhancement to suggest intracranial metastatic disease.    MRI Brain With & Without Contrast (01/29/2025 16:35)   Impression:  No evidence of intracranial metastatic disease.    Assessment & Plan   *T4N0M0 poorly differentiated carcinoma/squamous differentiation left lower lobe of the lung/small cell lung cancer on pathology at surgery  Mass discovered on low-dose CT chest screening 7/31/2023  PET scan 9/8/2023 showed a 1.3 x 5 cm left lower lobe mass involving the hilum with smaller adjacent nodular opacities, max SUV 23.2, photopenic right upper lobe nodule  Bronchoscopy with biopsy from the left lower lobe 9/6/2023 positive for poorly differentiated carcinoma with squamous differentiation; P-L1 TPS 0%  Initiated neoadjuvant treatment with carboplatin/Taxol/nivolumab 9/20/2023 and completed 3 cycles 11/1/2023  Status post left lower lobectomy 12/13/2023 complicated by prolonged and left lower lobe pneumonia.  Pathology from surgery showed significant treatment effect but residual small cell carcinoma with positive bronchial and perivascular soft tissue margins by tumor within lymphatics, positive lymph node station 10 L for small cell carcinoma x 2   2/5/2024-initiated postoperative carboplatin/-16 with concurrent radiation for small cell lung cancer/positive margin  2/26/2024-C2 carboplatin/-16 with concurrent radiation; completed radiation therapy 3/15/2024; 4 cycles carboplatin/-16 completed 4/11/2024  CT chest and MRI brain 7/24/2024-[facility]  CT chest 10/16/2024-stable scarring and volume loss in the left hemothorax, no suspicious pulmonary nodules or lymphadenopathy  2/24/2025 PET-intensely avid mediastinal lymphadenopathy and left adrenal nodule new and increased in size from previous CT, uptake sigmoid colon, asymmetrical consolidation left lower lung with reticulonodular opacification;  bronchoscopy and biopsy from mediastinal station 4L 3/7/2025 positive for small cell cancer    *Anemia secondary to chemotherapy  B12/folic acid normal, ferritin 408, Iron sat 32%  Hemoglobin improved 13.4 today    *Immune mediated colitis  Patient treated with high-dose steroids and taper, now off steroid therapy  No recurrence of diarrhea off steroid therapy    *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  It has been approximately 1 year since previous treatment.  I recommended repeat treatment with carboplatin/-16 and retrial of atezolizumab understanding possibility of recurrent colitis or other autoimmune side effects.  The patient and family were agreeable to proceed.  We will plan a CT after 2 cycles of treatment and PET after 4 cycles of treatment if positive response.  If he has residual disease after 4 cycles of chemotherapy plus immunotherapy consider consolidative radiation therapy and continuation of atezolizumab if tolerated.  Patient will need a port placed for treatment as he has poor peripheral access    I spent 55 minutes of time on the case today reviewing the patient's bronchoscopy report pathology report discussing with the patient and family recurrent disease treatment recommendations writing orders and documenting care.

## 2025-03-11 ENCOUNTER — PATIENT OUTREACH (OUTPATIENT)
Dept: OTHER | Facility: HOSPITAL | Age: 70
End: 2025-03-11
Payer: MEDICARE

## 2025-03-11 DIAGNOSIS — C34.32 CANCER OF BRONCHUS OF LEFT LOWER LOBE: Primary | ICD-10-CM

## 2025-03-11 LAB
CYTO UR: NORMAL
DX PRELIMINARY: NORMAL
LAB AP CASE REPORT: NORMAL
LAB AP NON-GYN SPECIMEN ADEQUACY: NORMAL
LAB AP SPECIAL STAINS: NORMAL
PATH REPORT.FINAL DX SPEC: NORMAL
PATH REPORT.GROSS SPEC: NORMAL

## 2025-03-13 ENCOUNTER — LAB (OUTPATIENT)
Dept: LAB | Facility: HOSPITAL | Age: 70
End: 2025-03-13
Payer: MEDICARE

## 2025-03-13 ENCOUNTER — OFFICE VISIT (OUTPATIENT)
Dept: ONCOLOGY | Facility: CLINIC | Age: 70
End: 2025-03-13
Payer: MEDICARE

## 2025-03-13 ENCOUNTER — PATIENT OUTREACH (OUTPATIENT)
Dept: OTHER | Facility: HOSPITAL | Age: 70
End: 2025-03-13
Payer: MEDICARE

## 2025-03-13 ENCOUNTER — PREP FOR SURGERY (OUTPATIENT)
Dept: OTHER | Facility: HOSPITAL | Age: 70
End: 2025-03-13
Payer: MEDICARE

## 2025-03-13 VITALS
RESPIRATION RATE: 16 BRPM | HEIGHT: 72 IN | BODY MASS INDEX: 24.11 KG/M2 | SYSTOLIC BLOOD PRESSURE: 139 MMHG | OXYGEN SATURATION: 96 % | WEIGHT: 178 LBS | TEMPERATURE: 98.4 F | HEART RATE: 74 BPM | DIASTOLIC BLOOD PRESSURE: 84 MMHG

## 2025-03-13 DIAGNOSIS — Z79.899 NEED FOR PROPHYLACTIC CHEMOTHERAPY: ICD-10-CM

## 2025-03-13 DIAGNOSIS — C34.90 PRIMARY MALIGNANT NEOPLASM OF LUNG METASTATIC TO OTHER SITE, UNSPECIFIED LATERALITY: Primary | ICD-10-CM

## 2025-03-13 DIAGNOSIS — C34.92 PRIMARY SQUAMOUS CELL CARCINOMA OF LUNG, LEFT: ICD-10-CM

## 2025-03-13 DIAGNOSIS — Z45.2 ENCOUNTER FOR CENTRAL LINE PLACEMENT: Primary | ICD-10-CM

## 2025-03-13 DIAGNOSIS — Z79.899 HIGH RISK MEDICATION USE: ICD-10-CM

## 2025-03-13 PROBLEM — Z79.69 NEED FOR PROPHYLACTIC CHEMOTHERAPY: Status: ACTIVE | Noted: 2025-03-13

## 2025-03-13 RX ORDER — PROCHLORPERAZINE MALEATE 10 MG
10 TABLET ORAL ONCE
OUTPATIENT
Start: 2025-03-26 | End: 2025-03-26

## 2025-03-13 RX ORDER — OLANZAPINE 5 MG/1
5 TABLET ORAL NIGHTLY
Qty: 4 TABLET | Refills: 3 | Status: SHIPPED | OUTPATIENT
Start: 2025-03-13

## 2025-03-13 RX ORDER — SODIUM CHLORIDE 9 MG/ML
20 INJECTION, SOLUTION INTRAVENOUS ONCE
OUTPATIENT
Start: 2025-03-27

## 2025-03-13 RX ORDER — SODIUM CHLORIDE 9 MG/ML
20 INJECTION, SOLUTION INTRAVENOUS ONCE
OUTPATIENT
Start: 2025-03-26

## 2025-03-13 RX ORDER — DIPHENHYDRAMINE HYDROCHLORIDE 50 MG/ML
50 INJECTION INTRAMUSCULAR; INTRAVENOUS AS NEEDED
OUTPATIENT
Start: 2025-03-25

## 2025-03-13 RX ORDER — FAMOTIDINE 10 MG/ML
20 INJECTION, SOLUTION INTRAVENOUS AS NEEDED
OUTPATIENT
Start: 2025-03-25

## 2025-03-13 RX ORDER — PROCHLORPERAZINE MALEATE 10 MG
10 TABLET ORAL ONCE
OUTPATIENT
Start: 2025-03-27 | End: 2025-03-27

## 2025-03-13 RX ORDER — PALONOSETRON 0.05 MG/ML
0.25 INJECTION, SOLUTION INTRAVENOUS ONCE
OUTPATIENT
Start: 2025-03-25

## 2025-03-13 RX ORDER — HYDROCORTISONE SODIUM SUCCINATE 100 MG/2ML
100 INJECTION INTRAMUSCULAR; INTRAVENOUS AS NEEDED
OUTPATIENT
Start: 2025-03-25

## 2025-03-13 RX ORDER — ONDANSETRON 8 MG/1
8 TABLET, FILM COATED ORAL 3 TIMES DAILY PRN
Qty: 30 TABLET | Refills: 5 | Status: SHIPPED | OUTPATIENT
Start: 2025-03-13

## 2025-03-13 RX ORDER — SODIUM CHLORIDE 9 MG/ML
20 INJECTION, SOLUTION INTRAVENOUS ONCE
OUTPATIENT
Start: 2025-03-25

## 2025-03-13 NOTE — PROGRESS NOTES
Referral to reopen case.  The patient has biopsy confirmed recurrent disease in the mediastinum and adrenal gland with recent FNA of the mediastinum by bronchoscopy showing small cell lung cancer.     Called patient, s/w wife. The patient is resting. Reintroduced myself as nurse navigator, explained my role.     Met with Dr. Dubon today. The patient has recurrence of small cell lung cancer in the mediastinum and left adrenal gland. It has been approximately 1 year since previous treatment. Dr. Dubon recommended repeat treatment with carboplatin/-16 and retrial of atezolizumab understanding possibility of recurrent colitis or other autoimmune side effects. The patient and family were agreeable to proceed. Dr. Dubon is planning a CT after 2 cycles of treatment and PET after 4 cycles of treatment if positive response. If he has residual disease after 4 cycles of chemotherapy plus immunotherapy consider consolidative radiation therapy and continuation of atezolizumab if tolerated. The patient is having a port placed by Dr. Jay 3/18 and plans to start treatment 3/26 at [facility].    The patient is alert and oriented.  He intermittently wears oxygen. He does not use assistive devices except when going [facility] [place], when he uses a wheelchair.  He and his wife live in [place], [place].    [facility].  The patient/wife denies transportation, financial or other resource needs at this time. The also deny any  billing issues.     The patient still has the navigation folder that was previously provided.  Provided my name and number again. Encouraged the patient/wife to call as needed.

## 2025-03-17 ENCOUNTER — ANESTHESIA EVENT (OUTPATIENT)
Dept: PERIOP | Facility: HOSPITAL | Age: 70
End: 2025-03-17
Payer: MEDICARE

## 2025-03-18 ENCOUNTER — APPOINTMENT (OUTPATIENT)
Dept: GENERAL RADIOLOGY | Facility: HOSPITAL | Age: 70
End: 2025-03-18
Payer: MEDICARE

## 2025-03-18 ENCOUNTER — ANESTHESIA (OUTPATIENT)
Dept: PERIOP | Facility: HOSPITAL | Age: 70
End: 2025-03-18
Payer: MEDICARE

## 2025-03-18 ENCOUNTER — HOSPITAL ENCOUNTER (OUTPATIENT)
Facility: HOSPITAL | Age: 70
Setting detail: HOSPITAL OUTPATIENT SURGERY
Discharge: HOME | End: 2025-03-18
Attending: STUDENT IN AN ORGANIZED HEALTH CARE EDUCATION/TRAINING PROGRAM | Admitting: STUDENT IN AN ORGANIZED HEALTH CARE EDUCATION/TRAINING PROGRAM
Payer: MEDICARE

## 2025-03-18 VITALS
SYSTOLIC BLOOD PRESSURE: 135 MMHG | RESPIRATION RATE: 16 BRPM | OXYGEN SATURATION: 97 % | BODY MASS INDEX: 23.95 KG/M2 | WEIGHT: 176.6 LBS | HEART RATE: 88 BPM | DIASTOLIC BLOOD PRESSURE: 81 MMHG | TEMPERATURE: 97.9 F

## 2025-03-18 DIAGNOSIS — C34.90 PRIMARY MALIGNANT NEOPLASM OF LUNG METASTATIC TO OTHER SITE, UNSPECIFIED LATERALITY: ICD-10-CM

## 2025-03-18 PROCEDURE — 25010000002 CEFAZOLIN PER 500 MG: Performed by: STUDENT IN AN ORGANIZED HEALTH CARE EDUCATION/TRAINING PROGRAM

## 2025-03-18 PROCEDURE — 25010000002 PROPOFOL 200 MG/20ML EMULSION: Performed by: NURSE ANESTHETIST, CERTIFIED REGISTERED

## 2025-03-18 PROCEDURE — 25810000003 LACTATED RINGERS PER 1000 ML: Performed by: ANESTHESIOLOGY

## 2025-03-18 PROCEDURE — 71045 X-RAY EXAM CHEST 1 VIEW: CPT

## 2025-03-18 PROCEDURE — 25010000002 ONDANSETRON PER 1 MG: Performed by: ANESTHESIOLOGY

## 2025-03-18 PROCEDURE — 76000 FLUOROSCOPY <1 HR PHYS/QHP: CPT

## 2025-03-18 PROCEDURE — 76937 US GUIDE VASCULAR ACCESS: CPT | Performed by: STUDENT IN AN ORGANIZED HEALTH CARE EDUCATION/TRAINING PROGRAM

## 2025-03-18 PROCEDURE — 77001 FLUOROGUIDE FOR VEIN DEVICE: CPT | Performed by: STUDENT IN AN ORGANIZED HEALTH CARE EDUCATION/TRAINING PROGRAM

## 2025-03-18 PROCEDURE — 36561 INSERT TUNNELED CV CATH: CPT | Performed by: STUDENT IN AN ORGANIZED HEALTH CARE EDUCATION/TRAINING PROGRAM

## 2025-03-18 PROCEDURE — 25010000002 LIDOCAINE 2% SOLUTION: Performed by: NURSE ANESTHETIST, CERTIFIED REGISTERED

## 2025-03-18 PROCEDURE — 25010000002 MIDAZOLAM PER 1MG: Performed by: ANESTHESIOLOGY

## 2025-03-18 PROCEDURE — 25010000002 DEXAMETHASONE PER 1 MG: Performed by: ANESTHESIOLOGY

## 2025-03-18 PROCEDURE — C1788 PORT, INDWELLING, IMP: HCPCS | Performed by: STUDENT IN AN ORGANIZED HEALTH CARE EDUCATION/TRAINING PROGRAM

## 2025-03-18 DEVICE — POWERPORT CLEARVUE SLIM IMPLANTABLE PORT WITH ATTACHABLE 8F POLYURETHANE OPEN-ENDED SINGLE-LUMEN VENOUS CATHETER INTERMEDIATE KIT
Type: IMPLANTABLE DEVICE | Site: ARTERIAL | Status: FUNCTIONAL
Brand: POWERPORT CLEARVUE

## 2025-03-18 RX ORDER — FENTANYL CITRATE 50 UG/ML
25 INJECTION, SOLUTION INTRAMUSCULAR; INTRAVENOUS
Status: DISCONTINUED | OUTPATIENT
Start: 2025-03-18 | End: 2025-03-18 | Stop reason: HOSPADM

## 2025-03-18 RX ORDER — MAGNESIUM HYDROXIDE 1200 MG/15ML
LIQUID ORAL AS NEEDED
Status: DISCONTINUED | OUTPATIENT
Start: 2025-03-18 | End: 2025-03-18 | Stop reason: HOSPADM

## 2025-03-18 RX ORDER — SODIUM CHLORIDE 0.9 % (FLUSH) 0.9 %
10 SYRINGE (ML) INJECTION AS NEEDED
Status: DISCONTINUED | OUTPATIENT
Start: 2025-03-18 | End: 2025-03-18 | Stop reason: HOSPADM

## 2025-03-18 RX ORDER — ONDANSETRON 2 MG/ML
4 INJECTION INTRAMUSCULAR; INTRAVENOUS ONCE AS NEEDED
Status: DISCONTINUED | OUTPATIENT
Start: 2025-03-18 | End: 2025-03-18 | Stop reason: HOSPADM

## 2025-03-18 RX ORDER — LIDOCAINE HYDROCHLORIDE 20 MG/ML
INJECTION, SOLUTION INFILTRATION; PERINEURAL AS NEEDED
Status: DISCONTINUED | OUTPATIENT
Start: 2025-03-18 | End: 2025-03-18 | Stop reason: SURG

## 2025-03-18 RX ORDER — MIDAZOLAM HYDROCHLORIDE 2 MG/2ML
0.5 INJECTION, SOLUTION INTRAMUSCULAR; INTRAVENOUS
Status: DISCONTINUED | OUTPATIENT
Start: 2025-03-18 | End: 2025-03-18 | Stop reason: HOSPADM

## 2025-03-18 RX ORDER — FAMOTIDINE 10 MG/ML
20 INJECTION, SOLUTION INTRAVENOUS
Status: COMPLETED | OUTPATIENT
Start: 2025-03-18 | End: 2025-03-18

## 2025-03-18 RX ORDER — DEXMEDETOMIDINE HYDROCHLORIDE 100 UG/ML
INJECTION, SOLUTION INTRAVENOUS AS NEEDED
Status: DISCONTINUED | OUTPATIENT
Start: 2025-03-18 | End: 2025-03-18 | Stop reason: SURG

## 2025-03-18 RX ORDER — ONDANSETRON 2 MG/ML
4 INJECTION INTRAMUSCULAR; INTRAVENOUS ONCE AS NEEDED
Status: COMPLETED | OUTPATIENT
Start: 2025-03-18 | End: 2025-03-18

## 2025-03-18 RX ORDER — DIPHENHYDRAMINE HYDROCHLORIDE 50 MG/ML
12.5 INJECTION, SOLUTION INTRAMUSCULAR; INTRAVENOUS
Status: DISCONTINUED | OUTPATIENT
Start: 2025-03-18 | End: 2025-03-18 | Stop reason: HOSPADM

## 2025-03-18 RX ORDER — DEXAMETHASONE SODIUM PHOSPHATE 4 MG/ML
8 INJECTION, SOLUTION INTRA-ARTICULAR; INTRALESIONAL; INTRAMUSCULAR; INTRAVENOUS; SOFT TISSUE ONCE AS NEEDED
Status: COMPLETED | OUTPATIENT
Start: 2025-03-18 | End: 2025-03-18

## 2025-03-18 RX ORDER — PROPOFOL 10 MG/ML
INJECTION, EMULSION INTRAVENOUS AS NEEDED
Status: DISCONTINUED | OUTPATIENT
Start: 2025-03-18 | End: 2025-03-18 | Stop reason: SURG

## 2025-03-18 RX ORDER — SODIUM CHLORIDE, SODIUM LACTATE, POTASSIUM CHLORIDE, CALCIUM CHLORIDE 600; 310; 30; 20 MG/100ML; MG/100ML; MG/100ML; MG/100ML
9 INJECTION, SOLUTION INTRAVENOUS CONTINUOUS
Status: DISCONTINUED | OUTPATIENT
Start: 2025-03-18 | End: 2025-03-18 | Stop reason: HOSPADM

## 2025-03-18 RX ORDER — LIDOCAINE HYDROCHLORIDE 10 MG/ML
0.5 INJECTION, SOLUTION EPIDURAL; INFILTRATION; INTRACAUDAL; PERINEURAL ONCE AS NEEDED
Status: DISCONTINUED | OUTPATIENT
Start: 2025-03-18 | End: 2025-03-18 | Stop reason: HOSPADM

## 2025-03-18 RX ORDER — BUPIVACAINE HYDROCHLORIDE AND EPINEPHRINE 5; 5 MG/ML; UG/ML
INJECTION, SOLUTION EPIDURAL; INTRACAUDAL; PERINEURAL AS NEEDED
Status: DISCONTINUED | OUTPATIENT
Start: 2025-03-18 | End: 2025-03-18 | Stop reason: HOSPADM

## 2025-03-18 RX ORDER — HYDROCODONE BITARTRATE AND ACETAMINOPHEN 5; 325 MG/1; MG/1
1 TABLET ORAL ONCE AS NEEDED
Status: DISCONTINUED | OUTPATIENT
Start: 2025-03-18 | End: 2025-03-18 | Stop reason: HOSPADM

## 2025-03-18 RX ORDER — SODIUM CHLORIDE 0.9 % (FLUSH) 0.9 %
10 SYRINGE (ML) INJECTION EVERY 12 HOURS SCHEDULED
Status: DISCONTINUED | OUTPATIENT
Start: 2025-03-18 | End: 2025-03-18 | Stop reason: HOSPADM

## 2025-03-18 RX ORDER — SODIUM CHLORIDE 9 MG/ML
40 INJECTION, SOLUTION INTRAVENOUS AS NEEDED
Status: DISCONTINUED | OUTPATIENT
Start: 2025-03-18 | End: 2025-03-18 | Stop reason: HOSPADM

## 2025-03-18 RX ADMIN — PROPOFOL 50 MG: 10 INJECTION, EMULSION INTRAVENOUS at 12:20

## 2025-03-18 RX ADMIN — FAMOTIDINE 20 MG: 10 INJECTION INTRAVENOUS at 09:21

## 2025-03-18 RX ADMIN — PROPOFOL 50 MG: 10 INJECTION, EMULSION INTRAVENOUS at 12:00

## 2025-03-18 RX ADMIN — PROPOFOL 50 MG: 10 INJECTION, EMULSION INTRAVENOUS at 12:15

## 2025-03-18 RX ADMIN — PROPOFOL 50 MG: 10 INJECTION, EMULSION INTRAVENOUS at 11:50

## 2025-03-18 RX ADMIN — CEFAZOLIN 2000 MG: 2 INJECTION, POWDER, FOR SOLUTION INTRAVENOUS at 11:52

## 2025-03-18 RX ADMIN — PROPOFOL 50 MG: 10 INJECTION, EMULSION INTRAVENOUS at 12:10

## 2025-03-18 RX ADMIN — LIDOCAINE HYDROCHLORIDE 60 MG: 20 INJECTION, SOLUTION INFILTRATION; PERINEURAL at 11:50

## 2025-03-18 RX ADMIN — SODIUM CHLORIDE, SODIUM LACTATE, POTASSIUM CHLORIDE, AND CALCIUM CHLORIDE 9 ML/HR: 600; 310; 30; 20 INJECTION, SOLUTION INTRAVENOUS at 09:21

## 2025-03-18 RX ADMIN — PROPOFOL 50 MG: 10 INJECTION, EMULSION INTRAVENOUS at 12:25

## 2025-03-18 RX ADMIN — MIDAZOLAM HYDROCHLORIDE 0.5 MG: 1 INJECTION, SOLUTION INTRAMUSCULAR; INTRAVENOUS at 11:44

## 2025-03-18 RX ADMIN — DEXMEDETOMIDINE HYDROCHLORIDE 8 MCG: 100 INJECTION, SOLUTION INTRAVENOUS at 11:50

## 2025-03-18 RX ADMIN — DEXAMETHASONE SODIUM PHOSPHATE 8 MG: 4 INJECTION INTRA-ARTICULAR; INTRALESIONAL; INTRAMUSCULAR; INTRAVENOUS; SOFT TISSUE at 09:21

## 2025-03-18 RX ADMIN — ONDANSETRON 4 MG: 2 INJECTION INTRAMUSCULAR; INTRAVENOUS at 09:21

## 2025-03-18 NOTE — ANESTHESIA POSTPROCEDURE EVALUATION
Patient: Sunny Hung    Procedure Summary       Date: 03/18/25 Room / Location: [facility] OR 2 [facility]    Anesthesia Start: 1145 Anesthesia Stop: 1237    Procedures:       INSERTION VENOUS ACCESS DEVICE      FL C ARM DURING SURGERY Diagnosis:       Primary malignant neoplasm of lung metastatic to other site, unspecified laterality      (Primary malignant neoplasm of lung metastatic to other site, unspecified laterality [C34.90])      (port insertion)    Scheduled Providers: Jayashree Jay MD Provider: Kev Lopez CRNA    Anesthesia Type: MAC ASA Status: 3            Anesthesia Type: MAC    Vitals  Vitals Value Taken Time   /81 03/18/25 13:14   Temp 97.9 °F (36.6 °C) 03/18/25 12:49   Pulse 91 03/18/25 13:15   Resp 16 03/18/25 13:14   SpO2 98 % 03/18/25 13:15   Vitals shown include unfiled device data.        Post Anesthesia Care and Evaluation    Patient location during evaluation: bedside  Patient participation: complete - patient participated  Level of consciousness: awake and alert  Pain score: 0  Pain management: adequate    Airway patency: patent  Anesthetic complications: No anesthetic complications  PONV Status: none  Cardiovascular status: acceptable  Respiratory status: acceptable  Hydration status: acceptable  No anesthesia care post op

## 2025-03-18 NOTE — ANESTHESIA PREPROCEDURE EVALUATION
Anesthesia Evaluation     Patient summary reviewed and Nursing notes reviewed   no history of anesthetic complications:   NPO Solid Status: > 8 hours  NPO Liquid Status: > 2 hours           Airway   Mallampati: II  TM distance: >3 FB  Neck ROM: full  Dental    (+) poor dentition    Pulmonary - normal exam    breath sounds clear to auscultation  (+) a smoker Former, Abstained day of surgery, lung cancer, COPD moderate,home oxygen  Cardiovascular - normal exam  Exercise tolerance: poor (<4 METS)    Rhythm: regular  Rate: normal    (+) hyperlipidemia      Neuro/Psych  (+) numbness  GI/Hepatic/Renal/Endo    (+) GERD well controlled    Musculoskeletal     (+) back pain  Abdominal  - normal exam   Substance History      OB/GYN negative ob/gyn ROS         Other   arthritis,   history of cancer active                Anesthesia Plan    ASA 3     MAC     intravenous induction     Anesthetic plan, risks, benefits, and alternatives have been provided, discussed and informed consent has been obtained with: patient.  Pre-procedure education provided  Use of blood products discussed with patient  Consented to blood products.      CODE STATUS:

## 2025-03-18 NOTE — OP NOTE
PREOPERATIVE DIAGNOSIS:  Recurrent small cell cancer    POSTOPERATIVE DIAGNOSIS AND FINDINGS:  same    PROCEDURE:  Right internal jugular Powerport placement with fluoroscopic guidance    DATE OF PROCEDURE:   3/18/2025    SURGEON:  Jayashree Zarate MD    ASSISTANT:  none    ANESTHESIA:  MAC    EBL:  Minimal    SPECIMEN:  none    DESCRIPTION:  All risks, benefits, and alternatives were explained and informed consent was obtained. The patient was taken to the operating room, transferred onto the operating room table in the supine position, underwent monitored anesthesia, and was prepped and draped in the usual sterile manner.  Half percent marcaine with epinephrine was administered.  The right internal jugular vein was accessed with an 18-gauge needle under ultrasound guidance, and a guidewire was inserted under fluoroscopic guidance into the superior vena cava.  A subcutaneous pocket was then created with electrocautery on the right chest wall. The port was placed in the pocket and secured in two points with 2-0 prolene. The tubing was then tunneled from the subcutaneous pocket to the location of the wire in the neck. The vein dilator and sheath were introduced, and the dilator and guidewire were removed.  The port tubing was inserted to the cavoatrial junction, and position of tip was confirmed with fluoroscopic guidance. The port was connected to the tubing and the cap was secured. Venous blood was easily aspirated from the port, and the port was flushed with heparinized saline. There was good hemostasis noted. The skin was then closed with 3-0 Vicryl deep dermal and 4-0 Vicryl subcuticular sutures. Dermabond was placed over the wound. The patient tolerated the procedure well, and was transferred to the recovery area in stable condition. Recovery room CXR was ordered to confirm placement.    JAYASHREE ZARATE M.D.  General and Endoscopic Surgery  [facility]    [address], [address]  [facility]  [place], [address]  P: [phone]  F: [phone]

## 2025-03-18 NOTE — ANESTHESIA POSTPROCEDURE EVALUATION
Patient: Sunny Hung    Procedure Summary       Date: 03/18/25 Room / Location: [facility] OR 2 / [facility] OR; [facility] XRAY    Anesthesia Start: 1145 Anesthesia Stop: 1237    Procedures:       INSERTION VENOUS ACCESS DEVICE      FL C ARM DURING SURGERY Diagnosis:       Primary malignant neoplasm of lung metastatic to other site, unspecified laterality      (Primary malignant neoplasm of lung metastatic to other site, unspecified laterality [C34.90])      (port insertion)    Scheduled Providers: Jayashree Jay MD Provider: Kev Lopez CRNA    Anesthesia Type: MAC ASA Status: 3            Anesthesia Type: MAC    Vitals  Vitals Value Taken Time   /81 03/18/25 13:14   Temp 97.9 °F (36.6 °C) 03/18/25 12:49   Pulse 91 03/18/25 13:15   Resp 16 03/18/25 13:14   SpO2 98 % 03/18/25 13:15   Vitals shown include unfiled device data.        Post Anesthesia Care and Evaluation      Comments: Pt discharged per staff without notifying anesthesia team.

## 2025-03-18 NOTE — H&P
"General Surgery History and Physical      Summary:    Sunny Hung is a 69 y.o. gentleman with recurrent small cell lung cancer.  Port placement was recommended for chemotherapy.  The risks (including bleeding, infection, damage to surrounding structures), benefits, and alternatives were explained and he agreed and wished to proceed.    Referring Provider: Jayashree Jay MD    Chief Complaint:    Recurrent small cell lung cancer    History of Present Illness:    Sunny Hung is a 69 y.o. woman who presents for port placement.    Past Medical History:   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\"      Past Surgical History:    Past Surgical History:   Procedure Laterality Date    BRONCHOSCOPY N/A 3/7/2025    Procedure: BRONCHOSCOPY WITH ENDOBRONCHIAL ULTRASOUND WITH FNA;  Surgeon: Rosalio Scott MD;  Location: [facility];  Service: Pulmonary;  Laterality: N/A;  PRE: LUNG NODULE  POST: SAME    BRONCHOSCOPY WITH ION ROBOTIC ASSIST N/A 09/06/2023    Procedure: BRONCHOSCOPY WITH BAL;   ION ROBOT AND ENDOBRONCHIAL ULTRASOUND WITH FNA'S;  Surgeon: Rosalio Scott MD;  Location: [facility];  Service: Robotics - Pulmonary;  Laterality: N/A;  PRE- LEFT LOWER LOBE MASS  POST- SAME    COLONOSCOPY W/ POLYPECTOMY N/A 02/28/2025    Procedure: COLONOSCOPY WITH POLYPECTOMY, clip;  Surgeon: Clem Lord MD;  Location: [facility];  Service: Gastroenterology;  Laterality: N/A;  Diverticulosis; Ascending polyp x 1; Descending polyp x 1; Sigmoid poyp x 1- clip at 20cm, hot snare; Rectal polyp x 1- clip, hot snare    GANGLION CYST EXCISION Bilateral  "    HEMORRHOIDECTOMY      LOBECTOMY Left 2023    Procedure: BRONCHOSCOPY, THORACOSCOPY WITH LYSIS OF ADHESIONS (120 MINUTES), LEFT LOWER LOBECTOMY WITH EN BLOC PARTIAL PARIETAL PLEURECTOMY USING DAVINCI ROBOT, MEDIASTINAL LYMPH NODE DISSECTION, INTERCOSTAL NERVE BLOCK;  Surgeon: Rosalio Scott MD;  Location: [facility];  Service: Robotics - DaVinci;  Laterality: Left;    OTHER SURGICAL HISTORY      SOMETHING REMOVED FROM LEFT CHEST, BENIGN ? LIPOMA     Family History:    Family History   Problem Relation Age of Onset    Bone cancer Mother     COPD Father     Brain cancer Sister     Cancer Brother         Malignant tumor of pharynx    Alcohol abuse Brother     Cirrhosis Brother     Recurrent abdominal pain Brother     Lung cancer Brother     Malig Hyperthermia Neg Hx      Social History:    Social History     Socioeconomic History    Marital status:      Spouse name: Carmen   Tobacco Use    Smoking status: Former     Current packs/day: 0.00     Average packs/day: 0.5 packs/day for 40.0 years (20.0 ttl pk-yrs)     Types: Cigarettes     Start date:      Quit date:      Years since quittin.2     Passive exposure: Past    Smokeless tobacco: Never   Vaping Use    Vaping status: Never Used   Substance and Sexual Activity    Alcohol use: Not Currently     Comment: VERY RARELY, MAYBE 1 BEER    Drug use: Never    Sexual activity: Defer     Allergies:   Allergies   Allergen Reactions    Amoxicillin-Pot Clavulanate Hives     Beta lactam allergy details  Antibiotic reaction: hives  Age at reaction: adult  Dose to reaction time: days  Reason for antibiotic: unknown  Epinephrine required for reaction?: no  Tolerated antibiotics: unknown         Medications:     Current Facility-Administered Medications:     ceFAZolin 2000 mg IVPB in 100 mL NS (VTB), 2,000 mg, Intravenous, Once, Jayashree Jay MD    dexAMETHasone (DECADRON) injection 8 mg, 8 mg, Intravenous, Once PRN, Zora Rome MD    famotidine  (PEPCID) injection 20 mg, 20 mg, Intravenous, 60 Min Pre-Op, Zora Rome MD    lactated ringers infusion, 9 mL/hr, Intravenous, Continuous, Zora Rome MD    lidocaine PF 1% (XYLOCAINE) injection 0.5 mL, 0.5 mL, Injection, Once PRN, Zora Rome MD    Midazolam HCl (PF) (VERSED) injection 0.5 mg, 0.5 mg, Intravenous, Q10 Min PRN, Zora Rome MD    ondansetron (ZOFRAN) injection 4 mg, 4 mg, Intravenous, Once PRN, Zora Rome MD    sodium chloride 0.9 % flush 10 mL, 10 mL, Intravenous, Q12H, Zora Rmoe MD    sodium chloride 0.9 % flush 10 mL, 10 mL, Intravenous, PRN, Zora Rome MD    sodium chloride 0.9 % infusion 40 mL, 40 mL, Intravenous, PRN, Zora Rome MD    Radiology/Endoscopy:    24 2025 PET scan reviewed by me personally: Mediastinal adenopathy, sigmoid colon uptake, esophagitis    Labs:    Labs from 2/11/2025 reviewed by me     Review of Systems:   Influenza-like illness: no fever, no  cough, no  sore throat, no  body aches, no loss of sense of taste or smell, no known exposure to person with Covid-19.  Constitutional: Negative for fevers or chills  HENT: Negative for hearing loss or runny nose  Eyes: Negative for vision changes or scleral icterus  Respiratory: Negative for cough or shortness of breath  Cardiovascular: Negative for chest pain or heart palpitations  Gastrointestinal: Negative for abdominal pain, nausea, vomiting, constipation, melena, or hematochezia  Genitourinary: Negative for hematuria or dysuria  Musculoskeletal: Negative for joint swelling or gait instability  Neurologic: Negative for tremors or seizures  Psychiatric: Negative for suicidal ideations or depression  All other systems reviewed and negative    Physical Exam:   Constitutional: Well-developed well-nourished, no acute distress  Eyes: Conjunctiva normal, sclera nonicteric  ENMT: Hearing grossly normal, oral mucosa moist  Neck: Supple, trachea midline  Respiratory: Clear to auscultation, normal inspiratory  effort  Cardiovascular: Regular rate, no peripheral edema, no jugular venous distention  Gastrointestinal: Soft, nontender  Skin:  Warm, dry, no rash on visualized skin surfaces  Musculoskeletal: Symmetric strength, normal gait  Psychiatric: Alert and oriented ×3, normal affect     Jayashree Jay M.D.  General and Endoscopic Surgery  [facility]    [address], [address]  [place], [place], [address]  P: [phone]  F: [phone]

## 2025-03-25 ENCOUNTER — INFUSION (OUTPATIENT)
Dept: ONCOLOGY | Facility: HOSPITAL | Age: 70
End: 2025-03-25
Payer: MEDICARE

## 2025-03-25 VITALS
OXYGEN SATURATION: 97 % | BODY MASS INDEX: 24.33 KG/M2 | SYSTOLIC BLOOD PRESSURE: 160 MMHG | HEART RATE: 95 BPM | DIASTOLIC BLOOD PRESSURE: 89 MMHG | TEMPERATURE: 97.2 F | WEIGHT: 179.4 LBS | RESPIRATION RATE: 20 BRPM

## 2025-03-25 DIAGNOSIS — C34.92 PRIMARY SQUAMOUS CELL CARCINOMA OF LUNG, LEFT: Primary | ICD-10-CM

## 2025-03-25 DIAGNOSIS — Z79.899 HIGH RISK MEDICATION USE: ICD-10-CM

## 2025-03-25 DIAGNOSIS — Z79.899 NEED FOR PROPHYLACTIC CHEMOTHERAPY: ICD-10-CM

## 2025-03-25 PROBLEM — Z45.2 FITTING AND ADJUSTMENT OF VASCULAR CATHETER: Status: ACTIVE | Noted: 2025-03-25

## 2025-03-25 LAB
ALBUMIN SERPL-MCNC: 4.2 G/DL (ref 3.5–5.2)
ALBUMIN/GLOB SERPL: 1.5 G/DL
ALP SERPL-CCNC: 46 U/L (ref 39–117)
ALT SERPL W P-5'-P-CCNC: 14 U/L (ref 1–41)
ANION GAP SERPL CALCULATED.3IONS-SCNC: 9.2 MMOL/L (ref 5–15)
AST SERPL-CCNC: 16 U/L (ref 1–40)
BASOPHILS # BLD AUTO: 0.04 10*3/MM3 (ref 0–0.2)
BASOPHILS NFR BLD AUTO: 0.4 % (ref 0–1.5)
BILIRUB SERPL-MCNC: 0.4 MG/DL (ref 0–1.2)
BUN SERPL-MCNC: 17 MG/DL (ref 8–23)
BUN/CREAT SERPL: 21.5 (ref 7–25)
CALCIUM SPEC-SCNC: 9.3 MG/DL (ref 8.6–10.5)
CHLORIDE SERPL-SCNC: 105 MMOL/L (ref 98–107)
CO2 SERPL-SCNC: 25.8 MMOL/L (ref 22–29)
CREAT SERPL-MCNC: 0.79 MG/DL (ref 0.76–1.27)
DEPRECATED RDW RBC AUTO: 51.8 FL (ref 37–54)
EGFRCR SERPLBLD CKD-EPI 2021: 96.8 ML/MIN/1.73
EOSINOPHIL # BLD AUTO: 0.19 10*3/MM3 (ref 0–0.4)
EOSINOPHIL NFR BLD AUTO: 2 % (ref 0.3–6.2)
ERYTHROCYTE [DISTWIDTH] IN BLOOD BY AUTOMATED COUNT: 15.9 % (ref 12.3–15.4)
GLOBULIN UR ELPH-MCNC: 2.8 GM/DL
GLUCOSE SERPL-MCNC: 84 MG/DL (ref 65–99)
HCT VFR BLD AUTO: 40 % (ref 37.5–51)
HGB BLD-MCNC: 12.2 G/DL (ref 13–17.7)
IMM GRANULOCYTES # BLD AUTO: 0.02 10*3/MM3 (ref 0–0.05)
IMM GRANULOCYTES NFR BLD AUTO: 0.2 % (ref 0–0.5)
LYMPHOCYTES # BLD AUTO: 1.69 10*3/MM3 (ref 0.7–3.1)
LYMPHOCYTES NFR BLD AUTO: 17.5 % (ref 19.6–45.3)
MCH RBC QN AUTO: 26.8 PG (ref 26.6–33)
MCHC RBC AUTO-ENTMCNC: 30.5 G/DL (ref 31.5–35.7)
MCV RBC AUTO: 87.9 FL (ref 79–97)
MONOCYTES # BLD AUTO: 0.84 10*3/MM3 (ref 0.1–0.9)
MONOCYTES NFR BLD AUTO: 8.7 % (ref 5–12)
NEUTROPHILS NFR BLD AUTO: 6.9 10*3/MM3 (ref 1.7–7)
NEUTROPHILS NFR BLD AUTO: 71.2 % (ref 42.7–76)
PLATELET # BLD AUTO: 204 10*3/MM3 (ref 140–450)
PMV BLD AUTO: 9.3 FL (ref 6–12)
POTASSIUM SERPL-SCNC: 3.8 MMOL/L (ref 3.5–5.2)
PROT SERPL-MCNC: 7 G/DL (ref 6–8.5)
PSA SERPL-MCNC: 3.83 NG/ML (ref 0–4)
RBC # BLD AUTO: 4.55 10*6/MM3 (ref 4.14–5.8)
SODIUM SERPL-SCNC: 140 MMOL/L (ref 136–145)
T3FREE SERPL-MCNC: 3.39 PG/ML (ref 2–4.4)
T4 FREE SERPL-MCNC: 1.39 NG/DL (ref 0.93–1.7)
TSH SERPL DL<=0.05 MIU/L-ACNC: 2.39 UIU/ML (ref 0.27–4.2)
WBC NRBC COR # BLD AUTO: 9.68 10*3/MM3 (ref 3.4–10.8)

## 2025-03-25 PROCEDURE — 96375 TX/PRO/DX INJ NEW DRUG ADDON: CPT

## 2025-03-25 PROCEDURE — 25810000003 SODIUM CHLORIDE 0.9 % SOLUTION 250 ML FLEX CONT: Performed by: INTERNAL MEDICINE

## 2025-03-25 PROCEDURE — 80053 COMPREHEN METABOLIC PANEL: CPT | Performed by: INTERNAL MEDICINE

## 2025-03-25 PROCEDURE — 25010000002 PALONOSETRON PER 25 MCG: Performed by: INTERNAL MEDICINE

## 2025-03-25 PROCEDURE — 84443 ASSAY THYROID STIM HORMONE: CPT | Performed by: INTERNAL MEDICINE

## 2025-03-25 PROCEDURE — 25010000002 ETOPOSIDE 100 MG/5ML SOLUTION 5 ML VIAL: Performed by: INTERNAL MEDICINE

## 2025-03-25 PROCEDURE — 85025 COMPLETE CBC W/AUTO DIFF WBC: CPT | Performed by: INTERNAL MEDICINE

## 2025-03-25 PROCEDURE — 96367 TX/PROPH/DG ADDL SEQ IV INF: CPT

## 2025-03-25 PROCEDURE — 84439 ASSAY OF FREE THYROXINE: CPT | Performed by: INTERNAL MEDICINE

## 2025-03-25 PROCEDURE — 96413 CHEMO IV INFUSION 1 HR: CPT

## 2025-03-25 PROCEDURE — 25010000002 FOSAPREPITANT PER 1 MG: Performed by: INTERNAL MEDICINE

## 2025-03-25 PROCEDURE — 84481 FREE ASSAY (FT-3): CPT | Performed by: INTERNAL MEDICINE

## 2025-03-25 PROCEDURE — 84153 ASSAY OF PSA TOTAL: CPT | Performed by: INTERNAL MEDICINE

## 2025-03-25 PROCEDURE — 96417 CHEMO IV INFUS EACH ADDL SEQ: CPT

## 2025-03-25 PROCEDURE — 25010000002 DEXAMETHASONE SODIUM PHOSPHATE 100 MG/10ML SOLUTION: Performed by: INTERNAL MEDICINE

## 2025-03-25 PROCEDURE — 25010000002 CARBOPLATIN PER 50 MG: Performed by: INTERNAL MEDICINE

## 2025-03-25 PROCEDURE — 25810000003 SODIUM CHLORIDE 0.9 % SOLUTION: Performed by: INTERNAL MEDICINE

## 2025-03-25 PROCEDURE — 25810000003 SODIUM CHLORIDE 0.9 % SOLUTION 500 ML FLEX CONT: Performed by: INTERNAL MEDICINE

## 2025-03-25 PROCEDURE — 25010000002 ATEZOLIZUMAB 1200 MG/20ML SOLUTION 20 ML VIAL: Performed by: INTERNAL MEDICINE

## 2025-03-25 RX ORDER — PALONOSETRON 0.05 MG/ML
0.25 INJECTION, SOLUTION INTRAVENOUS ONCE
Status: COMPLETED | OUTPATIENT
Start: 2025-03-25 | End: 2025-03-25

## 2025-03-25 RX ORDER — SODIUM CHLORIDE 0.9 % (FLUSH) 0.9 %
10 SYRINGE (ML) INJECTION AS NEEDED
Status: CANCELLED | OUTPATIENT
Start: 2025-03-25

## 2025-03-25 RX ORDER — SODIUM CHLORIDE 9 MG/ML
20 INJECTION, SOLUTION INTRAVENOUS ONCE
Status: COMPLETED | OUTPATIENT
Start: 2025-03-25 | End: 2025-03-25

## 2025-03-25 RX ORDER — HEPARIN SODIUM (PORCINE) LOCK FLUSH IV SOLN 100 UNIT/ML 100 UNIT/ML
500 SOLUTION INTRAVENOUS AS NEEDED
Status: CANCELLED | OUTPATIENT
Start: 2025-03-25

## 2025-03-25 RX ORDER — ALBUTEROL SULFATE 0.83 MG/ML
2.5 SOLUTION RESPIRATORY (INHALATION) EVERY 4 HOURS PRN
COMMUNITY

## 2025-03-25 RX ADMIN — SODIUM CHLORIDE 20 ML/HR: 900 INJECTION, SOLUTION INTRAVENOUS at 10:41

## 2025-03-25 RX ADMIN — ATEZOLIZUMAB 1200 MG: 1200 INJECTION, SOLUTION INTRAVENOUS at 11:21

## 2025-03-25 RX ADMIN — DEXAMETHASONE SODIUM PHOSPHATE 12 MG: 10 INJECTION, SOLUTION INTRAMUSCULAR; INTRAVENOUS at 12:24

## 2025-03-25 RX ADMIN — PALONOSETRON HYDROCHLORIDE 0.25 MG: 0.25 INJECTION, SOLUTION INTRAVENOUS at 10:41

## 2025-03-25 RX ADMIN — ETOPOSIDE 160 MG: 20 INJECTION INTRAVENOUS at 13:18

## 2025-03-25 RX ADMIN — CARBOPLATIN 640 MG: 10 INJECTION INTRAVENOUS at 12:43

## 2025-03-25 RX ADMIN — FOSAPREPITANT DIMEGLUMINE 150 MG: 150 INJECTION, POWDER, LYOPHILIZED, FOR SOLUTION INTRAVENOUS at 10:43

## 2025-03-26 ENCOUNTER — INFUSION (OUTPATIENT)
Dept: ONCOLOGY | Facility: HOSPITAL | Age: 70
End: 2025-03-26
Payer: MEDICARE

## 2025-03-26 VITALS
DIASTOLIC BLOOD PRESSURE: 74 MMHG | TEMPERATURE: 98.6 F | OXYGEN SATURATION: 96 % | RESPIRATION RATE: 20 BRPM | HEART RATE: 96 BPM | SYSTOLIC BLOOD PRESSURE: 161 MMHG

## 2025-03-26 DIAGNOSIS — Z79.899 HIGH RISK MEDICATION USE: ICD-10-CM

## 2025-03-26 DIAGNOSIS — Z45.2 FITTING AND ADJUSTMENT OF VASCULAR CATHETER: ICD-10-CM

## 2025-03-26 DIAGNOSIS — C34.92 PRIMARY SQUAMOUS CELL CARCINOMA OF LUNG, LEFT: ICD-10-CM

## 2025-03-26 DIAGNOSIS — Z79.899 NEED FOR PROPHYLACTIC CHEMOTHERAPY: Primary | ICD-10-CM

## 2025-03-26 PROCEDURE — 25010000002 HEPARIN LOCK FLUSH PER 10 UNITS: Performed by: INTERNAL MEDICINE

## 2025-03-26 PROCEDURE — 63710000001 PROCHLORPERAZINE MALEATE PER 5 MG: Performed by: INTERNAL MEDICINE

## 2025-03-26 PROCEDURE — 25010000002 ETOPOSIDE 100 MG/5ML SOLUTION 5 ML VIAL: Performed by: INTERNAL MEDICINE

## 2025-03-26 PROCEDURE — 96413 CHEMO IV INFUSION 1 HR: CPT

## 2025-03-26 PROCEDURE — 25810000003 SODIUM CHLORIDE 0.9 % SOLUTION 500 ML FLEX CONT: Performed by: INTERNAL MEDICINE

## 2025-03-26 PROCEDURE — A9270 NON-COVERED ITEM OR SERVICE: HCPCS | Performed by: INTERNAL MEDICINE

## 2025-03-26 RX ORDER — SODIUM CHLORIDE 0.9 % (FLUSH) 0.9 %
10 SYRINGE (ML) INJECTION AS NEEDED
Status: DISCONTINUED | OUTPATIENT
Start: 2025-03-26 | End: 2025-03-26 | Stop reason: HOSPADM

## 2025-03-26 RX ORDER — SODIUM CHLORIDE 0.9 % (FLUSH) 0.9 %
10 SYRINGE (ML) INJECTION AS NEEDED
Status: CANCELLED | OUTPATIENT
Start: 2025-03-26

## 2025-03-26 RX ORDER — PROCHLORPERAZINE MALEATE 5 MG/1
10 TABLET ORAL ONCE
Status: COMPLETED | OUTPATIENT
Start: 2025-03-26 | End: 2025-03-26

## 2025-03-26 RX ORDER — HEPARIN SODIUM (PORCINE) LOCK FLUSH IV SOLN 100 UNIT/ML 100 UNIT/ML
500 SOLUTION INTRAVENOUS AS NEEDED
Status: DISCONTINUED | OUTPATIENT
Start: 2025-03-26 | End: 2025-03-26 | Stop reason: HOSPADM

## 2025-03-26 RX ORDER — HEPARIN SODIUM (PORCINE) LOCK FLUSH IV SOLN 100 UNIT/ML 100 UNIT/ML
500 SOLUTION INTRAVENOUS AS NEEDED
Status: CANCELLED | OUTPATIENT
Start: 2025-03-26

## 2025-03-26 RX ADMIN — Medication 10 ML: at 14:46

## 2025-03-26 RX ADMIN — SODIUM CHLORIDE 160 MG: 9 INJECTION, SOLUTION INTRAVENOUS at 13:44

## 2025-03-26 RX ADMIN — PROCHLORPERAZINE MALEATE 10 MG: 5 TABLET ORAL at 13:20

## 2025-03-26 RX ADMIN — HEPARIN 500 UNITS: 100 SYRINGE at 14:46

## 2025-03-27 ENCOUNTER — INFUSION (OUTPATIENT)
Dept: ONCOLOGY | Facility: HOSPITAL | Age: 70
End: 2025-03-27
Payer: MEDICARE

## 2025-03-27 ENCOUNTER — TELEPHONE (OUTPATIENT)
Dept: ONCOLOGY | Facility: CLINIC | Age: 70
End: 2025-03-27
Payer: MEDICARE

## 2025-03-27 VITALS
TEMPERATURE: 98.2 F | RESPIRATION RATE: 20 BRPM | HEART RATE: 60 BPM | SYSTOLIC BLOOD PRESSURE: 165 MMHG | DIASTOLIC BLOOD PRESSURE: 69 MMHG | OXYGEN SATURATION: 98 %

## 2025-03-27 DIAGNOSIS — Z45.2 FITTING AND ADJUSTMENT OF VASCULAR CATHETER: ICD-10-CM

## 2025-03-27 DIAGNOSIS — Z79.899 NEED FOR PROPHYLACTIC CHEMOTHERAPY: Primary | ICD-10-CM

## 2025-03-27 DIAGNOSIS — Z79.899 HIGH RISK MEDICATION USE: ICD-10-CM

## 2025-03-27 DIAGNOSIS — C34.92 PRIMARY SQUAMOUS CELL CARCINOMA OF LUNG, LEFT: ICD-10-CM

## 2025-03-27 PROCEDURE — 96413 CHEMO IV INFUSION 1 HR: CPT

## 2025-03-27 PROCEDURE — A9270 NON-COVERED ITEM OR SERVICE: HCPCS | Performed by: INTERNAL MEDICINE

## 2025-03-27 PROCEDURE — 25810000003 SODIUM CHLORIDE 0.9 % SOLUTION 500 ML FLEX CONT: Performed by: INTERNAL MEDICINE

## 2025-03-27 PROCEDURE — 25010000002 ETOPOSIDE 100 MG/5ML SOLUTION 5 ML VIAL: Performed by: INTERNAL MEDICINE

## 2025-03-27 PROCEDURE — 63710000001 PROCHLORPERAZINE MALEATE PER 5 MG: Performed by: INTERNAL MEDICINE

## 2025-03-27 PROCEDURE — 25010000002 HEPARIN LOCK FLUSH PER 10 UNITS: Performed by: INTERNAL MEDICINE

## 2025-03-27 RX ORDER — SODIUM CHLORIDE 0.9 % (FLUSH) 0.9 %
10 SYRINGE (ML) INJECTION AS NEEDED
OUTPATIENT
Start: 2025-03-27

## 2025-03-27 RX ORDER — PROCHLORPERAZINE MALEATE 5 MG/1
10 TABLET ORAL ONCE
Status: COMPLETED | OUTPATIENT
Start: 2025-03-27 | End: 2025-03-27

## 2025-03-27 RX ORDER — SODIUM CHLORIDE 9 MG/ML
20 INJECTION, SOLUTION INTRAVENOUS ONCE
Status: DISCONTINUED | OUTPATIENT
Start: 2025-03-27 | End: 2025-03-27 | Stop reason: HOSPADM

## 2025-03-27 RX ORDER — SODIUM CHLORIDE 0.9 % (FLUSH) 0.9 %
10 SYRINGE (ML) INJECTION AS NEEDED
Status: DISCONTINUED | OUTPATIENT
Start: 2025-03-27 | End: 2025-03-27 | Stop reason: HOSPADM

## 2025-03-27 RX ORDER — HEPARIN SODIUM (PORCINE) LOCK FLUSH IV SOLN 100 UNIT/ML 100 UNIT/ML
500 SOLUTION INTRAVENOUS AS NEEDED
Status: DISCONTINUED | OUTPATIENT
Start: 2025-03-27 | End: 2025-03-27 | Stop reason: HOSPADM

## 2025-03-27 RX ORDER — HEPARIN SODIUM (PORCINE) LOCK FLUSH IV SOLN 100 UNIT/ML 100 UNIT/ML
500 SOLUTION INTRAVENOUS AS NEEDED
OUTPATIENT
Start: 2025-03-27

## 2025-03-27 RX ADMIN — PROCHLORPERAZINE MALEATE 10 MG: 5 TABLET ORAL at 14:00

## 2025-03-27 RX ADMIN — Medication 10 ML: at 15:06

## 2025-03-27 RX ADMIN — SODIUM CHLORIDE 160 MG: 9 INJECTION, SOLUTION INTRAVENOUS at 14:00

## 2025-03-27 RX ADMIN — HEPARIN 500 UNITS: 100 SYRINGE at 15:06

## 2025-03-27 NOTE — TELEPHONE ENCOUNTER
Faxed order/referral to [facility] with demographics and face to face visit notes.    03/28/25 Called [facility] and they will process order placed in Epic and they will have it delivered on Monday 3/31/25.

## 2025-03-28 ENCOUNTER — PATIENT OUTREACH (OUTPATIENT)
Dept: OTHER | Facility: HOSPITAL | Age: 70
End: 2025-03-28
Payer: MEDICARE

## 2025-03-28 NOTE — PROGRESS NOTES
Reviewed chart. Port placed 3/18/25. Started treatment 3/25    Called patient. Left message that I was just touching base to see how he was doing. Wanted to know if he had any questions/concerns or resource/supportive care needs; requested cb

## 2025-04-03 ENCOUNTER — LAB (OUTPATIENT)
Dept: LAB | Facility: HOSPITAL | Age: 70
End: 2025-04-03
Payer: MEDICARE

## 2025-04-03 ENCOUNTER — OFFICE VISIT (OUTPATIENT)
Dept: ONCOLOGY | Facility: CLINIC | Age: 70
End: 2025-04-03
Payer: MEDICARE

## 2025-04-03 ENCOUNTER — HOSPITAL ENCOUNTER (OUTPATIENT)
Dept: ULTRASOUND IMAGING | Facility: HOSPITAL | Age: 70
Discharge: HOME | End: 2025-04-03
Payer: MEDICARE

## 2025-04-03 VITALS
SYSTOLIC BLOOD PRESSURE: 147 MMHG | WEIGHT: 180.8 LBS | HEIGHT: 72 IN | TEMPERATURE: 98.1 F | HEART RATE: 66 BPM | RESPIRATION RATE: 16 BRPM | BODY MASS INDEX: 24.49 KG/M2 | DIASTOLIC BLOOD PRESSURE: 80 MMHG | OXYGEN SATURATION: 95 %

## 2025-04-03 DIAGNOSIS — C34.92 PRIMARY SQUAMOUS CELL CARCINOMA OF LUNG, LEFT: ICD-10-CM

## 2025-04-03 DIAGNOSIS — Z79.899 HIGH RISK MEDICATION USE: Primary | ICD-10-CM

## 2025-04-03 DIAGNOSIS — M79.89 SWELLING OF LEFT LOWER EXTREMITY: ICD-10-CM

## 2025-04-03 DIAGNOSIS — Z79.69 NEED FOR PROPHYLACTIC CHEMOTHERAPY: ICD-10-CM

## 2025-04-03 LAB
ANION GAP SERPL CALCULATED.3IONS-SCNC: 11.5 MMOL/L (ref 5–15)
BASOPHILS # BLD AUTO: 0.03 10*3/MM3 (ref 0–0.2)
BASOPHILS NFR BLD AUTO: 0.9 % (ref 0–1.5)
BUN SERPL-MCNC: 19 MG/DL (ref 8–23)
BUN/CREAT SERPL: 30.2 (ref 7–25)
CALCIUM SPEC-SCNC: 9 MG/DL (ref 8.6–10.5)
CHLORIDE SERPL-SCNC: 102 MMOL/L (ref 98–107)
CO2 SERPL-SCNC: 25.5 MMOL/L (ref 22–29)
CREAT SERPL-MCNC: 0.63 MG/DL (ref 0.76–1.27)
DEPRECATED RDW RBC AUTO: 47.4 FL (ref 37–54)
EGFRCR SERPLBLD CKD-EPI 2021: 103.6 ML/MIN/1.73
EOSINOPHIL # BLD AUTO: 0.04 10*3/MM3 (ref 0–0.4)
EOSINOPHIL NFR BLD AUTO: 1.3 % (ref 0.3–6.2)
ERYTHROCYTE [DISTWIDTH] IN BLOOD BY AUTOMATED COUNT: 15.4 % (ref 12.3–15.4)
GLUCOSE SERPL-MCNC: 99 MG/DL (ref 65–99)
HCT VFR BLD AUTO: 31.6 % (ref 37.5–51)
HGB BLD-MCNC: 10.1 G/DL (ref 13–17.7)
IMM GRANULOCYTES # BLD AUTO: 0.01 10*3/MM3 (ref 0–0.05)
IMM GRANULOCYTES NFR BLD AUTO: 0.3 % (ref 0–0.5)
LYMPHOCYTES # BLD AUTO: 1.19 10*3/MM3 (ref 0.7–3.1)
LYMPHOCYTES NFR BLD AUTO: 37.7 % (ref 19.6–45.3)
MAGNESIUM SERPL-MCNC: 1.8 MG/DL (ref 1.6–2.4)
MCH RBC QN AUTO: 27.1 PG (ref 26.6–33)
MCHC RBC AUTO-ENTMCNC: 32 G/DL (ref 31.5–35.7)
MCV RBC AUTO: 84.7 FL (ref 79–97)
MONOCYTES # BLD AUTO: 0.11 10*3/MM3 (ref 0.1–0.9)
MONOCYTES NFR BLD AUTO: 3.5 % (ref 5–12)
NEUTROPHILS NFR BLD AUTO: 1.78 10*3/MM3 (ref 1.7–7)
NEUTROPHILS NFR BLD AUTO: 56.3 % (ref 42.7–76)
NRBC BLD AUTO-RTO: 0 /100 WBC (ref 0–0.2)
PLATELET # BLD AUTO: 68 10*3/MM3 (ref 140–450)
PMV BLD AUTO: 10.3 FL (ref 6–12)
POTASSIUM SERPL-SCNC: 3.5 MMOL/L (ref 3.5–5.2)
RBC # BLD AUTO: 3.73 10*6/MM3 (ref 4.14–5.8)
SODIUM SERPL-SCNC: 139 MMOL/L (ref 136–145)
WBC NRBC COR # BLD AUTO: 3.16 10*3/MM3 (ref 3.4–10.8)

## 2025-04-03 PROCEDURE — 83735 ASSAY OF MAGNESIUM: CPT | Performed by: INTERNAL MEDICINE

## 2025-04-03 PROCEDURE — 93971 EXTREMITY STUDY: CPT

## 2025-04-03 PROCEDURE — 80048 BASIC METABOLIC PNL TOTAL CA: CPT | Performed by: INTERNAL MEDICINE

## 2025-04-03 PROCEDURE — 36415 COLL VENOUS BLD VENIPUNCTURE: CPT

## 2025-04-03 PROCEDURE — 85025 COMPLETE CBC W/AUTO DIFF WBC: CPT | Performed by: INTERNAL MEDICINE

## 2025-04-03 NOTE — PROGRESS NOTES
"  Subjective     REASON FOR FOLLOW-UP:  NSCLC-small cell lung cancer    REQUESTING PHYSICIAN:  Sonia    History of Present Illness   Patient is seen back today in 1 week follow-up after initiating further treatment with carboplatin/-16/atezolizumab for small cell lung cancer.  He is seen today accompanied by his wife.  Thankfully he seems of tolerated his first week of treatment well without any major side effects.  He denies any nausea or vomiting or change in bowel habits.  He has noted some new swelling in his left lower leg.  This does reduce with sleep.  Patient thought it was just his ankle but it is involving his lower calf and ankle as well as foot.    Reviewed his blood work showing platelet count down to 68,000 today.  He denies any bleeding difficulty.  Patient already bruises easily, unchanged.  Denies other concerns at this time.      Review of Systems   Constitutional:  Negative for fatigue.   HENT:  Positive for dental problem.    Respiratory:  Positive for cough and shortness of breath.    Cardiovascular:  Positive for chest pain.   Gastrointestinal: Negative.    Genitourinary: Negative.    Musculoskeletal: Negative.    Neurological:  Positive for numbness.   Hematological: Negative.    Psychiatric/Behavioral:  Negative for dysphoric mood. The patient is not nervous/anxious.          Objective     Vitals:    04/03/25 1305   BP: 147/80   Pulse: 66   Resp: 16   Temp: 98.1 °F (36.7 °C)   TempSrc: Infrared   SpO2: 95%   Weight: 82 kg (180 lb 12.8 oz)   Height: 182.9 cm (72\")   PainSc: 0-No pain               4/3/2025     1:12 PM   Current Status   ECOG score 1       Physical Exam    CONSTITUTIONAL: pleasant well-developed adult man  LYMPH: no cervical or supraclavicular lad  CV: RRR, S1S2, no murmur  RESP: Bilateral upper lobe expiratory wheeze new Mediport in the right chest wall with some mild ecchymosis otherwise well-healed.  GI: soft, non-tender, no splenomegaly, +bs  MUSC: Left lower extremity 1+ " edema involving the lower leg, ankle and foot; normal gait  NEURO: alert and oriented x3, normal strength  PSYCH: normal mood anxious affect      RECENT LABS:  Results from last 7 days   Lab Units 04/03/25  1332   WBC 10*3/mm3 3.16*   NEUTROS ABS 10*3/mm3 1.78   HEMOGLOBIN g/dL 10.1*   HEMATOCRIT % 31.6*   PLATELETS 10*3/mm3 68*     Results from last 7 days   Lab Units 04/03/25  1310   SODIUM mmol/L 139   POTASSIUM mmol/L 3.5   CHLORIDE mmol/L 102   CO2 mmol/L 25.5   BUN mg/dL 19   CREATININE mg/dL 0.63*   CALCIUM mg/dL 9.0   GLUCOSE mg/dL 99   MAGNESIUM mg/dL 1.8             PET scan 9823  IMPRESSION:  1. Intensely hypermetabolic approximately 13 x 5 cm pleural-based left  lower lobe lung mass involving the left hilum. Smaller nodular opacities  adjacently are hypermetabolic and likely represent metastases. A 7 mm  right upper lobe nodule is photopenic. Conservative surveillance is  recommended. There is no convincing evidence for metastatic disease at  the neck, abdomen, or pelvis.  2. There are 2 hypermetabolic foci at the sigmoid colon need further  evaluation, particularly the 1.4 cm hypermetabolic polyp at the distal  sigmoid colon. Colonoscopy is recommended.  3. Nonspecific heterogeneous prostate activity. PSA follow-up is  recommended.    CT Chest Without Contrast Diagnostic (07/17/2024 12:52)  IMPRESSION:  1.There is evidence for interval decrease in size of the ill-defined abnormal attenuation involving the posterior aspect of the mid residual left lung extending into the left hilum. There is also improvement in aeration throughout the residual left lower   lung with improvement in multifocal areas of abnormal attenuation. These findings suggest an appropriate response to interval therapy.  2.Stable abnormal attenuation associated with the left hilum.  3.Otherwise stable CT of the chest.    MRI Brain With & Without Contrast (07/24/2024 13:16)  Impression:  1. Mild generalized parenchymal volume loss and  changes of chronic small vessel ischemic disease.  2. No acute intracranial abnormality.  3. No pathologic intracranial contrast enhancement to suggest intracranial metastatic disease.    MRI Brain With & Without Contrast (01/29/2025 16:35)   Impression:  No evidence of intracranial metastatic disease.    Assessment & Plan   *[id number] poorly differentiated carcinoma/squamous differentiation left lower lobe of the lung/small cell lung cancer on pathology at surgery  Mass discovered on low-dose CT chest screening 7/31/2023  PET scan 9/8/2023 showed a 1.3 x 5 cm left lower lobe mass involving the hilum with smaller adjacent nodular opacities, max SUV 23.2, photopenic right upper lobe nodule  Bronchoscopy with biopsy from the left lower lobe 9/6/2023 positive for poorly differentiated carcinoma with squamous differentiation; P-L1 TPS 0%  Initiated neoadjuvant treatment with carboplatin/Taxol/nivolumab 9/20/2023 and completed 3 cycles 11/1/2023  Status post left lower lobectomy 12/13/2023 complicated by prolonged and left lower lobe pneumonia.  Pathology from surgery showed significant treatment effect but residual small cell carcinoma with positive bronchial and perivascular soft tissue margins by tumor within lymphatics, positive lymph node station 10 L for small cell carcinoma x 2   2/5/2024-initiated postoperative carboplatin/-16 with concurrent radiation for small cell lung cancer/positive margin  2/26/2024-C2 carboplatin/-16 with concurrent radiation; completed radiation therapy 3/15/2024; 4 cycles carboplatin/-16 completed 4/11/2024  CT chest and MRI brain 7/24/2024-[facility]  CT chest 10/16/2024-stable scarring and volume loss in the left hemothorax, no suspicious pulmonary nodules or lymphadenopathy  2/24/2025 PET-intensely avid mediastinal lymphadenopathy and left adrenal nodule new and increased in size from previous CT, uptake sigmoid colon, asymmetrical consolidation left lower lung with reticulonodular  opacification; bronchoscopy and biopsy from mediastinal station 4L 3/7/2025 positive for small cell cancer  3/25/2025 initiating C1 carboplatin/-16/atezolizumab.    *Anemia secondary to chemotherapy  B12/folic acid normal, ferritin 408, Iron sat 32%  3/25/2025 Hemoglobin improved 13.4 at the start of chemotherapy  4/3/2025 Hgb 10.1 one week out from initiating chemotherapy.    *Chemotherapy-induced thrombocytopenia  4/3/2025 platelets 68,000, 1 week out from initiating carboplatin/-16.  Monitor closely.    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  Obtain Doppler study.    *Access  4/3/2025: Patient with new Mediport in the right chest wall with some minimal bruising but otherwise looks good.    PLAN:  Send patient for left lower extremity Doppler study.  Plan to bring back in 1 week for repeat CBC with RN review to keep an eye on counts, especially platelet count but is down to 68,000 currently.  Return in 2 weeks for follow-up with HILDA Lamb and cycle 2 carboplatin/-16/atezolizumab.  Plan a CT after 2 cycles of treatment and PET after 4 cycles of treatment if positive response.  If he has residual disease after 4 cycles of chemotherapy plus immunotherapy consider consolidative radiation therapy and continuation of  atezolizumab if tolerated.    This patient is on high risk drug therapy requiring intensive monitoring for toxicity.      I spent 48 minutes caring for Sunny on this date of service. This time includes time spent by me in the following activities: preparing for the visit, reviewing tests, performing a medically appropriate examination and/or evaluation, counseling and educating the patient/family/caregiver, referring and communicating with other health care professionals, documenting information in the medical record, independently interpreting results and communicating that information with the patient/family/caregiver, care coordination, ordering test(s), obtaining a separately obtained history, and reviewing a separately obtained history      ADDENDUM: Left lower extremity Doppler study negative for thrombosis.  Recommended keeping the leg elevated when resting, wearing support stocking, and monitoring for worsening swelling.

## 2025-04-10 ENCOUNTER — CLINICAL SUPPORT (OUTPATIENT)
Dept: ONCOLOGY | Facility: HOSPITAL | Age: 70
End: 2025-04-10
Payer: MEDICARE

## 2025-04-10 ENCOUNTER — OFFICE VISIT (OUTPATIENT)
Dept: ONCOLOGY | Facility: CLINIC | Age: 70
End: 2025-04-10
Payer: MEDICARE

## 2025-04-10 ENCOUNTER — LAB (OUTPATIENT)
Dept: LAB | Facility: HOSPITAL | Age: 70
End: 2025-04-10
Payer: MEDICARE

## 2025-04-10 VITALS
BODY MASS INDEX: 24.58 KG/M2 | WEIGHT: 181.2 LBS | OXYGEN SATURATION: 96 % | TEMPERATURE: 98.1 F | RESPIRATION RATE: 16 BRPM | HEART RATE: 86 BPM | SYSTOLIC BLOOD PRESSURE: 137 MMHG | DIASTOLIC BLOOD PRESSURE: 76 MMHG

## 2025-04-10 VITALS
HEART RATE: 86 BPM | HEIGHT: 72 IN | OXYGEN SATURATION: 96 % | SYSTOLIC BLOOD PRESSURE: 137 MMHG | RESPIRATION RATE: 16 BRPM | WEIGHT: 181.2 LBS | BODY MASS INDEX: 24.54 KG/M2 | DIASTOLIC BLOOD PRESSURE: 76 MMHG | TEMPERATURE: 98.6 F

## 2025-04-10 DIAGNOSIS — T45.1X5A CHEMOTHERAPY INDUCED NEUTROPENIA: Primary | ICD-10-CM

## 2025-04-10 DIAGNOSIS — C34.92 PRIMARY SQUAMOUS CELL CARCINOMA OF LUNG, LEFT: Primary | ICD-10-CM

## 2025-04-10 DIAGNOSIS — D70.1 CHEMOTHERAPY INDUCED NEUTROPENIA: Primary | ICD-10-CM

## 2025-04-10 DIAGNOSIS — K11.20 PAROTIDITIS: ICD-10-CM

## 2025-04-10 LAB
BASOPHILS # BLD AUTO: 0.01 10*3/MM3 (ref 0–0.2)
BASOPHILS NFR BLD AUTO: 0.4 % (ref 0–1.5)
DEPRECATED RDW RBC AUTO: 48.7 FL (ref 37–54)
EOSINOPHIL # BLD AUTO: 0.04 10*3/MM3 (ref 0–0.4)
EOSINOPHIL NFR BLD AUTO: 1.5 % (ref 0.3–6.2)
ERYTHROCYTE [DISTWIDTH] IN BLOOD BY AUTOMATED COUNT: 16 % (ref 12.3–15.4)
HCT VFR BLD AUTO: 33.3 % (ref 37.5–51)
HGB BLD-MCNC: 10.3 G/DL (ref 13–17.7)
IMM GRANULOCYTES # BLD AUTO: 0.04 10*3/MM3 (ref 0–0.05)
IMM GRANULOCYTES NFR BLD AUTO: 1.5 % (ref 0–0.5)
LYMPHOCYTES # BLD AUTO: 1.26 10*3/MM3 (ref 0.7–3.1)
LYMPHOCYTES NFR BLD AUTO: 47.4 % (ref 19.6–45.3)
MCH RBC QN AUTO: 26.5 PG (ref 26.6–33)
MCHC RBC AUTO-ENTMCNC: 30.9 G/DL (ref 31.5–35.7)
MCV RBC AUTO: 85.6 FL (ref 79–97)
MONOCYTES # BLD AUTO: 1.02 10*3/MM3 (ref 0.1–0.9)
MONOCYTES NFR BLD AUTO: 38.3 % (ref 5–12)
NEUTROPHILS NFR BLD AUTO: 0.29 10*3/MM3 (ref 1.7–7)
NEUTROPHILS NFR BLD AUTO: 10.9 % (ref 42.7–76)
NRBC BLD AUTO-RTO: 1.5 /100 WBC (ref 0–0.2)
PLATELET # BLD AUTO: 102 10*3/MM3 (ref 140–450)
PMV BLD AUTO: 10.1 FL (ref 6–12)
RBC # BLD AUTO: 3.89 10*6/MM3 (ref 4.14–5.8)
WBC NRBC COR # BLD AUTO: 2.66 10*3/MM3 (ref 3.4–10.8)

## 2025-04-10 PROCEDURE — 36416 COLLJ CAPILLARY BLOOD SPEC: CPT

## 2025-04-10 PROCEDURE — 85025 COMPLETE CBC W/AUTO DIFF WBC: CPT | Performed by: INTERNAL MEDICINE

## 2025-04-10 RX ORDER — LEVOFLOXACIN 500 MG/1
500 TABLET, FILM COATED ORAL DAILY
Qty: 7 TABLET | Refills: 0 | Status: SHIPPED | OUTPATIENT
Start: 2025-04-10 | End: 2025-04-17

## 2025-04-10 RX ORDER — METHYLPREDNISOLONE 4 MG/1
TABLET ORAL
Qty: 1 EACH | Refills: 0 | Status: SHIPPED | OUTPATIENT
Start: 2025-04-10

## 2025-04-10 RX ORDER — METRONIDAZOLE 500 MG/1
500 TABLET ORAL 3 TIMES DAILY
Qty: 21 TABLET | Refills: 0 | Status: SHIPPED | OUTPATIENT
Start: 2025-04-10 | End: 2025-04-17

## 2025-04-10 NOTE — NURSING NOTE
CBC results reviewed with pt and family. ANC 0.29. Pt reports temperature up to 101 the past two nights but denies fever during the day. Pt also woke up this morning with swelling along left jaw. Denies any mouth or tooth pain. Dr. Dubon informed and will see pt in exam room. Pt and family informed and v/u.     Lab Results   Component Value Date    WBC 2.66 (L) 04/10/2025    HGB 10.3 (L) 04/10/2025    HCT 33.3 (L) 04/10/2025    MCV 85.6 04/10/2025     (L) 04/10/2025

## 2025-04-10 NOTE — PROGRESS NOTES
"  Subjective     REASON FOR FOLLOW-UP:  NSCLC-small cell lung cancer    REQUESTING PHYSICIAN:  Sonia    History of Present Illness   Patient is seen back today in 1 week follow-up after initiating further treatment with carboplatin/-16/atezolizumab for small cell lung cancer.  The patient is seen today for evaluation of low-grade fever and left parotid swelling and tenderness both present for approximately 24 hours.  Tmax has been 100.1.  He has low-grade nausea without vomiting.      Review of Systems   Constitutional:  Positive for fever. Negative for fatigue.   HENT:  Positive for dental problem.    Respiratory:  Positive for cough and shortness of breath.    Cardiovascular:  Positive for chest pain.   Gastrointestinal:  Positive for nausea. Negative for vomiting.   Genitourinary: Negative.    Musculoskeletal: Negative.    Neurological:  Positive for numbness.   Hematological: Negative.    Psychiatric/Behavioral:  Negative for dysphoric mood. The patient is not nervous/anxious.          Objective     Vitals:    04/10/25 1417   BP: 137/76   Pulse: 86   Resp: 16   Temp: 98.6 °F (37 °C)  Comment: been running a temp. last couple nights   TempSrc: Infrared   SpO2: 96%   Weight: 82.2 kg (181 lb 3.2 oz)   Height: 182.9 cm (72.01\")   PainSc: 2    PainLoc: Mouth  Comment: left side               4/10/2025     2:20 PM   Current Status   ECOG score 2       Physical Exam    CONSTITUTIONAL: pleasant well-developed adult man  LYMPH: no cervical or supraclavicular lad  HEENT: Very tender swelling along the left posterior jaw/parotid without significant redness or warmth no fluctuance  CV: RRR, S1S2, no murmur  RESP: Breath sounds are diminished and coarse  GI: soft, non-tender, no splenomegaly, +bs  MUSC: Left lower extremity 1+ edema involving the lower leg, ankle and foot  NEURO: alert and oriented x3, normal strength  PSYCH: normal mood anxious affect      RECENT LABS:  Results from last 7 days   Lab Units 04/10/25  1343 "   WBC 10*3/mm3 2.66*   NEUTROS ABS 10*3/mm3 0.29*   HEMOGLOBIN g/dL 10.3*   HEMATOCRIT % 33.3*   PLATELETS 10*3/mm3 102*                   PET scan 9823  IMPRESSION:  1. Intensely hypermetabolic approximately 13 x 5 cm pleural-based left  lower lobe lung mass involving the left hilum. Smaller nodular opacities  adjacently are hypermetabolic and likely represent metastases. A 7 mm  right upper lobe nodule is photopenic. Conservative surveillance is  recommended. There is no convincing evidence for metastatic disease at  the neck, abdomen, or pelvis.  2. There are 2 hypermetabolic foci at the sigmoid colon need further  evaluation, particularly the 1.4 cm hypermetabolic polyp at the distal  sigmoid colon. Colonoscopy is recommended.  3. Nonspecific heterogeneous prostate activity. PSA follow-up is  recommended.    CT Chest Without Contrast Diagnostic (07/17/2024 12:52)  IMPRESSION:  1.There is evidence for interval decrease in size of the ill-defined abnormal attenuation involving the posterior aspect of the mid residual left lung extending into the left hilum. There is also improvement in aeration throughout the residual left lower   lung with improvement in multifocal areas of abnormal attenuation. These findings suggest an appropriate response to interval therapy.  2.Stable abnormal attenuation associated with the left hilum.  3.Otherwise stable CT of the chest.    MRI Brain With & Without Contrast (07/24/2024 13:16)  Impression:  1. Mild generalized parenchymal volume loss and changes of chronic small vessel ischemic disease.  2. No acute intracranial abnormality.  3. No pathologic intracranial contrast enhancement to suggest intracranial metastatic disease.    MRI Brain With & Without Contrast (01/29/2025 16:35)   Impression:  No evidence of intracranial metastatic disease.    Assessment & Plan   *T4N0M0 poorly differentiated carcinoma/squamous differentiation left lower lobe of the lung/small cell lung cancer on  pathology at surgery  Mass discovered on low-dose CT chest screening 7/31/2023  PET scan 9/8/2023 showed a 1.3 x 5 cm left lower lobe mass involving the hilum with smaller adjacent nodular opacities, max SUV 23.2, photopenic right upper lobe nodule  Bronchoscopy with biopsy from the left lower lobe 9/6/2023 positive for poorly differentiated carcinoma with squamous differentiation; P-L1 TPS 0%  Initiated neoadjuvant treatment with carboplatin/Taxol/nivolumab 9/20/2023 and completed 3 cycles 11/1/2023  Status post left lower lobectomy 12/13/2023 complicated by prolonged and left lower lobe pneumonia.  Pathology from surgery showed significant treatment effect but residual small cell carcinoma with positive bronchial and perivascular soft tissue margins by tumor within lymphatics, positive lymph node station 10 L for small cell carcinoma x 2   2/5/2024-initiated postoperative carboplatin/-16 with concurrent radiation for small cell lung cancer/positive margin  2/26/2024-C2 carboplatin/-16 with concurrent radiation; completed radiation therapy 3/15/2024; 4 cycles carboplatin/-16 completed 4/11/2024  CT chest and MRI brain 7/24/2024-[facility]  CT chest 10/16/2024-stable scarring and volume loss in the left hemothorax, no suspicious pulmonary nodules or lymphadenopathy  2/24/2025 PET-intensely avid mediastinal lymphadenopathy and left adrenal nodule new and increased in size from previous CT, uptake sigmoid colon, asymmetrical consolidation left lower lung with reticulonodular opacification; bronchoscopy and biopsy from mediastinal station 4L 3/7/2025 positive for small cell cancer  3/25/2025- C1 carboplatin/-16/atezolizumab.    *Pancytopenia secondary to chemotherapy:   WBC 2.66, hemoglobin 10.3, platelets 102, ANC 0.29    *Immune mediated colitis  Patient treated with high-dose steroids and taper, now off steroid therapy  No recurrence of diarrhea off steroid therapy    *Peripheral neuropathy from previous  Taxol  Neuropathy symptoms stable on current chemotherapy    *MRI brain 9/11/23-2 mm enhancement left cerebellar hemisphere likely vessel artifact; MRI brain 11/27/2023-no evidence of metastatic disease, small focus of enhancement left inferior cerebellar region unchanged likely benign; MRI brain 7/24/2024-no evidence of disease; MRI brain 1/29/25 negative    *PET uptake 2 foci sigmoid colon; he is scheduled for a colonoscopy in January    *Comorbidities-hyperlipidemia    *New left lower extremity edema involving the lower legs/ankle/foot.-Negative venous duplex for 325    *Left parotid swelling/tenderness without redness warmth or fluctuance, low-grade fever 100.1 currently afebrile    PLAN:  Medrol Dosepak and antibiotics (Levaquin 500 milligrams daily x 7 days, Flagyl 500 mg every 8 hours x 7 days) for left parotiditis/soft tissue infection.  Patient advised to present to ER if worsening swelling, fluctuance, redness/warmth to the area or fever over 101 for intravenous antibiotics/neutropenic fever  Neulasta support future chemo for prevention of neutropenic infections

## 2025-04-11 ENCOUNTER — TELEPHONE (OUTPATIENT)
Dept: ONCOLOGY | Facility: CLINIC | Age: 70
End: 2025-04-11
Payer: MEDICARE

## 2025-04-11 NOTE — TELEPHONE ENCOUNTER
Neulasta on-pro added to day 3 of plan. Message sent to pre-certs    ----- Message from Gerg Dubon sent at 4/10/2025  4:47 PM EDT -----  Add neulasta onpro future chemo cycles

## 2025-04-14 ENCOUNTER — TELEPHONE (OUTPATIENT)
Dept: ONCOLOGY | Facility: CLINIC | Age: 70
End: 2025-04-14
Payer: MEDICARE

## 2025-04-14 NOTE — TELEPHONE ENCOUNTER
Informed Rochelle that patient may continue his steroid pack and he will still get steroids with treatment plan tomorrow. She verbalized understanding.

## 2025-04-14 NOTE — TELEPHONE ENCOUNTER
Caller: VIRIDIANA [place]    Relationship: Emergency Contact    Best call back number: [phone]      Who are you requesting to speak with (clinical staff, provider,  specific staff member): CLINICAL      What was the call regarding: VIRIDIANA ASKING IF PT SHOULD TAKE HIS ORAL STEROIDS IN THE MORNING AS HE IS USUALLY GIVEN THEM PRIOR TO HIS TREATMENT    PLEASE ADVISE

## 2025-04-15 ENCOUNTER — INFUSION (OUTPATIENT)
Dept: ONCOLOGY | Facility: HOSPITAL | Age: 70
End: 2025-04-15
Payer: MEDICARE

## 2025-04-15 ENCOUNTER — OFFICE VISIT (OUTPATIENT)
Dept: ONCOLOGY | Facility: CLINIC | Age: 70
End: 2025-04-15
Payer: MEDICARE

## 2025-04-15 VITALS
WEIGHT: 178.8 LBS | SYSTOLIC BLOOD PRESSURE: 167 MMHG | OXYGEN SATURATION: 98 % | HEIGHT: 72 IN | BODY MASS INDEX: 24.22 KG/M2 | RESPIRATION RATE: 16 BRPM | HEART RATE: 54 BPM | TEMPERATURE: 98 F | DIASTOLIC BLOOD PRESSURE: 69 MMHG

## 2025-04-15 DIAGNOSIS — C34.92 PRIMARY SQUAMOUS CELL CARCINOMA OF LUNG, LEFT: ICD-10-CM

## 2025-04-15 DIAGNOSIS — Z79.69 NEED FOR PROPHYLACTIC CHEMOTHERAPY: Primary | ICD-10-CM

## 2025-04-15 DIAGNOSIS — Z45.2 FITTING AND ADJUSTMENT OF VASCULAR CATHETER: ICD-10-CM

## 2025-04-15 DIAGNOSIS — Z79.899 HIGH RISK MEDICATION USE: ICD-10-CM

## 2025-04-15 DIAGNOSIS — Z79.69 NEED FOR PROPHYLACTIC CHEMOTHERAPY: ICD-10-CM

## 2025-04-15 DIAGNOSIS — Z79.899 HIGH RISK MEDICATION USE: Primary | ICD-10-CM

## 2025-04-15 DIAGNOSIS — D64.81 ANEMIA ASSOCIATED WITH CHEMOTHERAPY: ICD-10-CM

## 2025-04-15 DIAGNOSIS — R53.1 WEAKNESS: ICD-10-CM

## 2025-04-15 DIAGNOSIS — D70.1 CHEMOTHERAPY INDUCED NEUTROPENIA: ICD-10-CM

## 2025-04-15 DIAGNOSIS — Z91.81 RISK FOR FALLS: ICD-10-CM

## 2025-04-15 DIAGNOSIS — T45.1X5A CHEMOTHERAPY INDUCED NEUTROPENIA: ICD-10-CM

## 2025-04-15 DIAGNOSIS — T45.1X5A ANEMIA ASSOCIATED WITH CHEMOTHERAPY: ICD-10-CM

## 2025-04-15 LAB
ALBUMIN SERPL-MCNC: 3.8 G/DL (ref 3.5–5.2)
ALBUMIN/GLOB SERPL: 1.2 G/DL
ALP SERPL-CCNC: 53 U/L (ref 39–117)
ALT SERPL W P-5'-P-CCNC: 21 U/L (ref 1–41)
ANION GAP SERPL CALCULATED.3IONS-SCNC: 10.9 MMOL/L (ref 5–15)
AST SERPL-CCNC: 14 U/L (ref 1–40)
BASOPHILS # BLD AUTO: 0.03 10*3/MM3 (ref 0–0.2)
BASOPHILS NFR BLD AUTO: 0.4 % (ref 0–1.5)
BILIRUB SERPL-MCNC: <0.2 MG/DL (ref 0–1.2)
BUN SERPL-MCNC: 15 MG/DL (ref 8–23)
BUN/CREAT SERPL: 18.5 (ref 7–25)
CALCIUM SPEC-SCNC: 9 MG/DL (ref 8.6–10.5)
CHLORIDE SERPL-SCNC: 103 MMOL/L (ref 98–107)
CO2 SERPL-SCNC: 25.1 MMOL/L (ref 22–29)
CREAT SERPL-MCNC: 0.81 MG/DL (ref 0.76–1.27)
DEPRECATED RDW RBC AUTO: 51.2 FL (ref 37–54)
EGFRCR SERPLBLD CKD-EPI 2021: 96 ML/MIN/1.73
EOSINOPHIL # BLD AUTO: 0.06 10*3/MM3 (ref 0–0.4)
EOSINOPHIL NFR BLD AUTO: 0.7 % (ref 0.3–6.2)
ERYTHROCYTE [DISTWIDTH] IN BLOOD BY AUTOMATED COUNT: 16 % (ref 12.3–15.4)
GLOBULIN UR ELPH-MCNC: 3.1 GM/DL
GLUCOSE SERPL-MCNC: 134 MG/DL (ref 65–99)
HCT VFR BLD AUTO: 36.7 % (ref 37.5–51)
HGB BLD-MCNC: 11 G/DL (ref 13–17.7)
IMM GRANULOCYTES # BLD AUTO: 0.19 10*3/MM3 (ref 0–0.05)
IMM GRANULOCYTES NFR BLD AUTO: 2.2 % (ref 0–0.5)
LYMPHOCYTES # BLD AUTO: 1.67 10*3/MM3 (ref 0.7–3.1)
LYMPHOCYTES NFR BLD AUTO: 19.7 % (ref 19.6–45.3)
MAGNESIUM SERPL-MCNC: 2 MG/DL (ref 1.6–2.4)
MCH RBC QN AUTO: 26.4 PG (ref 26.6–33)
MCHC RBC AUTO-ENTMCNC: 30 G/DL (ref 31.5–35.7)
MCV RBC AUTO: 88 FL (ref 79–97)
MONOCYTES # BLD AUTO: 1.03 10*3/MM3 (ref 0.1–0.9)
MONOCYTES NFR BLD AUTO: 12.1 % (ref 5–12)
NEUTROPHILS NFR BLD AUTO: 5.51 10*3/MM3 (ref 1.7–7)
NEUTROPHILS NFR BLD AUTO: 64.9 % (ref 42.7–76)
PLATELET # BLD AUTO: 247 10*3/MM3 (ref 140–450)
PMV BLD AUTO: 9.3 FL (ref 6–12)
POTASSIUM SERPL-SCNC: 3.2 MMOL/L (ref 3.5–5.2)
PROT SERPL-MCNC: 6.9 G/DL (ref 6–8.5)
RBC # BLD AUTO: 4.17 10*6/MM3 (ref 4.14–5.8)
SODIUM SERPL-SCNC: 139 MMOL/L (ref 136–145)
WBC NRBC COR # BLD AUTO: 8.49 10*3/MM3 (ref 3.4–10.8)

## 2025-04-15 PROCEDURE — 25010000002 HEPARIN LOCK FLUSH PER 10 UNITS: Performed by: INTERNAL MEDICINE

## 2025-04-15 PROCEDURE — 96413 CHEMO IV INFUSION 1 HR: CPT

## 2025-04-15 PROCEDURE — 25010000002 ATEZOLIZUMAB 1200 MG/20ML SOLUTION 20 ML VIAL: Performed by: NURSE PRACTITIONER

## 2025-04-15 PROCEDURE — 25810000003 SODIUM CHLORIDE 0.9 % SOLUTION 250 ML FLEX CONT: Performed by: NURSE PRACTITIONER

## 2025-04-15 PROCEDURE — 25010000002 CARBOPLATIN PER 50 MG: Performed by: NURSE PRACTITIONER

## 2025-04-15 PROCEDURE — 96367 TX/PROPH/DG ADDL SEQ IV INF: CPT

## 2025-04-15 PROCEDURE — 25010000002 DEXAMETHASONE SODIUM PHOSPHATE 100 MG/10ML SOLUTION: Performed by: NURSE PRACTITIONER

## 2025-04-15 PROCEDURE — 25010000002 ETOPOSIDE 100 MG/5ML SOLUTION 5 ML VIAL: Performed by: NURSE PRACTITIONER

## 2025-04-15 PROCEDURE — 25810000003 SODIUM CHLORIDE 0.9 % SOLUTION 500 ML FLEX CONT: Performed by: NURSE PRACTITIONER

## 2025-04-15 PROCEDURE — 25010000002 PALONOSETRON PER 25 MCG: Performed by: NURSE PRACTITIONER

## 2025-04-15 PROCEDURE — 96375 TX/PRO/DX INJ NEW DRUG ADDON: CPT

## 2025-04-15 PROCEDURE — 83735 ASSAY OF MAGNESIUM: CPT | Performed by: INTERNAL MEDICINE

## 2025-04-15 PROCEDURE — 85025 COMPLETE CBC W/AUTO DIFF WBC: CPT | Performed by: INTERNAL MEDICINE

## 2025-04-15 PROCEDURE — 80053 COMPREHEN METABOLIC PANEL: CPT | Performed by: INTERNAL MEDICINE

## 2025-04-15 PROCEDURE — 25810000003 SODIUM CHLORIDE 0.9 % SOLUTION: Performed by: NURSE PRACTITIONER

## 2025-04-15 PROCEDURE — 96417 CHEMO IV INFUS EACH ADDL SEQ: CPT

## 2025-04-15 PROCEDURE — 25010000002 FOSAPREPITANT PER 1 MG: Performed by: NURSE PRACTITIONER

## 2025-04-15 RX ORDER — FAMOTIDINE 10 MG/ML
20 INJECTION, SOLUTION INTRAVENOUS AS NEEDED
Status: CANCELLED | OUTPATIENT
Start: 2025-04-15

## 2025-04-15 RX ORDER — HEPARIN SODIUM (PORCINE) LOCK FLUSH IV SOLN 100 UNIT/ML 100 UNIT/ML
500 SOLUTION INTRAVENOUS AS NEEDED
Status: CANCELLED | OUTPATIENT
Start: 2025-04-15

## 2025-04-15 RX ORDER — HEPARIN SODIUM (PORCINE) LOCK FLUSH IV SOLN 100 UNIT/ML 100 UNIT/ML
500 SOLUTION INTRAVENOUS AS NEEDED
Status: DISCONTINUED | OUTPATIENT
Start: 2025-04-15 | End: 2025-04-15 | Stop reason: HOSPADM

## 2025-04-15 RX ORDER — PROCHLORPERAZINE MALEATE 10 MG
10 TABLET ORAL ONCE
Status: CANCELLED | OUTPATIENT
Start: 2025-04-17 | End: 2025-04-17

## 2025-04-15 RX ORDER — PALONOSETRON 0.05 MG/ML
0.25 INJECTION, SOLUTION INTRAVENOUS ONCE
Status: COMPLETED | OUTPATIENT
Start: 2025-04-15 | End: 2025-04-15

## 2025-04-15 RX ORDER — SODIUM CHLORIDE 0.9 % (FLUSH) 0.9 %
10 SYRINGE (ML) INJECTION AS NEEDED
Status: DISCONTINUED | OUTPATIENT
Start: 2025-04-15 | End: 2025-04-15 | Stop reason: HOSPADM

## 2025-04-15 RX ORDER — SODIUM CHLORIDE 9 MG/ML
20 INJECTION, SOLUTION INTRAVENOUS ONCE
Status: CANCELLED | OUTPATIENT
Start: 2025-04-15

## 2025-04-15 RX ORDER — SODIUM CHLORIDE 0.9 % (FLUSH) 0.9 %
10 SYRINGE (ML) INJECTION AS NEEDED
Status: CANCELLED | OUTPATIENT
Start: 2025-04-15

## 2025-04-15 RX ORDER — SODIUM CHLORIDE 9 MG/ML
20 INJECTION, SOLUTION INTRAVENOUS ONCE
Status: COMPLETED | OUTPATIENT
Start: 2025-04-15 | End: 2025-04-15

## 2025-04-15 RX ORDER — SODIUM CHLORIDE 9 MG/ML
20 INJECTION, SOLUTION INTRAVENOUS ONCE
Status: CANCELLED | OUTPATIENT
Start: 2025-04-16

## 2025-04-15 RX ORDER — HYDROCORTISONE SODIUM SUCCINATE 100 MG/2ML
100 INJECTION INTRAMUSCULAR; INTRAVENOUS AS NEEDED
Status: CANCELLED | OUTPATIENT
Start: 2025-04-15

## 2025-04-15 RX ORDER — DIPHENHYDRAMINE HYDROCHLORIDE 50 MG/ML
50 INJECTION, SOLUTION INTRAMUSCULAR; INTRAVENOUS AS NEEDED
Status: CANCELLED | OUTPATIENT
Start: 2025-04-15

## 2025-04-15 RX ORDER — PALONOSETRON 0.05 MG/ML
0.25 INJECTION, SOLUTION INTRAVENOUS ONCE
Status: CANCELLED | OUTPATIENT
Start: 2025-04-15

## 2025-04-15 RX ORDER — PROCHLORPERAZINE MALEATE 10 MG
10 TABLET ORAL ONCE
Status: CANCELLED | OUTPATIENT
Start: 2025-04-16 | End: 2025-04-16

## 2025-04-15 RX ORDER — SODIUM CHLORIDE 9 MG/ML
20 INJECTION, SOLUTION INTRAVENOUS ONCE
Status: CANCELLED | OUTPATIENT
Start: 2025-04-17

## 2025-04-15 RX ADMIN — Medication 10 ML: at 13:03

## 2025-04-15 RX ADMIN — DEXAMETHASONE SODIUM PHOSPHATE 12 MG: 10 INJECTION, SOLUTION INTRAMUSCULAR; INTRAVENOUS at 11:09

## 2025-04-15 RX ADMIN — HEPARIN 500 UNITS: 100 SYRINGE at 13:03

## 2025-04-15 RX ADMIN — ATEZOLIZUMAB 1200 MG: 1200 INJECTION, SOLUTION INTRAVENOUS at 10:33

## 2025-04-15 RX ADMIN — FOSAPREPITANT DIMEGLUMINE 150 MG: 150 INJECTION, POWDER, LYOPHILIZED, FOR SOLUTION INTRAVENOUS at 09:50

## 2025-04-15 RX ADMIN — CARBOPLATIN 630 MG: 10 INJECTION, SOLUTION INTRAVENOUS at 11:28

## 2025-04-15 RX ADMIN — SODIUM CHLORIDE 160 MG: 9 INJECTION, SOLUTION INTRAVENOUS at 12:02

## 2025-04-15 RX ADMIN — PALONOSETRON HYDROCHLORIDE 0.25 MG: 0.25 INJECTION, SOLUTION INTRAVENOUS at 11:08

## 2025-04-15 RX ADMIN — SODIUM CHLORIDE 20 ML/HR: 900 INJECTION, SOLUTION INTRAVENOUS at 09:50

## 2025-04-15 NOTE — PROGRESS NOTES
"  Subjective     REASON FOR FOLLOW-UP:  NSCLC-small cell lung cancer    REQUESTING PHYSICIAN:  Sonia    History of Present Illness   Patient is seen back today for cycle 2 carboplatin/-16/atezolizumab for small cell lung cancer.  He did have a febrile episode with parotid swelling and tenderness for 24 hours this past cycle and was treated with Levaquin and steroids with improvement.  He did become neutropenic with mehrdad 0.29.  We have added Neulasta this cycle.  He denies any new concerns today.  He continues to have difficulty with ambulation when coming to treatments and is in need of a wheelchair.      Review of Systems   Constitutional:  Positive for fatigue. Negative for fever.   HENT:  Positive for dental problem.    Respiratory:  Positive for cough and shortness of breath.    Cardiovascular:  Positive for chest pain.   Gastrointestinal:  Positive for nausea. Negative for vomiting.   Genitourinary: Negative.    Musculoskeletal: Negative.    Neurological:  Positive for numbness.   Hematological: Negative.    Psychiatric/Behavioral:  Negative for dysphoric mood. The patient is not nervous/anxious.          Objective     Vitals:    04/15/25 0829 04/15/25 0847   BP: 169/73 167/69   Pulse: 54    Resp: 16    Temp: 98 °F (36.7 °C)    TempSrc: Infrared    SpO2: 98%    Weight: 81.1 kg (178 lb 12.8 oz)    Height: 182.9 cm (72.01\")    PainSc: 0-No pain                4/15/2025     8:45 AM   Current Status   ECOG score 2       Physical Exam    CONSTITUTIONAL: pleasant well-developed adult man  LYMPH: no cervical or supraclavicular lad  HEENT: Resolved swelling along jaw on left side, hearing intact  CV: RRR, S1S2, no murmur  RESP: Breath sounds are diminished and coarse  GI: soft, non-tender, no splenomegaly, +bs  MUSC: trace edema left foot  NEURO: alert and oriented x3, normal strength  PSYCH: normal mood anxious affect      RECENT LABS:  Results from last 7 days   Lab Units 04/15/25  0831 04/10/25  1343   WBC 10*3/mm3 " 8.49 2.66*   NEUTROS ABS 10*3/mm3 5.51 0.29*   HEMOGLOBIN g/dL 11.0* 10.3*   HEMATOCRIT % 36.7* 33.3*   PLATELETS 10*3/mm3 247 102*     Results from last 7 days   Lab Units 04/15/25  0831   SODIUM mmol/L 139   POTASSIUM mmol/L 3.2*   CHLORIDE mmol/L 103   CO2 mmol/L 25.1   BUN mg/dL 15   CREATININE mg/dL 0.81   CALCIUM mg/dL 9.0   ALBUMIN g/dL 3.8   BILIRUBIN mg/dL <0.2   ALK PHOS U/L 53   ALT (SGPT) U/L 21   AST (SGOT) U/L 14   GLUCOSE mg/dL 134*   MAGNESIUM mg/dL 2.0               PET scan 9823  IMPRESSION:  1. Intensely hypermetabolic approximately 13 x 5 cm pleural-based left  lower lobe lung mass involving the left hilum. Smaller nodular opacities  adjacently are hypermetabolic and likely represent metastases. A 7 mm  right upper lobe nodule is photopenic. Conservative surveillance is  recommended. There is no convincing evidence for metastatic disease at  the neck, abdomen, or pelvis.  2. There are 2 hypermetabolic foci at the sigmoid colon need further  evaluation, particularly the 1.4 cm hypermetabolic polyp at the distal  sigmoid colon. Colonoscopy is recommended.  3. Nonspecific heterogeneous prostate activity. PSA follow-up is  recommended.    CT Chest Without Contrast Diagnostic (07/17/2024 12:52)  IMPRESSION:  1.There is evidence for interval decrease in size of the ill-defined abnormal attenuation involving the posterior aspect of the mid residual left lung extending into the left hilum. There is also improvement in aeration throughout the residual left lower   lung with improvement in multifocal areas of abnormal attenuation. These findings suggest an appropriate response to interval therapy.  2.Stable abnormal attenuation associated with the left hilum.  3.Otherwise stable CT of the chest.    MRI Brain With & Without Contrast (07/24/2024 13:16)  Impression:  1. Mild generalized parenchymal volume loss and changes of chronic small vessel ischemic disease.  2. No acute intracranial abnormality.  3. No  pathologic intracranial contrast enhancement to suggest intracranial metastatic disease.    MRI Brain With & Without Contrast (01/29/2025 16:35)   Impression:  No evidence of intracranial metastatic disease.    Assessment & Plan   *T4N0M0 poorly differentiated carcinoma/squamous differentiation left lower lobe of the lung/small cell lung cancer on pathology at surgery  Mass discovered on low-dose CT chest screening 7/31/2023  PET scan 9/8/2023 showed a 1.3 x 5 cm left lower lobe mass involving the hilum with smaller adjacent nodular opacities, max SUV 23.2, photopenic right upper lobe nodule  Bronchoscopy with biopsy from the left lower lobe 9/6/2023 positive for poorly differentiated carcinoma with squamous differentiation; P-L1 TPS 0%  Initiated neoadjuvant treatment with carboplatin/Taxol/nivolumab 9/20/2023 and completed 3 cycles 11/1/2023  Status post left lower lobectomy 12/13/2023 complicated by prolonged and left lower lobe pneumonia.  Pathology from surgery showed significant treatment effect but residual small cell carcinoma with positive bronchial and perivascular soft tissue margins by tumor within lymphatics, positive lymph node station 10 L for small cell carcinoma x 2   2/5/2024-initiated postoperative carboplatin/-16 with concurrent radiation for small cell lung cancer/positive margin  2/26/2024-C2 carboplatin/-16 with concurrent radiation; completed radiation therapy 3/15/2024; 4 cycles carboplatin/-16 completed 4/11/2024  CT chest and MRI brain 7/24/2024-[facility]  CT chest 10/16/2024-stable scarring and volume loss in the left hemothorax, no suspicious pulmonary nodules or lymphadenopathy  2/24/2025 PET-intensely avid mediastinal lymphadenopathy and left adrenal nodule new and increased in size from previous CT, uptake sigmoid colon, asymmetrical consolidation left lower lung with reticulonodular opacification; bronchoscopy and biopsy from mediastinal station 4L 3/7/2025 positive for small cell  cancer  3/25/2025- C1 carboplatin/-16/atezolizumab.  4/15/2025 returns today for cycle 2 carboplatin/-16/atezolizumab and will proceed with the addition of Neulasta due to ANC of 0.298 her last cycle.    *Pancytopenia secondary to chemotherapy:   WBC 8.49, hemoglobin 11, platelets 247,000, ANC 5.51    *Immune mediated colitis  Patient treated with high-dose steroids and taper, now off steroid therapy  No recurrence of diarrhea off steroid therapy    *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  Neuropathy symptoms stable on current chemotherapy    *MRI brain 9/11/23-2 mm enhancement left cerebellar hemisphere likely vessel artifact; MRI brain 11/27/2023-no evidence of metastatic disease, small focus of enhancement left inferior cerebellar region unchanged likely benign; MRI brain 7/24/2024-no evidence of disease; MRI brain 1/29/25 negative    *PET uptake 2 foci sigmoid colon; he is scheduled for a colonoscopy in January    *Weakness-patient does need wheelchair for transport. Patient unable to safely use a cane or walker. Due to immobility related to metastatic cancer, a transport wheelchair is needed to assist with daily living activities inside the home. Patient unable to propel a standard wheelchair or lightweight wheelchair, but has a caregiver who is available, willing, and able to provide assistance with the transport wheelchair.      *Comorbidities-hyperlipidemia    *New left lower extremity edema involving the lower legs/ankle/foot.-Negative venous duplex for 32/5    *Left parotid swelling/tenderness without redness warmth or fluctuance, low-grade fever 100.1 treated with Levaquin, Flagyl, Medrol Dosepak with improvement in symptoms.  No edema noted today to  jaw.    PLAN:  Proceed with cycle 2 carboplatin/atezolizumab/-16 along with Neulasta support for previous neutropenia  Request transport wheelchair  1 and 2-week CBC with nurse review  3-week follow-up with Dr. Dubon, labs, treatment and CT scans  in 2 weeks.

## 2025-04-16 ENCOUNTER — INFUSION (OUTPATIENT)
Dept: ONCOLOGY | Facility: HOSPITAL | Age: 70
End: 2025-04-16
Payer: MEDICARE

## 2025-04-16 VITALS
OXYGEN SATURATION: 95 % | SYSTOLIC BLOOD PRESSURE: 151 MMHG | HEART RATE: 69 BPM | TEMPERATURE: 97.8 F | DIASTOLIC BLOOD PRESSURE: 72 MMHG

## 2025-04-16 DIAGNOSIS — Z79.899 HIGH RISK MEDICATION USE: ICD-10-CM

## 2025-04-16 DIAGNOSIS — Z79.69 NEED FOR PROPHYLACTIC CHEMOTHERAPY: Primary | ICD-10-CM

## 2025-04-16 DIAGNOSIS — C34.92 PRIMARY SQUAMOUS CELL CARCINOMA OF LUNG, LEFT: ICD-10-CM

## 2025-04-16 DIAGNOSIS — Z45.2 FITTING AND ADJUSTMENT OF VASCULAR CATHETER: ICD-10-CM

## 2025-04-16 PROCEDURE — A9270 NON-COVERED ITEM OR SERVICE: HCPCS | Performed by: NURSE PRACTITIONER

## 2025-04-16 PROCEDURE — 25810000003 SODIUM CHLORIDE 0.9 % SOLUTION 500 ML FLEX CONT: Performed by: NURSE PRACTITIONER

## 2025-04-16 PROCEDURE — 25010000002 HEPARIN LOCK FLUSH PER 10 UNITS: Performed by: INTERNAL MEDICINE

## 2025-04-16 PROCEDURE — 25010000002 ETOPOSIDE 100 MG/5ML SOLUTION 5 ML VIAL: Performed by: NURSE PRACTITIONER

## 2025-04-16 PROCEDURE — 96413 CHEMO IV INFUSION 1 HR: CPT

## 2025-04-16 PROCEDURE — 63710000001 PROCHLORPERAZINE MALEATE PER 5 MG: Performed by: NURSE PRACTITIONER

## 2025-04-16 RX ORDER — HEPARIN SODIUM (PORCINE) LOCK FLUSH IV SOLN 100 UNIT/ML 100 UNIT/ML
500 SOLUTION INTRAVENOUS AS NEEDED
Status: DISCONTINUED | OUTPATIENT
Start: 2025-04-16 | End: 2025-04-16 | Stop reason: HOSPADM

## 2025-04-16 RX ORDER — SODIUM CHLORIDE 0.9 % (FLUSH) 0.9 %
10 SYRINGE (ML) INJECTION AS NEEDED
Status: DISCONTINUED | OUTPATIENT
Start: 2025-04-16 | End: 2025-04-16 | Stop reason: HOSPADM

## 2025-04-16 RX ORDER — PROCHLORPERAZINE MALEATE 5 MG/1
10 TABLET ORAL ONCE
Status: COMPLETED | OUTPATIENT
Start: 2025-04-16 | End: 2025-04-16

## 2025-04-16 RX ORDER — HEPARIN SODIUM (PORCINE) LOCK FLUSH IV SOLN 100 UNIT/ML 100 UNIT/ML
500 SOLUTION INTRAVENOUS AS NEEDED
Status: CANCELLED | OUTPATIENT
Start: 2025-04-16

## 2025-04-16 RX ORDER — SODIUM CHLORIDE 0.9 % (FLUSH) 0.9 %
10 SYRINGE (ML) INJECTION AS NEEDED
Status: CANCELLED | OUTPATIENT
Start: 2025-04-16

## 2025-04-16 RX ADMIN — PROCHLORPERAZINE MALEATE 10 MG: 5 TABLET ORAL at 14:01

## 2025-04-16 RX ADMIN — Medication 10 ML: at 15:42

## 2025-04-16 RX ADMIN — SODIUM CHLORIDE 160 MG: 9 INJECTION, SOLUTION INTRAVENOUS at 14:37

## 2025-04-16 RX ADMIN — HEPARIN 500 UNITS: 100 SYRINGE at 15:42

## 2025-04-16 NOTE — NURSING NOTE
The pt and his daughter,Rochelle, watched the Neulasta On Body Injector video. The pt will also start Claritin 10mg tonight and take it daily for 5-7 days this cycle and each subsequent chemo cycle.

## 2025-04-17 ENCOUNTER — INFUSION (OUTPATIENT)
Dept: ONCOLOGY | Facility: HOSPITAL | Age: 70
End: 2025-04-17
Payer: MEDICARE

## 2025-04-17 ENCOUNTER — APPOINTMENT (OUTPATIENT)
Dept: CT IMAGING | Facility: HOSPITAL | Age: 70
End: 2025-04-17
Payer: MEDICARE

## 2025-04-17 VITALS
DIASTOLIC BLOOD PRESSURE: 83 MMHG | OXYGEN SATURATION: 97 % | TEMPERATURE: 98 F | HEART RATE: 70 BPM | SYSTOLIC BLOOD PRESSURE: 144 MMHG

## 2025-04-17 DIAGNOSIS — Z79.899 HIGH RISK MEDICATION USE: ICD-10-CM

## 2025-04-17 DIAGNOSIS — C34.92 PRIMARY SQUAMOUS CELL CARCINOMA OF LUNG, LEFT: ICD-10-CM

## 2025-04-17 DIAGNOSIS — Z45.2 FITTING AND ADJUSTMENT OF VASCULAR CATHETER: ICD-10-CM

## 2025-04-17 DIAGNOSIS — Z79.69 NEED FOR PROPHYLACTIC CHEMOTHERAPY: Primary | ICD-10-CM

## 2025-04-17 PROCEDURE — 25010000002 HEPARIN LOCK FLUSH PER 10 UNITS: Performed by: INTERNAL MEDICINE

## 2025-04-17 PROCEDURE — 25010000002 PEGFILGRASTIM 6 MG/0.6ML PREFILLED SYRINGE KIT: Performed by: NURSE PRACTITIONER

## 2025-04-17 PROCEDURE — 25010000002 ETOPOSIDE 500 MG/25ML SOLUTION 25 ML VIAL: Performed by: NURSE PRACTITIONER

## 2025-04-17 PROCEDURE — 63710000001 PROCHLORPERAZINE MALEATE PER 5 MG: Performed by: NURSE PRACTITIONER

## 2025-04-17 PROCEDURE — 96413 CHEMO IV INFUSION 1 HR: CPT

## 2025-04-17 PROCEDURE — 96377 APPLICATON ON-BODY INJECTOR: CPT

## 2025-04-17 PROCEDURE — 25810000003 SODIUM CHLORIDE 0.9 % SOLUTION 500 ML FLEX CONT: Performed by: NURSE PRACTITIONER

## 2025-04-17 PROCEDURE — A9270 NON-COVERED ITEM OR SERVICE: HCPCS | Performed by: NURSE PRACTITIONER

## 2025-04-17 RX ORDER — PROCHLORPERAZINE MALEATE 5 MG/1
10 TABLET ORAL ONCE
Status: COMPLETED | OUTPATIENT
Start: 2025-04-17 | End: 2025-04-17

## 2025-04-17 RX ORDER — SODIUM CHLORIDE 0.9 % (FLUSH) 0.9 %
10 SYRINGE (ML) INJECTION AS NEEDED
Status: DISCONTINUED | OUTPATIENT
Start: 2025-04-17 | End: 2025-04-17 | Stop reason: HOSPADM

## 2025-04-17 RX ORDER — HEPARIN SODIUM (PORCINE) LOCK FLUSH IV SOLN 100 UNIT/ML 100 UNIT/ML
500 SOLUTION INTRAVENOUS AS NEEDED
Status: DISCONTINUED | OUTPATIENT
Start: 2025-04-17 | End: 2025-04-17 | Stop reason: HOSPADM

## 2025-04-17 RX ORDER — SODIUM CHLORIDE 0.9 % (FLUSH) 0.9 %
10 SYRINGE (ML) INJECTION AS NEEDED
OUTPATIENT
Start: 2025-04-17

## 2025-04-17 RX ORDER — HEPARIN SODIUM (PORCINE) LOCK FLUSH IV SOLN 100 UNIT/ML 100 UNIT/ML
500 SOLUTION INTRAVENOUS AS NEEDED
OUTPATIENT
Start: 2025-04-17

## 2025-04-17 RX ORDER — LORATADINE 10 MG/1
10 TABLET ORAL DAILY
COMMUNITY

## 2025-04-17 RX ADMIN — HEPARIN 500 UNITS: 100 SYRINGE at 15:49

## 2025-04-17 RX ADMIN — SODIUM CHLORIDE 160 MG: 9 INJECTION, SOLUTION INTRAVENOUS at 14:45

## 2025-04-17 RX ADMIN — PROCHLORPERAZINE MALEATE 10 MG: 5 TABLET ORAL at 13:42

## 2025-04-17 RX ADMIN — Medication 10 ML: at 15:49

## 2025-04-17 RX ADMIN — PEGFILGRASTIM 6 MG: KIT SUBCUTANEOUS at 14:54

## 2025-04-18 ENCOUNTER — PATIENT OUTREACH (OUTPATIENT)
Dept: OTHER | Facility: HOSPITAL | Age: 70
End: 2025-04-18
Payer: MEDICARE

## 2025-04-18 ENCOUNTER — PATIENT MESSAGE (OUTPATIENT)
Dept: ONCOLOGY | Facility: CLINIC | Age: 70
End: 2025-04-18
Payer: MEDICARE

## 2025-04-18 ENCOUNTER — TELEPHONE (OUTPATIENT)
Dept: ONCOLOGY | Facility: CLINIC | Age: 70
End: 2025-04-18
Payer: MEDICARE

## 2025-04-18 ENCOUNTER — HOSPITAL ENCOUNTER (OUTPATIENT)
Dept: ULTRASOUND IMAGING | Facility: HOSPITAL | Age: 70
Discharge: HOME | End: 2025-04-18
Payer: MEDICARE

## 2025-04-18 DIAGNOSIS — M79.605 PAIN AND SWELLING OF LEFT LOWER EXTREMITY: Primary | ICD-10-CM

## 2025-04-18 DIAGNOSIS — M79.89 PAIN AND SWELLING OF LEFT LOWER EXTREMITY: Primary | ICD-10-CM

## 2025-04-18 DIAGNOSIS — M79.605 PAIN AND SWELLING OF LEFT LOWER EXTREMITY: ICD-10-CM

## 2025-04-18 DIAGNOSIS — M79.89 PAIN AND SWELLING OF LEFT LOWER EXTREMITY: ICD-10-CM

## 2025-04-18 PROCEDURE — 93971 EXTREMITY STUDY: CPT

## 2025-04-18 NOTE — PROGRESS NOTES
Reviewed chart. The patient has biopsy confirmed recurrent disease in the mediastinum and adrenal gland with recent FNA of the mediastinum by bronchoscopy showing small cell lung cancer. Saw oncology APRN 4/15, rec'd cycle 2 carboplatin/-16/atezolizumab (started 3/25) for small cell lung cancer. Added Neulasta due to ANC of 0.298 his last cycle. Ordered wheelchair    Called patient. Spoke with his wife. He is tolerating treatment fairly well.  We discussed bone pain that may occur from Neulasta; he is taking Claritin for this.      The patient has been eating ok and drinking Ensure/Boost.     The patient's wife had a recent fall.  Their daughter has been helping care for them both.     We again discussed integrative therapies and [facility]. Patient's wife expressed gratitude for my support and denied any additional needs at this time. Navigation will continue to follow; encouraged patient/wife to call as needed.

## 2025-04-23 ENCOUNTER — CLINICAL SUPPORT (OUTPATIENT)
Dept: ONCOLOGY | Facility: HOSPITAL | Age: 70
End: 2025-04-23
Payer: MEDICARE

## 2025-04-23 ENCOUNTER — LAB (OUTPATIENT)
Dept: LAB | Facility: HOSPITAL | Age: 70
End: 2025-04-23
Payer: MEDICARE

## 2025-04-23 DIAGNOSIS — C34.92 PRIMARY SQUAMOUS CELL CARCINOMA OF LUNG, LEFT: Primary | ICD-10-CM

## 2025-04-23 LAB
BASOPHILS # BLD AUTO: 0.06 10*3/MM3 (ref 0–0.2)
BASOPHILS NFR BLD AUTO: 1 % (ref 0–1.5)
DEPRECATED RDW RBC AUTO: 49.2 FL (ref 37–54)
EOSINOPHIL # BLD AUTO: 0.02 10*3/MM3 (ref 0–0.4)
EOSINOPHIL NFR BLD AUTO: 0.3 % (ref 0.3–6.2)
ERYTHROCYTE [DISTWIDTH] IN BLOOD BY AUTOMATED COUNT: 16.2 % (ref 12.3–15.4)
HCT VFR BLD AUTO: 30.9 % (ref 37.5–51)
HGB BLD-MCNC: 9.8 G/DL (ref 13–17.7)
IMM GRANULOCYTES # BLD AUTO: 0.08 10*3/MM3 (ref 0–0.05)
IMM GRANULOCYTES NFR BLD AUTO: 1.4 % (ref 0–0.5)
LYMPHOCYTES # BLD AUTO: 1.22 10*3/MM3 (ref 0.7–3.1)
LYMPHOCYTES NFR BLD AUTO: 21.2 % (ref 19.6–45.3)
MCH RBC QN AUTO: 26.8 PG (ref 26.6–33)
MCHC RBC AUTO-ENTMCNC: 31.7 G/DL (ref 31.5–35.7)
MCV RBC AUTO: 84.4 FL (ref 79–97)
MONOCYTES # BLD AUTO: 1.29 10*3/MM3 (ref 0.1–0.9)
MONOCYTES NFR BLD AUTO: 22.4 % (ref 5–12)
NEUTROPHILS NFR BLD AUTO: 3.08 10*3/MM3 (ref 1.7–7)
NEUTROPHILS NFR BLD AUTO: 53.7 % (ref 42.7–76)
NRBC BLD AUTO-RTO: 0 /100 WBC (ref 0–0.2)
PLATELET # BLD AUTO: 93 10*3/MM3 (ref 140–450)
PMV BLD AUTO: 9.7 FL (ref 6–12)
RBC # BLD AUTO: 3.66 10*6/MM3 (ref 4.14–5.8)
WBC NRBC COR # BLD AUTO: 5.75 10*3/MM3 (ref 3.4–10.8)

## 2025-04-23 PROCEDURE — 36416 COLLJ CAPILLARY BLOOD SPEC: CPT

## 2025-04-23 PROCEDURE — 85025 COMPLETE CBC W/AUTO DIFF WBC: CPT | Performed by: INTERNAL MEDICINE

## 2025-04-23 NOTE — PROGRESS NOTES
Patient is here for lab review with RN. He is accompanied by his daughter and is in a wheelchair. CBC reviewed, counts are stable for this patient at this time. Patient has complaints of constipation. I provided him with a copy of our bowel management protocol and encouraged him to call back if it does not help. Copy of labs given to patient and follow up appointment reviewed. Patient is instructed to call the office with any concerns or new symptoms prior to next visit. Patient verbalized understanding and discharged in stable condition.    CBC:      Lab 04/23/25  1406   WBC 5.75   HEMOGLOBIN 9.8*   HEMATOCRIT 30.9*   PLATELETS 93*   NEUTROS ABS 3.08   IMMATURE GRANS (ABS) 0.08*   LYMPHS ABS 1.22   MONOS ABS 1.29*   EOS ABS 0.02   MCV 84.4     Bowel Management Protocol provided to patient:  Take 2 Senokot-S tablets at bedtime  If you do not have a bowel movement in the morning, take 2 Senokot-S tablets after breakfast  If you do not have a bowel movement by evening, take 3 Senokot-S tablets at bedtime  If you do not have a bowel movement in the morning, add 2 tablespoons of Milk of Magnesia after breakfast and dinner while continuing Senokot-S tablets  If there if no bowel movement within 48 hours of beginning this protocol, please call our office at [phone] to discuss further interventions.

## 2025-04-30 ENCOUNTER — HOSPITAL ENCOUNTER (OUTPATIENT)
Dept: CT IMAGING | Facility: HOSPITAL | Age: 70
Discharge: HOME | End: 2025-04-30
Payer: MEDICARE

## 2025-04-30 ENCOUNTER — INFUSION (OUTPATIENT)
Dept: ONCOLOGY | Facility: HOSPITAL | Age: 70
End: 2025-04-30
Payer: MEDICARE

## 2025-04-30 ENCOUNTER — APPOINTMENT (OUTPATIENT)
Dept: LAB | Facility: HOSPITAL | Age: 70
End: 2025-04-30
Payer: MEDICARE

## 2025-04-30 VITALS — TEMPERATURE: 98.4 F

## 2025-04-30 DIAGNOSIS — N28.1 CYST, KIDNEY, ACQUIRED: ICD-10-CM

## 2025-04-30 DIAGNOSIS — Z45.2 FITTING AND ADJUSTMENT OF VASCULAR CATHETER: Primary | ICD-10-CM

## 2025-04-30 DIAGNOSIS — C34.92 PRIMARY SQUAMOUS CELL CARCINOMA OF LUNG, LEFT: ICD-10-CM

## 2025-04-30 DIAGNOSIS — Z79.899 HIGH RISK MEDICATION USE: ICD-10-CM

## 2025-04-30 LAB
BASOPHILS # BLD AUTO: 0.04 10*3/MM3 (ref 0–0.2)
BASOPHILS NFR BLD AUTO: 0.3 % (ref 0–1.5)
DEPRECATED RDW RBC AUTO: 51.8 FL (ref 37–54)
EOSINOPHIL # BLD AUTO: 0.07 10*3/MM3 (ref 0–0.4)
EOSINOPHIL NFR BLD AUTO: 0.5 % (ref 0.3–6.2)
ERYTHROCYTE [DISTWIDTH] IN BLOOD BY AUTOMATED COUNT: 16.6 % (ref 12.3–15.4)
HCT VFR BLD AUTO: 29.3 % (ref 37.5–51)
HGB BLD-MCNC: 9.2 G/DL (ref 13–17.7)
IMM GRANULOCYTES # BLD AUTO: 0.78 10*3/MM3 (ref 0–0.05)
IMM GRANULOCYTES NFR BLD AUTO: 5.3 % (ref 0–0.5)
LYMPHOCYTES # BLD AUTO: 1.88 10*3/MM3 (ref 0.7–3.1)
LYMPHOCYTES NFR BLD AUTO: 12.8 % (ref 19.6–45.3)
MCH RBC QN AUTO: 27.1 PG (ref 26.6–33)
MCHC RBC AUTO-ENTMCNC: 31.4 G/DL (ref 31.5–35.7)
MCV RBC AUTO: 86.2 FL (ref 79–97)
MONOCYTES # BLD AUTO: 1.87 10*3/MM3 (ref 0.1–0.9)
MONOCYTES NFR BLD AUTO: 12.7 % (ref 5–12)
NEUTROPHILS NFR BLD AUTO: 10.03 10*3/MM3 (ref 1.7–7)
NEUTROPHILS NFR BLD AUTO: 68.4 % (ref 42.7–76)
NRBC BLD AUTO-RTO: 1.1 /100 WBC (ref 0–0.2)
PLATELET # BLD AUTO: 75 10*3/MM3 (ref 140–450)
PMV BLD AUTO: 10.9 FL (ref 6–12)
RBC # BLD AUTO: 3.4 10*6/MM3 (ref 4.14–5.8)
WBC NRBC COR # BLD AUTO: 14.67 10*3/MM3 (ref 3.4–10.8)

## 2025-04-30 PROCEDURE — 74178 CT ABD&PLV WO CNTR FLWD CNTR: CPT

## 2025-04-30 PROCEDURE — 25010000002 HEPARIN LOCK FLUSH PER 10 UNITS: Performed by: INTERNAL MEDICINE

## 2025-04-30 PROCEDURE — 71260 CT THORAX DX C+: CPT

## 2025-04-30 PROCEDURE — 25510000001 IOPAMIDOL PER 1 ML: Performed by: UROLOGY

## 2025-04-30 PROCEDURE — 85025 COMPLETE CBC W/AUTO DIFF WBC: CPT | Performed by: INTERNAL MEDICINE

## 2025-04-30 RX ORDER — HEPARIN SODIUM (PORCINE) LOCK FLUSH IV SOLN 100 UNIT/ML 100 UNIT/ML
500 SOLUTION INTRAVENOUS AS NEEDED
Status: DISCONTINUED | OUTPATIENT
Start: 2025-04-30 | End: 2025-04-30 | Stop reason: HOSPADM

## 2025-04-30 RX ORDER — LEVOFLOXACIN 500 MG/1
500 TABLET, FILM COATED ORAL DAILY
Qty: 7 TABLET | Refills: 0 | Status: SHIPPED | OUTPATIENT
Start: 2025-04-30 | End: 2025-05-07

## 2025-04-30 RX ORDER — SODIUM CHLORIDE 0.9 % (FLUSH) 0.9 %
10 SYRINGE (ML) INJECTION AS NEEDED
OUTPATIENT
Start: 2025-04-30

## 2025-04-30 RX ORDER — HEPARIN SODIUM (PORCINE) LOCK FLUSH IV SOLN 100 UNIT/ML 100 UNIT/ML
500 SOLUTION INTRAVENOUS AS NEEDED
OUTPATIENT
Start: 2025-04-30

## 2025-04-30 RX ORDER — SODIUM CHLORIDE 9 MG/ML
1000 INJECTION, SOLUTION INTRAVENOUS ONCE
Status: CANCELLED | OUTPATIENT
Start: 2025-04-30

## 2025-04-30 RX ORDER — IOPAMIDOL 755 MG/ML
100 INJECTION, SOLUTION INTRAVASCULAR
Status: COMPLETED | OUTPATIENT
Start: 2025-04-30 | End: 2025-04-30

## 2025-04-30 RX ORDER — SODIUM CHLORIDE 0.9 % (FLUSH) 0.9 %
10 SYRINGE (ML) INJECTION AS NEEDED
Status: DISCONTINUED | OUTPATIENT
Start: 2025-04-30 | End: 2025-04-30 | Stop reason: HOSPADM

## 2025-04-30 RX ADMIN — IOPAMIDOL 100 ML: 755 INJECTION, SOLUTION INTRAVENOUS at 12:54

## 2025-04-30 RX ADMIN — HEPARIN 500 UNITS: 100 SYRINGE at 13:16

## 2025-04-30 RX ADMIN — Medication 10 ML: at 13:16

## 2025-04-30 NOTE — NURSING NOTE
CBC reviewed w/pt and daughter, Rochelle. The following was discussed:  1) Tooth pain on right side of mouth. The pt explained that this pain started recently, and he feels the tooth needs to be removed. The pt's daughter explained that the pt has had teeth just fall out on their own in the past.   2)Persistent cough that is now producing discolored phlegm which is green in color.  3)Nausea with occasional vomiting and decreased appetite. The pt explained that he is taking Zofran only occasionally. He can eat and drink but has no appetite. The pt's daughter mentioned that he has not started Omeprazole; he was advised to do so since he is experiencing increased GI symptoms. He was instructed to take the Zofran Every 8 hours as needed for nausea which, with Omeprazole use, may improve his appetite and tolerance to food and drink.     Dr. Dubon was consulted and recommends that the pt see his dentist this week regarding the right mouth/tooth pain.  A copy of today's CBC was given to the pt to show to his dentist. Results are stable for the pt at this time except for platelets of 75K. ANC was elevated due to recent Neulasta injection. The pt was warned that his dentist may opt to postpone any tooth extraction due to his low platelets. The pt was also instructed to start Levaquin 500mg today for the tooth issue as well as the discolored sputum.  Levaquin was escribed to [facility] and the pt's daughter will pick it up today. The pt and daughter, Rochelle, was reminded to contact our office before his next appt for any questions or concerns. Understanding was verbalized.

## 2025-05-01 NOTE — PROGRESS NOTES
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  He has stable cough shortness of breath.  He has generalized weakness from chemotherapy.  He reports easy bruising.  He had a dental extraction recently performed.      Review of Systems   Constitutional:  Positive for fever. Negative for fatigue.   HENT:  Positive for dental problem.    Respiratory:  Positive for cough and shortness of breath.    Cardiovascular:  Negative for chest pain.   Gastrointestinal:  Positive for constipation and nausea. Negative for abdominal pain and vomiting.   Genitourinary: Negative.    Musculoskeletal: Negative.    Neurological:  Positive for numbness.   Hematological:  Bruises/bleeds easily.   Psychiatric/Behavioral:  Negative for dysphoric mood. The patient is not nervous/anxious.          Objective     Vitals:    05/06/25 1002   BP: 111/70   Pulse: 96   Resp: 16   Temp: 98.5 °F (36.9 °C)   TempSrc: Infrared   SpO2: 97%   Weight: 79.8 kg (176 lb)   Height: 182.9 cm (72.01\")   PainSc: 0-No pain                 5/6/2025    10:04 AM   Current Status   ECOG score 2       Physical Exam    CONSTITUTIONAL: pleasant well-developed adult man  LYMPH: no cervical or supraclavicular lad  HEENT: Resolved parotid swelling  CV: RRR, S1S2, no murmur  RESP: Breath sounds are diminished and coarse  GI: soft, non-tender, no splenomegaly, +bs  MUSC: Left lower extremity 1+ edema involving the lower leg, ankle and foot  NEURO: alert and oriented x3, normal strength  PSYCH: normal mood anxious affect  Skin: Scattered minor bruising    RECENT LABS:  Results from last 7 days   Lab Units 05/06/25  1001 04/30/25  1217   WBC 10*3/mm3 13.08* 14.67*   NEUTROS ABS 10*3/mm3 10.15* 10.03*   HEMOGLOBIN g/dL 9.4* 9.2*   HEMATOCRIT % 30.1* 29.3*   PLATELETS " 10*3/mm3 244 75*     Results from last 7 days   Lab Units 05/06/25  1001   SODIUM mmol/L 138   POTASSIUM mmol/L 3.7   CHLORIDE mmol/L 102   CO2 mmol/L 25.3   BUN mg/dL 9   CREATININE mg/dL 0.70*   CALCIUM mg/dL 8.9   ALBUMIN g/dL 3.7   BILIRUBIN mg/dL 0.2   ALK PHOS U/L 63   ALT (SGPT) U/L 11   AST (SGOT) U/L 14   GLUCOSE mg/dL 117*               PET scan 9823  IMPRESSION:  1. Intensely hypermetabolic approximately 13 x 5 cm pleural-based left  lower lobe lung mass involving the left hilum. Smaller nodular opacities  adjacently are hypermetabolic and likely represent metastases. A 7 mm  right upper lobe nodule is photopenic. Conservative surveillance is  recommended. There is no convincing evidence for metastatic disease at  the neck, abdomen, or pelvis.  2. There are 2 hypermetabolic foci at the sigmoid colon need further  evaluation, particularly the 1.4 cm hypermetabolic polyp at the distal  sigmoid colon. Colonoscopy is recommended.  3. Nonspecific heterogeneous prostate activity. PSA follow-up is  recommended.    CT Chest Without Contrast Diagnostic (07/17/2024 12:52)  IMPRESSION:  1.There is evidence for interval decrease in size of the ill-defined abnormal attenuation involving the posterior aspect of the mid residual left lung extending into the left hilum. There is also improvement in aeration throughout the residual left lower   lung with improvement in multifocal areas of abnormal attenuation. These findings suggest an appropriate response to interval therapy.  2.Stable abnormal attenuation associated with the left hilum.  3.Otherwise stable CT of the chest.    MRI Brain With & Without Contrast (07/24/2024 13:16)  Impression:  1. Mild generalized parenchymal volume loss and changes of chronic small vessel ischemic disease.  2. No acute intracranial abnormality.  3. No pathologic intracranial contrast enhancement to suggest intracranial metastatic disease.    MRI Brain With & Without Contrast (01/29/2025  16:35)   Impression:  No evidence of intracranial metastatic disease.    CT Abdomen Pelvis With & Without Contrast (04/30/2025 13:03)  CT Chest With Contrast Diagnostic (04/30/2025 13:03)  Impression:  1. New 6 mm nodule within the left lung apex suspicious for metastatic disease.  2. Status post left lower lobectomy.  3. Trace left pleural effusion and chronic consolidation within the posterior left upper lobe compatible with treatment related change.  4. No evidence of metastatic disease within the abdomen or pelvis.      Assessment & Plan   *T4N0M0 poorly differentiated carcinoma/squamous differentiation left lower lobe of the lung/small cell lung cancer on pathology at surgery  Mass discovered on low-dose CT chest screening 7/31/2023  PET scan 9/8/2023 showed a 1.3 x 5 cm left lower lobe mass involving the hilum with smaller adjacent nodular opacities, max SUV 23.2, photopenic right upper lobe nodule  Bronchoscopy with biopsy from the left lower lobe 9/6/2023 positive for poorly differentiated carcinoma with squamous differentiation; P-L1 TPS 0%  Initiated neoadjuvant treatment with carboplatin/Taxol/nivolumab 9/20/2023 and completed 3 cycles 11/1/2023  Status post left lower lobectomy 12/13/2023 complicated by prolonged and left lower lobe pneumonia.  Pathology from surgery showed significant treatment effect but residual small cell carcinoma with positive bronchial and perivascular soft tissue margins by tumor within lymphatics, positive lymph node station 10 L for small cell carcinoma x 2   2/5/2024-initiated postoperative carboplatin/-16 with concurrent radiation for small cell lung cancer/positive margin  2/26/2024-C2 carboplatin/-16 with concurrent radiation; completed radiation therapy 3/15/2024; 4 cycles carboplatin/-16 completed 4/11/2024  CT chest and MRI brain 7/24/2024-[facility]  CT chest 10/16/2024-stable scarring and volume loss in the left hemothorax, no suspicious pulmonary nodules or  lymphadenopathy  2/24/2025 PET-intensely avid mediastinal lymphadenopathy and left adrenal nodule new and increased in size from previous CT, uptake sigmoid colon, asymmetrical consolidation left lower lung with reticulonodular opacification; bronchoscopy and biopsy from mediastinal station 4L 3/7/2025 positive for small cell cancer  3/25/2025- C1 carboplatin/-16/atezolizumab; 4/15/2025 C2 carboplatin/-16/atezolizumab  4/30/2025-CT chest showed improved mediastinal lymphadenopathy, new 6 mm nodule left apex      *Pancytopenia secondary to chemotherapy:   WBC 13.08, hemoglobin 9.4, platelets 244    *Immune mediated colitis  Patient treated with high-dose steroids and taper, now off steroid therapy  No recurrence of diarrhea off steroid therapy    *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  Neuropathy symptoms stable on current chemotherapy    *MRI brain 9/11/23-2 mm enhancement left cerebellar hemisphere likely vessel artifact; MRI brain 11/27/2023-no evidence of metastatic disease, small focus of enhancement left inferior cerebellar region unchanged likely benign; MRI brain 7/24/2024-no evidence of disease; MRI brain 1/29/25 negative       *Comorbidities-hyperlipidemia    *New left lower extremity edema involving the lower legs/ankle/foot.-Negative venous duplex for 325        PLAN:  Proceed with cycle 3 carboplatin/-16/atezolizumab with reduction of carboplatin to AUC 4  Neulasta support future chemo for prevention of neutropenic infections  Weekly CBC RN review  Return to clinic 3 weeks for cycle 4 of systemic therapy after which he will have repeat PET scan and MRI brain; plan to continue maintenance atezolizumab if stable disease and no immune mediated side effect

## 2025-05-06 ENCOUNTER — INFUSION (OUTPATIENT)
Dept: ONCOLOGY | Facility: HOSPITAL | Age: 70
End: 2025-05-06
Payer: MEDICARE

## 2025-05-06 ENCOUNTER — OFFICE VISIT (OUTPATIENT)
Dept: ONCOLOGY | Facility: CLINIC | Age: 70
End: 2025-05-06
Payer: MEDICARE

## 2025-05-06 VITALS
SYSTOLIC BLOOD PRESSURE: 111 MMHG | WEIGHT: 176 LBS | BODY MASS INDEX: 23.84 KG/M2 | TEMPERATURE: 98.5 F | HEIGHT: 72 IN | DIASTOLIC BLOOD PRESSURE: 70 MMHG | OXYGEN SATURATION: 97 % | RESPIRATION RATE: 16 BRPM | HEART RATE: 96 BPM

## 2025-05-06 DIAGNOSIS — Z79.69 NEED FOR PROPHYLACTIC CHEMOTHERAPY: ICD-10-CM

## 2025-05-06 DIAGNOSIS — C34.92 PRIMARY SQUAMOUS CELL CARCINOMA OF LUNG, LEFT: ICD-10-CM

## 2025-05-06 DIAGNOSIS — C34.32 CANCER OF BRONCHUS OF LEFT LOWER LOBE: ICD-10-CM

## 2025-05-06 DIAGNOSIS — Z79.899 HIGH RISK MEDICATION USE: ICD-10-CM

## 2025-05-06 DIAGNOSIS — Z79.69 NEED FOR PROPHYLACTIC CHEMOTHERAPY: Primary | ICD-10-CM

## 2025-05-06 DIAGNOSIS — D64.81 ANEMIA ASSOCIATED WITH CHEMOTHERAPY: Primary | ICD-10-CM

## 2025-05-06 DIAGNOSIS — T45.1X5A ANEMIA ASSOCIATED WITH CHEMOTHERAPY: Primary | ICD-10-CM

## 2025-05-06 DIAGNOSIS — C34.90 PRIMARY MALIGNANT NEOPLASM OF LUNG METASTATIC TO OTHER SITE, UNSPECIFIED LATERALITY: ICD-10-CM

## 2025-05-06 LAB
ALBUMIN SERPL-MCNC: 3.7 G/DL (ref 3.5–5.2)
ALBUMIN/GLOB SERPL: 1.2 G/DL
ALP SERPL-CCNC: 63 U/L (ref 39–117)
ALT SERPL W P-5'-P-CCNC: 11 U/L (ref 1–41)
ANION GAP SERPL CALCULATED.3IONS-SCNC: 10.7 MMOL/L (ref 5–15)
AST SERPL-CCNC: 14 U/L (ref 1–40)
BASOPHILS # BLD AUTO: 0.06 10*3/MM3 (ref 0–0.2)
BASOPHILS NFR BLD AUTO: 0.5 % (ref 0–1.5)
BILIRUB SERPL-MCNC: 0.2 MG/DL (ref 0–1.2)
BUN SERPL-MCNC: 9 MG/DL (ref 8–23)
BUN/CREAT SERPL: 12.9 (ref 7–25)
CALCIUM SPEC-SCNC: 8.9 MG/DL (ref 8.6–10.5)
CHLORIDE SERPL-SCNC: 102 MMOL/L (ref 98–107)
CO2 SERPL-SCNC: 25.3 MMOL/L (ref 22–29)
CREAT SERPL-MCNC: 0.7 MG/DL (ref 0.76–1.27)
DEPRECATED RDW RBC AUTO: 51.1 FL (ref 37–54)
EGFRCR SERPLBLD CKD-EPI 2021: 100.4 ML/MIN/1.73
EOSINOPHIL # BLD AUTO: 0.07 10*3/MM3 (ref 0–0.4)
EOSINOPHIL NFR BLD AUTO: 0.5 % (ref 0.3–6.2)
ERYTHROCYTE [DISTWIDTH] IN BLOOD BY AUTOMATED COUNT: 16.8 % (ref 12.3–15.4)
GLOBULIN UR ELPH-MCNC: 3.1 GM/DL
GLUCOSE SERPL-MCNC: 117 MG/DL (ref 65–99)
HCT VFR BLD AUTO: 30.1 % (ref 37.5–51)
HGB BLD-MCNC: 9.4 G/DL (ref 13–17.7)
IMM GRANULOCYTES # BLD AUTO: 0.09 10*3/MM3 (ref 0–0.05)
IMM GRANULOCYTES NFR BLD AUTO: 0.7 % (ref 0–0.5)
LYMPHOCYTES # BLD AUTO: 1.22 10*3/MM3 (ref 0.7–3.1)
LYMPHOCYTES NFR BLD AUTO: 9.3 % (ref 19.6–45.3)
MCH RBC QN AUTO: 26.9 PG (ref 26.6–33)
MCHC RBC AUTO-ENTMCNC: 31.2 G/DL (ref 31.5–35.7)
MCV RBC AUTO: 86 FL (ref 79–97)
MONOCYTES # BLD AUTO: 1.49 10*3/MM3 (ref 0.1–0.9)
MONOCYTES NFR BLD AUTO: 11.4 % (ref 5–12)
NEUTROPHILS NFR BLD AUTO: 10.15 10*3/MM3 (ref 1.7–7)
NEUTROPHILS NFR BLD AUTO: 77.6 % (ref 42.7–76)
NRBC BLD AUTO-RTO: 0.2 /100 WBC (ref 0–0.2)
PLATELET # BLD AUTO: 244 10*3/MM3 (ref 140–450)
PMV BLD AUTO: 9.5 FL (ref 6–12)
POTASSIUM SERPL-SCNC: 3.7 MMOL/L (ref 3.5–5.2)
PROT SERPL-MCNC: 6.8 G/DL (ref 6–8.5)
RBC # BLD AUTO: 3.5 10*6/MM3 (ref 4.14–5.8)
SODIUM SERPL-SCNC: 138 MMOL/L (ref 136–145)
T3FREE SERPL-MCNC: 2.58 PG/ML (ref 2–4.4)
T4 FREE SERPL-MCNC: 1.24 NG/DL (ref 0.92–1.68)
TSH SERPL DL<=0.05 MIU/L-ACNC: 3.67 UIU/ML (ref 0.27–4.2)
WBC NRBC COR # BLD AUTO: 13.08 10*3/MM3 (ref 3.4–10.8)

## 2025-05-06 PROCEDURE — 25010000002 FOSAPREPITANT PER 1 MG: Performed by: INTERNAL MEDICINE

## 2025-05-06 PROCEDURE — 25810000003 SODIUM CHLORIDE 0.9 % SOLUTION 250 ML FLEX CONT: Performed by: INTERNAL MEDICINE

## 2025-05-06 PROCEDURE — 25010000002 ATEZOLIZUMAB 1200 MG/20ML SOLUTION 20 ML VIAL: Performed by: INTERNAL MEDICINE

## 2025-05-06 PROCEDURE — 25010000002 PALONOSETRON PER 25 MCG: Performed by: INTERNAL MEDICINE

## 2025-05-06 PROCEDURE — 96413 CHEMO IV INFUSION 1 HR: CPT

## 2025-05-06 PROCEDURE — 25010000002 DEXAMETHASONE SODIUM PHOSPHATE 100 MG/10ML SOLUTION: Performed by: INTERNAL MEDICINE

## 2025-05-06 PROCEDURE — 80053 COMPREHEN METABOLIC PANEL: CPT | Performed by: NURSE PRACTITIONER

## 2025-05-06 PROCEDURE — 25010000002 CARBOPLATIN PER 50 MG: Performed by: INTERNAL MEDICINE

## 2025-05-06 PROCEDURE — 96367 TX/PROPH/DG ADDL SEQ IV INF: CPT

## 2025-05-06 PROCEDURE — 96375 TX/PRO/DX INJ NEW DRUG ADDON: CPT

## 2025-05-06 PROCEDURE — 84481 FREE ASSAY (FT-3): CPT | Performed by: INTERNAL MEDICINE

## 2025-05-06 PROCEDURE — 25810000003 SODIUM CHLORIDE 0.9 % SOLUTION: Performed by: INTERNAL MEDICINE

## 2025-05-06 PROCEDURE — 25810000003 SODIUM CHLORIDE 0.9 % SOLUTION 500 ML FLEX CONT: Performed by: INTERNAL MEDICINE

## 2025-05-06 PROCEDURE — 25010000002 ETOPOSIDE 100 MG/5ML SOLUTION 5 ML VIAL: Performed by: INTERNAL MEDICINE

## 2025-05-06 PROCEDURE — 84443 ASSAY THYROID STIM HORMONE: CPT | Performed by: INTERNAL MEDICINE

## 2025-05-06 PROCEDURE — 85025 COMPLETE CBC W/AUTO DIFF WBC: CPT | Performed by: NURSE PRACTITIONER

## 2025-05-06 PROCEDURE — 25010000002 ETOPOSIDE 500 MG/25ML SOLUTION 25 ML VIAL: Performed by: INTERNAL MEDICINE

## 2025-05-06 PROCEDURE — 96417 CHEMO IV INFUS EACH ADDL SEQ: CPT

## 2025-05-06 PROCEDURE — 84439 ASSAY OF FREE THYROXINE: CPT | Performed by: INTERNAL MEDICINE

## 2025-05-06 RX ORDER — SODIUM CHLORIDE 9 MG/ML
20 INJECTION, SOLUTION INTRAVENOUS ONCE
Status: CANCELLED | OUTPATIENT
Start: 2025-05-06

## 2025-05-06 RX ORDER — SODIUM CHLORIDE 9 MG/ML
20 INJECTION, SOLUTION INTRAVENOUS ONCE
Status: CANCELLED | OUTPATIENT
Start: 2025-05-07

## 2025-05-06 RX ORDER — FAMOTIDINE 10 MG/ML
20 INJECTION, SOLUTION INTRAVENOUS AS NEEDED
Status: CANCELLED | OUTPATIENT
Start: 2025-05-06

## 2025-05-06 RX ORDER — PROCHLORPERAZINE MALEATE 10 MG
10 TABLET ORAL ONCE
Status: CANCELLED | OUTPATIENT
Start: 2025-05-07 | End: 2025-05-07

## 2025-05-06 RX ORDER — HYDROCORTISONE SODIUM SUCCINATE 100 MG/2ML
100 INJECTION INTRAMUSCULAR; INTRAVENOUS AS NEEDED
Status: CANCELLED | OUTPATIENT
Start: 2025-05-06

## 2025-05-06 RX ORDER — PROCHLORPERAZINE MALEATE 10 MG
10 TABLET ORAL ONCE
Status: CANCELLED | OUTPATIENT
Start: 2025-05-08 | End: 2025-05-08

## 2025-05-06 RX ORDER — SODIUM CHLORIDE 9 MG/ML
20 INJECTION, SOLUTION INTRAVENOUS ONCE
Status: CANCELLED | OUTPATIENT
Start: 2025-05-08

## 2025-05-06 RX ORDER — DIPHENHYDRAMINE HYDROCHLORIDE 50 MG/ML
50 INJECTION, SOLUTION INTRAMUSCULAR; INTRAVENOUS AS NEEDED
Status: CANCELLED | OUTPATIENT
Start: 2025-05-06

## 2025-05-06 RX ORDER — SODIUM CHLORIDE 9 MG/ML
20 INJECTION, SOLUTION INTRAVENOUS ONCE
Status: COMPLETED | OUTPATIENT
Start: 2025-05-06 | End: 2025-05-06

## 2025-05-06 RX ORDER — PALONOSETRON 0.05 MG/ML
0.25 INJECTION, SOLUTION INTRAVENOUS ONCE
Status: COMPLETED | OUTPATIENT
Start: 2025-05-06 | End: 2025-05-06

## 2025-05-06 RX ORDER — PALONOSETRON 0.05 MG/ML
0.25 INJECTION, SOLUTION INTRAVENOUS ONCE
Status: CANCELLED | OUTPATIENT
Start: 2025-05-06

## 2025-05-06 RX ADMIN — FOSAPREPITANT DIMEGLUMINE 150 MG: 150 INJECTION, POWDER, LYOPHILIZED, FOR SOLUTION INTRAVENOUS at 11:43

## 2025-05-06 RX ADMIN — ATEZOLIZUMAB 1200 MG: 1200 INJECTION, SOLUTION INTRAVENOUS at 12:15

## 2025-05-06 RX ADMIN — PALONOSETRON HYDROCHLORIDE 0.25 MG: 0.25 INJECTION, SOLUTION INTRAVENOUS at 11:41

## 2025-05-06 RX ADMIN — SODIUM CHLORIDE 160 MG: 9 INJECTION, SOLUTION INTRAVENOUS at 13:51

## 2025-05-06 RX ADMIN — SODIUM CHLORIDE 20 ML/HR: 9 INJECTION, SOLUTION INTRAVENOUS at 11:41

## 2025-05-06 RX ADMIN — DEXAMETHASONE SODIUM PHOSPHATE 12 MG: 10 INJECTION, SOLUTION INTRAMUSCULAR; INTRAVENOUS at 12:54

## 2025-05-06 RX ADMIN — CARBOPLATIN 560 MG: 10 INJECTION INTRAVENOUS at 13:17

## 2025-05-07 ENCOUNTER — INFUSION (OUTPATIENT)
Dept: ONCOLOGY | Facility: HOSPITAL | Age: 70
End: 2025-05-07
Payer: MEDICARE

## 2025-05-07 VITALS
TEMPERATURE: 99.1 F | HEART RATE: 77 BPM | DIASTOLIC BLOOD PRESSURE: 69 MMHG | OXYGEN SATURATION: 98 % | SYSTOLIC BLOOD PRESSURE: 133 MMHG

## 2025-05-07 DIAGNOSIS — Z79.69 NEED FOR PROPHYLACTIC CHEMOTHERAPY: Primary | ICD-10-CM

## 2025-05-07 DIAGNOSIS — Z79.899 HIGH RISK MEDICATION USE: ICD-10-CM

## 2025-05-07 DIAGNOSIS — C34.92 PRIMARY SQUAMOUS CELL CARCINOMA OF LUNG, LEFT: ICD-10-CM

## 2025-05-07 PROCEDURE — A9270 NON-COVERED ITEM OR SERVICE: HCPCS | Performed by: INTERNAL MEDICINE

## 2025-05-07 PROCEDURE — 25010000002 ETOPOSIDE 500 MG/25ML SOLUTION 25 ML VIAL: Performed by: INTERNAL MEDICINE

## 2025-05-07 PROCEDURE — 25810000003 SODIUM CHLORIDE 0.9 % SOLUTION 500 ML FLEX CONT: Performed by: INTERNAL MEDICINE

## 2025-05-07 PROCEDURE — 63710000001 PROCHLORPERAZINE MALEATE PER 5 MG: Performed by: INTERNAL MEDICINE

## 2025-05-07 PROCEDURE — 96413 CHEMO IV INFUSION 1 HR: CPT

## 2025-05-07 RX ORDER — PROCHLORPERAZINE MALEATE 5 MG/1
10 TABLET ORAL ONCE
Status: COMPLETED | OUTPATIENT
Start: 2025-05-07 | End: 2025-05-07

## 2025-05-07 RX ADMIN — PROCHLORPERAZINE MALEATE 10 MG: 5 TABLET ORAL at 13:48

## 2025-05-07 RX ADMIN — SODIUM CHLORIDE 160 MG: 9 INJECTION, SOLUTION INTRAVENOUS at 14:37

## 2025-05-08 ENCOUNTER — INFUSION (OUTPATIENT)
Dept: ONCOLOGY | Facility: HOSPITAL | Age: 70
End: 2025-05-08
Payer: MEDICARE

## 2025-05-08 VITALS
SYSTOLIC BLOOD PRESSURE: 132 MMHG | HEART RATE: 67 BPM | OXYGEN SATURATION: 96 % | TEMPERATURE: 98 F | RESPIRATION RATE: 20 BRPM | DIASTOLIC BLOOD PRESSURE: 68 MMHG

## 2025-05-08 DIAGNOSIS — Z45.2 FITTING AND ADJUSTMENT OF VASCULAR CATHETER: ICD-10-CM

## 2025-05-08 DIAGNOSIS — C34.92 PRIMARY SQUAMOUS CELL CARCINOMA OF LUNG, LEFT: ICD-10-CM

## 2025-05-08 DIAGNOSIS — Z79.899 HIGH RISK MEDICATION USE: ICD-10-CM

## 2025-05-08 DIAGNOSIS — Z79.69 NEED FOR PROPHYLACTIC CHEMOTHERAPY: Primary | ICD-10-CM

## 2025-05-08 PROCEDURE — 96377 APPLICATON ON-BODY INJECTOR: CPT

## 2025-05-08 PROCEDURE — 25010000002 ETOPOSIDE 100 MG/5ML SOLUTION 5 ML VIAL: Performed by: INTERNAL MEDICINE

## 2025-05-08 PROCEDURE — A9270 NON-COVERED ITEM OR SERVICE: HCPCS | Performed by: INTERNAL MEDICINE

## 2025-05-08 PROCEDURE — 96413 CHEMO IV INFUSION 1 HR: CPT

## 2025-05-08 PROCEDURE — 63710000001 PROCHLORPERAZINE MALEATE PER 5 MG: Performed by: INTERNAL MEDICINE

## 2025-05-08 PROCEDURE — 25810000003 SODIUM CHLORIDE 0.9 % SOLUTION 500 ML FLEX CONT: Performed by: INTERNAL MEDICINE

## 2025-05-08 PROCEDURE — 25010000002 PEGFILGRASTIM 6 MG/0.6ML PREFILLED SYRINGE KIT: Performed by: INTERNAL MEDICINE

## 2025-05-08 PROCEDURE — 25010000002 HEPARIN LOCK FLUSH PER 10 UNITS: Performed by: INTERNAL MEDICINE

## 2025-05-08 RX ORDER — SODIUM CHLORIDE 0.9 % (FLUSH) 0.9 %
10 SYRINGE (ML) INJECTION AS NEEDED
Status: DISCONTINUED | OUTPATIENT
Start: 2025-05-08 | End: 2025-05-08 | Stop reason: HOSPADM

## 2025-05-08 RX ORDER — HEPARIN SODIUM (PORCINE) LOCK FLUSH IV SOLN 100 UNIT/ML 100 UNIT/ML
500 SOLUTION INTRAVENOUS AS NEEDED
OUTPATIENT
Start: 2025-05-08

## 2025-05-08 RX ORDER — HEPARIN SODIUM (PORCINE) LOCK FLUSH IV SOLN 100 UNIT/ML 100 UNIT/ML
500 SOLUTION INTRAVENOUS AS NEEDED
Status: DISCONTINUED | OUTPATIENT
Start: 2025-05-08 | End: 2025-05-08 | Stop reason: HOSPADM

## 2025-05-08 RX ORDER — PROCHLORPERAZINE MALEATE 5 MG/1
10 TABLET ORAL ONCE
Status: COMPLETED | OUTPATIENT
Start: 2025-05-08 | End: 2025-05-08

## 2025-05-08 RX ORDER — SODIUM CHLORIDE 0.9 % (FLUSH) 0.9 %
10 SYRINGE (ML) INJECTION AS NEEDED
OUTPATIENT
Start: 2025-05-08

## 2025-05-08 RX ADMIN — Medication 10 ML: at 15:21

## 2025-05-08 RX ADMIN — PEGFILGRASTIM 6 MG: KIT SUBCUTANEOUS at 14:27

## 2025-05-08 RX ADMIN — HEPARIN 500 UNITS: 100 SYRINGE at 15:21

## 2025-05-08 RX ADMIN — SODIUM CHLORIDE 160 MG: 9 INJECTION, SOLUTION INTRAVENOUS at 14:18

## 2025-05-08 RX ADMIN — PROCHLORPERAZINE MALEATE 10 MG: 5 TABLET ORAL at 13:48

## 2025-05-12 ENCOUNTER — PATIENT MESSAGE (OUTPATIENT)
Dept: SURGERY | Facility: CLINIC | Age: 70
End: 2025-05-12
Payer: MEDICARE

## 2025-05-13 ENCOUNTER — CLINICAL SUPPORT (OUTPATIENT)
Dept: ONCOLOGY | Facility: HOSPITAL | Age: 70
End: 2025-05-13
Payer: MEDICARE

## 2025-05-13 ENCOUNTER — LAB (OUTPATIENT)
Dept: LAB | Facility: HOSPITAL | Age: 70
End: 2025-05-13
Payer: MEDICARE

## 2025-05-13 VITALS — TEMPERATURE: 98.2 F

## 2025-05-13 DIAGNOSIS — C34.92 PRIMARY SQUAMOUS CELL CARCINOMA OF LUNG, LEFT: ICD-10-CM

## 2025-05-13 DIAGNOSIS — Z79.899 HIGH RISK MEDICATION USE: ICD-10-CM

## 2025-05-13 DIAGNOSIS — Z79.69 NEED FOR PROPHYLACTIC CHEMOTHERAPY: ICD-10-CM

## 2025-05-13 LAB
BASOPHILS # BLD AUTO: 0.15 10*3/MM3 (ref 0–0.2)
BASOPHILS NFR BLD AUTO: 0.7 % (ref 0–1.5)
DEPRECATED RDW RBC AUTO: 53 FL (ref 37–54)
EOSINOPHIL # BLD AUTO: 0.07 10*3/MM3 (ref 0–0.4)
EOSINOPHIL NFR BLD AUTO: 0.3 % (ref 0.3–6.2)
ERYTHROCYTE [DISTWIDTH] IN BLOOD BY AUTOMATED COUNT: 17.2 % (ref 12.3–15.4)
HCT VFR BLD AUTO: 28.7 % (ref 37.5–51)
HGB BLD-MCNC: 9.1 G/DL (ref 13–17.7)
IMM GRANULOCYTES # BLD AUTO: 0.24 10*3/MM3 (ref 0–0.05)
IMM GRANULOCYTES NFR BLD AUTO: 1.1 % (ref 0–0.5)
LYMPHOCYTES # BLD AUTO: 1.55 10*3/MM3 (ref 0.7–3.1)
LYMPHOCYTES NFR BLD AUTO: 7.2 % (ref 19.6–45.3)
MCH RBC QN AUTO: 27.1 PG (ref 26.6–33)
MCHC RBC AUTO-ENTMCNC: 31.7 G/DL (ref 31.5–35.7)
MCV RBC AUTO: 85.4 FL (ref 79–97)
MONOCYTES # BLD AUTO: 0.73 10*3/MM3 (ref 0.1–0.9)
MONOCYTES NFR BLD AUTO: 3.4 % (ref 5–12)
NEUTROPHILS NFR BLD AUTO: 18.74 10*3/MM3 (ref 1.7–7)
NEUTROPHILS NFR BLD AUTO: 87.3 % (ref 42.7–76)
NRBC BLD AUTO-RTO: 0 /100 WBC (ref 0–0.2)
PLATELET # BLD AUTO: 127 10*3/MM3 (ref 140–450)
PMV BLD AUTO: 10.3 FL (ref 6–12)
RBC # BLD AUTO: 3.36 10*6/MM3 (ref 4.14–5.8)
WBC NRBC COR # BLD AUTO: 21.48 10*3/MM3 (ref 3.4–10.8)

## 2025-05-13 PROCEDURE — 36415 COLL VENOUS BLD VENIPUNCTURE: CPT

## 2025-05-13 PROCEDURE — 85025 COMPLETE CBC W/AUTO DIFF WBC: CPT | Performed by: INTERNAL MEDICINE

## 2025-05-13 NOTE — NURSING NOTE
The pt was in the office today accompanied by his daughter, Rochelle, for CBC review following cycle 3 carbo, -16 and atezolizumab last week. The pt has no complaints except for fatigue and cough productive of light green phlegm for the last 2 days. The pt denies fever and has been eating well and drinking what he feels is adequate fluids. Dr. Dubon was consulted and does not want to add an antibiotic at this time for the cough with production since the pt just recently completed a round of Levaquin for similar symptoms which did improve temporarily. Dr. Dubon explained that he is concerned about the development of antibiotic resistance. The pt and daughter was informed and instructed to call the office before his next appt in one week if he develops fever, worsening phlegm production, worsening SOA or any other concerns. Understanding was verbalized by both the pt and daughter.    Lab Results   Component Value Date    WBC 21.48 (H) 05/13/2025    HGB 9.1 (L) 05/13/2025    HCT 28.7 (L) 05/13/2025    MCV 85.4 05/13/2025     (L) 05/13/2025   ANC 1.874

## 2025-05-15 ENCOUNTER — TELEPHONE (OUTPATIENT)
Dept: ONCOLOGY | Facility: CLINIC | Age: 70
End: 2025-05-15
Payer: MEDICARE

## 2025-05-15 ENCOUNTER — TELEMEDICINE (OUTPATIENT)
Dept: SURGERY | Facility: CLINIC | Age: 70
End: 2025-05-15
Payer: MEDICARE

## 2025-05-15 DIAGNOSIS — C34.92 PRIMARY SQUAMOUS CELL CARCINOMA OF LUNG, LEFT: Primary | ICD-10-CM

## 2025-05-15 PROCEDURE — 99214 OFFICE O/P EST MOD 30 MIN: CPT | Performed by: SURGERY

## 2025-05-15 RX ORDER — LEVOFLOXACIN 500 MG/1
500 TABLET, FILM COATED ORAL DAILY
Qty: 7 TABLET | Refills: 0 | Status: SHIPPED | OUTPATIENT
Start: 2025-05-15

## 2025-05-15 NOTE — TELEPHONE ENCOUNTER
Called patient's daughter back. Below is a copy of her [employer] message from earlier today.    She says they have not caught a fever with thermometer as of yet, but that the patient is having sweats and chills and coughing up yellow sputum. He will not eat anything. She was able to get him to drink some Gatorade. She says his throat is very sore, as well.

## 2025-05-15 NOTE — TELEPHONE ENCOUNTER
Provider: Jagdish   Caller: Rochelle   Relationship to Patient: daughter   Call Back Phone Number: [phone]   Reason for Call: Pt daughter calling about pt running fever and coughing up yellow sputum.

## 2025-05-15 NOTE — TELEPHONE ENCOUNTER
Spoke with Dr. Dubon via telephone. He decided to go ahead and order Levaquin 500 mg daily x 7. Encouraged daughter to let us know how that does for him.

## 2025-05-20 ENCOUNTER — LAB (OUTPATIENT)
Dept: LAB | Facility: HOSPITAL | Age: 70
End: 2025-05-20
Payer: MEDICARE

## 2025-05-20 ENCOUNTER — CLINICAL SUPPORT (OUTPATIENT)
Dept: ONCOLOGY | Facility: HOSPITAL | Age: 70
End: 2025-05-20
Payer: MEDICARE

## 2025-05-20 DIAGNOSIS — Z79.899 HIGH RISK MEDICATION USE: ICD-10-CM

## 2025-05-20 DIAGNOSIS — Z79.899 HIGH RISK MEDICATION USE: Primary | ICD-10-CM

## 2025-05-20 LAB
BASOPHILS # BLD AUTO: 0.04 10*3/MM3 (ref 0–0.2)
BASOPHILS NFR BLD AUTO: 0.3 % (ref 0–1.5)
DEPRECATED RDW RBC AUTO: 50.2 FL (ref 37–54)
EOSINOPHIL # BLD AUTO: 0.14 10*3/MM3 (ref 0–0.4)
EOSINOPHIL NFR BLD AUTO: 1.2 % (ref 0.3–6.2)
ERYTHROCYTE [DISTWIDTH] IN BLOOD BY AUTOMATED COUNT: 17.2 % (ref 12.3–15.4)
HCT VFR BLD AUTO: 28.1 % (ref 37.5–51)
HGB BLD-MCNC: 9 G/DL (ref 13–17.7)
IMM GRANULOCYTES # BLD AUTO: 0.23 10*3/MM3 (ref 0–0.05)
IMM GRANULOCYTES NFR BLD AUTO: 2 % (ref 0–0.5)
LYMPHOCYTES # BLD AUTO: 1.52 10*3/MM3 (ref 0.7–3.1)
LYMPHOCYTES NFR BLD AUTO: 13.2 % (ref 19.6–45.3)
MCH RBC QN AUTO: 26.7 PG (ref 26.6–33)
MCHC RBC AUTO-ENTMCNC: 32 G/DL (ref 31.5–35.7)
MCV RBC AUTO: 83.4 FL (ref 79–97)
MONOCYTES # BLD AUTO: 1.06 10*3/MM3 (ref 0.1–0.9)
MONOCYTES NFR BLD AUTO: 9.2 % (ref 5–12)
NEUTROPHILS NFR BLD AUTO: 74.1 % (ref 42.7–76)
NEUTROPHILS NFR BLD AUTO: 8.49 10*3/MM3 (ref 1.7–7)
NRBC BLD AUTO-RTO: 1 /100 WBC (ref 0–0.2)
PLATELET # BLD AUTO: 48 10*3/MM3 (ref 140–450)
PMV BLD AUTO: 11.5 FL (ref 6–12)
RBC # BLD AUTO: 3.37 10*6/MM3 (ref 4.14–5.8)
WBC NRBC COR # BLD AUTO: 11.48 10*3/MM3 (ref 3.4–10.8)

## 2025-05-20 PROCEDURE — 85025 COMPLETE CBC W/AUTO DIFF WBC: CPT | Performed by: INTERNAL MEDICINE

## 2025-05-20 PROCEDURE — 36416 COLLJ CAPILLARY BLOOD SPEC: CPT

## 2025-05-20 RX ORDER — HYDROCODONE POLISTIREX AND CHLORPHENIRAMINE POLISTIREX 10; 8 MG/5ML; MG/5ML
5 SUSPENSION, EXTENDED RELEASE ORAL EVERY 12 HOURS PRN
Qty: 300 ML | Refills: 0 | Status: SHIPPED | OUTPATIENT
Start: 2025-05-20

## 2025-05-20 RX ORDER — GUAIFENESIN AND DEXTROMETHORPHAN HYDROBROMIDE 600; 30 MG/1; MG/1
1 TABLET, EXTENDED RELEASE ORAL 2 TIMES DAILY PRN
Qty: 60 TABLET | Refills: 1 | Status: SHIPPED | OUTPATIENT
Start: 2025-05-20

## 2025-05-20 NOTE — PROGRESS NOTES
Patient is here for lab review with RN and accompanied by his daughter who is transports him via wheelchair. CBC reviewed, platelets have dropped. Patient has noticed increased bruising. He is not on any anticoagulation or blood thinners. He says he will complete Levaquin round tomorrow. His cough has not really gotten any better. He is still coughing so often and hard that it makes him dizzy and he vomits. He has green productive thick cough. He's also experiencing pain across the middle of his back. He wonders if maybe he has pulled a muscle from coughing, because otherwise, he doesn't ambulate much where it would have been pulled another way. Dr. Dubon advised to take Mucinex and Tussionex and let us know hwo that does for him. Copy of labs given to patient and follow up appointment reviewed. Patient is instructed to call the office with any concerns or new symptoms prior to next visit. Patient verbalized understanding and discharged in stable condition.    CBC:      Lab 05/20/25  1447   WBC 11.48*   HEMOGLOBIN 9.0*   HEMATOCRIT 28.1*   PLATELETS 48*   NEUTROS ABS 8.49*   IMMATURE GRANS (ABS) 0.23*   LYMPHS ABS 1.52   MONOS ABS 1.06*   EOS ABS 0.14   MCV 83.4

## 2025-05-27 ENCOUNTER — INFUSION (OUTPATIENT)
Dept: ONCOLOGY | Facility: HOSPITAL | Age: 70
End: 2025-05-27
Payer: MEDICARE

## 2025-05-27 ENCOUNTER — OFFICE VISIT (OUTPATIENT)
Dept: ONCOLOGY | Facility: CLINIC | Age: 70
End: 2025-05-27
Payer: MEDICARE

## 2025-05-27 VITALS
HEART RATE: 92 BPM | HEIGHT: 72 IN | DIASTOLIC BLOOD PRESSURE: 69 MMHG | TEMPERATURE: 98.1 F | WEIGHT: 172.2 LBS | OXYGEN SATURATION: 96 % | SYSTOLIC BLOOD PRESSURE: 109 MMHG | BODY MASS INDEX: 23.32 KG/M2 | RESPIRATION RATE: 16 BRPM

## 2025-05-27 DIAGNOSIS — Z79.899 HIGH RISK MEDICATION USE: ICD-10-CM

## 2025-05-27 DIAGNOSIS — Z79.69 NEED FOR PROPHYLACTIC CHEMOTHERAPY: ICD-10-CM

## 2025-05-27 DIAGNOSIS — C34.92 PRIMARY SQUAMOUS CELL CARCINOMA OF LUNG, LEFT: ICD-10-CM

## 2025-05-27 DIAGNOSIS — Z79.69 NEED FOR PROPHYLACTIC CHEMOTHERAPY: Primary | ICD-10-CM

## 2025-05-27 DIAGNOSIS — C34.32 CANCER OF BRONCHUS OF LEFT LOWER LOBE: ICD-10-CM

## 2025-05-27 DIAGNOSIS — Z45.2 FITTING AND ADJUSTMENT OF VASCULAR CATHETER: ICD-10-CM

## 2025-05-27 DIAGNOSIS — E27.8 RIGHT ADRENAL MASS: ICD-10-CM

## 2025-05-27 LAB
ALBUMIN SERPL-MCNC: 3.5 G/DL (ref 3.5–5.2)
ALBUMIN/GLOB SERPL: 1.1 G/DL
ALP SERPL-CCNC: 68 U/L (ref 39–117)
ALT SERPL W P-5'-P-CCNC: 11 U/L (ref 1–41)
ANION GAP SERPL CALCULATED.3IONS-SCNC: 11.3 MMOL/L (ref 5–15)
AST SERPL-CCNC: 15 U/L (ref 1–40)
BASOPHILS # BLD AUTO: 0.03 10*3/MM3 (ref 0–0.2)
BASOPHILS NFR BLD AUTO: 0.2 % (ref 0–1.5)
BILIRUB SERPL-MCNC: 0.2 MG/DL (ref 0–1.2)
BUN SERPL-MCNC: 9 MG/DL (ref 8–23)
BUN/CREAT SERPL: 12.2 (ref 7–25)
CALCIUM SPEC-SCNC: 9 MG/DL (ref 8.6–10.5)
CHLORIDE SERPL-SCNC: 100 MMOL/L (ref 98–107)
CO2 SERPL-SCNC: 25.7 MMOL/L (ref 22–29)
CREAT SERPL-MCNC: 0.74 MG/DL (ref 0.76–1.27)
DEPRECATED RDW RBC AUTO: 55.5 FL (ref 37–54)
EGFRCR SERPLBLD CKD-EPI 2021: 98.7 ML/MIN/1.73
EOSINOPHIL # BLD AUTO: 0.16 10*3/MM3 (ref 0–0.4)
EOSINOPHIL NFR BLD AUTO: 1.2 % (ref 0.3–6.2)
ERYTHROCYTE [DISTWIDTH] IN BLOOD BY AUTOMATED COUNT: 17.7 % (ref 12.3–15.4)
GLOBULIN UR ELPH-MCNC: 3.3 GM/DL
GLUCOSE SERPL-MCNC: 92 MG/DL (ref 65–99)
HCT VFR BLD AUTO: 29 % (ref 37.5–51)
HGB BLD-MCNC: 8.7 G/DL (ref 13–17.7)
IMM GRANULOCYTES # BLD AUTO: 0.05 10*3/MM3 (ref 0–0.05)
IMM GRANULOCYTES NFR BLD AUTO: 0.4 % (ref 0–0.5)
LYMPHOCYTES # BLD AUTO: 1.4 10*3/MM3 (ref 0.7–3.1)
LYMPHOCYTES NFR BLD AUTO: 10.6 % (ref 19.6–45.3)
MCH RBC QN AUTO: 26.3 PG (ref 26.6–33)
MCHC RBC AUTO-ENTMCNC: 30 G/DL (ref 31.5–35.7)
MCV RBC AUTO: 87.6 FL (ref 79–97)
MONOCYTES # BLD AUTO: 1.17 10*3/MM3 (ref 0.1–0.9)
MONOCYTES NFR BLD AUTO: 8.9 % (ref 5–12)
NEUTROPHILS NFR BLD AUTO: 10.4 10*3/MM3 (ref 1.7–7)
NEUTROPHILS NFR BLD AUTO: 78.7 % (ref 42.7–76)
PLATELET # BLD AUTO: 135 10*3/MM3 (ref 140–450)
PMV BLD AUTO: 9.5 FL (ref 6–12)
POTASSIUM SERPL-SCNC: 3.4 MMOL/L (ref 3.5–5.2)
PROT SERPL-MCNC: 6.8 G/DL (ref 6–8.5)
RBC # BLD AUTO: 3.31 10*6/MM3 (ref 4.14–5.8)
SODIUM SERPL-SCNC: 137 MMOL/L (ref 136–145)
WBC NRBC COR # BLD AUTO: 13.21 10*3/MM3 (ref 3.4–10.8)

## 2025-05-27 PROCEDURE — 25810000003 SODIUM CHLORIDE 0.9 % SOLUTION 250 ML FLEX CONT: Performed by: NURSE PRACTITIONER

## 2025-05-27 PROCEDURE — 82728 ASSAY OF FERRITIN: CPT | Performed by: INTERNAL MEDICINE

## 2025-05-27 PROCEDURE — 25010000002 HEPARIN LOCK FLUSH PER 10 UNITS: Performed by: INTERNAL MEDICINE

## 2025-05-27 PROCEDURE — 25810000003 SODIUM CHLORIDE 0.9 % SOLUTION: Performed by: NURSE PRACTITIONER

## 2025-05-27 PROCEDURE — 25010000002 FOSAPREPITANT PER 1 MG: Performed by: NURSE PRACTITIONER

## 2025-05-27 PROCEDURE — 99214 OFFICE O/P EST MOD 30 MIN: CPT | Performed by: NURSE PRACTITIONER

## 2025-05-27 PROCEDURE — 96367 TX/PROPH/DG ADDL SEQ IV INF: CPT

## 2025-05-27 PROCEDURE — 80053 COMPREHEN METABOLIC PANEL: CPT | Performed by: INTERNAL MEDICINE

## 2025-05-27 PROCEDURE — 25010000002 CARBOPLATIN PER 50 MG: Performed by: NURSE PRACTITIONER

## 2025-05-27 PROCEDURE — 96375 TX/PRO/DX INJ NEW DRUG ADDON: CPT

## 2025-05-27 PROCEDURE — 25010000002 PALONOSETRON 0.25 MG/5ML SOLUTION PREFILLED SYRINGE: Performed by: NURSE PRACTITIONER

## 2025-05-27 PROCEDURE — 96413 CHEMO IV INFUSION 1 HR: CPT

## 2025-05-27 PROCEDURE — 85025 COMPLETE CBC W/AUTO DIFF WBC: CPT | Performed by: INTERNAL MEDICINE

## 2025-05-27 PROCEDURE — 84466 ASSAY OF TRANSFERRIN: CPT | Performed by: INTERNAL MEDICINE

## 2025-05-27 PROCEDURE — 1126F AMNT PAIN NOTED NONE PRSNT: CPT | Performed by: NURSE PRACTITIONER

## 2025-05-27 PROCEDURE — 83540 ASSAY OF IRON: CPT | Performed by: INTERNAL MEDICINE

## 2025-05-27 PROCEDURE — 25010000002 ATEZOLIZUMAB 1200 MG/20ML SOLUTION 20 ML VIAL: Performed by: NURSE PRACTITIONER

## 2025-05-27 PROCEDURE — 96417 CHEMO IV INFUS EACH ADDL SEQ: CPT

## 2025-05-27 PROCEDURE — 25010000002 ETOPOSIDE 100 MG/5ML SOLUTION 5 ML VIAL: Performed by: NURSE PRACTITIONER

## 2025-05-27 PROCEDURE — 25010000002 DEXAMETHASONE SODIUM PHOSPHATE 100 MG/10ML SOLUTION: Performed by: NURSE PRACTITIONER

## 2025-05-27 PROCEDURE — 25810000003 SODIUM CHLORIDE 0.9 % SOLUTION 500 ML FLEX CONT: Performed by: NURSE PRACTITIONER

## 2025-05-27 RX ORDER — FAMOTIDINE 10 MG/ML
20 INJECTION, SOLUTION INTRAVENOUS AS NEEDED
Status: CANCELLED | OUTPATIENT
Start: 2025-05-27

## 2025-05-27 RX ORDER — SODIUM CHLORIDE 0.9 % (FLUSH) 0.9 %
10 SYRINGE (ML) INJECTION AS NEEDED
Status: DISCONTINUED | OUTPATIENT
Start: 2025-05-27 | End: 2025-05-27 | Stop reason: HOSPADM

## 2025-05-27 RX ORDER — SODIUM CHLORIDE 9 MG/ML
20 INJECTION, SOLUTION INTRAVENOUS ONCE
Status: COMPLETED | OUTPATIENT
Start: 2025-05-27 | End: 2025-05-27

## 2025-05-27 RX ORDER — HEPARIN SODIUM (PORCINE) LOCK FLUSH IV SOLN 100 UNIT/ML 100 UNIT/ML
500 SOLUTION INTRAVENOUS AS NEEDED
Status: DISCONTINUED | OUTPATIENT
Start: 2025-05-27 | End: 2025-05-27 | Stop reason: HOSPADM

## 2025-05-27 RX ORDER — PALONOSETRON 0.05 MG/ML
0.25 INJECTION, SOLUTION INTRAVENOUS ONCE
Status: CANCELLED | OUTPATIENT
Start: 2025-05-27

## 2025-05-27 RX ORDER — HYDROCORTISONE SODIUM SUCCINATE 100 MG/2ML
100 INJECTION INTRAMUSCULAR; INTRAVENOUS AS NEEDED
Status: CANCELLED | OUTPATIENT
Start: 2025-05-27

## 2025-05-27 RX ORDER — SODIUM CHLORIDE 0.9 % (FLUSH) 0.9 %
10 SYRINGE (ML) INJECTION AS NEEDED
Status: CANCELLED | OUTPATIENT
Start: 2025-05-27

## 2025-05-27 RX ORDER — DIPHENHYDRAMINE HYDROCHLORIDE 50 MG/ML
50 INJECTION, SOLUTION INTRAMUSCULAR; INTRAVENOUS AS NEEDED
Status: CANCELLED | OUTPATIENT
Start: 2025-05-27

## 2025-05-27 RX ORDER — PROCHLORPERAZINE MALEATE 10 MG
10 TABLET ORAL ONCE
Status: CANCELLED | OUTPATIENT
Start: 2025-05-29 | End: 2025-05-29

## 2025-05-27 RX ORDER — SODIUM CHLORIDE 9 MG/ML
20 INJECTION, SOLUTION INTRAVENOUS ONCE
Status: CANCELLED | OUTPATIENT
Start: 2025-05-28

## 2025-05-27 RX ORDER — HEPARIN SODIUM (PORCINE) LOCK FLUSH IV SOLN 100 UNIT/ML 100 UNIT/ML
500 SOLUTION INTRAVENOUS AS NEEDED
Status: CANCELLED | OUTPATIENT
Start: 2025-05-27

## 2025-05-27 RX ORDER — PROCHLORPERAZINE MALEATE 10 MG
10 TABLET ORAL ONCE
Status: CANCELLED | OUTPATIENT
Start: 2025-05-28 | End: 2025-05-28

## 2025-05-27 RX ORDER — PALONOSETRON 0.05 MG/ML
0.25 INJECTION, SOLUTION INTRAVENOUS ONCE
Status: COMPLETED | OUTPATIENT
Start: 2025-05-27 | End: 2025-05-27

## 2025-05-27 RX ORDER — SODIUM CHLORIDE 9 MG/ML
20 INJECTION, SOLUTION INTRAVENOUS ONCE
Status: CANCELLED | OUTPATIENT
Start: 2025-05-29

## 2025-05-27 RX ORDER — SODIUM CHLORIDE 9 MG/ML
20 INJECTION, SOLUTION INTRAVENOUS ONCE
Status: CANCELLED | OUTPATIENT
Start: 2025-05-27

## 2025-05-27 RX ADMIN — CARBOPLATIN 520 MG: 600 INJECTION, SOLUTION INTRAVENOUS at 12:29

## 2025-05-27 RX ADMIN — FOSAPREPITANT DIMEGLUMINE 150 MG: 150 INJECTION, POWDER, LYOPHILIZED, FOR SOLUTION INTRAVENOUS at 11:38

## 2025-05-27 RX ADMIN — PALONOSETRON 0.25 MG: 0.25 INJECTION, SOLUTION INTRAVENOUS at 11:37

## 2025-05-27 RX ADMIN — Medication 10 ML: at 14:06

## 2025-05-27 RX ADMIN — ETOPOSIDE 120 MG: 20 INJECTION INTRAVENOUS at 13:02

## 2025-05-27 RX ADMIN — ATEZOLIZUMAB 1200 MG: 1200 INJECTION, SOLUTION INTRAVENOUS at 10:58

## 2025-05-27 RX ADMIN — HEPARIN 500 UNITS: 100 SYRINGE at 14:07

## 2025-05-27 RX ADMIN — SODIUM CHLORIDE 20 ML/HR: 9 INJECTION, SOLUTION INTRAVENOUS at 11:36

## 2025-05-27 RX ADMIN — DEXAMETHASONE SODIUM PHOSPHATE 12 MG: 10 INJECTION, SOLUTION INTRAMUSCULAR; INTRAVENOUS at 12:11

## 2025-05-27 NOTE — PROGRESS NOTES
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  During the last cycle he did have productive cough with green sputum and low-grade temperature.  This was keeping him up at night.  He completed a course of Levaquin though still had some coughing and thereafter was prescribed Tussionex and Mucinex.  He reports today that he still does have mucus production however it is clear in color.  Is mainly noted at night.  He continues the Mucinex and Tussionex though has weaned the dose of Tussionex.  He denies any fevers in the past week.  He denies any new pain.      Review of Systems   Constitutional:  Positive for fever. Negative for fatigue.   HENT:  Positive for dental problem.    Respiratory:  Positive for cough and shortness of breath.    Cardiovascular:  Negative for chest pain.   Gastrointestinal:  Positive for constipation and nausea. Negative for abdominal pain and vomiting.   Genitourinary: Negative.    Musculoskeletal: Negative.    Neurological:  Positive for numbness.   Hematological:  Bruises/bleeds easily.   Psychiatric/Behavioral:  Negative for dysphoric mood. The patient is not nervous/anxious.          Objective     Vitals:    05/27/25 0915   BP: 109/69   Pulse: 92   Resp: 16   Temp: 98.1 °F (36.7 °C)   TempSrc: Infrared   SpO2: 96%   Weight: 78.1 kg (172 lb 3.2 oz)   Height: 182.9 cm (72.01\")   PainSc: 0-No pain                 5/27/2025     9:16 AM   Current Status   ECOG score 1       Physical Exam    CONSTITUTIONAL: pleasant well-developed adult man  LYMPH: no cervical or supraclavicular lad  HEENT: Resolved parotid swelling  CV: RRR, S1S2, no murmur  RESP: Breath sounds are diminished and coarse  GI: soft, non-tender, no splenomegaly, +bs  MUSC: Left lower extremity 1+ edema involving the " lower leg, ankle and foot  NEURO: alert and oriented x3, normal strength  PSYCH: normal mood anxious affect  Skin: Scattered minor bruising    RECENT LABS:  Results from last 7 days   Lab Units 05/27/25  0916   WBC 10*3/mm3 13.21*   NEUTROS ABS 10*3/mm3 10.40*   HEMOGLOBIN g/dL 8.7*   HEMATOCRIT % 29.0*   PLATELETS 10*3/mm3 135*     Results from last 7 days   Lab Units 05/27/25  0916   SODIUM mmol/L 137   POTASSIUM mmol/L 3.4*   CHLORIDE mmol/L 100   CO2 mmol/L 25.7   BUN mg/dL 9   CREATININE mg/dL 0.74*   CALCIUM mg/dL 9.0   ALBUMIN g/dL 3.5   BILIRUBIN mg/dL 0.2   ALK PHOS U/L 68   ALT (SGPT) U/L 11   AST (SGOT) U/L 15   GLUCOSE mg/dL 92                 PET scan 9823  IMPRESSION:  1. Intensely hypermetabolic approximately 13 x 5 cm pleural-based left  lower lobe lung mass involving the left hilum. Smaller nodular opacities  adjacently are hypermetabolic and likely represent metastases. A 7 mm  right upper lobe nodule is photopenic. Conservative surveillance is  recommended. There is no convincing evidence for metastatic disease at  the neck, abdomen, or pelvis.  2. There are 2 hypermetabolic foci at the sigmoid colon need further  evaluation, particularly the 1.4 cm hypermetabolic polyp at the distal  sigmoid colon. Colonoscopy is recommended.  3. Nonspecific heterogeneous prostate activity. PSA follow-up is  recommended.    CT Chest Without Contrast Diagnostic (07/17/2024 12:52)  IMPRESSION:  1.There is evidence for interval decrease in size of the ill-defined abnormal attenuation involving the posterior aspect of the mid residual left lung extending into the left hilum. There is also improvement in aeration throughout the residual left lower   lung with improvement in multifocal areas of abnormal attenuation. These findings suggest an appropriate response to interval therapy.  2.Stable abnormal attenuation associated with the left hilum.  3.Otherwise stable CT of the chest.    MRI Brain With & Without Contrast  (07/24/2024 13:16)  Impression:  1. Mild generalized parenchymal volume loss and changes of chronic small vessel ischemic disease.  2. No acute intracranial abnormality.  3. No pathologic intracranial contrast enhancement to suggest intracranial metastatic disease.    MRI Brain With & Without Contrast (01/29/2025 16:35)   Impression:  No evidence of intracranial metastatic disease.    CT Abdomen Pelvis With & Without Contrast (04/30/2025 13:03)  CT Chest With Contrast Diagnostic (04/30/2025 13:03)  Impression:  1. New 6 mm nodule within the left lung apex suspicious for metastatic disease.  2. Status post left lower lobectomy.  3. Trace left pleural effusion and chronic consolidation within the posterior left upper lobe compatible with treatment related change.  4. No evidence of metastatic disease within the abdomen or pelvis.      Assessment & Plan   *T4N0M0 poorly differentiated carcinoma/squamous differentiation left lower lobe of the lung/small cell lung cancer on pathology at surgery  Mass discovered on low-dose CT chest screening 7/31/2023  PET scan 9/8/2023 showed a 1.3 x 5 cm left lower lobe mass involving the hilum with smaller adjacent nodular opacities, max SUV 23.2, photopenic right upper lobe nodule  Bronchoscopy with biopsy from the left lower lobe 9/6/2023 positive for poorly differentiated carcinoma with squamous differentiation; P-L1 TPS 0%  Initiated neoadjuvant treatment with carboplatin/Taxol/nivolumab 9/20/2023 and completed 3 cycles 11/1/2023  Status post left lower lobectomy 12/13/2023 complicated by prolonged and left lower lobe pneumonia.  Pathology from surgery showed significant treatment effect but residual small cell carcinoma with positive bronchial and perivascular soft tissue margins by tumor within lymphatics, positive lymph node station 10 L for small cell carcinoma x 2   2/5/2024-initiated postoperative carboplatin/-16 with concurrent radiation for small cell lung  cancer/positive margin  2/26/2024-C2 carboplatin/-16 with concurrent radiation; completed radiation therapy 3/15/2024; 4 cycles carboplatin/-16 completed 4/11/2024  CT chest and MRI brain 7/24/2024-[facility]  CT chest 10/16/2024-stable scarring and volume loss in the left hemothorax, no suspicious pulmonary nodules or lymphadenopathy  2/24/2025 PET-intensely avid mediastinal lymphadenopathy and left adrenal nodule new and increased in size from previous CT, uptake sigmoid colon, asymmetrical consolidation left lower lung with reticulonodular opacification; bronchoscopy and biopsy from mediastinal station 4L 3/7/2025 positive for small cell cancer  3/25/2025- C1 carboplatin/-16/atezolizumab; 4/15/2025 C2 carboplatin/-16/atezolizumab  4/30/2025-CT chest showed improved mediastinal lymphadenopathy, new 6 mm nodule left apex  Cycle 3 carboplatin reduced to AUC 4  5/27/2025 patient returns for cycle 4 carboplatin and -16.  We will proceed today however we will dose reduce his etoposide to 60 mg/m².  Carboplatin dosing will stay the same.  We will proceed with PET scan following this cycle and thereafter review by Dr. Duobn      *Pancytopenia secondary to chemotherapy:   WBC 13.21, hemoglobin 8.7, platelets 135    *Immune mediated colitis  Patient treated with high-dose steroids and taper, now off steroid therapy  No recurrence of diarrhea off steroid therapy    *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  Neuropathy symptoms stable on current chemotherapy    *MRI brain 9/11/23-2 mm enhancement left cerebellar hemisphere likely vessel artifact; MRI brain 11/27/2023-no evidence of metastatic disease, small focus of enhancement left inferior cerebellar region unchanged likely benign; MRI brain 7/24/2024-no evidence of disease; MRI brain 1/29/25 negative       *Comorbidities-hyperlipidemia    *New left lower extremity edema involving the lower legs/ankle/foot.-Negative venous duplex for 325        PLAN:  Proceed with  cycle 4 carboplatin at AUC 4, -16 dose reduced to 60 mg/m2 and atezolizumab today  Continue Neulasta  Weekly CBC RN review attention hemoglobin  2 weeks PET scan and MRI brain; plan to continue maintenance atezolizumab if stable disease and no immune mediated side effect  Follow-up in 3 weeks with Dr. Dubon and possible atezolizumab

## 2025-05-28 ENCOUNTER — INFUSION (OUTPATIENT)
Dept: ONCOLOGY | Facility: HOSPITAL | Age: 70
End: 2025-05-28
Payer: MEDICARE

## 2025-05-28 VITALS
DIASTOLIC BLOOD PRESSURE: 74 MMHG | TEMPERATURE: 98 F | SYSTOLIC BLOOD PRESSURE: 152 MMHG | HEART RATE: 87 BPM | OXYGEN SATURATION: 97 % | RESPIRATION RATE: 20 BRPM

## 2025-05-28 DIAGNOSIS — C34.92 PRIMARY SQUAMOUS CELL CARCINOMA OF LUNG, LEFT: ICD-10-CM

## 2025-05-28 DIAGNOSIS — T45.1X5A ANEMIA ASSOCIATED WITH CHEMOTHERAPY: ICD-10-CM

## 2025-05-28 DIAGNOSIS — Z79.69 NEED FOR PROPHYLACTIC CHEMOTHERAPY: Primary | ICD-10-CM

## 2025-05-28 DIAGNOSIS — Z79.899 HIGH RISK MEDICATION USE: ICD-10-CM

## 2025-05-28 DIAGNOSIS — Z45.2 FITTING AND ADJUSTMENT OF VASCULAR CATHETER: ICD-10-CM

## 2025-05-28 DIAGNOSIS — D64.81 ANEMIA ASSOCIATED WITH CHEMOTHERAPY: ICD-10-CM

## 2025-05-28 LAB
FERRITIN SERPL-MCNC: 414 NG/ML (ref 30–400)
IRON 24H UR-MRATE: 36 MCG/DL (ref 59–158)
IRON SATN MFR SERPL: 14 % (ref 20–50)
TIBC SERPL-MCNC: 261 MCG/DL (ref 298–536)
TRANSFERRIN SERPL-MCNC: 175 MG/DL (ref 200–360)

## 2025-05-28 PROCEDURE — A9270 NON-COVERED ITEM OR SERVICE: HCPCS | Performed by: NURSE PRACTITIONER

## 2025-05-28 PROCEDURE — 25810000003 SODIUM CHLORIDE 0.9 % SOLUTION 500 ML FLEX CONT: Performed by: NURSE PRACTITIONER

## 2025-05-28 PROCEDURE — 25010000002 HEPARIN LOCK FLUSH PER 10 UNITS

## 2025-05-28 PROCEDURE — 96413 CHEMO IV INFUSION 1 HR: CPT

## 2025-05-28 PROCEDURE — 63710000001 PROCHLORPERAZINE MALEATE PER 5 MG: Performed by: NURSE PRACTITIONER

## 2025-05-28 PROCEDURE — 25010000002 ETOPOSIDE 500 MG/25ML SOLUTION 25 ML VIAL: Performed by: NURSE PRACTITIONER

## 2025-05-28 RX ORDER — FLUTICASONE FUROATE, UMECLIDINIUM BROMIDE AND VILANTEROL TRIFENATATE 200; 62.5; 25 UG/1; UG/1; UG/1
1 POWDER RESPIRATORY (INHALATION) DAILY
COMMUNITY
Start: 2025-05-28

## 2025-05-28 RX ORDER — HEPARIN SODIUM (PORCINE) LOCK FLUSH IV SOLN 100 UNIT/ML 100 UNIT/ML
SOLUTION INTRAVENOUS
Status: COMPLETED
Start: 2025-05-28 | End: 2025-05-28

## 2025-05-28 RX ORDER — HEPARIN SODIUM (PORCINE) LOCK FLUSH IV SOLN 100 UNIT/ML 100 UNIT/ML
500 SOLUTION INTRAVENOUS AS NEEDED
Status: DISCONTINUED | OUTPATIENT
Start: 2025-05-28 | End: 2025-05-28 | Stop reason: HOSPADM

## 2025-05-28 RX ORDER — PROCHLORPERAZINE MALEATE 5 MG/1
10 TABLET ORAL ONCE
Status: COMPLETED | OUTPATIENT
Start: 2025-05-28 | End: 2025-05-28

## 2025-05-28 RX ORDER — SODIUM CHLORIDE 0.9 % (FLUSH) 0.9 %
10 SYRINGE (ML) INJECTION AS NEEDED
OUTPATIENT
Start: 2025-05-28

## 2025-05-28 RX ORDER — SODIUM CHLORIDE 0.9 % (FLUSH) 0.9 %
10 SYRINGE (ML) INJECTION AS NEEDED
Status: DISCONTINUED | OUTPATIENT
Start: 2025-05-28 | End: 2025-05-28 | Stop reason: HOSPADM

## 2025-05-28 RX ORDER — HEPARIN SODIUM (PORCINE) LOCK FLUSH IV SOLN 100 UNIT/ML 100 UNIT/ML
500 SOLUTION INTRAVENOUS AS NEEDED
OUTPATIENT
Start: 2025-05-28

## 2025-05-28 RX ORDER — SODIUM CHLORIDE 9 MG/ML
20 INJECTION, SOLUTION INTRAVENOUS ONCE
Status: DISCONTINUED | OUTPATIENT
Start: 2025-05-28 | End: 2025-05-28 | Stop reason: HOSPADM

## 2025-05-28 RX ADMIN — ETOPOSIDE 120 MG: 20 INJECTION, SOLUTION INTRAVENOUS at 13:58

## 2025-05-28 RX ADMIN — PROCHLORPERAZINE MALEATE 10 MG: 5 TABLET ORAL at 13:26

## 2025-05-28 RX ADMIN — HEPARIN 500 UNITS: 100 SYRINGE at 15:02

## 2025-05-28 RX ADMIN — Medication 10 ML: at 15:02

## 2025-05-28 RX ADMIN — HEPARIN SODIUM (PORCINE) LOCK FLUSH IV SOLN 100 UNIT/ML 500 UNITS: 100 SOLUTION at 15:02

## 2025-05-29 ENCOUNTER — INFUSION (OUTPATIENT)
Dept: ONCOLOGY | Facility: HOSPITAL | Age: 70
End: 2025-05-29
Payer: MEDICARE

## 2025-05-29 VITALS
RESPIRATION RATE: 20 BRPM | TEMPERATURE: 97.8 F | HEART RATE: 70 BPM | SYSTOLIC BLOOD PRESSURE: 123 MMHG | DIASTOLIC BLOOD PRESSURE: 64 MMHG | OXYGEN SATURATION: 97 %

## 2025-05-29 DIAGNOSIS — Z79.69 NEED FOR PROPHYLACTIC CHEMOTHERAPY: Primary | ICD-10-CM

## 2025-05-29 DIAGNOSIS — C34.92 PRIMARY SQUAMOUS CELL CARCINOMA OF LUNG, LEFT: ICD-10-CM

## 2025-05-29 DIAGNOSIS — Z79.899 HIGH RISK MEDICATION USE: ICD-10-CM

## 2025-05-29 PROCEDURE — A9270 NON-COVERED ITEM OR SERVICE: HCPCS | Performed by: NURSE PRACTITIONER

## 2025-05-29 PROCEDURE — 25010000002 ETOPOSIDE 500 MG/25ML SOLUTION 25 ML VIAL: Performed by: NURSE PRACTITIONER

## 2025-05-29 PROCEDURE — 63710000001 PROCHLORPERAZINE MALEATE PER 5 MG: Performed by: NURSE PRACTITIONER

## 2025-05-29 PROCEDURE — 25810000003 SODIUM CHLORIDE 0.9 % SOLUTION 500 ML FLEX CONT: Performed by: NURSE PRACTITIONER

## 2025-05-29 PROCEDURE — 96413 CHEMO IV INFUSION 1 HR: CPT

## 2025-05-29 PROCEDURE — 96377 APPLICATON ON-BODY INJECTOR: CPT

## 2025-05-29 PROCEDURE — 25010000002 PEGFILGRASTIM 6 MG/0.6ML PREFILLED SYRINGE KIT: Performed by: NURSE PRACTITIONER

## 2025-05-29 RX ORDER — PROCHLORPERAZINE MALEATE 5 MG/1
10 TABLET ORAL ONCE
Status: COMPLETED | OUTPATIENT
Start: 2025-05-29 | End: 2025-05-29

## 2025-05-29 RX ADMIN — PROCHLORPERAZINE MALEATE 10 MG: 5 TABLET ORAL at 13:24

## 2025-05-29 RX ADMIN — ETOPOSIDE 120 MG: 20 INJECTION, SOLUTION INTRAVENOUS at 13:38

## 2025-05-29 RX ADMIN — PEGFILGRASTIM 6 MG: KIT SUBCUTANEOUS at 14:35

## 2025-05-30 ENCOUNTER — PATIENT OUTREACH (OUTPATIENT)
Dept: OTHER | Facility: HOSPITAL | Age: 70
End: 2025-05-30
Payer: MEDICARE

## 2025-05-30 NOTE — PROGRESS NOTES
Reviewed chart.  Continues Carboplatin, -16 dose reduced to 60 mg/m2 and Atezolizumab. PET 6/11, MRI 6/12; sees Dr. Dubon 6/17    Called patient. S/w daughter. He is resting right now.  She states he is doing ok with treatment.  She will tell him that I called.    I will try to meet at upcoming PET scan

## 2025-05-31 ENCOUNTER — HOSPITAL ENCOUNTER (EMERGENCY)
Facility: HOSPITAL | Age: 70
Discharge: HOME | End: 2025-05-31
Attending: STUDENT IN AN ORGANIZED HEALTH CARE EDUCATION/TRAINING PROGRAM
Payer: MEDICARE

## 2025-05-31 ENCOUNTER — APPOINTMENT (OUTPATIENT)
Dept: CT IMAGING | Facility: HOSPITAL | Age: 70
End: 2025-05-31
Payer: MEDICARE

## 2025-05-31 ENCOUNTER — DOCUMENTATION (OUTPATIENT)
Dept: ONCOLOGY | Facility: CLINIC | Age: 70
End: 2025-05-31
Payer: MEDICARE

## 2025-05-31 VITALS
TEMPERATURE: 99.1 F | HEIGHT: 70 IN | DIASTOLIC BLOOD PRESSURE: 71 MMHG | RESPIRATION RATE: 18 BRPM | OXYGEN SATURATION: 98 % | SYSTOLIC BLOOD PRESSURE: 122 MMHG | HEART RATE: 96 BPM | BODY MASS INDEX: 24.62 KG/M2 | WEIGHT: 172 LBS

## 2025-05-31 DIAGNOSIS — M79.89 LEFT ARM SWELLING: Primary | ICD-10-CM

## 2025-05-31 LAB
ANION GAP SERPL CALCULATED.3IONS-SCNC: 12.3 MMOL/L (ref 5–15)
BUN SERPL-MCNC: 13 MG/DL (ref 8–23)
BUN/CREAT SERPL: 20.6 (ref 7–25)
CALCIUM SPEC-SCNC: 8.9 MG/DL (ref 8.6–10.5)
CHLORIDE SERPL-SCNC: 101 MMOL/L (ref 98–107)
CO2 SERPL-SCNC: 23.7 MMOL/L (ref 22–29)
CREAT SERPL-MCNC: 0.63 MG/DL (ref 0.76–1.27)
DEPRECATED RDW RBC AUTO: 56.7 FL (ref 37–54)
EGFRCR SERPLBLD CKD-EPI 2021: 103.6 ML/MIN/1.73
ERYTHROCYTE [DISTWIDTH] IN BLOOD BY AUTOMATED COUNT: 18.4 % (ref 12.3–15.4)
GLUCOSE SERPL-MCNC: 101 MG/DL (ref 65–99)
HCT VFR BLD AUTO: 26.6 % (ref 37.5–51)
HGB BLD-MCNC: 8.3 G/DL (ref 13–17.7)
LYMPHOCYTES # BLD MANUAL: 0.71 10*3/MM3 (ref 0.7–3.1)
MCH RBC QN AUTO: 26.9 PG (ref 26.6–33)
MCHC RBC AUTO-ENTMCNC: 31.2 G/DL (ref 31.5–35.7)
MCV RBC AUTO: 86.4 FL (ref 79–97)
NEUTROPHILS # BLD AUTO: 70.1 10*3/MM3 (ref 1.7–7)
NEUTROPHILS NFR BLD MANUAL: 96 % (ref 42.7–76)
NEUTS BAND NFR BLD MANUAL: 3 % (ref 0–5)
PLAT MORPH BLD: NORMAL
PLATELET # BLD AUTO: 134 10*3/MM3 (ref 140–450)
PMV BLD AUTO: 9.3 FL (ref 6–12)
POTASSIUM SERPL-SCNC: 4.1 MMOL/L (ref 3.5–5.2)
RBC # BLD AUTO: 3.08 10*6/MM3 (ref 4.14–5.8)
RBC MORPH BLD: NORMAL
SODIUM SERPL-SCNC: 137 MMOL/L (ref 136–145)
VARIANT LYMPHS NFR BLD MANUAL: 1 % (ref 19.6–45.3)
WBC MORPH BLD: NORMAL
WBC NRBC COR # BLD AUTO: 70.81 10*3/MM3 (ref 3.4–10.8)

## 2025-05-31 PROCEDURE — 80048 BASIC METABOLIC PNL TOTAL CA: CPT | Performed by: STUDENT IN AN ORGANIZED HEALTH CARE EDUCATION/TRAINING PROGRAM

## 2025-05-31 PROCEDURE — 71275 CT ANGIOGRAPHY CHEST: CPT

## 2025-05-31 PROCEDURE — 85025 COMPLETE CBC W/AUTO DIFF WBC: CPT | Performed by: STUDENT IN AN ORGANIZED HEALTH CARE EDUCATION/TRAINING PROGRAM

## 2025-05-31 PROCEDURE — 99285 EMERGENCY DEPT VISIT HI MDM: CPT | Performed by: STUDENT IN AN ORGANIZED HEALTH CARE EDUCATION/TRAINING PROGRAM

## 2025-05-31 PROCEDURE — 25510000001 IOPAMIDOL PER 1 ML: Performed by: STUDENT IN AN ORGANIZED HEALTH CARE EDUCATION/TRAINING PROGRAM

## 2025-05-31 PROCEDURE — 85007 BL SMEAR W/DIFF WBC COUNT: CPT | Performed by: STUDENT IN AN ORGANIZED HEALTH CARE EDUCATION/TRAINING PROGRAM

## 2025-05-31 PROCEDURE — 73206 CT ANGIO UPR EXTRM W/O&W/DYE: CPT

## 2025-05-31 RX ORDER — TAMSULOSIN HYDROCHLORIDE 0.4 MG/1
CAPSULE ORAL
Status: COMPLETED
Start: 2025-05-31 | End: 2025-05-31

## 2025-05-31 RX ORDER — IOPAMIDOL 755 MG/ML
100 INJECTION, SOLUTION INTRAVASCULAR
Status: COMPLETED | OUTPATIENT
Start: 2025-05-31 | End: 2025-05-31

## 2025-05-31 RX ORDER — TAMSULOSIN HYDROCHLORIDE 0.4 MG/1
0.4 CAPSULE ORAL ONCE
Status: COMPLETED | OUTPATIENT
Start: 2025-05-31 | End: 2025-05-31

## 2025-05-31 RX ORDER — IOPAMIDOL 755 MG/ML
100 INJECTION, SOLUTION INTRAVASCULAR
Status: DISCONTINUED | OUTPATIENT
Start: 2025-05-31 | End: 2025-05-31 | Stop reason: HOSPADM

## 2025-05-31 RX ADMIN — TAMSULOSIN HYDROCHLORIDE 0.4 MG: 0.4 CAPSULE ORAL at 13:55

## 2025-05-31 RX ADMIN — IOPAMIDOL 100 ML: 755 INJECTION, SOLUTION INTRAVENOUS at 11:36

## 2025-05-31 RX ADMIN — IOPAMIDOL 100 ML: 755 INJECTION, SOLUTION INTRAVENOUS at 11:42

## 2025-05-31 NOTE — ED PROVIDER NOTES
"Subjective   History of Present Illness  69-year-old male with a history of left lower lobe lung cancer, likely small cell lung cancer on pathology with poorly differentiated carcinoma/squamous differentiation of the left lower lobe.  Patient presents today with complaints of swelling of the left arm that began yesterday.  The patient was told to present to the emergency department by his nurse practitioner at the oncology group.  The patient does have a prior left lower lobectomy on 12/13/2023.  In May 2025 the patient has carboplatin and -16 as treatment cycles.  Etoposide also used.  The mid April 2025 his mediastinal lymphadenopathy had improved however had a 6 mm nodule in the left lung apex.  He has never had swelling of the arm in the past.  He has had longstanding tingling in his fingers in the upper and lower extremities but states that he believes that this is neuropathy secondary to his conversations with his oncology team.  He has no falls, trauma, numbness, tingling, other loss of sensation.      Review of Systems   Constitutional:  Negative for activity change, chills, diaphoresis and fever.   HENT: Negative.     Respiratory: Negative.     Cardiovascular: Negative.    Skin: Negative.    Neurological: Negative.    All other systems reviewed and are negative.      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\"       Allergies   Allergen Reactions    Amoxicillin-Pot Clavulanate Hives     Beta lactam allergy details  Antibiotic reaction: hives  Age at reaction: adult  Dose to reaction time: days  Reason for antibiotic: unknown  Epinephrine required " for reaction?: no  Tolerated antibiotics: unknown           Past Surgical History:   Procedure Laterality Date    BRONCHOSCOPY N/A 3/7/2025    Procedure: BRONCHOSCOPY WITH ENDOBRONCHIAL ULTRASOUND WITH FNA;  Surgeon: Rosalio Scott MD;  Location: [facility] ENDOSCOPY;  Service: Pulmonary;  Laterality: N/A;  PRE: LUNG NODULE  POST: SAME    BRONCHOSCOPY WITH ION ROBOTIC ASSIST N/A 09/06/2023    Procedure: BRONCHOSCOPY WITH BAL;   ION ROBOT AND ENDOBRONCHIAL ULTRASOUND WITH FNA'S;  Surgeon: Rosalio Scott MD;  Location: [facility] ENDOSCOPY;  Service: Robotics - Pulmonary;  Laterality: N/A;  PRE- LEFT LOWER LOBE MASS  POST- SAME    COLONOSCOPY W/ POLYPECTOMY N/A 02/28/2025    Procedure: COLONOSCOPY WITH POLYPECTOMY, clip;  Surgeon: Clem Lord MD;  Location: [facility];  Service: Gastroenterology;  Laterality: N/A;  Diverticulosis; Ascending polyp x 1; Descending polyp x 1; Sigmoid poyp x 1- clip at 20cm, hot snare; Rectal polyp x 1- clip, hot snare    GANGLION CYST EXCISION Bilateral     HEMORRHOIDECTOMY      LOBECTOMY Left 12/13/2023    Procedure: BRONCHOSCOPY, THORACOSCOPY WITH LYSIS OF ADHESIONS (120 MINUTES), LEFT LOWER LOBECTOMY WITH EN BLOC PARTIAL PARIETAL PLEURECTOMY USING DAVINCI ROBOT, MEDIASTINAL LYMPH NODE DISSECTION, INTERCOSTAL NERVE BLOCK;  Surgeon: Rosalio Scott MD;  Location: [facility];  Service: Robotics - DaVinci;  Laterality: Left;    OTHER SURGICAL HISTORY      SOMETHING REMOVED FROM LEFT CHEST, BENIGN ? LIPOMA    VENOUS ACCESS DEVICE (PORT) INSERTION N/A 3/18/2025    Procedure: INSERTION VENOUS ACCESS DEVICE;  Surgeon: Jayashree Jay MD;  Location: [facility];  Service: General;  Laterality: N/A;       Family History   Problem Relation Age of Onset    Bone cancer Mother     COPD Father     Brain cancer Sister     Cancer Brother         Malignant tumor of pharynx    Alcohol abuse Brother     Cirrhosis Brother     Recurrent abdominal pain Brother     Lung cancer Brother     Susan  Hyperthermia Neg Hx        Social History     Socioeconomic History    Marital status:      Spouse name: Carmen   Tobacco Use    Smoking status: Former     Current packs/day: 0.00     Average packs/day: 0.5 packs/day for 40.0 years (20.0 ttl pk-yrs)     Types: Cigarettes     Start date:      Quit date: 2016     Years since quittin.4     Passive exposure: Past    Smokeless tobacco: Never   Vaping Use    Vaping status: Never Used   Substance and Sexual Activity    Alcohol use: Not Currently     Comment: VERY RARELY, MAYBE 1 BEER    Drug use: Never    Sexual activity: Defer           Objective   Physical Exam  Vitals and nursing note reviewed.   Constitutional:       Appearance: Normal appearance. He is normal weight. He is not ill-appearing, toxic-appearing or diaphoretic.   HENT:      Head: Normocephalic and atraumatic.      Right Ear: External ear normal.      Left Ear: External ear normal.      Nose: Nose normal. No congestion or rhinorrhea.      Mouth/Throat:      Pharynx: No oropharyngeal exudate or posterior oropharyngeal erythema.   Eyes:      Extraocular Movements: Extraocular movements intact.      Conjunctiva/sclera: Conjunctivae normal.      Pupils: Pupils are equal, round, and reactive to light.   Cardiovascular:      Rate and Rhythm: Normal rate and regular rhythm.      Pulses: Normal pulses.   Pulmonary:      Effort: Pulmonary effort is normal.      Breath sounds: Normal breath sounds. No stridor. No wheezing, rhonchi or rales.   Chest:      Chest wall: No tenderness.   Abdominal:      General: There is no distension.      Palpations: Abdomen is soft. There is no mass.   Musculoskeletal:         General: Swelling and tenderness present. No signs of injury. Normal range of motion.      Cervical back: Normal range of motion. No rigidity or tenderness.      Comments: Mild swelling to the forearm of the left upper extremity with some diffuse tenderness.  No evidence of cellulitic change.   There is no warmth, no joint abnormality or decrease in range of motion of the elbow or wrist.  No evidence of deformity, abrasion or laceration.  Compartments are soft.  Sensation intact to light touch.   Skin:     General: Skin is warm.      Capillary Refill: Capillary refill takes less than 2 seconds.      Coloration: Skin is not jaundiced or pale.      Findings: No bruising.   Neurological:      General: No focal deficit present.      Mental Status: He is alert and oriented to person, place, and time. Mental status is at baseline.      Motor: No weakness.         Procedures           ED Course                                                       Medical Decision Making    MDM:    Escalation of care including admission/observation considered    - Discussions of management with other providers:  None    - Discussed/reviewed with Radiology regarding test interpretation    - Independent interpretation: Labs and CT    - Additional patient history obtained from: None    - Review of external non-ED record (if available):  Prior Inpt record, Office record, Outpt record, Prior Outpt labs, PCP record, Outside ED record, Other    - Chronic conditions affecting care: See HPI and medical Hx.    - Social Determinants of health significantly affecting care:  None        Medical Decision Making Discussion:    This is a pleasant 69-year-old presents to the emergency department with swelling of the left arm.  He is concerned because he was found to have a mass in his left upper lung several weeks ago.  The patient does have known cancer and is actively being treated.  On my evaluation the patient does have unilateral swelling noted of the left upper extremity however in the setting of active cancer, D-dimer will likely be elevated and I do not have ultrasound capabilities at this time based off of the information that was given to me.  As a result, I did discuss this with the patient we will proceed with possibility of outlet  obstruction in the form of a possibility of a left upper lung mass.  CT angiogram of the left upper extremity as well as CTA of the chest were obtained.  No PE noted.  No obvious evidence of an obstructive process noted however, ultrasound would be better for evaluation of this patient's venous outflow.  I do believe that the patient should follow-up for ultrasound however, he is otherwise comfortable, hemodynamically stable and well-appearing.  He is neurovascular intact in left upper extremity and I see no evidence of cellulitic change.  I did discuss with the patient and his family that they should return tomorrow for ultrasonography of the left upper extremity.  They are very agreeable to this.  Should there be any new, changing or worsening symptoms they are instructed to proceed to the hospital/emergency department at their nearest location in order to be reevaluated.  They are agreeable to this.  Comfortable, nontoxic and stable for discharge.    The patient has been given very strict return precautions to return to the emergency department should there be any acute change or worsening of their condition.  I have explained my findings and the patient has expressed understanding to me.  I explained that the work-up performed in the ED has been based on the specific complaint and concern, as the nature of emergency medicine is complaint driven and they understand that new symptoms may arise.  I have told them that, should there be any new symptoms, worsening or changing symptoms, a new work-up may be indicated that they are encouraged to return to the emergency department or promptly contact their primary care physician. We have employed a shared decision-making process as the discussion of their disposition.  The patient has been educated as to the nature of the visit, the tests and work-up performed and the findings from today's visit. At this time, there does not appear to be any acute emergent process that  necessitates admission to the hospital, however, the patient understands that this can change unexpectedly. At this time, the patient is stable for discharge home and agrees to follow-up with her primary care physician in the next 24 to 48 hours or earlier should they be able to obtain an appointment.    The patient was counseled regarding diagnostic results and treatment plan and patient has indicated understanding of these instructions.      Amount and/or Complexity of Data Reviewed  Labs: ordered.  Radiology: ordered.    Risk  Prescription drug management.        Final diagnoses:   Left arm swelling       ED Disposition  ED Disposition       ED Disposition   Discharge    Condition   Good    Comment   --               Hosea Abernathy MD  [address] [facility] [address]  [phone]          Hosea Abernathy MD  [address] [facility] [address]  [phone]               Medication List      No changes were made to your prescriptions during this visit.            Cesario Bojorquez DO  05/31/25 1930

## 2025-05-31 NOTE — PROGRESS NOTES
On-call note: Patient's daughter called in reporting swollen arm from hand to forearm with redness and warmth.  Also some swelling in supraclavicular area on same side.  This is the opposite side assist port.  Patient is currently being treated for cancer.  He has a low-grade temperature of 99.5 however did have G-CSF injection yesterday and chemotherapy Tuesday through Thursday of this week.  Arm is tender to touch.  Instructed daughter to take patient to emergency department for evaluation with concerns of possible DVT versus cellulitis from description on phone.  Coni Gonzales APRN

## 2025-05-31 NOTE — DISCHARGE INSTRUCTIONS

## 2025-06-01 ENCOUNTER — APPOINTMENT (OUTPATIENT)
Dept: ULTRASOUND IMAGING | Facility: HOSPITAL | Age: 70
End: 2025-06-01
Payer: MEDICARE

## 2025-06-01 ENCOUNTER — HOSPITAL ENCOUNTER (EMERGENCY)
Facility: HOSPITAL | Age: 70
Discharge: STILL ADMITTED OR ADMINISTRATIVE | End: 2025-06-01
Attending: EMERGENCY MEDICINE
Payer: MEDICARE

## 2025-06-01 PROCEDURE — 93971 EXTREMITY STUDY: CPT

## 2025-06-01 PROCEDURE — 99211 OFF/OP EST MAY X REQ PHY/QHP: CPT | Performed by: EMERGENCY MEDICINE

## 2025-06-02 NOTE — PROGRESS NOTES
"  THORACIC SURGERY CLINIC CONSULT NOTE    You have chosen to receive care through a telephone visit. Do you consent to use a telephone visit for your medical care today? Yes    REASON FOR CONSULT: Lung cancer surveillance    Subjective   HISTORY OF PRESENTING ILLNESS:   Sunny Hung is a 69 y.o. male who has significant medical problems as mentioned in the medical chart.     History of Present Illness  The patient presents via virtual visit for evaluation of a small nodule.    He is currently undergoing chemotherapy, which he reports as being well-tolerated without any significant complications. His respiratory function appears to be inconsistent, with periods of normalcy interspersed with episodes of difficulty. A PET scan has been scheduled to follow the completion of his chemotherapy regimen.    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\"       Past Surgical History:   Procedure Laterality Date    BRONCHOSCOPY N/A 3/7/2025    Procedure: BRONCHOSCOPY WITH ENDOBRONCHIAL ULTRASOUND WITH FNA;  Surgeon: Rosalio Scott MD;  Location: [facility] ENDOSCOPY;  Service: Pulmonary;  Laterality: N/A;  PRE: LUNG NODULE  POST: SAME    BRONCHOSCOPY WITH ION ROBOTIC ASSIST N/A 09/06/2023    Procedure: BRONCHOSCOPY WITH BAL;   ION ROBOT AND ENDOBRONCHIAL ULTRASOUND WITH FNA'S;  Surgeon: Rosalio Scott MD;  Location: [facility] ENDOSCOPY;  Service: Robotics - Pulmonary;  Laterality: N/A;  PRE- LEFT LOWER LOBE MASS  POST- SAME    COLONOSCOPY W/ POLYPECTOMY N/A 02/28/2025    Procedure: COLONOSCOPY WITH POLYPECTOMY, clip;  Surgeon: Clem Lord MD;  Location:  [facility] OR; "  Service: Gastroenterology;  Laterality: N/A;  Diverticulosis; Ascending polyp x 1; Descending polyp x 1; Sigmoid poyp x 1- clip at 20cm, hot snare; Rectal polyp x 1- clip, hot snare    GANGLION CYST EXCISION Bilateral     HEMORRHOIDECTOMY      LOBECTOMY Left 2023    Procedure: BRONCHOSCOPY, THORACOSCOPY WITH LYSIS OF ADHESIONS (120 MINUTES), LEFT LOWER LOBECTOMY WITH EN BLOC PARTIAL PARIETAL PLEURECTOMY USING DAVINCI ROBOT, MEDIASTINAL LYMPH NODE DISSECTION, INTERCOSTAL NERVE BLOCK;  Surgeon: Rosalio Scott MD;  Location: [facility] MAIN OR;  Service: Robotics - DaVinci;  Laterality: Left;    OTHER SURGICAL HISTORY      SOMETHING REMOVED FROM LEFT CHEST, BENIGN ? LIPOMA    VENOUS ACCESS DEVICE (PORT) INSERTION N/A 3/18/2025    Procedure: INSERTION VENOUS ACCESS DEVICE;  Surgeon: Jayashree Jay MD;  Location: [facility] OR;  Service: General;  Laterality: N/A;       Family History   Problem Relation Age of Onset    Bone cancer Mother     COPD Father     Brain cancer Sister     Cancer Brother         Malignant tumor of pharynx    Alcohol abuse Brother     Cirrhosis Brother     Recurrent abdominal pain Brother     Lung cancer Brother     Malig Hyperthermia Neg Hx        Social History     Socioeconomic History    Marital status:      Spouse name: Carmen   Tobacco Use    Smoking status: Former     Current packs/day: 0.00     Average packs/day: 0.5 packs/day for 40.0 years (20.0 ttl pk-yrs)     Types: Cigarettes     Start date:      Quit date:      Years since quittin.4     Passive exposure: Past    Smokeless tobacco: Never   Vaping Use    Vaping status: Never Used   Substance and Sexual Activity    Alcohol use: Not Currently     Comment: VERY RARELY, MAYBE 1 BEER    Drug use: Never    Sexual activity: Defer         Current Outpatient Medications:     acetaminophen (TYLENOL) 500 MG tablet, Take 1 tablet by mouth Every 6 (Six) Hours As Needed for Mild Pain., Disp: , Rfl:     albuterol (PROVENTIL) (2.5  MG/3ML) 0.083% nebulizer solution, Take 2.5 mg by nebulization Every 4 (Four) Hours As Needed for Wheezing or Shortness of Air., Disp: , Rfl:     albuterol sulfate  (90 Base) MCG/ACT inhaler, Inhale 2 puffs Every 4 (Four) Hours As Needed for Wheezing., Disp: 18 g, Rfl: 1    guaifenesin-dextromethorphan 600-30 mg (MUCINEX DM)  MG tablet sustained-release 12 hour, Take 1 tablet by mouth 2 (Two) Times a Day As Needed (Cough)., Disp: 60 tablet, Rfl: 1    Hydrocod Janak-Chlorphe Janak ER (TUSSIONEX PENNKINETIC) 10-8 MG/5ML ER suspension, Take 5 mL by mouth Every 12 (Twelve) Hours As Needed for Cough., Disp: 300 mL, Rfl: 0    loratadine (CLARITIN) 10 MG tablet, Take 1 tablet by mouth Daily. Pt takes 5-7 days when receiving neulasta injection, Disp: , Rfl:     OLANZapine (zyPREXA) 5 MG tablet, Take 1 tablet by mouth Every Night. Take nightly x 4 starting night of chemotherapy., Disp: 4 tablet, Rfl: 3    ondansetron (ZOFRAN) 8 MG tablet, Take 1 tablet by mouth 3 (Three) Times a Day As Needed for Nausea or Vomiting., Disp: 30 tablet, Rfl: 5    pravastatin (PRAVACHOL) 20 MG tablet, Take 1 tablet by mouth Daily., Disp: , Rfl:     tamsulosin (FLOMAX) 0.4 MG capsule 24 hr capsule, Take 1 capsule by mouth 2 (Two) Times a Day., Disp: , Rfl:     Trelegy Ellipta 200-62.5-25 MCG/ACT inhaler, Inhale 1 puff Daily., Disp: , Rfl:      Allergies   Allergen Reactions    Amoxicillin-Pot Clavulanate Hives     Beta lactam allergy details  Antibiotic reaction: hives  Age at reaction: adult  Dose to reaction time: days  Reason for antibiotic: unknown  Epinephrine required for reaction?: no  Tolerated antibiotics: unknown                 Objective    OBJECTIVE:     VITAL SIGNS:  There were no vitals taken for this visit.    PHYSICAL EXAM:  Normal appearance.   Head is normocephalic.   Nose appears normal.   No obvious deformity of the mouth and throat.  Conjunctivae normal.   Heart rate and rhythm is normal.  Pulmonary effort is  normal.   Moving all 4 extremities.  Extremities warm.  No focal deficit present.   Alert and oriented to person, place, and time.     RESULTS REVIEW:  I have reviewed the patient's all relevant laboratory and imaging findings.     Assessment & Plan    ASSESSMENT & PLAN:  Sunny Hung is a 69 y.o. male with significant medical conditions as mentioned above presented to my clinic.    Assessment & Plan  1. Pulmonary nodule.  A recent CT scan of the chest revealed a small nodule, approximately one fourth of an inch in size. This nodule could potentially be benign, but its nature can not be definitively determined at this time. The patient is currently undergoing chemotherapy under the supervision of Dr. Dubon, who will continue to monitor the situation closely. A PET scan is planned after the completion of chemotherapy. If any abnormalities or areas of concern are identified in future scans, a follow-up consultation will be scheduled promptly.    I discussed the patients findings and my recommendations with the patient/family/caregiver. The patient/family/caregiver was given adequate time to ask questions and all questions were answered to patient satisfaction. Thank you for this consult and allowing us to participate in the care of your patient.      Rosalio Scott MD  Thoracic Surgeon  [facility] [place]        Dictated utilizing Dragon dictation    I spent 30 minutes caring for Sunny on this date of service. This time includes time spent by me in the following activities:preparing for the visit, reviewing tests, obtaining and/or reviewing a separately obtained history, performing a medically appropriate examination and/or evaluation, counseling and educating the patient/family/caregiver, ordering medications, tests, or procedures, referring and communicating with other health care professionals , documenting information in the medical record, independently interpreting results and communicating  that information with the patient/family/caregiver, and care coordination and more than half the time was spent in direct face to face evaluation and decision making.     Patient or patient representative verbalized consent for the use of Ambient Listening during the visit with  Rosalio Scott MD for chart documentation. 6/2/2025  08:31 EDT

## 2025-06-03 ENCOUNTER — CLINICAL SUPPORT (OUTPATIENT)
Dept: ONCOLOGY | Facility: HOSPITAL | Age: 70
End: 2025-06-03
Payer: MEDICARE

## 2025-06-03 ENCOUNTER — LAB (OUTPATIENT)
Dept: LAB | Facility: HOSPITAL | Age: 70
End: 2025-06-03
Payer: MEDICARE

## 2025-06-03 VITALS
TEMPERATURE: 98.9 F | SYSTOLIC BLOOD PRESSURE: 130 MMHG | HEART RATE: 101 BPM | OXYGEN SATURATION: 95 % | DIASTOLIC BLOOD PRESSURE: 78 MMHG

## 2025-06-03 DIAGNOSIS — Z79.899 HIGH RISK MEDICATION USE: ICD-10-CM

## 2025-06-03 DIAGNOSIS — D64.81 ANEMIA ASSOCIATED WITH CHEMOTHERAPY: ICD-10-CM

## 2025-06-03 DIAGNOSIS — C34.92 PRIMARY SQUAMOUS CELL CARCINOMA OF LUNG, LEFT: ICD-10-CM

## 2025-06-03 DIAGNOSIS — Z79.899 HIGH RISK MEDICATION USE: Primary | ICD-10-CM

## 2025-06-03 DIAGNOSIS — T45.1X5A ANEMIA ASSOCIATED WITH CHEMOTHERAPY: ICD-10-CM

## 2025-06-03 LAB
BASOPHILS # BLD AUTO: 0.14 10*3/MM3 (ref 0–0.2)
BASOPHILS NFR BLD AUTO: 0.5 % (ref 0–1.5)
DEPRECATED RDW RBC AUTO: 55.4 FL (ref 37–54)
EOSINOPHIL # BLD AUTO: 0.1 10*3/MM3 (ref 0–0.4)
EOSINOPHIL NFR BLD AUTO: 0.4 % (ref 0.3–6.2)
ERYTHROCYTE [DISTWIDTH] IN BLOOD BY AUTOMATED COUNT: 18.2 % (ref 12.3–15.4)
HCT VFR BLD AUTO: 25.6 % (ref 37.5–51)
HGB BLD-MCNC: 8.1 G/DL (ref 13–17.7)
IMM GRANULOCYTES # BLD AUTO: 0.32 10*3/MM3 (ref 0–0.05)
IMM GRANULOCYTES NFR BLD AUTO: 1.2 % (ref 0–0.5)
LYMPHOCYTES # BLD AUTO: 1.69 10*3/MM3 (ref 0.7–3.1)
LYMPHOCYTES NFR BLD AUTO: 6.3 % (ref 19.6–45.3)
MCH RBC QN AUTO: 26.9 PG (ref 26.6–33)
MCHC RBC AUTO-ENTMCNC: 31.6 G/DL (ref 31.5–35.7)
MCV RBC AUTO: 85 FL (ref 79–97)
MONOCYTES # BLD AUTO: 0.79 10*3/MM3 (ref 0.1–0.9)
MONOCYTES NFR BLD AUTO: 2.9 % (ref 5–12)
NEUTROPHILS NFR BLD AUTO: 24 10*3/MM3 (ref 1.7–7)
NEUTROPHILS NFR BLD AUTO: 88.7 % (ref 42.7–76)
NRBC BLD AUTO-RTO: 0 /100 WBC (ref 0–0.2)
PLATELET # BLD AUTO: 79 10*3/MM3 (ref 140–450)
PMV BLD AUTO: 11.2 FL (ref 6–12)
RBC # BLD AUTO: 3.01 10*6/MM3 (ref 4.14–5.8)
WBC NRBC COR # BLD AUTO: 27.04 10*3/MM3 (ref 3.4–10.8)

## 2025-06-03 PROCEDURE — 85025 COMPLETE CBC W/AUTO DIFF WBC: CPT | Performed by: INTERNAL MEDICINE

## 2025-06-03 PROCEDURE — 36416 COLLJ CAPILLARY BLOOD SPEC: CPT

## 2025-06-03 NOTE — NURSING NOTE
CBC was reviewed with the  pt and son-in-law. They were informed that both the hgb (8.1) and platelets (79K) are low today. The pt had no new complaints except for persistent left are swelling for which he was seen in the ER on 5/31/25. The pt explained that the hand swelling has resolved, but swelling in the forearm and upper arm are persisting and even slightly worse. The pt also feels light the swelling has moved to his left arm and possibly into the left cheek/eye area. Dr. Dubon reviewed the pt's latest office notes before he briefly saw the pt while in the office. He instructed the pt to keep the left arm elevated and follow-up in one week as scheduled. He should contact our office before this for any questions or concerns. The pt and son-in-law v/u.     The pt was instructed to contact his urologist, Dr. Chuy Reyes,  today for his concerns about the urinary catheter placed during his ER visit for urinary retention. The pt feels like the bag may be leaking since his right lower pant leg is wet. He also mentioned that the band holding the catheter to his upper right leg is creating skin irritation. The pt and son will go over to Dr. Reyes's office after leaving our office today since they were unable to reach his office staff by phone.

## 2025-06-10 ENCOUNTER — LAB (OUTPATIENT)
Dept: LAB | Facility: HOSPITAL | Age: 70
End: 2025-06-10
Payer: MEDICARE

## 2025-06-10 ENCOUNTER — CLINICAL SUPPORT (OUTPATIENT)
Dept: ONCOLOGY | Facility: HOSPITAL | Age: 70
End: 2025-06-10
Payer: MEDICARE

## 2025-06-10 DIAGNOSIS — C34.92 PRIMARY SQUAMOUS CELL CARCINOMA OF LUNG, LEFT: Primary | ICD-10-CM

## 2025-06-10 DIAGNOSIS — D64.81 ANEMIA ASSOCIATED WITH CHEMOTHERAPY: ICD-10-CM

## 2025-06-10 DIAGNOSIS — T45.1X5A ANEMIA ASSOCIATED WITH CHEMOTHERAPY: ICD-10-CM

## 2025-06-10 DIAGNOSIS — Z79.899 HIGH RISK MEDICATION USE: ICD-10-CM

## 2025-06-10 DIAGNOSIS — C34.92 PRIMARY SQUAMOUS CELL CARCINOMA OF LUNG, LEFT: ICD-10-CM

## 2025-06-10 LAB
ABO GROUP BLD: NORMAL
BASOPHILS # BLD AUTO: 0.03 10*3/MM3 (ref 0–0.2)
BASOPHILS NFR BLD AUTO: 0.3 % (ref 0–1.5)
BLD GP AB SCN SERPL QL: NEGATIVE
DEPRECATED RDW RBC AUTO: 53.8 FL (ref 37–54)
EOSINOPHIL # BLD AUTO: 0.1 10*3/MM3 (ref 0–0.4)
EOSINOPHIL NFR BLD AUTO: 1.1 % (ref 0.3–6.2)
ERYTHROCYTE [DISTWIDTH] IN BLOOD BY AUTOMATED COUNT: 18.1 % (ref 12.3–15.4)
HCT VFR BLD AUTO: 23.4 % (ref 37.5–51)
HGB BLD-MCNC: 7.7 G/DL (ref 13–17.7)
IMM GRANULOCYTES # BLD AUTO: 0.07 10*3/MM3 (ref 0–0.05)
IMM GRANULOCYTES NFR BLD AUTO: 0.8 % (ref 0–0.5)
LYMPHOCYTES # BLD AUTO: 1.58 10*3/MM3 (ref 0.7–3.1)
LYMPHOCYTES NFR BLD AUTO: 17 % (ref 19.6–45.3)
MCH RBC QN AUTO: 27.3 PG (ref 26.6–33)
MCHC RBC AUTO-ENTMCNC: 32.9 G/DL (ref 31.5–35.7)
MCV RBC AUTO: 83 FL (ref 79–97)
MONOCYTES # BLD AUTO: 0.97 10*3/MM3 (ref 0.1–0.9)
MONOCYTES NFR BLD AUTO: 10.5 % (ref 5–12)
NEUTROPHILS NFR BLD AUTO: 6.52 10*3/MM3 (ref 1.7–7)
NEUTROPHILS NFR BLD AUTO: 70.3 % (ref 42.7–76)
NRBC BLD AUTO-RTO: 0.3 /100 WBC (ref 0–0.2)
PLATELET # BLD AUTO: 38 10*3/MM3 (ref 140–450)
PMV BLD AUTO: 10.8 FL (ref 6–12)
RBC # BLD AUTO: 2.82 10*6/MM3 (ref 4.14–5.8)
RH BLD: POSITIVE
T&S EXPIRATION DATE: NORMAL
WBC NRBC COR # BLD AUTO: 9.27 10*3/MM3 (ref 3.4–10.8)

## 2025-06-10 PROCEDURE — 86901 BLOOD TYPING SEROLOGIC RH(D): CPT | Performed by: NURSE PRACTITIONER

## 2025-06-10 PROCEDURE — 36415 COLL VENOUS BLD VENIPUNCTURE: CPT

## 2025-06-10 PROCEDURE — 86923 COMPATIBILITY TEST ELECTRIC: CPT

## 2025-06-10 PROCEDURE — 85025 COMPLETE CBC W/AUTO DIFF WBC: CPT | Performed by: INTERNAL MEDICINE

## 2025-06-10 PROCEDURE — 86850 RBC ANTIBODY SCREEN: CPT | Performed by: NURSE PRACTITIONER

## 2025-06-10 PROCEDURE — 86900 BLOOD TYPING SEROLOGIC ABO: CPT | Performed by: NURSE PRACTITIONER

## 2025-06-10 RX ORDER — DIPHENHYDRAMINE HCL 25 MG
25 CAPSULE ORAL ONCE
Status: CANCELLED | OUTPATIENT
Start: 2025-06-10 | End: 2025-06-10

## 2025-06-10 RX ORDER — SODIUM CHLORIDE 9 MG/ML
250 INJECTION, SOLUTION INTRAVENOUS ONCE
Status: CANCELLED | OUTPATIENT
Start: 2025-06-10

## 2025-06-10 RX ORDER — ACETAMINOPHEN 325 MG/1
650 TABLET ORAL ONCE
Status: CANCELLED | OUTPATIENT
Start: 2025-06-10 | End: 2025-06-10

## 2025-06-10 NOTE — NURSING NOTE
Hgb 7.7 today.  Platelet count 38.  Pt reports fatigue and denies bleeding.  Per HILDA Lamb, one unit [facility]'s ordered to be given in [place] [facility] on Thursday 6/12 at 11 am after his mri brain. Type and screen drawn and blood bank armband placed on patient with instructions not to remove. Bleeding precaution handout given to patient.  Pt instructed not to take any NSAIDS .  Pt V/U.

## 2025-06-11 ENCOUNTER — HOSPITAL ENCOUNTER (OUTPATIENT)
Dept: PET IMAGING | Facility: HOSPITAL | Age: 70
Discharge: HOME | End: 2025-06-11
Payer: MEDICARE

## 2025-06-11 ENCOUNTER — PATIENT OUTREACH (OUTPATIENT)
Dept: OTHER | Facility: HOSPITAL | Age: 70
End: 2025-06-11
Payer: MEDICARE

## 2025-06-11 ENCOUNTER — INFUSION (OUTPATIENT)
Dept: ONCOLOGY | Facility: HOSPITAL | Age: 70
End: 2025-06-11
Payer: MEDICARE

## 2025-06-11 DIAGNOSIS — C34.92 PRIMARY SQUAMOUS CELL CARCINOMA OF LUNG, LEFT: ICD-10-CM

## 2025-06-11 DIAGNOSIS — E27.8 RIGHT ADRENAL MASS: ICD-10-CM

## 2025-06-11 DIAGNOSIS — C34.32 CANCER OF BRONCHUS OF LEFT LOWER LOBE: ICD-10-CM

## 2025-06-11 LAB — GLUCOSE BLDC GLUCOMTR-MCNC: 89 MG/DL (ref 70–130)

## 2025-06-11 PROCEDURE — 34310000005 FLUDEOXYGLUCOSE F18 SOLUTION: Performed by: NURSE PRACTITIONER

## 2025-06-11 PROCEDURE — 78815 PET IMAGE W/CT SKULL-THIGH: CPT

## 2025-06-11 PROCEDURE — 82948 REAGENT STRIP/BLOOD GLUCOSE: CPT

## 2025-06-11 PROCEDURE — A9552 F18 FDG: HCPCS | Performed by: NURSE PRACTITIONER

## 2025-06-11 RX ADMIN — FLUDEOXYGLUCOSE F 18 1 DOSE: 200 INJECTION, SOLUTION INTRAVENOUS at 09:29

## 2025-06-11 NOTE — PROGRESS NOTES
Reviewed chart.    Met patient in PET department this morning. He states he is tolerating treatment fairly well although than his low blood counts. We discussed common signs of neutropenia, anemia and thrombocytopenia.  We discussed precautions for each. Patient verbalized understanding.  The patient reports fatigue although denies fevers or bruising/bleeding. He is receiving a blood transfusion tomorrow following his MRI at [facility].     He was seen in the ER 5/31 for swelling of the left arm. CT angiogram of the left upper extremity as well as CTA of the chest were obtained. No PE noted. US LUE 6/1 negative. The patient stated he was unable to void during this ER visit and a F/C was placed.  He is [facility].  The patient has a follow up with Dr. Reyes, his urologist later this month.     The patient denies disease or treatment related questions.  He also denies unmet resource needs.     Navigation will continue to follow; encouraged patient to call as needed.

## 2025-06-12 ENCOUNTER — TELEPHONE (OUTPATIENT)
Dept: ONCOLOGY | Facility: CLINIC | Age: 70
End: 2025-06-12
Payer: MEDICARE

## 2025-06-12 ENCOUNTER — HOSPITAL ENCOUNTER (OUTPATIENT)
Dept: CT IMAGING | Facility: HOSPITAL | Age: 70
Discharge: HOME | End: 2025-06-12
Payer: MEDICARE

## 2025-06-12 ENCOUNTER — LAB (OUTPATIENT)
Dept: LAB | Facility: HOSPITAL | Age: 70
End: 2025-06-12
Payer: MEDICARE

## 2025-06-12 ENCOUNTER — APPOINTMENT (OUTPATIENT)
Dept: ONCOLOGY | Facility: HOSPITAL | Age: 70
End: 2025-06-12
Payer: MEDICARE

## 2025-06-12 ENCOUNTER — OFFICE VISIT (OUTPATIENT)
Dept: ONCOLOGY | Facility: CLINIC | Age: 70
End: 2025-06-12
Payer: MEDICARE

## 2025-06-12 ENCOUNTER — HOSPITAL ENCOUNTER (OUTPATIENT)
Dept: MRI IMAGING | Facility: HOSPITAL | Age: 70
Discharge: HOME | End: 2025-06-12
Payer: MEDICARE

## 2025-06-12 ENCOUNTER — HOSPITAL ENCOUNTER (OUTPATIENT)
Dept: INFUSION THERAPY | Facility: HOSPITAL | Age: 70
Discharge: HOME | End: 2025-06-12
Payer: MEDICARE

## 2025-06-12 VITALS
TEMPERATURE: 98 F | WEIGHT: 176.2 LBS | HEART RATE: 81 BPM | HEIGHT: 70 IN | BODY MASS INDEX: 25.22 KG/M2 | OXYGEN SATURATION: 99 % | SYSTOLIC BLOOD PRESSURE: 110 MMHG | DIASTOLIC BLOOD PRESSURE: 70 MMHG | RESPIRATION RATE: 16 BRPM

## 2025-06-12 VITALS
RESPIRATION RATE: 16 BRPM | TEMPERATURE: 98 F | OXYGEN SATURATION: 96 % | SYSTOLIC BLOOD PRESSURE: 106 MMHG | HEART RATE: 65 BPM | DIASTOLIC BLOOD PRESSURE: 65 MMHG

## 2025-06-12 DIAGNOSIS — I82.290: Primary | ICD-10-CM

## 2025-06-12 DIAGNOSIS — M79.89 LEFT ARM SWELLING: ICD-10-CM

## 2025-06-12 DIAGNOSIS — C34.92 PRIMARY SQUAMOUS CELL CARCINOMA OF LUNG, LEFT: ICD-10-CM

## 2025-06-12 DIAGNOSIS — R94.8 ABNORMAL POSITRON EMISSION TOMOGRAPHY (PET) SCAN: ICD-10-CM

## 2025-06-12 DIAGNOSIS — T45.1X5A ANEMIA ASSOCIATED WITH CHEMOTHERAPY: ICD-10-CM

## 2025-06-12 DIAGNOSIS — Z45.2 FITTING AND ADJUSTMENT OF VASCULAR CATHETER: ICD-10-CM

## 2025-06-12 DIAGNOSIS — Z79.69 NEED FOR PROPHYLACTIC CHEMOTHERAPY: Primary | ICD-10-CM

## 2025-06-12 DIAGNOSIS — C34.92 PRIMARY SQUAMOUS CELL CARCINOMA OF LUNG, LEFT: Primary | ICD-10-CM

## 2025-06-12 DIAGNOSIS — Z79.899 HIGH RISK MEDICATION USE: ICD-10-CM

## 2025-06-12 DIAGNOSIS — D64.9 SYMPTOMATIC ANEMIA: Primary | ICD-10-CM

## 2025-06-12 DIAGNOSIS — C34.32 CANCER OF BRONCHUS OF LEFT LOWER LOBE: ICD-10-CM

## 2025-06-12 DIAGNOSIS — T45.1X5A CHEMOTHERAPY-INDUCED THROMBOCYTOPENIA: ICD-10-CM

## 2025-06-12 DIAGNOSIS — D69.59 CHEMOTHERAPY-INDUCED THROMBOCYTOPENIA: ICD-10-CM

## 2025-06-12 DIAGNOSIS — D64.81 ANEMIA ASSOCIATED WITH CHEMOTHERAPY: ICD-10-CM

## 2025-06-12 LAB
BASOPHILS # BLD AUTO: 0.02 10*3/MM3 (ref 0–0.2)
BASOPHILS NFR BLD AUTO: 0.2 % (ref 0–1.5)
DEPRECATED RDW RBC AUTO: 59 FL (ref 37–54)
EOSINOPHIL # BLD AUTO: 0.11 10*3/MM3 (ref 0–0.4)
EOSINOPHIL NFR BLD AUTO: 1.3 % (ref 0.3–6.2)
ERYTHROCYTE [DISTWIDTH] IN BLOOD BY AUTOMATED COUNT: 18.4 % (ref 12.3–15.4)
HCT VFR BLD AUTO: 27.6 % (ref 37.5–51)
HGB BLD-MCNC: 8.4 G/DL (ref 13–17.7)
IMM GRANULOCYTES # BLD AUTO: 0.04 10*3/MM3 (ref 0–0.05)
IMM GRANULOCYTES NFR BLD AUTO: 0.5 % (ref 0–0.5)
LYMPHOCYTES # BLD AUTO: 1.28 10*3/MM3 (ref 0.7–3.1)
LYMPHOCYTES NFR BLD AUTO: 14.7 % (ref 19.6–45.3)
MCH RBC QN AUTO: 26.8 PG (ref 26.6–33)
MCHC RBC AUTO-ENTMCNC: 30.4 G/DL (ref 31.5–35.7)
MCV RBC AUTO: 88.2 FL (ref 79–97)
MONOCYTES # BLD AUTO: 0.69 10*3/MM3 (ref 0.1–0.9)
MONOCYTES NFR BLD AUTO: 7.9 % (ref 5–12)
NEUTROPHILS NFR BLD AUTO: 6.59 10*3/MM3 (ref 1.7–7)
NEUTROPHILS NFR BLD AUTO: 75.4 % (ref 42.7–76)
PLATELET # BLD AUTO: 44 10*3/MM3 (ref 140–450)
PMV BLD AUTO: 11.6 FL (ref 6–12)
RBC # BLD AUTO: 3.13 10*6/MM3 (ref 4.14–5.8)
WBC NRBC COR # BLD AUTO: 8.73 10*3/MM3 (ref 3.4–10.8)

## 2025-06-12 PROCEDURE — A9270 NON-COVERED ITEM OR SERVICE: HCPCS | Performed by: NURSE PRACTITIONER

## 2025-06-12 PROCEDURE — 71275 CT ANGIOGRAPHY CHEST: CPT

## 2025-06-12 PROCEDURE — 36591 DRAW BLOOD OFF VENOUS DEVICE: CPT

## 2025-06-12 PROCEDURE — 36430 TRANSFUSION BLD/BLD COMPNT: CPT

## 2025-06-12 PROCEDURE — 86900 BLOOD TYPING SEROLOGIC ABO: CPT

## 2025-06-12 PROCEDURE — P9016 RBC LEUKOCYTES REDUCED: HCPCS

## 2025-06-12 PROCEDURE — A9573 GADOPICLENOL 0.5 MMOL/ML SOLUTION: HCPCS | Performed by: NURSE PRACTITIONER

## 2025-06-12 PROCEDURE — 25810000003 SODIUM CHLORIDE 0.9 % SOLUTION

## 2025-06-12 PROCEDURE — 25510000001 GADOPICLENOL 0.5 MMOL/ML SOLUTION: Performed by: NURSE PRACTITIONER

## 2025-06-12 PROCEDURE — 63710000001 ACETAMINOPHEN 325 MG TABLET: Performed by: NURSE PRACTITIONER

## 2025-06-12 PROCEDURE — 25510000001 IOPAMIDOL PER 1 ML: Performed by: NURSE PRACTITIONER

## 2025-06-12 PROCEDURE — 25010000002 HEPARIN LOCK FLUSH PER 10 UNITS: Performed by: INTERNAL MEDICINE

## 2025-06-12 PROCEDURE — 70553 MRI BRAIN STEM W/O & W/DYE: CPT

## 2025-06-12 PROCEDURE — 96374 THER/PROPH/DIAG INJ IV PUSH: CPT

## 2025-06-12 PROCEDURE — 63710000001 DIPHENHYDRAMINE PER 50 MG: Performed by: NURSE PRACTITIONER

## 2025-06-12 PROCEDURE — 85025 COMPLETE CBC W/AUTO DIFF WBC: CPT | Performed by: NURSE PRACTITIONER

## 2025-06-12 RX ORDER — ACETAMINOPHEN 325 MG/1
650 TABLET ORAL ONCE
Status: COMPLETED | OUTPATIENT
Start: 2025-06-12 | End: 2025-06-12

## 2025-06-12 RX ORDER — SODIUM CHLORIDE 0.9 % (FLUSH) 0.9 %
10 SYRINGE (ML) INJECTION AS NEEDED
OUTPATIENT
Start: 2025-06-12

## 2025-06-12 RX ORDER — DIPHENHYDRAMINE HCL 25 MG
25 CAPSULE ORAL ONCE
Status: COMPLETED | OUTPATIENT
Start: 2025-06-12 | End: 2025-06-12

## 2025-06-12 RX ORDER — HEPARIN SODIUM (PORCINE) LOCK FLUSH IV SOLN 100 UNIT/ML 100 UNIT/ML
500 SOLUTION INTRAVENOUS AS NEEDED
OUTPATIENT
Start: 2025-06-12

## 2025-06-12 RX ORDER — IOPAMIDOL 755 MG/ML
100 INJECTION, SOLUTION INTRAVASCULAR
Status: COMPLETED | OUTPATIENT
Start: 2025-06-12 | End: 2025-06-12

## 2025-06-12 RX ORDER — SODIUM CHLORIDE 9 MG/ML
INJECTION, SOLUTION INTRAVENOUS
Status: COMPLETED
Start: 2025-06-12 | End: 2025-06-12

## 2025-06-12 RX ORDER — SODIUM CHLORIDE 9 MG/ML
250 INJECTION, SOLUTION INTRAVENOUS ONCE
Status: COMPLETED | OUTPATIENT
Start: 2025-06-12 | End: 2025-06-12

## 2025-06-12 RX ORDER — HEPARIN SODIUM (PORCINE) LOCK FLUSH IV SOLN 100 UNIT/ML 100 UNIT/ML
500 SOLUTION INTRAVENOUS AS NEEDED
Status: DISCONTINUED | OUTPATIENT
Start: 2025-06-12 | End: 2025-06-14 | Stop reason: HOSPADM

## 2025-06-12 RX ADMIN — HEPARIN 500 UNITS: 100 SYRINGE at 13:58

## 2025-06-12 RX ADMIN — SODIUM CHLORIDE: 9 INJECTION, SOLUTION INTRAVENOUS at 13:50

## 2025-06-12 RX ADMIN — SODIUM CHLORIDE: 0.9 INJECTION, SOLUTION INTRAVENOUS at 13:50

## 2025-06-12 RX ADMIN — GADOPICLENOL 7 ML: 485.1 INJECTION INTRAVENOUS at 09:53

## 2025-06-12 RX ADMIN — DIPHENHYDRAMINE HYDROCHLORIDE 25 MG: 25 CAPSULE ORAL at 10:48

## 2025-06-12 RX ADMIN — ACETAMINOPHEN 650 MG: 325 TABLET ORAL at 10:48

## 2025-06-12 RX ADMIN — IOPAMIDOL 100 ML: 755 INJECTION, SOLUTION INTRAVENOUS at 10:35

## 2025-06-12 NOTE — NURSING NOTE
Pt arrived to [facility] for appt. VSS, no complaints at this time. 1 unit PRBC administered per MD order. PT tolerated well. VSS throughout transfusion. Labs drawn via [employer]. Labs called to Coni Gonzales office. Pt discharged from [facility] at 14:03 PM in stable condition, without complaints.

## 2025-06-12 NOTE — PROGRESS NOTES
"  Subjective     REASON FOR FOLLOW-UP:  NSCLC-small cell lung cancer    REQUESTING PHYSICIAN:  Sonia    History of Present Illness   Patient is brought in today for triage visit as radiology reached out to me this morning regarding concern for brachiocephalic stranding on PET scan.  The lack of contrast is a barrier to diagnosing a PE and therefore repeat CT angiogram chest with delayed contrast phase recommended.  Patient was sent for this test this morning however unfortunately contrast was not delayed and therefore the brachiocephalic veins were poorly opacified which limits evaluation for thrombus.  Patient has had continued left upper extremity edema though has improved from his hand, now moved up to just below his elbow which is somewhat fluctuant.  He does continue with stable shortness of breath.  He denies new pain.      Review of Systems   Constitutional:  Positive for fever. Negative for fatigue.   HENT:  Positive for dental problem.    Respiratory:  Positive for cough and shortness of breath.    Cardiovascular:  Negative for chest pain.   Gastrointestinal:  Positive for constipation and nausea. Negative for abdominal pain and vomiting.   Genitourinary: Negative.    Musculoskeletal: Negative.    Neurological:  Positive for numbness.   Hematological:  Bruises/bleeds easily.   Psychiatric/Behavioral:  Negative for dysphoric mood. The patient is not nervous/anxious.          Objective     Vitals:    06/12/25 1419   BP: 110/70   Pulse: 81   Resp: 16   Temp: 98 °F (36.7 °C)   TempSrc: Infrared   SpO2: 99%   Weight: 79.9 kg (176 lb 3.2 oz)   Height: 177.8 cm (70\")   PainSc: 0-No pain                 6/12/2025     2:37 PM   Current Status   ECOG score 1       Physical Exam    CONSTITUTIONAL: pleasant well-developed adult man  LYMPH: no cervical or supraclavicular lad  HEENT: Poor dentition, hearing intact, EOMI  CV: RRR, S1S2, no murmur  RESP: Breath sounds are diminished and coarse  GI: soft, non-tender, no " splenomegaly, +bs  MUSC: Bilateral lower extremity edema in feet and ankles, left upper extremity edema in upper arm and just below elbow  NEURO: alert and oriented x3, normal strength  PSYCH: normal mood anxious affect  Skin: Scattered minor bruising    RECENT LABS:  Results from last 7 days   Lab Units 06/12/25  1352 06/10/25  1331   WBC 10*3/mm3 8.73 9.27   NEUTROS ABS 10*3/mm3 6.59 6.52   HEMOGLOBIN g/dL 8.4* 7.7*   HEMATOCRIT % 27.6* 23.4*   PLATELETS 10*3/mm3 44* 38*                       PET scan 9823  IMPRESSION:  1. Intensely hypermetabolic approximately 13 x 5 cm pleural-based left  lower lobe lung mass involving the left hilum. Smaller nodular opacities  adjacently are hypermetabolic and likely represent metastases. A 7 mm  right upper lobe nodule is photopenic. Conservative surveillance is  recommended. There is no convincing evidence for metastatic disease at  the neck, abdomen, or pelvis.  2. There are 2 hypermetabolic foci at the sigmoid colon need further  evaluation, particularly the 1.4 cm hypermetabolic polyp at the distal  sigmoid colon. Colonoscopy is recommended.  3. Nonspecific heterogeneous prostate activity. PSA follow-up is  recommended.    CT Chest Without Contrast Diagnostic (07/17/2024 12:52)  IMPRESSION:  1.There is evidence for interval decrease in size of the ill-defined abnormal attenuation involving the posterior aspect of the mid residual left lung extending into the left hilum. There is also improvement in aeration throughout the residual left lower   lung with improvement in multifocal areas of abnormal attenuation. These findings suggest an appropriate response to interval therapy.  2.Stable abnormal attenuation associated with the left hilum.  3.Otherwise stable CT of the chest.    MRI Brain With & Without Contrast (07/24/2024 13:16)  Impression:  1. Mild generalized parenchymal volume loss and changes of chronic small vessel ischemic disease.  2. No acute intracranial  abnormality.  3. No pathologic intracranial contrast enhancement to suggest intracranial metastatic disease.    MRI Brain With & Without Contrast (01/29/2025 16:35)   Impression:  No evidence of intracranial metastatic disease.    CT Abdomen Pelvis With & Without Contrast (04/30/2025 13:03)  CT Chest With Contrast Diagnostic (04/30/2025 13:03)  Impression:  1. New 6 mm nodule within the left lung apex suspicious for metastatic disease.  2. Status post left lower lobectomy.  3. Trace left pleural effusion and chronic consolidation within the posterior left upper lobe compatible with treatment related change.  4. No evidence of metastatic disease within the abdomen or pelvis.      Assessment & Plan   *T4N0M0 poorly differentiated carcinoma/squamous differentiation left lower lobe of the lung/small cell lung cancer on pathology at surgery  Mass discovered on low-dose CT chest screening 7/31/2023  PET scan 9/8/2023 showed a 1.3 x 5 cm left lower lobe mass involving the hilum with smaller adjacent nodular opacities, max SUV 23.2, photopenic right upper lobe nodule  Bronchoscopy with biopsy from the left lower lobe 9/6/2023 positive for poorly differentiated carcinoma with squamous differentiation; P-L1 TPS 0%  Initiated neoadjuvant treatment with carboplatin/Taxol/nivolumab 9/20/2023 and completed 3 cycles 11/1/2023  Status post left lower lobectomy 12/13/2023 complicated by prolonged and left lower lobe pneumonia.  Pathology from surgery showed significant treatment effect but residual small cell carcinoma with positive bronchial and perivascular soft tissue margins by tumor within lymphatics, positive lymph node station 10 L for small cell carcinoma x 2   2/5/2024-initiated postoperative carboplatin/-16 with concurrent radiation for small cell lung cancer/positive margin  2/26/2024-C2 carboplatin/-16 with concurrent radiation; completed radiation therapy 3/15/2024; 4 cycles carboplatin/-16 completed 4/11/2024  CT  chest and MRI brain 7/24/2024-[facility]  CT chest 10/16/2024-stable scarring and volume loss in the left hemothorax, no suspicious pulmonary nodules or lymphadenopathy  2/24/2025 PET-intensely avid mediastinal lymphadenopathy and left adrenal nodule new and increased in size from previous CT, uptake sigmoid colon, asymmetrical consolidation left lower lung with reticulonodular opacification; bronchoscopy and biopsy from mediastinal station 4L 3/7/2025 positive for small cell cancer  3/25/2025- C1 carboplatin/-16/atezolizumab; 4/15/2025 C2 carboplatin/-16/atezolizumab  4/30/2025-CT chest showed improved mediastinal lymphadenopathy, new 6 mm nodule left apex  Cycle 3 carboplatin reduced to AUC 4  5/27/2025 patient returns for cycle 4 carboplatin and -16.  We will proceed today however we will dose reduce his etoposide to 60 mg/m².  Carboplatin dosing will stay the same.  We will proceed with PET scan following this cycle and thereafter review by Dr. Dubon      *Pancytopenia secondary to chemotherapy:   WBC 13.21, hemoglobin 8.7, platelets 135    *Immune mediated colitis  Patient treated with high-dose steroids and taper, now off steroid therapy  No recurrence of diarrhea off steroid therapy    *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  Neuropathy symptoms stable on current chemotherapy    *MRI brain 9/11/23-2 mm enhancement left cerebellar hemisphere likely vessel artifact; MRI brain 11/27/2023-no evidence of metastatic disease, small focus of enhancement left inferior cerebellar region unchanged likely benign; MRI brain 7/24/2024-no evidence of disease; MRI brain 1/29/25 negative       *Comorbidities-hyperlipidemia    *New left lower extremity edema involving the lower legs/ankle/foot.-Negative venous duplex for 325    *Left upper extremity edema with previous left upper extremity venous Doppler and CT angiogram chest unremarkable for DVT.  Dr. Adam with radiology called this morning stating that there is  stranding in the brachiocephalic vein concerning for possible DVT however with lack of contrast in the study this could not be fully diagnosed.  Suggest to repeat CT angiogram chest with delayed contrast phase which was ordered and performed today.  Unfortunately delayed contrast was not performed and therefore the study could not confirm thrombosis.  Case reviewed by Dr. Keita again who is fairly confident that from the PE there is a clot in the left brachiocephalic.  Case reviewed with Dr. Dubon today he states will proceed with anticoagulation with Eliquis.  CBC repeated and platelet count has improved from 38,000 on Tuesday up to 44,000 today.  Since he is below 50,000 we will begin Eliquis 2.5 mg twice daily with the intention to increase to 5 mg twice daily once his platelets are greater than 50,000.  Eliquis 5 mg tablets were sent to the pharmacy and patient's daughter verbalized understanding of half a tablet twice daily.  We did discuss possible risks with blood clot as well as starting anticoagulation including increased risk of bleeding.  Patient and daughter verbalized understanding.  He will return on Tuesday as planned    PLAN:  Begin Eliquis 2.5 mg twice daily for new thrombosis, once platelet count greater than 50,000 will increase to 5 mg twice daily  Follow-up in next Tuesday with repeat labs, scan review, with Dr. Dubon and possible atezolizumab    I spent 50 minutes caring for Sunny on this date of service. This time includes time spent by me in the following activities: preparing for the visit, reviewing tests, obtaining and/or reviewing a separately obtained history, performing a medically appropriate examination and/or evaluation, counseling and educating the patient/family/caregiver, ordering medications, tests, or procedures, referring and communicating with other health care professionals, documenting information in the medical record, independently interpreting results and communicating  that information with the patient/family/caregiver, and care coordination.

## 2025-06-12 NOTE — TELEPHONE ENCOUNTER
Received phone call from Dr. Villafana with radiology who is reading patient's PET scan with note of stranding in the left brachial cephalic on PET.  Patient recently has been worked up with concerns of DVT however had a negative CT angiogram chest and venous Doppler however it is felt that this was more central so the Doppler would not catch this area and the contrast timing was not delayed enough to see on CT angiogram chest.  Dr. Alejandro suggested CT chest PE study with delayed phase contrast to confirm the DVT.  Patient and daughter called and informed that we will be scheduling this today.  Will await the report and we will see in office following if clot is conformed to begin blood thinner pending repeat CBC and platelet count.

## 2025-06-17 ENCOUNTER — OFFICE VISIT (OUTPATIENT)
Dept: ONCOLOGY | Facility: CLINIC | Age: 70
End: 2025-06-17
Payer: MEDICARE

## 2025-06-17 ENCOUNTER — INFUSION (OUTPATIENT)
Dept: ONCOLOGY | Facility: HOSPITAL | Age: 70
End: 2025-06-17
Payer: MEDICARE

## 2025-06-17 VITALS
RESPIRATION RATE: 16 BRPM | HEART RATE: 80 BPM | BODY MASS INDEX: 24.82 KG/M2 | TEMPERATURE: 98.7 F | OXYGEN SATURATION: 99 % | DIASTOLIC BLOOD PRESSURE: 76 MMHG | SYSTOLIC BLOOD PRESSURE: 117 MMHG | WEIGHT: 173.4 LBS | HEIGHT: 70 IN

## 2025-06-17 DIAGNOSIS — D70.1 CHEMOTHERAPY INDUCED NEUTROPENIA: ICD-10-CM

## 2025-06-17 DIAGNOSIS — C34.92 PRIMARY SQUAMOUS CELL CARCINOMA OF LUNG, LEFT: ICD-10-CM

## 2025-06-17 DIAGNOSIS — D64.81 ANEMIA ASSOCIATED WITH CHEMOTHERAPY: ICD-10-CM

## 2025-06-17 DIAGNOSIS — C34.32 CANCER OF BRONCHUS OF LEFT LOWER LOBE: ICD-10-CM

## 2025-06-17 DIAGNOSIS — Z79.69 NEED FOR PROPHYLACTIC CHEMOTHERAPY: Primary | ICD-10-CM

## 2025-06-17 DIAGNOSIS — Z79.69 NEED FOR PROPHYLACTIC CHEMOTHERAPY: ICD-10-CM

## 2025-06-17 DIAGNOSIS — T45.1X5A CHEMOTHERAPY INDUCED NEUTROPENIA: ICD-10-CM

## 2025-06-17 DIAGNOSIS — Z79.899 HIGH RISK MEDICATION USE: ICD-10-CM

## 2025-06-17 DIAGNOSIS — T45.1X5A ANEMIA ASSOCIATED WITH CHEMOTHERAPY: ICD-10-CM

## 2025-06-17 DIAGNOSIS — Z45.2 FITTING AND ADJUSTMENT OF VASCULAR CATHETER: ICD-10-CM

## 2025-06-17 DIAGNOSIS — C34.90 PRIMARY MALIGNANT NEOPLASM OF LUNG METASTATIC TO OTHER SITE, UNSPECIFIED LATERALITY: Primary | ICD-10-CM

## 2025-06-17 DIAGNOSIS — I82.622 ARM DVT (DEEP VENOUS THROMBOEMBOLISM), ACUTE, LEFT: ICD-10-CM

## 2025-06-17 LAB
ALBUMIN SERPL-MCNC: 3.6 G/DL (ref 3.5–5.2)
ALBUMIN/GLOB SERPL: 1.1 G/DL
ALP SERPL-CCNC: 69 U/L (ref 39–117)
ALT SERPL W P-5'-P-CCNC: 12 U/L (ref 1–41)
ANION GAP SERPL CALCULATED.3IONS-SCNC: 11.7 MMOL/L (ref 5–15)
AST SERPL-CCNC: 17 U/L (ref 1–40)
BASOPHILS # BLD AUTO: 0.04 10*3/MM3 (ref 0–0.2)
BASOPHILS NFR BLD AUTO: 0.5 % (ref 0–1.5)
BILIRUB SERPL-MCNC: 0.2 MG/DL (ref 0–1.2)
BUN SERPL-MCNC: 8.6 MG/DL (ref 8–23)
BUN/CREAT SERPL: 13 (ref 7–25)
CALCIUM SPEC-SCNC: 9 MG/DL (ref 8.6–10.5)
CHLORIDE SERPL-SCNC: 102 MMOL/L (ref 98–107)
CO2 SERPL-SCNC: 23.3 MMOL/L (ref 22–29)
CREAT SERPL-MCNC: 0.66 MG/DL (ref 0.76–1.27)
DEPRECATED RDW RBC AUTO: 60.5 FL (ref 37–54)
EGFRCR SERPLBLD CKD-EPI 2021: 102.2 ML/MIN/1.73
EOSINOPHIL # BLD AUTO: 0.13 10*3/MM3 (ref 0–0.4)
EOSINOPHIL NFR BLD AUTO: 1.6 % (ref 0.3–6.2)
ERYTHROCYTE [DISTWIDTH] IN BLOOD BY AUTOMATED COUNT: 19.2 % (ref 12.3–15.4)
GLOBULIN UR ELPH-MCNC: 3.2 GM/DL
GLUCOSE SERPL-MCNC: 173 MG/DL (ref 65–99)
HCT VFR BLD AUTO: 30.2 % (ref 37.5–51)
HGB BLD-MCNC: 9.1 G/DL (ref 13–17.7)
IMM GRANULOCYTES # BLD AUTO: 0.04 10*3/MM3 (ref 0–0.05)
IMM GRANULOCYTES NFR BLD AUTO: 0.5 % (ref 0–0.5)
LYMPHOCYTES # BLD AUTO: 1.42 10*3/MM3 (ref 0.7–3.1)
LYMPHOCYTES NFR BLD AUTO: 17 % (ref 19.6–45.3)
MCH RBC QN AUTO: 26.7 PG (ref 26.6–33)
MCHC RBC AUTO-ENTMCNC: 30.1 G/DL (ref 31.5–35.7)
MCV RBC AUTO: 88.6 FL (ref 79–97)
MONOCYTES # BLD AUTO: 0.67 10*3/MM3 (ref 0.1–0.9)
MONOCYTES NFR BLD AUTO: 8 % (ref 5–12)
NEUTROPHILS NFR BLD AUTO: 6.05 10*3/MM3 (ref 1.7–7)
NEUTROPHILS NFR BLD AUTO: 72.4 % (ref 42.7–76)
NRBC BLD AUTO-RTO: 0 /100 WBC (ref 0–0.2)
PLATELET # BLD AUTO: 109 10*3/MM3 (ref 140–450)
PMV BLD AUTO: 9.7 FL (ref 6–12)
POTASSIUM SERPL-SCNC: 3.5 MMOL/L (ref 3.5–5.2)
PROT SERPL-MCNC: 6.8 G/DL (ref 6–8.5)
RBC # BLD AUTO: 3.41 10*6/MM3 (ref 4.14–5.8)
SODIUM SERPL-SCNC: 137 MMOL/L (ref 136–145)
T3FREE SERPL-MCNC: 2.54 PG/ML (ref 2–4.4)
T4 FREE SERPL-MCNC: 0.92 NG/DL (ref 0.92–1.68)
TSH SERPL DL<=0.05 MIU/L-ACNC: 6.05 UIU/ML (ref 0.27–4.2)
WBC NRBC COR # BLD AUTO: 8.35 10*3/MM3 (ref 3.4–10.8)

## 2025-06-17 PROCEDURE — 80053 COMPREHEN METABOLIC PANEL: CPT | Performed by: NURSE PRACTITIONER

## 2025-06-17 PROCEDURE — 25010000002 HEPARIN LOCK FLUSH PER 10 UNITS: Performed by: INTERNAL MEDICINE

## 2025-06-17 PROCEDURE — 85025 COMPLETE CBC W/AUTO DIFF WBC: CPT | Performed by: NURSE PRACTITIONER

## 2025-06-17 PROCEDURE — 25010000002 ATEZOLIZUMAB 1200 MG/20ML SOLUTION 20 ML VIAL: Performed by: INTERNAL MEDICINE

## 2025-06-17 PROCEDURE — 25810000003 SODIUM CHLORIDE 0.9 % SOLUTION 250 ML FLEX CONT: Performed by: INTERNAL MEDICINE

## 2025-06-17 PROCEDURE — 84443 ASSAY THYROID STIM HORMONE: CPT | Performed by: INTERNAL MEDICINE

## 2025-06-17 PROCEDURE — 84439 ASSAY OF FREE THYROXINE: CPT | Performed by: INTERNAL MEDICINE

## 2025-06-17 PROCEDURE — 84481 FREE ASSAY (FT-3): CPT | Performed by: INTERNAL MEDICINE

## 2025-06-17 PROCEDURE — 96413 CHEMO IV INFUSION 1 HR: CPT

## 2025-06-17 RX ORDER — SODIUM CHLORIDE 0.9 % (FLUSH) 0.9 %
10 SYRINGE (ML) INJECTION AS NEEDED
Status: DISCONTINUED | OUTPATIENT
Start: 2025-06-17 | End: 2025-06-17 | Stop reason: HOSPADM

## 2025-06-17 RX ORDER — SODIUM CHLORIDE 9 MG/ML
20 INJECTION, SOLUTION INTRAVENOUS ONCE
Status: CANCELLED | OUTPATIENT
Start: 2025-06-17

## 2025-06-17 RX ORDER — HEPARIN SODIUM (PORCINE) LOCK FLUSH IV SOLN 100 UNIT/ML 100 UNIT/ML
500 SOLUTION INTRAVENOUS AS NEEDED
Status: DISCONTINUED | OUTPATIENT
Start: 2025-06-17 | End: 2025-06-17 | Stop reason: HOSPADM

## 2025-06-17 RX ORDER — SODIUM CHLORIDE 9 MG/ML
20 INJECTION, SOLUTION INTRAVENOUS ONCE
OUTPATIENT
Start: 2025-07-08

## 2025-06-17 RX ORDER — SODIUM CHLORIDE 0.9 % (FLUSH) 0.9 %
10 SYRINGE (ML) INJECTION AS NEEDED
OUTPATIENT
Start: 2025-06-17

## 2025-06-17 RX ORDER — HEPARIN SODIUM (PORCINE) LOCK FLUSH IV SOLN 100 UNIT/ML 100 UNIT/ML
500 SOLUTION INTRAVENOUS AS NEEDED
OUTPATIENT
Start: 2025-06-17

## 2025-06-17 RX ADMIN — Medication 10 ML: at 11:46

## 2025-06-17 RX ADMIN — ATEZOLIZUMAB 1200 MG: 1200 INJECTION, SOLUTION INTRAVENOUS at 11:13

## 2025-06-17 RX ADMIN — HEPARIN 500 UNITS: 100 SYRINGE at 11:46

## 2025-06-17 NOTE — PROGRESS NOTES
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  He completed 4 cycles of carboplatin/-16/atezolizumab on 5/29/2025.  The patient is been having swelling of the left upper extremity with negative venous duplex but CT/PET imaging suggestive of thrombus in the left brachiocephalic vein.  He was started on anticoagulation last week with low-dose Eliquis noting improvement in swelling over the week.  He has improvement in his stamina.  He has stable shortness of breath.  He denies uncontrolled pain.  He complains of bilateral ankle edema.      Review of Systems   Constitutional:  Negative for fatigue and fever.   HENT:  Positive for dental problem.    Respiratory:  Positive for cough and shortness of breath.    Cardiovascular:  Positive for leg swelling. Negative for chest pain.   Gastrointestinal:  Positive for constipation and nausea. Negative for abdominal pain and vomiting.   Genitourinary: Negative.    Musculoskeletal: Negative.    Neurological:  Positive for numbness.   Hematological:  Bruises/bleeds easily.   Psychiatric/Behavioral:  Negative for dysphoric mood. The patient is not nervous/anxious.          Objective     Vitals:    06/17/25 0932   BP: 117/76   Pulse: 80   Resp: 16   Temp: 98.7 °F (37.1 °C)   TempSrc: Infrared   SpO2: 99%   Weight: 78.7 kg (173 lb 6.4 oz)   Height: 177.8 cm (70\")   PainSc: 0-No pain                   6/17/2025     9:48 AM   Current Status   ECOG score 1       Physical Exam    CONSTITUTIONAL: pleasant well-developed adult man  LYMPH: no cervical or supraclavicular lad  HEENT: Mild left parotid swelling  CV: RRR, S1S2, no murmur  RESP: Breath sounds are diminished and coarse  GI: soft, non-tender, no splenomegaly, +bs  MUSC: 1+ edema bilateral ankles  NEURO: alert and " oriented x3, normal strength  PSYCH: normal mood anxious affect  Skin: Scattered minor bruising    RECENT LABS:  Results from last 7 days   Lab Units 06/17/25  0946 06/12/25  1352 06/10/25  1331   WBC 10*3/mm3 8.35 8.73 9.27   NEUTROS ABS 10*3/mm3 6.05 6.59 6.52   HEMOGLOBIN g/dL 9.1* 8.4* 7.7*   HEMATOCRIT % 30.2* 27.6* 23.4*   PLATELETS 10*3/mm3 109* 44* 38*                       Assessment & Plan   *T4N0M0 poorly differentiated carcinoma/squamous differentiation left lower lobe of the lung/small cell lung cancer on pathology at surgery  Mass discovered on low-dose CT chest screening 7/31/2023  PET scan 9/8/2023 showed a 1.3 x 5 cm left lower lobe mass involving the hilum with smaller adjacent nodular opacities, max SUV 23.2, photopenic right upper lobe nodule  Bronchoscopy with biopsy from the left lower lobe 9/6/2023 positive for poorly differentiated carcinoma with squamous differentiation; P-L1 TPS 0%  Initiated neoadjuvant treatment with carboplatin/Taxol/nivolumab 9/20/2023 and completed 3 cycles 11/1/2023  Status post left lower lobectomy 12/13/2023 complicated by prolonged and left lower lobe pneumonia.  Pathology from surgery showed significant treatment effect but residual small cell carcinoma with positive bronchial and perivascular soft tissue margins by tumor within lymphatics, positive lymph node station 10 L for small cell carcinoma x 2   2/5/2024-initiated postoperative carboplatin/-16 with concurrent radiation for small cell lung cancer/positive margin  2/26/2024-C2 carboplatin/-16 with concurrent radiation; completed radiation therapy 3/15/2024; 4 cycles carboplatin/-16 completed 4/11/2024  CT chest and MRI brain 7/24/2024-[facility]  CT chest 10/16/2024-stable scarring and volume loss in the left hemothorax, no suspicious pulmonary nodules or lymphadenopathy  2/24/2025 PET-intensely avid mediastinal lymphadenopathy and left adrenal nodule new and increased in size from previous CT, uptake sigmoid  colon, asymmetrical consolidation left lower lung with reticulonodular opacification; bronchoscopy and biopsy from mediastinal station 4L 3/7/2025 positive for small cell cancer  3/25/2025- C1 carboplatin/-16/atezolizumab; 4/15/2025 C2 carboplatin/-16/atezolizumab  4/30/2025-CT chest showed improved mediastinal lymphadenopathy, new 6 mm nodule left apex  C4 carboplatin -16 atezolizumab completed 5/29/2025 6/11/2025 PET-enlarged left brachiocephalic vein with surrounding stranding, stable left upper lobe consolidation, decreased left paratracheal subcarinal and paraesophageal lymphadenopathy other than a 1.2 cm low right paratracheal lymph node previously 0.9 cm, new mildly hypermetabolic right hilar lymph node SUV 3.1, resolution of left adrenal nodule      *Pancytopenia secondary to chemotherapy:   WBC 8.35 hemoglobin 9.1 platelets 109    *Immune mediated colitis  Patient treated with high-dose steroids and taper, now off steroid therapy  No recurrence of diarrhea off steroid therapy    *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  Neuropathy symptoms stable on current chemotherapy    *MRI brain 9/11/23-2 mm enhancement left cerebellar hemisphere likely vessel artifact; MRI brain 11/27/2023-no evidence of metastatic disease, small focus of enhancement left inferior cerebellar region unchanged likely benign; MRI brain 7/24/2024-no evidence of disease; MRI brain 1/29/25 negative; MRI brain 6/12/2025 negative       *Comorbidities-hyperlipidemia    *Left upper extremity thrombosis  PET scan 6/11/2025-noted enlarged left brachiocephalic vein with surrounding stranding  6/12/2025 CT angiogram chest-no PE, left subclavian and brachiocephalic veins poorly opacified  Patient currently on Eliquis 2.5 mg every 12 hours (thrombocytopenia)        PLAN:  Proceed maintenance atezolizumab every 3 weeks; plan to repeat imaging 3 months  Continue Eliquis but increase dose to 5 mg every 12 hours  3-week lab atezolizumab; 6-week  lab practitioner atezolizumab

## 2025-07-08 ENCOUNTER — INFUSION (OUTPATIENT)
Dept: ONCOLOGY | Facility: HOSPITAL | Age: 70
End: 2025-07-08
Payer: MEDICARE

## 2025-07-08 VITALS
OXYGEN SATURATION: 98 % | RESPIRATION RATE: 18 BRPM | WEIGHT: 174 LBS | DIASTOLIC BLOOD PRESSURE: 68 MMHG | HEART RATE: 72 BPM | TEMPERATURE: 98.2 F | SYSTOLIC BLOOD PRESSURE: 137 MMHG | BODY MASS INDEX: 24.97 KG/M2

## 2025-07-08 DIAGNOSIS — Z79.69 NEED FOR PROPHYLACTIC CHEMOTHERAPY: ICD-10-CM

## 2025-07-08 DIAGNOSIS — Z45.2 FITTING AND ADJUSTMENT OF VASCULAR CATHETER: Primary | ICD-10-CM

## 2025-07-08 DIAGNOSIS — C34.92 PRIMARY SQUAMOUS CELL CARCINOMA OF LUNG, LEFT: ICD-10-CM

## 2025-07-08 DIAGNOSIS — Z79.899 HIGH RISK MEDICATION USE: ICD-10-CM

## 2025-07-08 LAB
ALBUMIN SERPL-MCNC: 3.8 G/DL (ref 3.5–5.2)
ALBUMIN/GLOB SERPL: 1.2 G/DL
ALP SERPL-CCNC: 53 U/L (ref 39–117)
ALT SERPL W P-5'-P-CCNC: 10 U/L (ref 1–41)
ANION GAP SERPL CALCULATED.3IONS-SCNC: 10 MMOL/L (ref 5–15)
AST SERPL-CCNC: 18 U/L (ref 1–40)
BASOPHILS # BLD AUTO: 0.08 10*3/MM3 (ref 0–0.2)
BASOPHILS NFR BLD AUTO: 1 % (ref 0–1.5)
BILIRUB SERPL-MCNC: 0.2 MG/DL (ref 0–1.2)
BUN SERPL-MCNC: 14.2 MG/DL (ref 8–23)
BUN/CREAT SERPL: 18.9 (ref 7–25)
CALCIUM SPEC-SCNC: 9 MG/DL (ref 8.6–10.5)
CHLORIDE SERPL-SCNC: 105 MMOL/L (ref 98–107)
CO2 SERPL-SCNC: 24 MMOL/L (ref 22–29)
CREAT SERPL-MCNC: 0.75 MG/DL (ref 0.76–1.27)
DEPRECATED RDW RBC AUTO: 59.9 FL (ref 37–54)
EGFRCR SERPLBLD CKD-EPI 2021: 98.3 ML/MIN/1.73
EOSINOPHIL # BLD AUTO: 0.67 10*3/MM3 (ref 0–0.4)
EOSINOPHIL NFR BLD AUTO: 8.3 % (ref 0.3–6.2)
ERYTHROCYTE [DISTWIDTH] IN BLOOD BY AUTOMATED COUNT: 18.1 % (ref 12.3–15.4)
GLOBULIN UR ELPH-MCNC: 3.1 GM/DL
GLUCOSE SERPL-MCNC: 113 MG/DL (ref 65–99)
HCT VFR BLD AUTO: 31.9 % (ref 37.5–51)
HGB BLD-MCNC: 9.8 G/DL (ref 13–17.7)
IMM GRANULOCYTES # BLD AUTO: 0.01 10*3/MM3 (ref 0–0.05)
IMM GRANULOCYTES NFR BLD AUTO: 0.1 % (ref 0–0.5)
LYMPHOCYTES # BLD AUTO: 1.78 10*3/MM3 (ref 0.7–3.1)
LYMPHOCYTES NFR BLD AUTO: 21.9 % (ref 19.6–45.3)
MCH RBC QN AUTO: 27.9 PG (ref 26.6–33)
MCHC RBC AUTO-ENTMCNC: 30.7 G/DL (ref 31.5–35.7)
MCV RBC AUTO: 90.9 FL (ref 79–97)
MONOCYTES # BLD AUTO: 0.68 10*3/MM3 (ref 0.1–0.9)
MONOCYTES NFR BLD AUTO: 8.4 % (ref 5–12)
NEUTROPHILS NFR BLD AUTO: 4.9 10*3/MM3 (ref 1.7–7)
NEUTROPHILS NFR BLD AUTO: 60.3 % (ref 42.7–76)
NRBC BLD AUTO-RTO: 0 /100 WBC (ref 0–0.2)
PLATELET # BLD AUTO: 249 10*3/MM3 (ref 140–450)
PMV BLD AUTO: 9.7 FL (ref 6–12)
POTASSIUM SERPL-SCNC: 4.1 MMOL/L (ref 3.5–5.2)
PROT SERPL-MCNC: 6.9 G/DL (ref 6–8.5)
RBC # BLD AUTO: 3.51 10*6/MM3 (ref 4.14–5.8)
SODIUM SERPL-SCNC: 139 MMOL/L (ref 136–145)
WBC NRBC COR # BLD AUTO: 8.12 10*3/MM3 (ref 3.4–10.8)

## 2025-07-08 PROCEDURE — 25010000002 ATEZOLIZUMAB 1200 MG/20ML SOLUTION 20 ML VIAL: Performed by: INTERNAL MEDICINE

## 2025-07-08 PROCEDURE — 25010000002 HEPARIN LOCK FLUSH PER 10 UNITS: Performed by: INTERNAL MEDICINE

## 2025-07-08 PROCEDURE — 80053 COMPREHEN METABOLIC PANEL: CPT | Performed by: INTERNAL MEDICINE

## 2025-07-08 PROCEDURE — 96413 CHEMO IV INFUSION 1 HR: CPT

## 2025-07-08 PROCEDURE — 25810000003 SODIUM CHLORIDE 0.9 % SOLUTION 250 ML FLEX CONT: Performed by: INTERNAL MEDICINE

## 2025-07-08 PROCEDURE — 85025 COMPLETE CBC W/AUTO DIFF WBC: CPT | Performed by: INTERNAL MEDICINE

## 2025-07-08 RX ORDER — HEPARIN SODIUM (PORCINE) LOCK FLUSH IV SOLN 100 UNIT/ML 100 UNIT/ML
500 SOLUTION INTRAVENOUS AS NEEDED
OUTPATIENT
Start: 2025-07-08

## 2025-07-08 RX ORDER — SODIUM CHLORIDE 0.9 % (FLUSH) 0.9 %
10 SYRINGE (ML) INJECTION AS NEEDED
OUTPATIENT
Start: 2025-07-08

## 2025-07-08 RX ORDER — HEPARIN SODIUM (PORCINE) LOCK FLUSH IV SOLN 100 UNIT/ML 100 UNIT/ML
500 SOLUTION INTRAVENOUS AS NEEDED
Status: DISCONTINUED | OUTPATIENT
Start: 2025-07-08 | End: 2025-07-08 | Stop reason: HOSPADM

## 2025-07-08 RX ORDER — AMOXICILLIN 250 MG
1 CAPSULE ORAL AS NEEDED
COMMUNITY

## 2025-07-08 RX ORDER — SODIUM CHLORIDE 0.9 % (FLUSH) 0.9 %
10 SYRINGE (ML) INJECTION AS NEEDED
Status: DISCONTINUED | OUTPATIENT
Start: 2025-07-08 | End: 2025-07-08 | Stop reason: HOSPADM

## 2025-07-08 RX ADMIN — ATEZOLIZUMAB 1200 MG: 1200 INJECTION, SOLUTION INTRAVENOUS at 14:47

## 2025-07-08 RX ADMIN — HEPARIN 500 UNITS: 100 SYRINGE at 15:20

## 2025-07-08 RX ADMIN — Medication 10 ML: at 15:20

## 2025-07-16 ENCOUNTER — PATIENT OUTREACH (OUTPATIENT)
Dept: OTHER | Facility: HOSPITAL | Age: 70
End: 2025-07-16
Payer: MEDICARE

## 2025-07-16 NOTE — PROGRESS NOTES
Reviewed chart. Patient with recurrent small cell cancer.  S/p carboplatin/-16/atezolizumab 3/25-5/29/25. Continues maintenance atezolizumab every 3 weeks (next due 7/29).    Called patient. Left message for wife, preferred contact that I was checking in to see how the patient is doing with immunotherapy. Requested cb at their convenience. Tried to reach on home phone as well, no vm.  Will close case since he appears stable on immunotherapy. Encouraged patient/wife to call in future if needed

## 2025-07-24 ENCOUNTER — OFFICE VISIT (OUTPATIENT)
Age: 70
End: 2025-07-24
Payer: MEDICARE

## 2025-07-24 VITALS
OXYGEN SATURATION: 97 % | SYSTOLIC BLOOD PRESSURE: 140 MMHG | DIASTOLIC BLOOD PRESSURE: 76 MMHG | HEIGHT: 70 IN | BODY MASS INDEX: 24.2 KG/M2 | WEIGHT: 169 LBS

## 2025-07-24 DIAGNOSIS — E78.2 MIXED HYPERLIPIDEMIA: Primary | ICD-10-CM

## 2025-07-24 DIAGNOSIS — E78.5 HYPERLIPIDEMIA LDL GOAL <100: ICD-10-CM

## 2025-07-24 DIAGNOSIS — C34.32 CANCER OF BRONCHUS OF LEFT LOWER LOBE: ICD-10-CM

## 2025-07-24 DIAGNOSIS — I47.19 ATRIAL TACHYCARDIA: ICD-10-CM

## 2025-07-24 NOTE — PROGRESS NOTES
CARDIOLOGY        Patient Name: Sunny Hung  :1955  Age: 69 y.o.  Primary Cardiologist: Giovany Delacruz MD  Encounter Provider:  Mauro Marks PA-C    Date of Service: 25            CHIEF COMPLAINT / REASON FOR OFFICE VISIT     1 year follow-up      HISTORY OF PRESENT ILLNESS       HPI  Sunny Hung is a 69 y.o. male who presents today for 1 year follow-up.     Pt has a  history significant for hyperlipidemia, atrial tachycardia, and lung cancer presents for 1 year follow-up.  Patient was seen in office on 2024 where he had no further evidence of palpitations.  He underwent going to therapy for his cancer with Dr. Dubon.  He was to continue being off his metoprolol.  He was encouraged to call the office if he any further bouts of SVT.  He is here for a 1 year follow-up.    Patient has not had any recurrence of palpitations or SVT.  He has been feeling his normal self for the past year.  He denies any chest pain, chest pressure, change in his shortness of breath, leg edema, or orthopnea.  He is now on Eliquis due to having a DVT to his left brachiocephalic vein.  He denies any bleeding issues.  He continues to be on immunotherapy with Dr. Dubon.      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 I evaluated the patient on December 15 for intermittent SVT episodes. He had 2 chest tubes in place at the time 1 of which was abutting the lateral wall of his left ventricle. He was having short, mostly asymptomatic SVT episodes that were never captured on ECG. There appear to be P wave morphologies consistent with atrial tachycardia during the spells.     The following portions of the patient's history were reviewed and updated as  "appropriate: allergies, current medications, past family history, past medical history, past social history, past surgical history and problem list.      VITAL SIGNS     Visit Vitals  /76 (BP Location: Left arm, Patient Position: Sitting, Cuff Size: Adult)   Ht 177.8 cm (70\")   Wt 76.7 kg (169 lb)   SpO2 97%   BMI 24.25 kg/m²       @[email or web address]  Wt Readings from Last 3 Encounters:   07/24/25 76.7 kg (169 lb)   07/08/25 78.9 kg (174 lb)   06/17/25 78.7 kg (173 lb 6.4 oz)     Body mass index is 24.25 kg/m².        PHYSICAL EXAMINATION     Constitutional:       General: Awake. Not in acute distress.     Appearance: Not in distress. Chronically ill-appearing.   Pulmonary:      Effort: Pulmonary effort is normal.      Breath sounds: Normal breath sounds.   Cardiovascular:      Normal rate. Regular rhythm.      Murmurs: There is no murmur.   Edema:     Peripheral edema absent.   Skin:     General: Skin is warm.   Neurological:      Mental Status: Alert.   Psychiatric:         Behavior: Behavior is cooperative.           REVIEWED DATA       ECG 12 Lead    Date/Time: 7/24/2025 8:40 AM  Performed by: Mauro Marks PA-C    Authorized by: Mauro Marks PA-C  Comparison: compared with previous ECG   Similar to previous ECG  Rhythm: sinus rhythm  Rate: normal  BPM: 71  Other findings: left ventricular hypertrophy    Clinical impression: abnormal EKG  Comments: Similar to previous        Cardiac Procedures:    Transthoracic echo on 12/5/2023    Left ventricular systolic function is normal. Left ventricular ejection fraction appears to be 56 - 60%.    Left ventricular diastolic function was normal.    Estimated right ventricular systolic pressure from tricuspid regurgitation is normal (<35 mmHg).       Lipid Panel          8/2/2024    07:09 11/5/2024    15:32   Lipid Panel   Total Cholesterol 179  176    Triglycerides 57  90    HDL Cholesterol 53  49    VLDL Cholesterol 11  17    LDL Cholesterol  115 "  110    LDL/HDL Ratio 2.16  2.22        Lab Results   Component Value Date     07/08/2025     06/17/2025    K 4.1 07/08/2025    K 3.5 06/17/2025     07/08/2025     06/17/2025    CO2 24.0 07/08/2025    CO2 23.3 06/17/2025    BUN 14.2 07/08/2025    BUN 8.6 06/17/2025    CREATININE 0.75 (L) 07/08/2025    CREATININE 0.66 (L) 06/17/2025    GLUCOSE 113 (H) 07/08/2025    GLUCOSE 173 (H) 06/17/2025    CALCIUM 9.0 07/08/2025    CALCIUM 9.0 06/17/2025    ALBUMIN 3.8 07/08/2025    ALBUMIN 3.6 06/17/2025    BILITOT 0.2 07/08/2025    BILITOT 0.2 06/17/2025    AST 18 07/08/2025    AST 17 06/17/2025    ALT 10 07/08/2025    ALT 12 06/17/2025     Lab Results   Component Value Date    WBC 8.12 07/08/2025    WBC 8.35 06/17/2025    HGB 9.8 (L) 07/08/2025    HGB 9.1 (L) 06/17/2025    HCT 31.9 (L) 07/08/2025    HCT 30.2 (L) 06/17/2025    MCV 90.9 07/08/2025    MCV 88.6 06/17/2025     07/08/2025     (L) 06/17/2025     Lab Results   Component Value Date    PROBNP 157.0 09/08/2024     Lab Results   Component Value Date    TROPONINT 14 09/08/2024     Lab Results   Component Value Date    TSH 6.050 (H) 06/17/2025    TSH 3.670 05/06/2025             ASSESSMENT & PLAN     There are no diagnoses linked to this encounter.    SVT, probably atrial tachycardia: Short-lived episode while hospitalized that responded to metoprolol.  Probably transient related to postoperative changes from his lobectomy.  Possibly also chest tube induced.  Clinically, no recurrence.  In sinus rhythm clinically today.  No need for the further metoprolol.  I encourage patient to call our office if he has any new questions or concerns, or any new symptoms to suggest that SVT has returned.  Poorly differentiated squamous cell carcinoma: Follows with oncology and thoracic surgery.  Status post lobectomy and immunotherapy.  In remission per his report   He underwent a surveillance CT today  Hyperlipidemia.  He does have some coronary  calcifications on his CT chest.  His goal LDL needs be less than 100 at least.  He is on pravastatin. LDL of 110 on labs 8-5-24. He wanted to work on diet modifications     RTC 1 year. He currently he is without complaint.  No further SVT episodes.  If he has any new symptoms to suggest any arrhythmia has returned.  I will have him follow-up in 1 year.      Return in about 1 year (around 7/24/2026) for Dr. Delacruz.    Future Appointments         Provider Department Center    7/29/2025 10:30 AM INFUSION ACCESS CHAIR- [facility] INFUSION [facility]    7/29/2025 11:00 AM Greg Dubon MD [facility] HEMATOLOGY & ONCOLOGY [facility]    7/29/2025 11:30 AM CHAIR 5 [facility]                MEDICATIONS         Discharge Medications            Accurate as of July 24, 2025  8:42 AM. If you have any questions, ask your nurse or doctor.                Changes to Medications        Instructions Start Date   guaifenesin-dextromethorphan 600-30 mg  MG tablet sustained-release 12 hour  What changed: when to take this   1 tablet, Oral, 2 Times Daily PRN             Continue These Medications        Instructions Start Date   acetaminophen 500 MG tablet  Commonly known as: TYLENOL   500 mg, Every 6 Hours PRN      albuterol (2.5 MG/3ML) 0.083% nebulizer solution  Commonly known as: PROVENTIL   2.5 mg, Every 4 Hours PRN      apixaban 5 MG tablet tablet  Commonly known as: ELIQUIS   5 mg, Oral, 2 Times Daily      Hydrocod Janak-Chlorphe Janak ER 10-8 MG/5ML ER suspension  Commonly known as: TUSSIONEX PENNKINETIC   5 mL, Oral, Every 12 Hours PRN      ondansetron 8 MG tablet  Commonly known as: ZOFRAN   8 mg, Oral, 3 Times Daily PRN      pravastatin 20 MG tablet  Commonly known as: PRAVACHOL   1 tablet, Daily      sennosides-docusate 8.6-50 MG per tablet  Commonly known as: PERICOLACE   1 tablet, As Needed      tamsulosin 0.4 MG capsule 24 hr capsule  Commonly  known as: FLOMAX   1 capsule, 2 Times Daily                   **Dragon Disclaimer:   Much of this encounter note is an electronic transcription/translation of spoken language to printed text. The electronic translation of spoken language may permit erroneous, or at times, nonsensical words or phrases to be inadvertently transcribed. Although I have reviewed the note for such errors, some may still exist.

## 2025-07-29 ENCOUNTER — INFUSION (OUTPATIENT)
Dept: ONCOLOGY | Facility: HOSPITAL | Age: 70
End: 2025-07-29
Payer: MEDICARE

## 2025-07-29 ENCOUNTER — OFFICE VISIT (OUTPATIENT)
Dept: ONCOLOGY | Facility: CLINIC | Age: 70
End: 2025-07-29
Payer: MEDICARE

## 2025-07-29 VITALS
HEIGHT: 70 IN | TEMPERATURE: 98.3 F | OXYGEN SATURATION: 98 % | RESPIRATION RATE: 16 BRPM | SYSTOLIC BLOOD PRESSURE: 163 MMHG | DIASTOLIC BLOOD PRESSURE: 78 MMHG | HEART RATE: 74 BPM | BODY MASS INDEX: 24.62 KG/M2 | WEIGHT: 172 LBS

## 2025-07-29 DIAGNOSIS — Z79.899 HIGH RISK MEDICATION USE: ICD-10-CM

## 2025-07-29 DIAGNOSIS — C34.92 PRIMARY SQUAMOUS CELL CARCINOMA OF LUNG, LEFT: ICD-10-CM

## 2025-07-29 DIAGNOSIS — Z79.69 NEED FOR PROPHYLACTIC CHEMOTHERAPY: ICD-10-CM

## 2025-07-29 DIAGNOSIS — Z79.69 NEED FOR PROPHYLACTIC CHEMOTHERAPY: Primary | ICD-10-CM

## 2025-07-29 DIAGNOSIS — Z45.2 FITTING AND ADJUSTMENT OF VASCULAR CATHETER: ICD-10-CM

## 2025-07-29 LAB
ALBUMIN SERPL-MCNC: 4 G/DL (ref 3.5–5.2)
ALBUMIN/GLOB SERPL: 1.3 G/DL
ALP SERPL-CCNC: 43 U/L (ref 39–117)
ALT SERPL W P-5'-P-CCNC: 12 U/L (ref 1–41)
ANION GAP SERPL CALCULATED.3IONS-SCNC: 9.9 MMOL/L (ref 5–15)
AST SERPL-CCNC: 16 U/L (ref 1–40)
BASOPHILS # BLD AUTO: 0.02 10*3/MM3 (ref 0–0.2)
BASOPHILS NFR BLD AUTO: 0.2 % (ref 0–1.5)
BILIRUB SERPL-MCNC: 0.4 MG/DL (ref 0–1.2)
BUN SERPL-MCNC: 17.5 MG/DL (ref 8–23)
BUN/CREAT SERPL: 27.3 (ref 7–25)
CALCIUM SPEC-SCNC: 9 MG/DL (ref 8.6–10.5)
CHLORIDE SERPL-SCNC: 101 MMOL/L (ref 98–107)
CO2 SERPL-SCNC: 25.1 MMOL/L (ref 22–29)
CREAT SERPL-MCNC: 0.64 MG/DL (ref 0.76–1.27)
DEPRECATED RDW RBC AUTO: 56.8 FL (ref 37–54)
EGFRCR SERPLBLD CKD-EPI 2021: 103.1 ML/MIN/1.73
EOSINOPHIL # BLD AUTO: 0.12 10*3/MM3 (ref 0–0.4)
EOSINOPHIL NFR BLD AUTO: 1 % (ref 0.3–6.2)
ERYTHROCYTE [DISTWIDTH] IN BLOOD BY AUTOMATED COUNT: 16.9 % (ref 12.3–15.4)
GLOBULIN UR ELPH-MCNC: 3 GM/DL
GLUCOSE SERPL-MCNC: 88 MG/DL (ref 65–99)
HCT VFR BLD AUTO: 37.7 % (ref 37.5–51)
HGB BLD-MCNC: 11.3 G/DL (ref 13–17.7)
IMM GRANULOCYTES # BLD AUTO: 0.03 10*3/MM3 (ref 0–0.05)
IMM GRANULOCYTES NFR BLD AUTO: 0.2 % (ref 0–0.5)
LYMPHOCYTES # BLD AUTO: 1.48 10*3/MM3 (ref 0.7–3.1)
LYMPHOCYTES NFR BLD AUTO: 11.9 % (ref 19.6–45.3)
MCH RBC QN AUTO: 27.4 PG (ref 26.6–33)
MCHC RBC AUTO-ENTMCNC: 30 G/DL (ref 31.5–35.7)
MCV RBC AUTO: 91.5 FL (ref 79–97)
MONOCYTES # BLD AUTO: 0.84 10*3/MM3 (ref 0.1–0.9)
MONOCYTES NFR BLD AUTO: 6.7 % (ref 5–12)
NEUTROPHILS NFR BLD AUTO: 80 % (ref 42.7–76)
NEUTROPHILS NFR BLD AUTO: 9.96 10*3/MM3 (ref 1.7–7)
PLATELET # BLD AUTO: 200 10*3/MM3 (ref 140–450)
PMV BLD AUTO: 9.4 FL (ref 6–12)
POTASSIUM SERPL-SCNC: 4.1 MMOL/L (ref 3.5–5.2)
PROT SERPL-MCNC: 7 G/DL (ref 6–8.5)
RBC # BLD AUTO: 4.12 10*6/MM3 (ref 4.14–5.8)
SODIUM SERPL-SCNC: 136 MMOL/L (ref 136–145)
T3FREE SERPL-MCNC: 2.71 PG/ML (ref 2–4.4)
T4 FREE SERPL-MCNC: 1.12 NG/DL (ref 0.92–1.68)
TSH SERPL DL<=0.05 MIU/L-ACNC: 8.46 UIU/ML (ref 0.27–4.2)
WBC NRBC COR # BLD AUTO: 12.45 10*3/MM3 (ref 3.4–10.8)

## 2025-07-29 PROCEDURE — 85025 COMPLETE CBC W/AUTO DIFF WBC: CPT | Performed by: INTERNAL MEDICINE

## 2025-07-29 PROCEDURE — 80053 COMPREHEN METABOLIC PANEL: CPT | Performed by: INTERNAL MEDICINE

## 2025-07-29 PROCEDURE — 84439 ASSAY OF FREE THYROXINE: CPT | Performed by: INTERNAL MEDICINE

## 2025-07-29 PROCEDURE — 25810000003 SODIUM CHLORIDE 0.9 % SOLUTION 250 ML FLEX CONT: Performed by: INTERNAL MEDICINE

## 2025-07-29 PROCEDURE — 1126F AMNT PAIN NOTED NONE PRSNT: CPT | Performed by: INTERNAL MEDICINE

## 2025-07-29 PROCEDURE — 25010000002 ATEZOLIZUMAB 1200 MG/20ML SOLUTION 20 ML VIAL: Performed by: INTERNAL MEDICINE

## 2025-07-29 PROCEDURE — 96413 CHEMO IV INFUSION 1 HR: CPT

## 2025-07-29 PROCEDURE — 84443 ASSAY THYROID STIM HORMONE: CPT | Performed by: INTERNAL MEDICINE

## 2025-07-29 PROCEDURE — G2211 COMPLEX E/M VISIT ADD ON: HCPCS | Performed by: INTERNAL MEDICINE

## 2025-07-29 PROCEDURE — 99214 OFFICE O/P EST MOD 30 MIN: CPT | Performed by: INTERNAL MEDICINE

## 2025-07-29 PROCEDURE — 84481 FREE ASSAY (FT-3): CPT | Performed by: INTERNAL MEDICINE

## 2025-07-29 PROCEDURE — 25010000002 HEPARIN LOCK FLUSH PER 10 UNITS: Performed by: INTERNAL MEDICINE

## 2025-07-29 RX ORDER — SODIUM CHLORIDE 9 MG/ML
20 INJECTION, SOLUTION INTRAVENOUS ONCE
Status: CANCELLED | OUTPATIENT
Start: 2025-07-29

## 2025-07-29 RX ORDER — SODIUM CHLORIDE 0.9 % (FLUSH) 0.9 %
10 SYRINGE (ML) INJECTION AS NEEDED
Status: DISCONTINUED | OUTPATIENT
Start: 2025-07-29 | End: 2025-07-29 | Stop reason: HOSPADM

## 2025-07-29 RX ORDER — SODIUM CHLORIDE 9 MG/ML
20 INJECTION, SOLUTION INTRAVENOUS ONCE
OUTPATIENT
Start: 2025-08-19

## 2025-07-29 RX ORDER — SODIUM CHLORIDE 0.9 % (FLUSH) 0.9 %
10 SYRINGE (ML) INJECTION AS NEEDED
OUTPATIENT
Start: 2025-07-29

## 2025-07-29 RX ORDER — HEPARIN SODIUM (PORCINE) LOCK FLUSH IV SOLN 100 UNIT/ML 100 UNIT/ML
500 SOLUTION INTRAVENOUS AS NEEDED
Status: DISCONTINUED | OUTPATIENT
Start: 2025-07-29 | End: 2025-07-29 | Stop reason: HOSPADM

## 2025-07-29 RX ORDER — HEPARIN SODIUM (PORCINE) LOCK FLUSH IV SOLN 100 UNIT/ML 100 UNIT/ML
500 SOLUTION INTRAVENOUS AS NEEDED
OUTPATIENT
Start: 2025-07-29

## 2025-07-29 RX ADMIN — Medication 10 ML: at 12:28

## 2025-07-29 RX ADMIN — ATEZOLIZUMAB 1200 MG: 1200 INJECTION, SOLUTION INTRAVENOUS at 11:53

## 2025-07-29 RX ADMIN — HEPARIN 500 UNITS: 100 SYRINGE at 12:28

## 2025-07-29 NOTE — PROGRESS NOTES
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  He completed 4 cycles of carboplatin/-16/atezolizumab on 5/29/2025.  The patient is been having swelling of the left upper extremity with negative venous duplex but CT/PET imaging suggestive of thrombus in the left brachiocephalic vein.  He was started on anticoagulation with Eliquis which has resolved his left upper extremity edema.  Otherwise he is recuperating from previous chemotherapy with improved stamina, stable shortness of breath, cough productive of sputum and 1 episode of hemoptysis but was scant.      Review of Systems   Constitutional:  Negative for fatigue and fever.   HENT:  Positive for dental problem.    Respiratory:  Positive for cough and shortness of breath.    Cardiovascular:  Negative for chest pain and leg swelling.   Gastrointestinal:  Positive for constipation. Negative for abdominal pain, nausea and vomiting.   Genitourinary: Negative.    Musculoskeletal: Negative.    Neurological:  Positive for numbness.   Hematological:  Bruises/bleeds easily.   Psychiatric/Behavioral:  Negative for dysphoric mood. The patient is not nervous/anxious.          Objective     Vitals:    07/29/25 1016   BP: 163/78   Pulse: 74   Resp: 16   Temp: 98.3 °F (36.8 °C)   TempSrc: Infrared   SpO2: 98%   Weight: 78 kg (172 lb)   Height: 177.8 cm (70\")   PainSc: 0-No pain                     7/29/2025    10:18 AM   Current Status   ECOG score 1       Physical Exam    CONSTITUTIONAL: pleasant well-developed adult man  LYMPH: no cervical or supraclavicular lad  HEENT: Poor dentition, no mucositis  CV: RRR, S1S2, no murmur  RESP: Breath sounds are diminished and coarse  GI: soft, non-tender, no splenomegaly, +bs  MUSC: No edema bilateral ankles, resolved left arm " swelling  NEURO: alert and oriented x3, normal strength  PSYCH: normal mood anxious affect  Skin: Scattered minor bruising    RECENT LABS:  Results from last 7 days   Lab Units 07/29/25  1027   WBC 10*3/mm3 12.45*   NEUTROS ABS 10*3/mm3 9.96*   HEMOGLOBIN g/dL 11.3*   HEMATOCRIT % 37.7   PLATELETS 10*3/mm3 200       Results from last 7 days   Lab Units 07/29/25  1027   SODIUM mmol/L 136   POTASSIUM mmol/L 4.1   CHLORIDE mmol/L 101   CO2 mmol/L 25.1   BUN mg/dL 17.5   CREATININE mg/dL 0.64*   CALCIUM mg/dL 9.0   ALBUMIN g/dL 4.0   BILIRUBIN mg/dL 0.4   ALK PHOS U/L 43   ALT (SGPT) U/L 12   AST (SGOT) U/L 16   GLUCOSE mg/dL 88                   Assessment & Plan   *T4N0M0 poorly differentiated carcinoma/squamous differentiation left lower lobe of the lung/small cell lung cancer on pathology at surgery  Mass discovered on low-dose CT chest screening 7/31/2023  PET scan 9/8/2023 showed a 1.3 x 5 cm left lower lobe mass involving the hilum with smaller adjacent nodular opacities, max SUV 23.2, photopenic right upper lobe nodule  Bronchoscopy with biopsy from the left lower lobe 9/6/2023 positive for poorly differentiated carcinoma with squamous differentiation; P-L1 TPS 0%  Initiated neoadjuvant treatment with carboplatin/Taxol/nivolumab 9/20/2023 and completed 3 cycles 11/1/2023  Status post left lower lobectomy 12/13/2023 complicated by prolonged and left lower lobe pneumonia.  Pathology from surgery showed significant treatment effect but residual small cell carcinoma with positive bronchial and perivascular soft tissue margins by tumor within lymphatics, positive lymph node station 10 L for small cell carcinoma x 2   2/5/2024-initiated postoperative carboplatin/-16 with concurrent radiation for small cell lung cancer/positive margin  2/26/2024-C2 carboplatin/-16 with concurrent radiation; completed radiation therapy 3/15/2024; 4 cycles carboplatin/-16 completed 4/11/2024  CT chest and MRI brain  7/24/2024-[facility]  CT chest 10/16/2024-stable scarring and volume loss in the left hemothorax, no suspicious pulmonary nodules or lymphadenopathy  2/24/2025 PET-intensely avid mediastinal lymphadenopathy and left adrenal nodule new and increased in size from previous CT, uptake sigmoid colon, asymmetrical consolidation left lower lung with reticulonodular opacification; bronchoscopy and biopsy from mediastinal station 4L 3/7/2025 positive for small cell cancer  3/25/2025- C1 carboplatin/-16/atezolizumab; 4/15/2025 C2 carboplatin/-16/atezolizumab  4/30/2025-CT chest showed improved mediastinal lymphadenopathy, new 6 mm nodule left apex  C4 carboplatin -16 atezolizumab completed 5/29/2025 6/11/2025 PET-enlarged left brachiocephalic vein with surrounding stranding, stable left upper lobe consolidation, decreased left paratracheal subcarinal and paraesophageal lymphadenopathy other than a 1.2 cm low right paratracheal lymph node previously 0.9 cm, new mildly hypermetabolic right hilar lymph node SUV 3.1, resolution of left adrenal nodule      *Pancytopenia secondary to chemotherapy:   Improving-WBC 12.4 hemoglobin 11.3 platelets 200    *Immune mediated colitis  Patient treated with high-dose steroids and taper, now off steroid therapy  No recurrence of diarrhea off steroid therapy    *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  Neuropathy symptoms stable on current chemotherapy    *MRI brain 9/11/23-2 mm enhancement left cerebellar hemisphere likely vessel artifact; MRI brain 11/27/2023-no evidence of metastatic disease, small focus of enhancement left inferior cerebellar region unchanged likely benign; MRI brain 7/24/2024-no evidence of disease; MRI brain 1/29/25 negative; MRI brain 6/12/2025 negative       *Comorbidities-hyperlipidemia    *Left upper extremity thrombosis  PET scan 6/11/2025-noted enlarged left brachiocephalic vein with surrounding stranding  6/12/2025 CT angiogram chest-no PE, left subclavian and  brachiocephalic veins poorly opacified  Patient currently on Eliquis 5 mg every 12 hours (thrombocytopenia)    *elevated TSH 8.46/normal T4    PLAN:  Continue maintenance atezolizumab every 3 weeks; plan to repeat imaging 3 months  Neptalior TSH  Continue Eliquis  5 mg every 12 hours  3-week lab atezolizumab; 6-week lab practitioner atezolizumab

## 2025-08-19 ENCOUNTER — INFUSION (OUTPATIENT)
Dept: ONCOLOGY | Facility: HOSPITAL | Age: 70
End: 2025-08-19
Payer: MEDICARE

## 2025-08-19 VITALS
BODY MASS INDEX: 24.28 KG/M2 | SYSTOLIC BLOOD PRESSURE: 138 MMHG | RESPIRATION RATE: 18 BRPM | WEIGHT: 169.6 LBS | TEMPERATURE: 98 F | DIASTOLIC BLOOD PRESSURE: 72 MMHG | OXYGEN SATURATION: 98 % | HEIGHT: 70 IN | HEART RATE: 86 BPM

## 2025-08-19 DIAGNOSIS — Z79.69 NEED FOR PROPHYLACTIC CHEMOTHERAPY: ICD-10-CM

## 2025-08-19 DIAGNOSIS — Z45.2 FITTING AND ADJUSTMENT OF VASCULAR CATHETER: ICD-10-CM

## 2025-08-19 DIAGNOSIS — Z79.899 HIGH RISK MEDICATION USE: ICD-10-CM

## 2025-08-19 DIAGNOSIS — C34.92 PRIMARY SQUAMOUS CELL CARCINOMA OF LUNG, LEFT: Primary | ICD-10-CM

## 2025-08-19 LAB
ALBUMIN SERPL-MCNC: 4.1 G/DL (ref 3.5–5.2)
ALBUMIN/GLOB SERPL: 1.5 G/DL
ALP SERPL-CCNC: 42 U/L (ref 39–117)
ALT SERPL W P-5'-P-CCNC: 15 U/L (ref 1–41)
ANION GAP SERPL CALCULATED.3IONS-SCNC: 9.8 MMOL/L (ref 5–15)
AST SERPL-CCNC: 16 U/L (ref 1–40)
BASOPHILS # BLD AUTO: 0.02 10*3/MM3 (ref 0–0.2)
BASOPHILS NFR BLD AUTO: 0.3 % (ref 0–1.5)
BILIRUB SERPL-MCNC: 0.2 MG/DL (ref 0–1.2)
BUN SERPL-MCNC: 22.1 MG/DL (ref 8–23)
BUN/CREAT SERPL: 29.9 (ref 7–25)
CALCIUM SPEC-SCNC: 9.2 MG/DL (ref 8.6–10.5)
CHLORIDE SERPL-SCNC: 103 MMOL/L (ref 98–107)
CO2 SERPL-SCNC: 24.2 MMOL/L (ref 22–29)
CREAT SERPL-MCNC: 0.74 MG/DL (ref 0.76–1.27)
DEPRECATED RDW RBC AUTO: 52.8 FL (ref 37–54)
EGFRCR SERPLBLD CKD-EPI 2021: 98.7 ML/MIN/1.73
EOSINOPHIL # BLD AUTO: 0.09 10*3/MM3 (ref 0–0.4)
EOSINOPHIL NFR BLD AUTO: 1.3 % (ref 0.3–6.2)
ERYTHROCYTE [DISTWIDTH] IN BLOOD BY AUTOMATED COUNT: 16.1 % (ref 12.3–15.4)
GLOBULIN UR ELPH-MCNC: 2.8 GM/DL
GLUCOSE SERPL-MCNC: 156 MG/DL (ref 65–99)
HCT VFR BLD AUTO: 37.8 % (ref 37.5–51)
HGB BLD-MCNC: 11.7 G/DL (ref 13–17.7)
IMM GRANULOCYTES # BLD AUTO: 0.02 10*3/MM3 (ref 0–0.05)
IMM GRANULOCYTES NFR BLD AUTO: 0.3 % (ref 0–0.5)
LYMPHOCYTES # BLD AUTO: 1.67 10*3/MM3 (ref 0.7–3.1)
LYMPHOCYTES NFR BLD AUTO: 23.6 % (ref 19.6–45.3)
MCH RBC QN AUTO: 27.9 PG (ref 26.6–33)
MCHC RBC AUTO-ENTMCNC: 31 G/DL (ref 31.5–35.7)
MCV RBC AUTO: 90 FL (ref 79–97)
MONOCYTES # BLD AUTO: 0.53 10*3/MM3 (ref 0.1–0.9)
MONOCYTES NFR BLD AUTO: 7.5 % (ref 5–12)
NEUTROPHILS NFR BLD AUTO: 4.76 10*3/MM3 (ref 1.7–7)
NEUTROPHILS NFR BLD AUTO: 67 % (ref 42.7–76)
NRBC BLD AUTO-RTO: 0 /100 WBC (ref 0–0.2)
PLATELET # BLD AUTO: 162 10*3/MM3 (ref 140–450)
PMV BLD AUTO: 9.4 FL (ref 6–12)
POTASSIUM SERPL-SCNC: 4 MMOL/L (ref 3.5–5.2)
PROT SERPL-MCNC: 6.9 G/DL (ref 6–8.5)
RBC # BLD AUTO: 4.2 10*6/MM3 (ref 4.14–5.8)
SODIUM SERPL-SCNC: 137 MMOL/L (ref 136–145)
WBC NRBC COR # BLD AUTO: 7.09 10*3/MM3 (ref 3.4–10.8)

## 2025-08-19 PROCEDURE — 96413 CHEMO IV INFUSION 1 HR: CPT

## 2025-08-19 PROCEDURE — 25810000003 SODIUM CHLORIDE 0.9 % SOLUTION 250 ML FLEX CONT: Performed by: INTERNAL MEDICINE

## 2025-08-19 PROCEDURE — 25010000002 HEPARIN LOCK FLUSH PER 10 UNITS: Performed by: INTERNAL MEDICINE

## 2025-08-19 PROCEDURE — 80053 COMPREHEN METABOLIC PANEL: CPT | Performed by: INTERNAL MEDICINE

## 2025-08-19 PROCEDURE — 25010000002 ATEZOLIZUMAB 1200 MG/20ML SOLUTION 20 ML VIAL: Performed by: INTERNAL MEDICINE

## 2025-08-19 PROCEDURE — 85025 COMPLETE CBC W/AUTO DIFF WBC: CPT | Performed by: INTERNAL MEDICINE

## 2025-08-19 RX ORDER — SODIUM CHLORIDE 0.9 % (FLUSH) 0.9 %
10 SYRINGE (ML) INJECTION AS NEEDED
OUTPATIENT
Start: 2025-08-19

## 2025-08-19 RX ORDER — HEPARIN SODIUM (PORCINE) LOCK FLUSH IV SOLN 100 UNIT/ML 100 UNIT/ML
500 SOLUTION INTRAVENOUS AS NEEDED
OUTPATIENT
Start: 2025-08-19

## 2025-08-19 RX ORDER — SODIUM CHLORIDE 0.9 % (FLUSH) 0.9 %
10 SYRINGE (ML) INJECTION AS NEEDED
Status: DISCONTINUED | OUTPATIENT
Start: 2025-08-19 | End: 2025-08-19 | Stop reason: HOSPADM

## 2025-08-19 RX ORDER — HEPARIN SODIUM (PORCINE) LOCK FLUSH IV SOLN 100 UNIT/ML 100 UNIT/ML
500 SOLUTION INTRAVENOUS AS NEEDED
Status: DISCONTINUED | OUTPATIENT
Start: 2025-08-19 | End: 2025-08-19 | Stop reason: HOSPADM

## 2025-08-19 RX ADMIN — ATEZOLIZUMAB 1200 MG: 1200 INJECTION, SOLUTION INTRAVENOUS at 14:34

## 2025-08-19 RX ADMIN — HEPARIN 500 UNITS: 100 SYRINGE at 15:16

## 2025-08-19 RX ADMIN — Medication 10 ML: at 15:15

## 2025-08-25 ENCOUNTER — TELEPHONE (OUTPATIENT)
Dept: ONCOLOGY | Facility: CLINIC | Age: 70
End: 2025-08-25
Payer: MEDICARE

## 2025-08-25 DIAGNOSIS — Z79.899 HIGH RISK MEDICATION USE: ICD-10-CM

## 2025-08-25 DIAGNOSIS — C34.92 PRIMARY SQUAMOUS CELL CARCINOMA OF LUNG, LEFT: ICD-10-CM

## 2025-08-25 DIAGNOSIS — Z79.69 NEED FOR PROPHYLACTIC CHEMOTHERAPY: Primary | ICD-10-CM

## 2025-08-25 RX ORDER — LEVOFLOXACIN 250 MG/1
500 TABLET, FILM COATED ORAL DAILY
Qty: 14 TABLET | Refills: 0 | Status: SHIPPED | OUTPATIENT
Start: 2025-08-25

## 2025-08-26 RX ORDER — APIXABAN 5 MG/1
5 TABLET, FILM COATED ORAL 2 TIMES DAILY
Qty: 60 TABLET | Refills: 2 | Status: SHIPPED | OUTPATIENT
Start: 2025-08-26

## 2025-08-29 ENCOUNTER — HOSPITAL ENCOUNTER (OUTPATIENT)
Dept: CT IMAGING | Facility: HOSPITAL | Age: 70
Discharge: HOME | End: 2025-08-29
Payer: MEDICARE

## 2025-08-29 DIAGNOSIS — Z79.69 NEED FOR PROPHYLACTIC CHEMOTHERAPY: ICD-10-CM

## 2025-08-29 DIAGNOSIS — C34.92 PRIMARY SQUAMOUS CELL CARCINOMA OF LUNG, LEFT: ICD-10-CM

## 2025-08-29 DIAGNOSIS — Z79.899 HIGH RISK MEDICATION USE: ICD-10-CM

## 2025-08-29 PROCEDURE — 71260 CT THORAX DX C+: CPT

## 2025-08-29 PROCEDURE — 25510000001 IOPAMIDOL PER 1 ML: Performed by: INTERNAL MEDICINE

## 2025-08-29 RX ORDER — IOPAMIDOL 755 MG/ML
100 INJECTION, SOLUTION INTRAVASCULAR
Status: COMPLETED | OUTPATIENT
Start: 2025-08-29 | End: 2025-08-29

## 2025-08-29 RX ADMIN — IOPAMIDOL 100 ML: 755 INJECTION, SOLUTION INTRAVENOUS at 10:54

## (undated) DEVICE — FRCP BX RADJAW4 NDL 2.8 240CM LG OG BX40

## (undated) DEVICE — VLV SXN ENDO DISP STRL

## (undated) DEVICE — SOL ANTISTICK CAUTRY ELECTROLUBE LF

## (undated) DEVICE — CATHETER,URETHRAL,REDRUBBER,STRL,12FR: Brand: MEDLINE INDUSTRIES, INC.

## (undated) DEVICE — ADAPT CLN BIOGUARD AIR/H2O DISP

## (undated) DEVICE — KT CLN CLEANOR SCPE

## (undated) DEVICE — MSK AIRWY LARYNG LMA PILOT SZ4

## (undated) DEVICE — INTENDED FOR TISSUE SEPARATION, AND OTHER PROCEDURES THAT REQUIRE A SHARP SURGICAL BLADE TO PUNCTURE OR CUT.: Brand: BARD-PARKER ® CARBON RIB-BACK BLADES

## (undated) DEVICE — KT ORCA ORCAPOD DISP STRL

## (undated) DEVICE — TB PROSHIELD PROTECT PLSTC CLR

## (undated) DEVICE — TROC BLADLES ANCHORPORT/OPTI LP 12X120MM 1P/U

## (undated) DEVICE — ADAPT SWVL FIBROPTIC BRONCH

## (undated) DEVICE — ADHS SKIN SURG TISS VISC PREMIERPRO EXOFIN HI/VISC FAST/DRY

## (undated) DEVICE — ELECTRD BLD EZ CLN MOD XLNG 2.75IN

## (undated) DEVICE — ANTIBACTERIAL UNDYED BRAIDED (POLYGLACTIN 910), SYNTHETIC ABSORBABLE SUTURE: Brand: COATED VICRYL

## (undated) DEVICE — APPL CHLORAPREP HI/LITE 26ML ORNG

## (undated) DEVICE — BIOPSY NEEDLE, 21G: Brand: FLEXISION

## (undated) DEVICE — SUT PROLN 2/0 SH 36IN 8523H

## (undated) DEVICE — NDL HYPO ECLPS SFTY 22G 1 1/2IN

## (undated) DEVICE — 24 FR STRAIGHT – SOFT PVC CATHETER: Brand: PVC THORACIC CATHETERS

## (undated) DEVICE — LAG MINOR PROCEDURE: Brand: MEDLINE INDUSTRIES, INC.

## (undated) DEVICE — Device: Brand: TISSUE RETRIEVAL SYSTEM

## (undated) DEVICE — NDL HYPO PRECISIONGLIDE REG 25G 1 1/2

## (undated) DEVICE — Device: Brand: SINGLE USE ASPIRATION NEEDLE NA-U401SX

## (undated) DEVICE — TBG INSUFFLATION LUER LOCK: Brand: MEDLINE INDUSTRIES, INC.

## (undated) DEVICE — FRCP BIOP SUPER TRAX 1.7MM 110CM

## (undated) DEVICE — TP APPL SPRY EXT PROGEL 11IN

## (undated) DEVICE — SINGLE USE SUCTION VALVE MAJ-209: Brand: SINGLE USE SUCTION VALVE (STERILE)

## (undated) DEVICE — SENSR O2 OXIMAX FNGR A/ 18IN NONSTR

## (undated) DEVICE — PENCL ES MEGADINE EZ/CLEAN BUTN W/HOLSTR 10FT

## (undated) DEVICE — 3M™ STERI-DRAPE™ INSTRUMENT POUCH 1018L: Brand: STERI-DRAPE™

## (undated) DEVICE — SUREFORM 45 CURVED-TIP: Brand: SUREFORM

## (undated) DEVICE — GLV SURG SENSICARE PI MIC PF SZ8 LF STRL

## (undated) DEVICE — STAPLER SHEATH: Brand: ENDOWRIST

## (undated) DEVICE — TIP COVER ACCESSORY

## (undated) DEVICE — THE SINGLE USE ETRAP – POLYP TRAP IS USED FOR SUCTION RETRIEVAL OF ENDOSCOPICALLY REMOVED POLYPS.: Brand: ETRAP

## (undated) DEVICE — SNAR POLYP CAPTIFLX MICRO OVL 13MM 240CM

## (undated) DEVICE — SWIVEL CONNECTOR

## (undated) DEVICE — DRP C/ARM 41X74IN

## (undated) DEVICE — KIT,ANTI FOG,W/SPONGE & FLUID,SOFT PACK: Brand: MEDLINE

## (undated) DEVICE — JACKT LAB F/R KNIT CUFF/COLR XLG BLU

## (undated) DEVICE — EXOFIN PRECISION PEN HIGH VISCOSITY TOPICAL SKIN ADHESIVE: Brand: EXOFIN PRECISION PEN, 1G

## (undated) DEVICE — GLV SURG SIGNATURE ESSENTIAL PF LTX SZ8

## (undated) DEVICE — BLCK/BITE BLOX W/DENTL/RIM W/STRAP 54F

## (undated) DEVICE — SPNG LAP CIGARETTE KITTNER 5MM STRL PK/5

## (undated) DEVICE — MSK PROC CURAPLEX O2 2/ADAPT 7FT

## (undated) DEVICE — LARGE BORE STOPCOCK WITH EXTENSION SET, MALE LUER LOCK ADAPTER WITH RETRACTABLE COLLAR

## (undated) DEVICE — SOL NACL 0.9PCT 1000ML

## (undated) DEVICE — VIAL FORMALIN CAP 10P 40ML

## (undated) DEVICE — SINGLE USE BIOPSY VALVE MAJ-210: Brand: SINGLE USE BIOPSY VALVE (STERILE)

## (undated) DEVICE — GLV SURG BIOGEL LTX PF 8

## (undated) DEVICE — SPNG GZ WOVN 4X4IN 12PLY 10/BX STRL

## (undated) DEVICE — SOL IRR H2O BO 1000ML STRL

## (undated) DEVICE — NDL INJ UROL WILLIAMS CYSTO 3.7F 23G 8MM

## (undated) DEVICE — GLV SURG BIOGEL LTX PF 6 1/2

## (undated) DEVICE — PATIENT RETURN ELECTRODE, SINGLE-USE, CONTACT QUALITY MONITORING, ADULT, WITH 9FT CORD, FOR PATIENTS WEIGING OVER 33LBS. (15KG): Brand: MEGADYNE

## (undated) DEVICE — CVR PROB GEN PURP W ISOSILK 6X48

## (undated) DEVICE — SINGLE USE ASPIRATION NEEDLE: Brand: SINGLE USE ASPIRATION NEEDLE

## (undated) DEVICE — TUBING, SUCTION, 1/4" X 10', STRAIGHT: Brand: MEDLINE

## (undated) DEVICE — LOU THORACIC: Brand: MEDLINE INDUSTRIES, INC.

## (undated) DEVICE — DBD-DRAPE,LAP,CHOLE,W/TROUGHS,STERILE: Brand: MEDLINE

## (undated) DEVICE — EXTENSION SET, MALE LUER LOCK ADAPTER WITH RETRACTABLE COLLAR

## (undated) DEVICE — SYR LUERLOK 20CC BX/50

## (undated) DEVICE — SYR LL TP 10ML STRL

## (undated) DEVICE — LP VESL MAXI 2.5X1MM RED 2PK

## (undated) DEVICE — 2, DISPOSABLE SUCTION/IRRIGATOR WITH DISPOSABLE TIP: Brand: STRYKEFLOW

## (undated) DEVICE — GLV SURG SENSICARE PI LF PF 6.5

## (undated) DEVICE — SUT MNCRYL PLS ANTIB UD 4/0 PS2 18IN

## (undated) DEVICE — VITAL SIGNS™ JACKSON-REES CIRCUITS: Brand: VITAL SIGNS™

## (undated) DEVICE — THE STERILE LIGHT HANDLE COVER IS USED WITH STERIS SURGICAL LIGHTING AND VISUALIZATION SYSTEMS.

## (undated) DEVICE — SYR LL W/SCALE/MARK 3ML STRL

## (undated) DEVICE — BW-412T DISP COMBO CLEANING BRUSH: Brand: SINGLE USE COMBINATION CLEANING BRUSH

## (undated) DEVICE — SUT ETHIB 0/0 CT1 30IN X424H

## (undated) DEVICE — LINER SURG CANSTR SXN S/RIGD 1500CC

## (undated) DEVICE — GOWN,NON-REINFORCED,SIRUS,SET IN SLV,XXL: Brand: MEDLINE

## (undated) DEVICE — ST TBG AIRSEAL FLTR TRI LUM

## (undated) DEVICE — VISION PROBE ADAPTER AND SUCTION ADAPTER

## (undated) DEVICE — TOTAL TRAY, 16FR 10ML SIL FOLEY, URN: Brand: MEDLINE

## (undated) DEVICE — OASIS DRAIN, SINGLE, INLINE & ATS COMPATIBLE: Brand: OASIS

## (undated) DEVICE — 28 FR STRAIGHT – SOFT PVC CATHETER: Brand: PVC THORACIC CATHETERS

## (undated) DEVICE — Device

## (undated) DEVICE — SEAL

## (undated) DEVICE — ARM DRAPE

## (undated) DEVICE — BLADELESS OBTURATOR: Brand: WECK VISTA

## (undated) DEVICE — LAPAROVUE VISIBILITY SYSTEM LAPAROSCOPIC SOLUTIONS: Brand: LAPAROVUE

## (undated) DEVICE — DECANTER BAG 9": Brand: MEDLINE INDUSTRIES, INC.

## (undated) DEVICE — SUT MNCRYL 4/0 PS2 18 IN

## (undated) DEVICE — MARKR SKIN W/RULR 2TP

## (undated) DEVICE — COLUMN DRAPE

## (undated) DEVICE — VLV PRT BRONCH LIP LTX DISP

## (undated) DEVICE — Device: Brand: BALLOON